# Patient Record
Sex: MALE | Race: WHITE | NOT HISPANIC OR LATINO | Employment: UNEMPLOYED | URBAN - METROPOLITAN AREA
[De-identification: names, ages, dates, MRNs, and addresses within clinical notes are randomized per-mention and may not be internally consistent; named-entity substitution may affect disease eponyms.]

---

## 2018-06-21 ENCOUNTER — HOSPITAL ENCOUNTER (INPATIENT)
Facility: HOSPITAL | Age: 48
LOS: 3 days | Discharge: HOME | End: 2018-06-24
Attending: EMERGENCY MEDICINE | Admitting: INTERNAL MEDICINE

## 2018-06-21 ENCOUNTER — APPOINTMENT (OUTPATIENT)
Dept: ULTRASOUND IMAGING | Facility: HOSPITAL | Age: 48
End: 2018-06-21

## 2018-06-21 ENCOUNTER — APPOINTMENT (OUTPATIENT)
Dept: CT IMAGING | Facility: HOSPITAL | Age: 48
End: 2018-06-21

## 2018-06-21 DIAGNOSIS — R93.2 ABNORMAL GALL BLADDER DIAGNOSTIC IMAGING: ICD-10-CM

## 2018-06-21 DIAGNOSIS — K70.30 ALCOHOLIC CIRRHOSIS OF LIVER WITHOUT ASCITES (HCC): ICD-10-CM

## 2018-06-21 DIAGNOSIS — K92.2 ACUTE UPPER GI BLEED: Primary | ICD-10-CM

## 2018-06-21 PROBLEM — M10.9 GOUT: Status: ACTIVE | Noted: 2018-06-21

## 2018-06-21 PROBLEM — R73.03 PRE-DIABETES: Status: ACTIVE | Noted: 2018-06-21

## 2018-06-21 LAB
ABO GROUP BLD: NORMAL
ALBUMIN SERPL-MCNC: 3.6 G/DL (ref 3.5–5.2)
ALBUMIN/GLOB SERPL: 0.8 G/DL
ALP SERPL-CCNC: 137 U/L (ref 39–117)
ALT SERPL W P-5'-P-CCNC: 29 U/L (ref 1–41)
AMMONIA BLD-SCNC: 69 UMOL/L (ref 16–60)
ANION GAP SERPL CALCULATED.3IONS-SCNC: 12.4 MMOL/L
AST SERPL-CCNC: 90 U/L (ref 1–40)
BASOPHILS # BLD AUTO: 0.02 10*3/MM3 (ref 0–0.2)
BASOPHILS NFR BLD AUTO: 0.3 % (ref 0–1.5)
BILIRUB SERPL-MCNC: 6.9 MG/DL (ref 0.1–1.2)
BILIRUB UR QL STRIP: NEGATIVE
BLD GP AB SCN SERPL QL: NEGATIVE
BUN BLD-MCNC: 3 MG/DL (ref 6–20)
BUN/CREAT SERPL: 5.2 (ref 7–25)
CALCIUM SPEC-SCNC: 9.1 MG/DL (ref 8.6–10.5)
CHLORIDE SERPL-SCNC: 91 MMOL/L (ref 98–107)
CLARITY UR: CLEAR
CO2 SERPL-SCNC: 27.6 MMOL/L (ref 22–29)
COLOR UR: YELLOW
CREAT BLD-MCNC: 0.58 MG/DL (ref 0.76–1.27)
D-LACTATE SERPL-SCNC: 1.5 MMOL/L (ref 0.5–2)
DEPRECATED RDW RBC AUTO: 62.3 FL (ref 37–54)
EOSINOPHIL # BLD AUTO: 0.1 10*3/MM3 (ref 0–0.7)
EOSINOPHIL NFR BLD AUTO: 1.3 % (ref 0.3–6.2)
ERYTHROCYTE [DISTWIDTH] IN BLOOD BY AUTOMATED COUNT: 14.1 % (ref 11.5–14.5)
ETHANOL BLD-MCNC: <10 MG/DL (ref 0–10)
ETHANOL UR QL: <0.01 %
GFR SERPL CREATININE-BSD FRML MDRD: >150 ML/MIN/1.73
GLOBULIN UR ELPH-MCNC: 4.7 GM/DL
GLUCOSE BLD-MCNC: 105 MG/DL (ref 65–99)
GLUCOSE UR STRIP-MCNC: NEGATIVE MG/DL
HAV IGM SERPL QL IA: NORMAL
HBV CORE IGM SERPL QL IA: NORMAL
HBV SURFACE AG SERPL QL IA: NORMAL
HCT VFR BLD AUTO: 32.5 % (ref 40.4–52.2)
HCV AB SER DONR QL: NORMAL
HGB BLD-MCNC: 11 G/DL (ref 13.7–17.6)
HGB UR QL STRIP.AUTO: NEGATIVE
IMM GRANULOCYTES # BLD: 0.03 10*3/MM3 (ref 0–0.03)
IMM GRANULOCYTES NFR BLD: 0.4 % (ref 0–0.5)
INR PPP: 1.89 (ref 0.9–1.1)
IRON 24H UR-MRATE: 49 MCG/DL (ref 59–158)
IRON SATN MFR SERPL: 37 % (ref 20–50)
KETONES UR QL STRIP: NEGATIVE
LEUKOCYTE ESTERASE UR QL STRIP.AUTO: NEGATIVE
LIPASE SERPL-CCNC: 45 U/L (ref 13–60)
LYMPHOCYTES # BLD AUTO: 1.06 10*3/MM3 (ref 0.9–4.8)
LYMPHOCYTES NFR BLD AUTO: 13.5 % (ref 19.6–45.3)
MACROCYTES BLD QL SMEAR: NORMAL
MCH RBC QN AUTO: 41 PG (ref 27–32.7)
MCHC RBC AUTO-ENTMCNC: 33.8 G/DL (ref 32.6–36.4)
MCV RBC AUTO: 121.3 FL (ref 79.8–96.2)
MONOCYTES # BLD AUTO: 0.94 10*3/MM3 (ref 0.2–1.2)
MONOCYTES NFR BLD AUTO: 11.9 % (ref 5–12)
NEUTROPHILS # BLD AUTO: 5.73 10*3/MM3 (ref 1.9–8.1)
NEUTROPHILS NFR BLD AUTO: 72.6 % (ref 42.7–76)
NITRITE UR QL STRIP: NEGATIVE
PH UR STRIP.AUTO: 6.5 [PH] (ref 5–8)
PLAT MORPH BLD: NORMAL
PLATELET # BLD AUTO: 110 10*3/MM3 (ref 140–500)
PMV BLD AUTO: 9.8 FL (ref 6–12)
POTASSIUM BLD-SCNC: 3.5 MMOL/L (ref 3.5–5.2)
PROCALCITONIN SERPL-MCNC: 0.15 NG/ML (ref 0.1–0.25)
PROT SERPL-MCNC: 8.3 G/DL (ref 6–8.5)
PROT UR QL STRIP: NEGATIVE
PROTHROMBIN TIME: 21.4 SECONDS (ref 11.7–14.2)
RBC # BLD AUTO: 2.68 10*6/MM3 (ref 4.6–6)
RH BLD: POSITIVE
SODIUM BLD-SCNC: 131 MMOL/L (ref 136–145)
SP GR UR STRIP: <1.005 (ref 1–1.03)
T&S EXPIRATION DATE: NORMAL
TIBC SERPL-MCNC: 134 MCG/DL (ref 298–536)
TRANSFERRIN SERPL-MCNC: 90 MG/DL (ref 200–360)
UROBILINOGEN UR QL STRIP: ABNORMAL
WBC MORPH BLD: NORMAL
WBC NRBC COR # BLD: 7.88 10*3/MM3 (ref 4.5–10.7)

## 2018-06-21 PROCEDURE — 85007 BL SMEAR W/DIFF WBC COUNT: CPT | Performed by: NURSE PRACTITIONER

## 2018-06-21 PROCEDURE — 81003 URINALYSIS AUTO W/O SCOPE: CPT | Performed by: NURSE PRACTITIONER

## 2018-06-21 PROCEDURE — 80307 DRUG TEST PRSMV CHEM ANLYZR: CPT | Performed by: NURSE PRACTITIONER

## 2018-06-21 PROCEDURE — 85610 PROTHROMBIN TIME: CPT | Performed by: NURSE PRACTITIONER

## 2018-06-21 PROCEDURE — 25010000002 THIAMINE PER 100 MG: Performed by: EMERGENCY MEDICINE

## 2018-06-21 PROCEDURE — 80307 DRUG TEST PRSMV CHEM ANLYZR: CPT | Performed by: INTERNAL MEDICINE

## 2018-06-21 PROCEDURE — 86850 RBC ANTIBODY SCREEN: CPT | Performed by: EMERGENCY MEDICINE

## 2018-06-21 PROCEDURE — 82390 ASSAY OF CERULOPLASMIN: CPT | Performed by: EMERGENCY MEDICINE

## 2018-06-21 PROCEDURE — 85025 COMPLETE CBC W/AUTO DIFF WBC: CPT | Performed by: NURSE PRACTITIONER

## 2018-06-21 PROCEDURE — 99284 EMERGENCY DEPT VISIT MOD MDM: CPT

## 2018-06-21 PROCEDURE — 82140 ASSAY OF AMMONIA: CPT | Performed by: EMERGENCY MEDICINE

## 2018-06-21 PROCEDURE — 76705 ECHO EXAM OF ABDOMEN: CPT

## 2018-06-21 PROCEDURE — 80053 COMPREHEN METABOLIC PANEL: CPT | Performed by: NURSE PRACTITIONER

## 2018-06-21 PROCEDURE — 80074 ACUTE HEPATITIS PANEL: CPT | Performed by: EMERGENCY MEDICINE

## 2018-06-21 PROCEDURE — 84145 PROCALCITONIN (PCT): CPT | Performed by: EMERGENCY MEDICINE

## 2018-06-21 PROCEDURE — 25010000002 IOPAMIDOL 61 % SOLUTION: Performed by: EMERGENCY MEDICINE

## 2018-06-21 PROCEDURE — 85018 HEMOGLOBIN: CPT | Performed by: HOSPITALIST

## 2018-06-21 PROCEDURE — 25010000002 PYRIDOXINE PER 100 MG: Performed by: EMERGENCY MEDICINE

## 2018-06-21 PROCEDURE — 25010000002 ONDANSETRON PER 1 MG: Performed by: NURSE PRACTITIONER

## 2018-06-21 PROCEDURE — 25010000002 VITAMIN K1 PER 1 MG: Performed by: EMERGENCY MEDICINE

## 2018-06-21 PROCEDURE — 83605 ASSAY OF LACTIC ACID: CPT | Performed by: EMERGENCY MEDICINE

## 2018-06-21 PROCEDURE — 83690 ASSAY OF LIPASE: CPT | Performed by: NURSE PRACTITIONER

## 2018-06-21 PROCEDURE — 83540 ASSAY OF IRON: CPT | Performed by: EMERGENCY MEDICINE

## 2018-06-21 PROCEDURE — 36415 COLL VENOUS BLD VENIPUNCTURE: CPT | Performed by: HOSPITALIST

## 2018-06-21 PROCEDURE — 74177 CT ABD & PELVIS W/CONTRAST: CPT

## 2018-06-21 PROCEDURE — 85014 HEMATOCRIT: CPT | Performed by: HOSPITALIST

## 2018-06-21 PROCEDURE — 84466 ASSAY OF TRANSFERRIN: CPT | Performed by: EMERGENCY MEDICINE

## 2018-06-21 PROCEDURE — 86901 BLOOD TYPING SEROLOGIC RH(D): CPT | Performed by: EMERGENCY MEDICINE

## 2018-06-21 PROCEDURE — 86900 BLOOD TYPING SEROLOGIC ABO: CPT | Performed by: EMERGENCY MEDICINE

## 2018-06-21 RX ORDER — ONDANSETRON 4 MG/1
4 TABLET, ORALLY DISINTEGRATING ORAL EVERY 6 HOURS PRN
Status: DISCONTINUED | OUTPATIENT
Start: 2018-06-21 | End: 2018-06-24 | Stop reason: HOSPADM

## 2018-06-21 RX ORDER — SODIUM CHLORIDE 9 MG/ML
100 INJECTION, SOLUTION INTRAVENOUS CONTINUOUS
Status: DISCONTINUED | OUTPATIENT
Start: 2018-06-21 | End: 2018-06-23

## 2018-06-21 RX ORDER — NITROGLYCERIN 0.4 MG/1
0.4 TABLET SUBLINGUAL
Status: DISCONTINUED | OUTPATIENT
Start: 2018-06-21 | End: 2018-06-24 | Stop reason: HOSPADM

## 2018-06-21 RX ORDER — ONDANSETRON 4 MG/1
4 TABLET, FILM COATED ORAL EVERY 6 HOURS PRN
Status: DISCONTINUED | OUTPATIENT
Start: 2018-06-21 | End: 2018-06-24 | Stop reason: HOSPADM

## 2018-06-21 RX ORDER — DIPHENOXYLATE HYDROCHLORIDE AND ATROPINE SULFATE 2.5; .025 MG/1; MG/1
1 TABLET ORAL DAILY
Status: DISCONTINUED | OUTPATIENT
Start: 2018-06-22 | End: 2018-06-24 | Stop reason: HOSPADM

## 2018-06-21 RX ORDER — ONDANSETRON 2 MG/ML
4 INJECTION INTRAMUSCULAR; INTRAVENOUS ONCE
Status: COMPLETED | OUTPATIENT
Start: 2018-06-21 | End: 2018-06-21

## 2018-06-21 RX ORDER — ALLOPURINOL 300 MG/1
300 TABLET ORAL DAILY PRN
Status: DISCONTINUED | OUTPATIENT
Start: 2018-06-21 | End: 2018-06-24 | Stop reason: HOSPADM

## 2018-06-21 RX ORDER — SODIUM CHLORIDE 0.9 % (FLUSH) 0.9 %
1-10 SYRINGE (ML) INJECTION AS NEEDED
Status: DISCONTINUED | OUTPATIENT
Start: 2018-06-21 | End: 2018-06-24 | Stop reason: HOSPADM

## 2018-06-21 RX ORDER — LORAZEPAM 1 MG/1
1 TABLET ORAL EVERY 6 HOURS PRN
Status: DISCONTINUED | OUTPATIENT
Start: 2018-06-21 | End: 2018-06-24 | Stop reason: HOSPADM

## 2018-06-21 RX ORDER — SODIUM CHLORIDE 0.9 % (FLUSH) 0.9 %
10 SYRINGE (ML) INJECTION AS NEEDED
Status: DISCONTINUED | OUTPATIENT
Start: 2018-06-21 | End: 2018-06-24 | Stop reason: HOSPADM

## 2018-06-21 RX ORDER — ONDANSETRON 2 MG/ML
4 INJECTION INTRAMUSCULAR; INTRAVENOUS EVERY 6 HOURS PRN
Status: DISCONTINUED | OUTPATIENT
Start: 2018-06-21 | End: 2018-06-24 | Stop reason: HOSPADM

## 2018-06-21 RX ORDER — THIAMINE MONONITRATE (VIT B1) 100 MG
100 TABLET ORAL DAILY
Status: DISCONTINUED | OUTPATIENT
Start: 2018-06-22 | End: 2018-06-24 | Stop reason: HOSPADM

## 2018-06-21 RX ORDER — ACETAMINOPHEN 325 MG/1
650 TABLET ORAL EVERY 4 HOURS PRN
Status: DISCONTINUED | OUTPATIENT
Start: 2018-06-21 | End: 2018-06-24 | Stop reason: HOSPADM

## 2018-06-21 RX ADMIN — SODIUM CHLORIDE 1000 ML: 9 INJECTION, SOLUTION INTRAVENOUS at 19:43

## 2018-06-21 RX ADMIN — FOLIC ACID 125 ML/HR: 5 INJECTION, SOLUTION INTRAMUSCULAR; INTRAVENOUS; SUBCUTANEOUS at 20:59

## 2018-06-21 RX ADMIN — IOPAMIDOL 85 ML: 612 INJECTION, SOLUTION INTRAVENOUS at 20:21

## 2018-06-21 RX ADMIN — PHYTONADIONE 5 MG: 10 INJECTION, EMULSION INTRAMUSCULAR; INTRAVENOUS; SUBCUTANEOUS at 22:11

## 2018-06-21 RX ADMIN — ONDANSETRON 4 MG: 2 INJECTION INTRAMUSCULAR; INTRAVENOUS at 19:44

## 2018-06-21 NOTE — ED PROVIDER NOTES
" EMERGENCY DEPARTMENT ENCOUNTER    CHIEF COMPLAINT  Chief Complaint: Hematemesis  History given by: Pt  History limited by: Nothing  Room Number: 19/19  PMD: No Known Provider      HPI:  Pt is a 48 y.o. male who presents complaining of vomiting blood (x3-4), which is streaked with blood, and began 2 days ago. The pt denies abd pain or black/tarry stool, and confirms mild fever. The pt stated that he vomited 1 week ago but without bleeding. The pt has never had this kind of vomiting before. The pt states that his symptoms are worse at night and in the morning.     Duration:  2 days  Onset: Gradual  Timing: Intermittent  Quality: Streaked with blood  Intensity/Severity: Moderate  Progression: No change  Associated Symptoms: Mild fever  Aggravating Factors: Unknown  Alleviating Factors: Unknown  Previous Episodes: None  Treatment before arrival: Nonw    PAST MEDICAL HISTORY  Active Ambulatory Problems     Diagnosis Date Noted   • No Active Ambulatory Problems     Resolved Ambulatory Problems     Diagnosis Date Noted   • No Resolved Ambulatory Problems     Past Medical History:   Diagnosis Date   • Gout    • Pre-diabetes        PAST SURGICAL HISTORY  Past Surgical History:   Procedure Laterality Date   • FOOT SURGERY         FAMILY HISTORY  History reviewed. No pertinent family history.    SOCIAL HISTORY  Social History     Social History   • Marital status:      Spouse name: N/A   • Number of children: N/A   • Years of education: N/A     Occupational History   • Not on file.     Social History Main Topics   • Smoking status: Never Smoker   • Smokeless tobacco: Not on file   • Alcohol use Yes      Comment: \"I normally drink on the weekends\".    • Drug use: Unknown   • Sexual activity: Not on file     Other Topics Concern   • Not on file     Social History Narrative   • No narrative on file       ALLERGIES  Patient has no known allergies.    REVIEW OF SYSTEMS  Review of Systems   Constitutional: Positive for " fever. Negative for activity change and appetite change.   HENT: Negative for congestion and sore throat.    Eyes: Negative.    Respiratory: Negative for cough and shortness of breath.    Cardiovascular: Negative for chest pain and leg swelling.   Gastrointestinal: Negative for abdominal pain, blood in stool, diarrhea and vomiting.        Hematemesis   Endocrine: Negative.    Genitourinary: Negative for decreased urine volume and dysuria.   Musculoskeletal: Negative for neck pain.   Skin: Negative for rash and wound.   Allergic/Immunologic: Negative.    Neurological: Negative for weakness, numbness and headaches.   Hematological: Negative.    Psychiatric/Behavioral: Negative.    All other systems reviewed and are negative.      PHYSICAL EXAM  ED Triage Vitals   Temp Heart Rate Resp BP SpO2   06/21/18 1850 06/21/18 1850 06/21/18 1850 06/21/18 1854 06/21/18 1850   97.8 °F (36.6 °C) 101 14 145/87 99 %      Temp src Heart Rate Source Patient Position BP Location FiO2 (%)   06/21/18 1850 06/21/18 1850 06/21/18 1854 06/21/18 1854 --   Tympanic Monitor Sitting Right arm        Physical Exam   Constitutional: He is oriented to person, place, and time. No distress.   HENT:   Head: Normocephalic and atraumatic.   Eyes: EOM are normal. Pupils are equal, round, and reactive to light.   Neck: Normal range of motion. Neck supple.   Cardiovascular: Normal rate, regular rhythm and normal heart sounds.    Pulmonary/Chest: Effort normal and breath sounds normal. No respiratory distress.   Abdominal: Soft. There is hepatomegaly. There is no tenderness. There is no rebound and no guarding.   Hepatomegaly on exam, liver is hard and palpable   Musculoskeletal: Normal range of motion. He exhibits no edema.   Neurological: He is alert and oriented to person, place, and time. He has normal sensation and normal strength.   Skin: Skin is warm and dry.   Psychiatric: Mood and affect normal.   Nursing note and vitals reviewed.      LAB  RESULTS  Lab Results (last 24 hours)     Procedure Component Value Units Date/Time    CBC & Differential [[id number]] Collected:  06/21/18 1906    Specimen:  Blood Updated:  06/21/18 1955    Narrative:       The following orders were created for panel order CBC & Differential.  Procedure                               Abnormality         Status                     ---------                               -----------         ------                     Scan Slide[[id number]]                                       Final result               CBC Auto Differential[[id number]]        Abnormal            Final result                 Please view results for these tests on the individual orders.    Comprehensive Metabolic Panel [[id number]]  (Abnormal) Collected:  06/21/18 1906    Specimen:  Blood Updated:  06/21/18 1944     Glucose 105 (H) mg/dL      BUN 3 (L) mg/dL      Creatinine 0.58 (L) mg/dL      Sodium 131 (L) mmol/L      Potassium 3.5 mmol/L      Chloride 91 (L) mmol/L      CO2 27.6 mmol/L      Calcium 9.1 mg/dL      Total Protein 8.3 g/dL      Albumin 3.60 g/dL      ALT (SGPT) 29 U/L      AST (SGOT) 90 (H) U/L      Alkaline Phosphatase 137 (H) U/L      Total Bilirubin 6.9 (H) mg/dL      eGFR Non African Amer >150 mL/min/1.73      Globulin 4.7 gm/dL      A/G Ratio 0.8 g/dL      BUN/Creatinine Ratio 5.2 (L)     Anion Gap 12.4 mmol/L     Lipase [[id number]]  (Normal) Collected:  06/21/18 1906    Specimen:  Blood Updated:  06/21/18 1938     Lipase 45 U/L     Protime-INR [[id number]]  (Abnormal) Collected:  06/21/18 1906    Specimen:  Blood Updated:  06/21/18 1937     Protime 21.4 (H) Seconds      INR 1.89 (H)    CBC Auto Differential [[id number]]  (Abnormal) Collected:  06/21/18 1906    Specimen:  Blood Updated:  06/21/18 1955     WBC 7.88 10*3/mm3      RBC 2.68 (L) 10*6/mm3      Hemoglobin 11.0 (L) g/dL      Hematocrit 32.5 (L) %      .3 (H) fL      MCH 41.0 (H) pg      MCHC 33.8 g/dL      RDW 14.1 %      RDW-SD 62.3 (H)  "fl      MPV 9.8 fL      Platelets 110 (L) 10*3/mm3      Neutrophil % 72.6 %      Lymphocyte % 13.5 (L) %      Monocyte % 11.9 %      Eosinophil % 1.3 %      Basophil % 0.3 %      Immature Grans % 0.4 %      Neutrophils, Absolute 5.73 10*3/mm3      Lymphocytes, Absolute 1.06 10*3/mm3      Monocytes, Absolute 0.94 10*3/mm3      Eosinophils, Absolute 0.10 10*3/mm3      Basophils, Absolute 0.02 10*3/mm3      Immature Grans, Absolute 0.03 10*3/mm3     Ethanol [[id number]] Collected:  06/21/18 1906    Specimen:  Blood Updated:  06/21/18 1938     Ethanol <10 mg/dL      Ethanol % <0.010 %     Scan Slide [[id number]] Collected:  06/21/18 1906    Specimen:  Blood Updated:  06/21/18 1955     Macrocytes Large/3+     WBC Morphology Normal     Platelet Morphology Normal    Procalcitonin [[id number]]  (Normal) Collected:  06/21/18 1906    Specimen:  Blood Updated:  06/21/18 2040     Procalcitonin 0.15 ng/mL     Narrative:       As a Marker for Sepsis (Non-Neonates):   1. <0.5 ng/mL represents a low risk of severe sepsis and/or septic shock.  1. >2 ng/mL represents a high risk of severe sepsis and/or septic shock.    As a Marker for Lower Respiratory Tract Infections that require antibiotic therapy:  PCT on Admission     Antibiotic Therapy             6-12 Hrs later  > 0.5                Strongly Recommended            >0.25 - <0.5         Recommended  0.1 - 0.25           Discouraged                   Remeasure/reassess PCT  <0.1                 Strongly Discouraged          Remeasure/reassess PCT      As 28 day mortality risk marker: \"Change in Procalcitonin Result\" (> 80 % or <=80 %) if Day 0 (or Day 1) and Day 4 values are available. Refer to http://www.[facility]-pct-calculator.com/   Change in PCT <=80 %   A decrease of PCT levels below or equal to 80 % defines a positive change in PCT test result representing a higher risk for 28-day all-cause mortality of patients diagnosed with severe sepsis or septic shock.  Change in PCT > 80 % "   A decrease of PCT levels of more than 80 % defines a negative change in PCT result representing a lower risk for 28-day all-cause mortality of patients diagnosed with severe sepsis or septic shock.                Iron Profile [[id number]] Collected:  06/21/18 1906    Specimen:  Blood Updated:  06/21/18 2029    Urinalysis With / Microscopic If Indicated (No Culture) - Urine, Clean Catch [[id number]]  (Abnormal) Collected:  06/21/18 1933    Specimen:  Urine from Urine, Clean Catch Updated:  06/21/18 2001     Color, UA Yellow     Appearance, UA Clear     pH, UA 6.5     Specific Gravity, UA <1.005 (L)     Glucose, UA Negative     Ketones, UA Negative     Bilirubin, UA Negative     Blood, UA Negative     Protein, UA Negative     Leuk Esterase, UA Negative     Nitrite, UA Negative     Urobilinogen, UA 1.0 E.U./dL    Narrative:       Urine microscopic not indicated.    Lactic Acid, Plasma [[id number]]  (Normal) Collected:  06/21/18 2014    Specimen:  Blood Updated:  06/21/18 2047     Lactate 1.5 mmol/L     Ceruloplasmin [[id number]] Collected:  06/21/18 2045    Specimen:  Blood Updated:  06/21/18 2049    Hepatitis Panel, Acute [[id number]] Collected:  06/21/18 2045    Specimen:  Blood Updated:  06/21/18 2049          I ordered the above labs and reviewed the results    RADIOLOGY  CT Abdomen Pelvis With Contrast   Final Result       Hepatosplenomegaly. Hepatic steatosis. Portacaval adenopathy,   nonspecific, further evaluation can be obtained.       Sludge or stones in the gallbladder, ultrasound could be considered for   further evaluation.       Minimal pleural effusions. Possible nodule in the lingula, follow-up   recommended.       Discussed by telephone with Dr. Worley at time of interpretation, 2035,   6/21/2018.       This report was finalized on 6/21/2018 8:45 PM by Dr. Rojas Dubon M.D.          US Gallbladder    (Results Pending)        I ordered the above noted radiological studies. Interpreted by  radiologist. Discussed with radiologist (Dr. Griffiths). Reviewed by me in PACS.       PROCEDURES  Procedures      PROGRESS AND CONSULTS  ED Course as of Jun 21 2109   Thu Jun 21, 2018   1905 C/o vomiting blood for the past 3 days.  Pt denies fever, chills, diarrhea, black stools or abdominal pain.  Pt states that he has cut back ETOH to the weekends, previously 4-5 nights a week.  Exam: Sclera icterus, lungs clear, abd soft, non tender, + BS  [MS]   2011 CT Angiogram Chest With Contrast []   2012 CT Angiogram Chest With Contrast []      ED Course User Index  [MS] Lizeth Dupont, APRN  [SM] Jaiden Worley MD   2001 Ordered multivitamin for the pt. Ordered CT abd/pelvis for further evaluation.    2022 Ordered ceruloplasmin, hepatitis panel, and iron profile for further evaluation.    2048 Placed call to [facility] for admission.     2049 Ordered US gallbladder for further evaluation.     2052 Rechecked the patient and he is resting comfortably. Discussed pertinent lab and imaging results, including CT abd pelvis and labs. Discussed that the pt has early cirrhosis, and will need to completely stop drinking alcohol. Discussed the plan to admit the pt to the hospital for GI evaluation. Patient agrees with plan and all questions were addressed.     2100 Discussed the pt with Dr. Miles ([facility]) who agrees to admit the pt    MEDICAL DECISION MAKING  Results were reviewed/discussed with the patient and they were also made aware of online access. Pt also made aware that some labs, such as cultures, will not be resulted during ER visit and follow up with PMD is necessary.     MDM  Number of Diagnoses or Management Options  Abnormal gall bladder diagnostic imaging:   Acute upper GI bleed:   Alcoholic cirrhosis of liver without ascites:      Amount and/or Complexity of Data Reviewed  Clinical lab tests: reviewed and ordered (BUN: 3, Sodium: 131, AST: 90, alkaline phosphatase: 137, total bilirubin: 6.9)  Tests in the radiology  section of CPT®: ordered and reviewed (CT abd/pelvis: hepatosplenomegaly, hepatic steatosis, sludge in gallbladder, possible nodule in lingula)  Discussion of test results with the performing providers: yes (Dr. Dubon (radiology))  Decide to obtain previous medical records or to obtain history from someone other than the patient: yes  Discuss the patient with other providers: yes (Dr. Miles ([facility]))    Patient Progress  Patient progress: stable         DIAGNOSIS  Final diagnoses:   Acute upper GI bleed   Alcoholic cirrhosis of liver without ascites   Abnormal gall bladder diagnostic imaging       DISPOSITION  ADMISSION    Discussed treatment plan and reason for admission with pt/family and admitting physician.  Pt/family voiced understanding of the plan for admission for further testing/treatment as needed.       Latest Documented Vital Signs:  As of 9:09 PM  BP- 132/80 HR- 98 Temp- 97.8 °F (36.6 °C) (Tympanic) O2 sat- 92%    --  Documentation assistance provided by eun Blake for Dr. Worley.  Information recorded by the scribe was done at my direction and has been verified and validated by me.     Josi Blake  06/21/18 2033       Josi Blake  06/21/18 2110       Jaiden Worley MD  06/22/18 0031

## 2018-06-21 NOTE — ED TRIAGE NOTES
"Pt c/o vomiting bright red bloody streaks that started last night and generalized weakness. Pt denies chest pain, shortness of breath, or dizziness. He also denies any dark colored stool but states \"My urine has been really dark\".   "

## 2018-06-22 ENCOUNTER — ANESTHESIA EVENT (OUTPATIENT)
Dept: GASTROENTEROLOGY | Facility: HOSPITAL | Age: 48
End: 2018-06-22

## 2018-06-22 ENCOUNTER — ANESTHESIA (OUTPATIENT)
Dept: GASTROENTEROLOGY | Facility: HOSPITAL | Age: 48
End: 2018-06-22

## 2018-06-22 PROBLEM — K80.20 GALLSTONES: Status: ACTIVE | Noted: 2018-06-22

## 2018-06-22 PROBLEM — R79.89 ELEVATED LIVER FUNCTION TESTS: Status: ACTIVE | Noted: 2018-06-22

## 2018-06-22 PROBLEM — F10.10 ALCOHOL ABUSE: Status: ACTIVE | Noted: 2018-06-22

## 2018-06-22 PROBLEM — K70.10 ALCOHOLIC HEPATITIS WITHOUT ASCITES: Status: ACTIVE | Noted: 2018-06-22

## 2018-06-22 PROBLEM — D69.6 THROMBOCYTOPENIA (HCC): Status: ACTIVE | Noted: 2018-06-22

## 2018-06-22 LAB
ALBUMIN SERPL-MCNC: 3.2 G/DL (ref 3.5–5.2)
ALBUMIN/GLOB SERPL: 0.8 G/DL
ALP SERPL-CCNC: 112 U/L (ref 39–117)
ALT SERPL W P-5'-P-CCNC: 22 U/L (ref 1–41)
AMPHET+METHAMPHET UR QL: NEGATIVE
ANION GAP SERPL CALCULATED.3IONS-SCNC: 12.2 MMOL/L
AST SERPL-CCNC: 70 U/L (ref 1–40)
BARBITURATES UR QL SCN: NEGATIVE
BASOPHILS # BLD AUTO: 0.01 10*3/MM3 (ref 0–0.2)
BASOPHILS NFR BLD AUTO: 0.2 % (ref 0–1.5)
BENZODIAZ UR QL SCN: NEGATIVE
BILIRUB SERPL-MCNC: 6.2 MG/DL (ref 0.1–1.2)
BUN BLD-MCNC: 2 MG/DL (ref 6–20)
BUN/CREAT SERPL: 3.7 (ref 7–25)
CALCIUM SPEC-SCNC: 8.5 MG/DL (ref 8.6–10.5)
CANNABINOIDS SERPL QL: NEGATIVE
CHLORIDE SERPL-SCNC: 94 MMOL/L (ref 98–107)
CO2 SERPL-SCNC: 25.8 MMOL/L (ref 22–29)
COCAINE UR QL: NEGATIVE
CREAT BLD-MCNC: 0.54 MG/DL (ref 0.76–1.27)
DEPRECATED RDW RBC AUTO: 62.3 FL (ref 37–54)
EOSINOPHIL # BLD AUTO: 0.08 10*3/MM3 (ref 0–0.7)
EOSINOPHIL NFR BLD AUTO: 1.5 % (ref 0.3–6.2)
ERYTHROCYTE [DISTWIDTH] IN BLOOD BY AUTOMATED COUNT: 14.1 % (ref 11.5–14.5)
FERRITIN SERPL-MCNC: 875 NG/ML (ref 30–400)
GFR SERPL CREATININE-BSD FRML MDRD: >150 ML/MIN/1.73
GLOBULIN UR ELPH-MCNC: 4 GM/DL
GLUCOSE BLD-MCNC: 102 MG/DL (ref 65–99)
HCT VFR BLD AUTO: 29.8 % (ref 40.4–52.2)
HCT VFR BLD AUTO: 30.1 % (ref 40.4–52.2)
HCT VFR BLD AUTO: 32.5 % (ref 40.4–52.2)
HGB BLD-MCNC: 10 G/DL (ref 13.7–17.6)
HGB BLD-MCNC: 10.8 G/DL (ref 13.7–17.6)
HGB BLD-MCNC: 9.9 G/DL (ref 13.7–17.6)
IMM GRANULOCYTES # BLD: 0 10*3/MM3 (ref 0–0.03)
IMM GRANULOCYTES NFR BLD: 0 % (ref 0–0.5)
INR PPP: 1.92 (ref 0.9–1.1)
LYMPHOCYTES # BLD AUTO: 0.76 10*3/MM3 (ref 0.9–4.8)
LYMPHOCYTES NFR BLD AUTO: 14.4 % (ref 19.6–45.3)
MCH RBC QN AUTO: 40.1 PG (ref 27–32.7)
MCHC RBC AUTO-ENTMCNC: 32.9 G/DL (ref 32.6–36.4)
MCV RBC AUTO: 121.9 FL (ref 79.8–96.2)
METHADONE UR QL SCN: NEGATIVE
MONOCYTES # BLD AUTO: 0.78 10*3/MM3 (ref 0.2–1.2)
MONOCYTES NFR BLD AUTO: 14.7 % (ref 5–12)
NEUTROPHILS # BLD AUTO: 3.66 10*3/MM3 (ref 1.9–8.1)
NEUTROPHILS NFR BLD AUTO: 69.2 % (ref 42.7–76)
OPIATES UR QL: NEGATIVE
OXYCODONE UR QL SCN: NEGATIVE
PLATELET # BLD AUTO: 92 10*3/MM3 (ref 140–500)
PMV BLD AUTO: 9.9 FL (ref 6–12)
POTASSIUM BLD-SCNC: 3.4 MMOL/L (ref 3.5–5.2)
PROT SERPL-MCNC: 7.2 G/DL (ref 6–8.5)
PROTHROMBIN TIME: 21.6 SECONDS (ref 11.7–14.2)
RBC # BLD AUTO: 2.47 10*6/MM3 (ref 4.6–6)
SODIUM BLD-SCNC: 132 MMOL/L (ref 136–145)
WBC NRBC COR # BLD: 5.29 10*3/MM3 (ref 4.5–10.7)

## 2018-06-22 PROCEDURE — 25010000002 PROPOFOL 10 MG/ML EMULSION: Performed by: NURSE ANESTHETIST, CERTIFIED REGISTERED

## 2018-06-22 PROCEDURE — 99254 IP/OBS CNSLTJ NEW/EST MOD 60: CPT | Performed by: INTERNAL MEDICINE

## 2018-06-22 PROCEDURE — 85025 COMPLETE CBC W/AUTO DIFF WBC: CPT | Performed by: HOSPITALIST

## 2018-06-22 PROCEDURE — 85014 HEMATOCRIT: CPT | Performed by: HOSPITALIST

## 2018-06-22 PROCEDURE — 85018 HEMOGLOBIN: CPT | Performed by: HOSPITALIST

## 2018-06-22 PROCEDURE — 88305 TISSUE EXAM BY PATHOLOGIST: CPT | Performed by: INTERNAL MEDICINE

## 2018-06-22 PROCEDURE — 87081 CULTURE SCREEN ONLY: CPT | Performed by: INTERNAL MEDICINE

## 2018-06-22 PROCEDURE — 82728 ASSAY OF FERRITIN: CPT | Performed by: INTERNAL MEDICINE

## 2018-06-22 PROCEDURE — 43239 EGD BIOPSY SINGLE/MULTIPLE: CPT | Performed by: INTERNAL MEDICINE

## 2018-06-22 PROCEDURE — 80053 COMPREHEN METABOLIC PANEL: CPT | Performed by: HOSPITALIST

## 2018-06-22 PROCEDURE — 88312 SPECIAL STAINS GROUP 1: CPT | Performed by: INTERNAL MEDICINE

## 2018-06-22 PROCEDURE — 85610 PROTHROMBIN TIME: CPT | Performed by: HOSPITALIST

## 2018-06-22 PROCEDURE — 0DB68ZX EXCISION OF STOMACH, VIA NATURAL OR ARTIFICIAL OPENING ENDOSCOPIC, DIAGNOSTIC: ICD-10-PCS | Performed by: INTERNAL MEDICINE

## 2018-06-22 RX ORDER — POTASSIUM CHLORIDE 750 MG/1
40 CAPSULE, EXTENDED RELEASE ORAL ONCE
Status: COMPLETED | OUTPATIENT
Start: 2018-06-22 | End: 2018-06-22

## 2018-06-22 RX ORDER — PROMETHAZINE HYDROCHLORIDE 25 MG/1
25 TABLET ORAL ONCE AS NEEDED
Status: DISCONTINUED | OUTPATIENT
Start: 2018-06-22 | End: 2018-06-22 | Stop reason: HOSPADM

## 2018-06-22 RX ORDER — ZINC SULFATE 50(220)MG
220 CAPSULE ORAL DAILY
Status: DISCONTINUED | OUTPATIENT
Start: 2018-06-22 | End: 2018-06-24 | Stop reason: HOSPADM

## 2018-06-22 RX ORDER — PROMETHAZINE HYDROCHLORIDE 25 MG/ML
12.5 INJECTION, SOLUTION INTRAMUSCULAR; INTRAVENOUS ONCE AS NEEDED
Status: DISCONTINUED | OUTPATIENT
Start: 2018-06-22 | End: 2018-06-22 | Stop reason: HOSPADM

## 2018-06-22 RX ORDER — PROPOFOL 10 MG/ML
VIAL (ML) INTRAVENOUS CONTINUOUS PRN
Status: DISCONTINUED | OUTPATIENT
Start: 2018-06-22 | End: 2018-06-22 | Stop reason: SURG

## 2018-06-22 RX ORDER — SODIUM CHLORIDE, SODIUM LACTATE, POTASSIUM CHLORIDE, CALCIUM CHLORIDE 600; 310; 30; 20 MG/100ML; MG/100ML; MG/100ML; MG/100ML
30 INJECTION, SOLUTION INTRAVENOUS CONTINUOUS
Status: CANCELLED | OUTPATIENT
Start: 2018-06-22

## 2018-06-22 RX ORDER — PROMETHAZINE HYDROCHLORIDE 25 MG/1
25 SUPPOSITORY RECTAL ONCE AS NEEDED
Status: DISCONTINUED | OUTPATIENT
Start: 2018-06-22 | End: 2018-06-22 | Stop reason: HOSPADM

## 2018-06-22 RX ORDER — SODIUM CHLORIDE, SODIUM LACTATE, POTASSIUM CHLORIDE, CALCIUM CHLORIDE 600; 310; 30; 20 MG/100ML; MG/100ML; MG/100ML; MG/100ML
1000 INJECTION, SOLUTION INTRAVENOUS CONTINUOUS
Status: ACTIVE | OUTPATIENT
Start: 2018-06-22 | End: 2018-06-24

## 2018-06-22 RX ORDER — PENTOXIFYLLINE 400 MG/1
400 TABLET, EXTENDED RELEASE ORAL
Status: DISCONTINUED | OUTPATIENT
Start: 2018-06-22 | End: 2018-06-24 | Stop reason: HOSPADM

## 2018-06-22 RX ORDER — TEMAZEPAM 15 MG/1
15 CAPSULE ORAL NIGHTLY PRN
Status: DISCONTINUED | OUTPATIENT
Start: 2018-06-22 | End: 2018-06-24 | Stop reason: HOSPADM

## 2018-06-22 RX ORDER — PROPOFOL 10 MG/ML
VIAL (ML) INTRAVENOUS AS NEEDED
Status: DISCONTINUED | OUTPATIENT
Start: 2018-06-22 | End: 2018-06-22 | Stop reason: SURG

## 2018-06-22 RX ORDER — SODIUM CHLORIDE 0.9 % (FLUSH) 0.9 %
3 SYRINGE (ML) INJECTION AS NEEDED
Status: DISCONTINUED | OUTPATIENT
Start: 2018-06-22 | End: 2018-06-23

## 2018-06-22 RX ORDER — POTASSIUM CHLORIDE 750 MG/1
40 CAPSULE, EXTENDED RELEASE ORAL ONCE
Status: DISCONTINUED | OUTPATIENT
Start: 2018-06-22 | End: 2018-06-22

## 2018-06-22 RX ORDER — PANTOPRAZOLE SODIUM 40 MG/10ML
80 INJECTION, POWDER, LYOPHILIZED, FOR SOLUTION INTRAVENOUS ONCE
Status: COMPLETED | OUTPATIENT
Start: 2018-06-22 | End: 2018-06-22

## 2018-06-22 RX ADMIN — Medication 100 MG: at 07:53

## 2018-06-22 RX ADMIN — PROPOFOL 200 MG: 10 INJECTION, EMULSION INTRAVENOUS at 14:34

## 2018-06-22 RX ADMIN — LORAZEPAM 1 MG: 1 TABLET ORAL at 00:45

## 2018-06-22 RX ADMIN — TEMAZEPAM 15 MG: 15 CAPSULE ORAL at 23:53

## 2018-06-22 RX ADMIN — POTASSIUM CHLORIDE 40 MEQ: 750 CAPSULE, EXTENDED RELEASE ORAL at 17:02

## 2018-06-22 RX ADMIN — SODIUM CHLORIDE 8 MG/HR: 900 INJECTION INTRAVENOUS at 05:50

## 2018-06-22 RX ADMIN — SODIUM CHLORIDE, POTASSIUM CHLORIDE, SODIUM LACTATE AND CALCIUM CHLORIDE 1000 ML: 600; 310; 30; 20 INJECTION, SOLUTION INTRAVENOUS at 13:44

## 2018-06-22 RX ADMIN — SODIUM CHLORIDE, POTASSIUM CHLORIDE, SODIUM LACTATE AND CALCIUM CHLORIDE: 600; 310; 30; 20 INJECTION, SOLUTION INTRAVENOUS at 14:26

## 2018-06-22 RX ADMIN — Medication 1 TABLET: at 07:53

## 2018-06-22 RX ADMIN — SODIUM CHLORIDE 8 MG/HR: 900 INJECTION INTRAVENOUS at 00:45

## 2018-06-22 RX ADMIN — SODIUM CHLORIDE 8 MG/HR: 900 INJECTION INTRAVENOUS at 21:28

## 2018-06-22 RX ADMIN — PENTOXIFYLLINE 400 MG: 400 TABLET, EXTENDED RELEASE ORAL at 16:16

## 2018-06-22 RX ADMIN — PROPOFOL 300 MCG/KG/MIN: 10 INJECTION, EMULSION INTRAVENOUS at 14:34

## 2018-06-22 RX ADMIN — PANTOPRAZOLE SODIUM 80 MG: 40 INJECTION, POWDER, FOR SOLUTION INTRAVENOUS at 00:46

## 2018-06-22 RX ADMIN — SODIUM CHLORIDE 100 ML/HR: 9 INJECTION, SOLUTION INTRAVENOUS at 20:11

## 2018-06-22 RX ADMIN — SODIUM CHLORIDE 100 ML/HR: 9 INJECTION, SOLUTION INTRAVENOUS at 05:01

## 2018-06-22 RX ADMIN — Medication 220 MG: at 16:16

## 2018-06-22 NOTE — PROGRESS NOTES
Attempted to see patient. Chart was read. He is in Endo at this time. [facility] will attempt to consult patient after returning from Endo.

## 2018-06-22 NOTE — PROGRESS NOTES
Discharge Planning Assessment  [facility]     Patient Name: Enrique Ramirez  MRN: [id number]  Today's Date: 6/22/2018    Admit Date: 6/21/2018          Discharge Needs Assessment     Row Name 06/22/18 1012       Living Environment    Lives With spouse    Name(s) of Who Lives With Patient Yessenia Ramirez  (Wife)    Current Living Arrangements home/apartment/condo    Primary Care Provided by self    Provides Primary Care For no one    Family Caregiver if Needed spouse    Family Caregiver Names Yessenia Ramirez    Able to Return to Prior Arrangements yes    Living Arrangement Comments Pt lives in a two story house with walk out basement with wife  (Yessenia Ramirez).       Resource/Environmental Concerns    Resource/Environmental Concerns none    Transportation Concerns car, none       Transition Planning    Patient/Family Anticipates Transition to home with family    Patient/Family Anticipated Services at Transition none    Transportation Anticipated car, drives self;family or friend will provide       Discharge Needs Assessment    Concerns to be Addressed denies needs/concerns at this time    Equipment Currently Used at Home none    Anticipated Changes Related to Illness none    Offered/Gave Vendor List no            Discharge Plan     Row Name 06/22/18 1014       Plan    Plan Plan home with wife.  TIA Crowder    Patient/Family in Agreement with Plan yes    Plan Comments FACE SHEET VERIFIED.  Spoke to pt at bedside.  Pt does not have a PCP.  Pt provided [facility] - pt appointment liaison number and list of [facility]MA MDs. Pt lives in a two story house with wife (Yessenia Ramirez  [phone]).  Pt is independent with ADL's.  Pt does not use any DME's.  Pt gets prescriptions at [facility]  ([address]  Pt denies any issues affording medications.   Pt is not current with HH.  Pt has not been in SNF.  Pt denies any discharge needs.  Plan home with wife.  TIA Crowder        Destination     No service coordination in this encounter.       Durable Medical Equipment     No service coordination in this encounter.      Dialysis/Infusion     No service coordination in this encounter.      Home Medical Care     No service coordination in this encounter.      Social Care     No service coordination in this encounter.                Demographic Summary     Row Name 06/22/18 1012       General Information    Admission Type inpatient    Arrived From emergency department    Referral Source admission list    Reason for Consult discharge planning    Preferred Language English     Used During This Interaction no            Functional Status     Row Name 06/22/18 1012       Functional Status    Usual Activity Tolerance good    Current Activity Tolerance good       Functional Status, IADL    Medications independent    Meal Preparation independent    Housekeeping independent    Laundry independent    Shopping independent       Mental Status    General Appearance WDL WDL       Mental Status Summary    Recent Changes in Mental Status/Cognitive Functioning no changes            Psychosocial    No documentation.           Abuse/Neglect    No documentation.           Legal    No documentation.           Substance Abuse    No documentation.           Patient Forms    No documentation.         Sudha Quiles RN

## 2018-06-22 NOTE — CONSULTS
[facility]  Initial Inpatient Consult Note    Referring Provider: Dr Miles [facility]    Reason for Consultation: GI bleed    Subjective     History of present illness:  48-year-old man with a past medical history of gout admitted with nausea, jaundice, and reports of hematemesis.  He reports that symptoms started about 6 days ago.  He had some nausea and bilious emesis.  It sounds like this occurred after some binge drinking.  He had some associated anorexia.  The nausea and poor appetite persisted.  For the past 4 days prior to admission, he has had some more emesis.  It has been blood-streaked and also frankly bloody.  He describes the blood as bright red.  He denies any coffee-ground emesis.  He denies any associated abdominal pain.  He denies any associated melenic stools, diarrhea, or hematochezia.  He denies any associated heartburn or reflux symptoms.  He was taking some ibuprofen a few days ago but only 2 tablets here there and denies any excessive use.  He does have a fairly significant alcohol use history with drinking at least 6 alcoholic drinks 3-4 times per week.  He denies that he drinks more though his lab work suggests otherwise.  He reports that he has had dark urine for at least the past 6 weeks.  He reports that his wife told him that his eyes looked yellow recently.    Currently, he reports feeling well.  He denies any emesis since admission.    Labs show that his hemoglobin back in 2015 was 15.7.  His hemoglobin this morning is down to 9.9 with macrocytic indices.  He has a thrombocytopenia with platelets of 92.  INR elevated at 1.92  CMP shows a total bilirubin of 6.9 at admission with an AST of 90 and an ALT of 29.  Imaging has shown hepatic steatosis.  Hepatitis panel is negative.    He reports a colonoscopy at age 42 due to father's history of colon polyps.  He has never had an EGD.    Past Medical History:  Past Medical History:   Diagnosis Date   • Gout    •  "Pre-diabetes        Past Surgical History:  Past Surgical History:   Procedure Laterality Date   • FOOT SURGERY          Social History:   Social History   Substance Use Topics   • Smoking status: Never Smoker   • Smokeless tobacco: Not on file   • Alcohol use Yes      Comment: \"I normally drink on the weekends\".         Family History:  Family History   Problem Relation Age of Onset   • Lung cancer Mother    • Heart attack Father        Home Meds:  Prescriptions Prior to Admission   Medication Sig Dispense Refill Last Dose   • ALLOPURINOL PO Take 300 mg by mouth Daily As Needed (\"gout flare-up\").          Current Meds:     multivitamin 1 tablet Oral Daily   potassium chloride 40 mEq Oral Once   thiamine 100 mg Oral Daily       Allergies:  No Known Allergies    Review of Systems  All systems were reviewed and negative except for:  Gastrointestinal: postitive for  hematemesis, jaundice, nausea and vomiting     Objective     Vital Signs  Temp:  [97.8 °F (36.6 °C)-99.4 °F (37.4 °C)] 98.2 °F (36.8 °C)  Heart Rate:  [] 85  Resp:  [14-18] 18  BP: (113-145)/(61-87) 124/65    Physical Exam:  Constitutional:    Alert, cooperative, in no acute distress, appears stated age   Eyes:            Lids and lashes normal, conjunctivae and sclerae normal, +    icterus   Ears, nose, mouth and throat:   Normal appearance of external ears and nose, no oral lesions, no thrush, oral mucosa moist   Respiratory:     Clear to auscultation, respirations regular, even and                   unlabored    Cardiovascular:    Regular rhythm and normal rate, normal S1 and S2, no            murmur, no gallop, palpable distal pulses, no lower extremity edema   Gastrointestinal:    Soft and obese, non-distended, non-tender to palpation, no guarding, no rebound tenderness, normal bowel sounds, no palpable masses or organomegaly  Rectal exam: deferred   Musculoskeletal:   Normal station, no atrophy, no tenderness to palpation, normal digits and " nails   Skin:   Mild jaundice, no bleeding, bruising, rashes or lesions   Lymphatics:   No palpable cervical or supraclavicular adenopathy   Psychiatric:  Judgement and insight: normal   Orientation to person, place and time: normal   Mood and affect: normal       Results Review:   I reviewed the patient's new clinical results.      Results from last 7 days  Lab Units 06/22/18  0726 06/21/18  2346 06/21/18  1906   WBC 10*3/mm3 5.29  --  7.88   HEMOGLOBIN g/dL 9.9* 10.0* 11.0*   HEMATOCRIT % 30.1* 29.8* 32.5*   PLATELETS 10*3/mm3 92*  --  110*         Results from last 7 days  Lab Units 06/22/18  0726 06/21/18  1906   SODIUM mmol/L 132* 131*   POTASSIUM mmol/L 3.4* 3.5   CHLORIDE mmol/L 94* 91*   CO2 mmol/L 25.8 27.6   BUN mg/dL 2* 3*   CREATININE mg/dL 0.54* 0.58*   CALCIUM mg/dL 8.5* 9.1   BILIRUBIN mg/dL 6.2* 6.9*   ALK PHOS U/L 112 137*   ALT (SGPT) U/L 22 29   AST (SGOT) U/L 70* 90*   GLUCOSE mg/dL 102* 105*         Results from last 7 days  Lab Units 06/22/18  0726 06/21/18  1906   INR  1.92* 1.89*         Lab Results  Lab Value Date/Time   LIPASE 45 06/21/2018 1906       Radiology:  Imaging Results (last 72 hours)     Procedure Component Value Units Date/Time    US Gallbladder [[id number]] Collected:  06/21/18 2148     Updated:  06/21/18 2210    Narrative:       ULTRASOUND GALLBLADDER.     TECHNIQUE: Multiple grayscale color and Doppler images were obtained.     HISTORY: Elevated bilirubin.     COMPARISON: No prior studies for comparison.     FINDINGS:  Liver does not demonstrate a mass or intrahepatic biliary ductal  dilatation. Fatty infiltration of the liver, hepatomegaly measures 22  cm.     Gallbladder is collapsed and demonstrates sludge with echogenic punctate  foci which may be tiny gallstones measuring up to 0.3 cm. Sonographic  Morrissey's sign is negative. Gallbladder wall thickness measures 0.5 cm,  the wall appears edematous versus small amount of pericholecystic fluid.  Common bile duct measures 0.5  cm.     Limited evaluation of pancreas is unremarkable, no mass.     Right kidney measures 12.8 x 9.7 x 5.8 cm, no mass, hydronephrosis or  calculus.       Impression:       Gallbladder demonstrates sludge with possible tiny calculi measuring up  to 0.3 cm. Gallbladder wall edema versus small amount of pericholecystic  fluid, Morrissey's sign is negative, the findings may be related to chronic  cholecystitis. Acute cholecystitis is possible but appears less likely.  If there is high concern for acute cholecystitis HIDA scan may be  performed.     Findings called to Jaiden Worley, 9:44 PM.       CT Abdomen Pelvis With Contrast [[id number]] Collected:  06/21/18 2033     Updated:  06/21/18 2049    Narrative:       CT ABDOMEN PELVIS W CONTRAST-     INDICATIONS: Elevated bilirubin     TECHNIQUE: Radiation dose reduction techniques were utilized, including  automated exposure control and exposure modulation based on body size.   Enhanced ABDOMEN AND PELVIS CT     COMPARISON: None available     FINDINGS:      Diffuse low-density of the liver compatible with steatosis. The liver  and spleen are enlarged. The spleen is measured at 16.6 cm AP, 20.3 cm  CC.     Density within the gallbladder lumen may be sludge or layering stones,  ultrasound could be considered for further evaluation.           Otherwise unremarkable appearance of the liver, spleen, adrenal glands,  pancreas, kidneys, bladder.     No bowel obstruction or abnormal bowel thickening is identified.     No free intraperitoneal gas or free fluid.     Enlarged portal caval lymph nodes are seen, nonspecific. For example,  precaval lymph node on image 65 measures 3.1 x 2.2 cm, could be evidence  of neoplasm or may be reactive. Further evaluation with positron  emission tomography could be obtained. Interval follow-up can  characterize change. Scattered small mesenteric and para-aortic lymph  nodes are seen that are not significant by size criteria.     Abdominal aorta is  not aneurysmal. Aortic and other arterial  calcifications are present.     The lung bases show minimal pleural effusions and linear densities  suggesting atelectasis and scarring. Somewhat nodular density in the  lingula, 8 mm on image 10 of series 1, may be nodular atelectasis,  follow-up CT of the chest in 3 months advised to exclude the possibility  of a nodule/characterize change.     Degenerative changes are seen in the spine. No acute fracture is  identified.             Impression:          Hepatosplenomegaly. Hepatic steatosis. Portacaval adenopathy,  nonspecific, further evaluation can be obtained.     Sludge or stones in the gallbladder, ultrasound could be considered for  further evaluation.     Minimal pleural effusions. Possible nodule in the lingula, follow-up  recommended.     Discussed by telephone with Dr. Worley at time of interpretation, 2035,  6/21/2018.     This report was finalized on 6/21/2018 8:45 PM by Dr. Rojas Dubon M.D.             Assessment/Plan     Principal Problem:    Acute upper GI bleed  Active Problems:    Gout    Pre-diabetes    Gallstones    Elevated liver function tests    Alcohol abuse    Alcoholic hepatitis without ascites      Impression  1. Hematemesis: leigh blood.  Possibly alcoholic gastritis versus portal hypertensive gastropathy versus esophageal varices    2. Macrocytic anemia: Acute decline over the past 3 years    3. Alcoholic hepatitis: Discriminate function of 50, MELD 21    4. Thrombocytopenia: Associated with #1    5. Cholelithiasis: no evidence of obstruction on imaging, denies any abdominal pain    6. Alcohol abuse: suspect drinking more than he is admitting to    Plan  EGD today for further evaluation of hematemesis  Start trental, zinc  3 meals with snacks daily  Needs absolute alcohol cessation      I discussed the patients findings and my recommendations with patient, nursing staff and consulting provider    Emerald White MD  [facility]      Dictated utilizing Dragon dictation

## 2018-06-22 NOTE — PLAN OF CARE
Problem: Patient Care Overview  Goal: Plan of Care Review  Outcome: Ongoing (interventions implemented as appropriate)   06/22/18 4181   Coping/Psychosocial   Plan of Care Reviewed With patient   Plan of Care Review   Progress improving   OTHER   Outcome Summary EGD done, test showed esophageal varices, CIWA at 1 most of shift, leigh discussion about ETOH cessation, no other complications       Problem: Gastrointestinal Bleeding (Adult)  Goal: Signs and Symptoms of Listed Potential Problems Will be Absent, Minimized or Managed (Gastrointestinal Bleeding)  Outcome: Ongoing (interventions implemented as appropriate)      Problem: Fall Risk (Adult)  Goal: Absence of Fall  Outcome: Ongoing (interventions implemented as appropriate)

## 2018-06-22 NOTE — PLAN OF CARE
Problem: Patient Care Overview  Goal: Plan of Care Review  Outcome: Ongoing (interventions implemented as appropriate)   06/22/18 0342   Coping/Psychosocial   Plan of Care Reviewed With patient   Plan of Care Review   Progress no change   OTHER   Outcome Summary Pt has no c/o pain, PRN ativan given for anxiety, GI consulted, iv fluids continue. No s/s of distress noted will continue to monitor. at 0344 6/22/18       Problem: Gastrointestinal Bleeding (Adult)  Goal: Signs and Symptoms of Listed Potential Problems Will be Absent, Minimized or Managed (Gastrointestinal Bleeding)  Outcome: Ongoing (interventions implemented as appropriate)      Problem: Fall Risk (Adult)  Goal: Identify Related Risk Factors and Signs and Symptoms  Outcome: Ongoing (interventions implemented as appropriate)    Goal: Absence of Fall  Outcome: Ongoing (interventions implemented as appropriate)

## 2018-06-22 NOTE — PROGRESS NOTES
Continued Stay Note  [facility]     Patient Name: Enrique Ramirez  MRN: [id number]  Today's Date: 6/22/2018    Admit Date: 6/21/2018          Discharge Plan     Row Name 06/22/18 1014       Plan    Plan Plan home with wife.  TIA Crowder    Patient/Family in Agreement with Plan yes    Plan Comments FACE SHEET VERIFIED.  Spoke to pt at bedside.  Pt does not have a PCP.  Pt provided [facility] - pt appointment liaison number and list of [facility] MDs. Pt lives in a two story house with wife (Yessenia Ramirez  [phone]).  Pt is independent with ADL's.  Pt does not use any DME's.  Pt gets prescriptions at [facility]  (Gene Mcnamara).  Pt denies any issues affording medications.   Pt is not current with HH.  Pt has not been in SNF.  Pt denies any discharge needs.  Plan home with wife.  TIA Crowder              Discharge Codes    No documentation.           Sudha Quiles RN

## 2018-06-22 NOTE — ANESTHESIA PREPROCEDURE EVALUATION
Anesthesia Evaluation     Patient summary reviewed   NPO Solid Status: > 8 hours  NPO Liquid Status: > 6 hours           Airway   Mallampati: II  TM distance: >3 FB  Dental      Pulmonary    Cardiovascular     Rhythm: regular  Rate: normal        Neuro/Psych  GI/Hepatic/Renal/Endo    (+) obesity,  GI bleeding, hepatitis, liver disease,     Musculoskeletal     Abdominal    Substance History   (+) alcohol use,      OB/GYN          Other                        Anesthesia Plan    ASA 3     MAC   total IV anesthesia  Anesthetic plan and risks discussed with patient.

## 2018-06-22 NOTE — H&P
"HISTORY AND PHYSICAL   [facility]        Patient Identification:  Name: Enrique Ramirez  Age: 48 y.o.  Sex: male  :  1970  MRN: [id number]                     Primary Care Physician: No Known Provider    Chief Complaint:  Vomiting blood    History of Present Illness:        The patient is a 48-year-old white male with history of gout and prediabetes who is admitted with history of having nausea and vomiting with some blood in the emesis.  He is felt sick for about 3 or 4 days.    Past Medical History:  Past Medical History:   Diagnosis Date   • Gout    • Pre-diabetes      Past Surgical History:  Past Surgical History:   Procedure Laterality Date   • FOOT SURGERY        Home Meds:  Prescriptions Prior to Admission   Medication Sig Dispense Refill Last Dose   • ALLOPURINOL PO Take 300 mg by mouth Daily As Needed (\"gout flare-up\").        Current meds    Current Facility-Administered Medications:   •  acetaminophen (TYLENOL) tablet 650 mg, 650 mg, Oral, Q4H PRN, Anthony Miles MD  •  allopurinol (ZYLOPRIM) tablet 300 mg, 300 mg, Oral, Daily PRN, Anthony Miles MD  •  LORazepam (ATIVAN) tablet 1 mg, 1 mg, Oral, Q6H PRN, Anthony Miles MD, 1 mg at 18  •  multivitamin (THERAGRAN) tablet 1 tablet, 1 tablet, Oral, Daily, Anthony Miles MD  •  nitroglycerin (NITROSTAT) SL tablet 0.4 mg, 0.4 mg, Sublingual, Q5 Min PRN, Anthony Miles MD  •  ondansetron (ZOFRAN) tablet 4 mg, 4 mg, Oral, Q6H PRN **OR** ondansetron ODT (ZOFRAN-ODT) disintegrating tablet 4 mg, 4 mg, Oral, Q6H PRN **OR** ondansetron (ZOFRAN) injection 4 mg, 4 mg, Intravenous, Q6H PRN, Anthony Miles MD  •  pantoprazole (PROTONIX) 40 mg/100 mL (0.4 mg/mL) in 0.9% NS IVPB, 8 mg/hr, Intravenous, Continuous, Anthony Miles MD, Last Rate: 20 mL/hr at 18, 8 mg/hr at 18  •  sodium chloride 0.9 % flush 1-10 mL, 1-10 mL, Intravenous, PRN, Anthony Miles MD  •  Insert peripheral IV, , , Once **AND** sodium chloride 0.9 % flush 10 " "mL, 10 mL, Intravenous, PRN, Lizeth Dupont, APRN  •  sodium chloride 0.9 % infusion, 100 mL/hr, Intravenous, Continuous, Anthony Miles MD  •  thiamine (VITAMIN B-1) tablet 100 mg, 100 mg, Oral, Daily, Anthony Miles MD  Allergies:  No Known Allergies  Immunizations:    There is no immunization history on file for this patient.  Social History:   Social History     Social History Narrative   • No narrative on file     Social History     Social History   • Marital status:      Spouse name: N/A   • Number of children: N/A   • Years of education: N/A     Occupational History   • Not on file.     Social History Main Topics   • Smoking status: Never Smoker   • Smokeless tobacco: Not on file   • Alcohol use Yes      Comment: \"I normally drink on the weekends\".    • Drug use: No   • Sexual activity: Defer     Other Topics Concern   • Not on file     Social History Narrative   • No narrative on file       Family History:  Family History   Problem Relation Age of Onset   • Lung cancer Mother    • Heart attack Father         Review of Systems  See history of present illness and past medical history.  Patient denies headache, dizziness, syncope, falls, trauma, change in vision, change in hearing, change in taste, changes in weight, changes in appetite, focal weakness, numbness, or paresthesia.  Patient denies chest pain, palpitations, dyspnea, orthopnea, PND, cough, sinus pressure, rhinorrhea, epistaxis, hemoptysis,  diarrhea, constipation or hematchezia.  Denies cold or heat intolerance, polydipsia, polyuria, polyphagia. Denies hematuria, pyuria, dysuria, hesitancy, frequency or urgency.  Denies fever, chills, sweats, night sweats.   Remainder of ROS is negative.    Objective:  tMax 24 hrs: Temp (24hrs), Av.6 °F (37 °C), Min:97.8 °F (36.6 °C), Max:99.4 °F (37.4 °C)    Vitals Ranges:   Temp:  [97.8 °F (36.6 °C)-99.4 °F (37.4 °C)] 99.4 °F (37.4 °C)  Heart Rate:  [] 98  Resp:  [14-18] 18  BP: " "(132-145)/(76-87) 132/82      Exam:  /82 (BP Location: Right arm, Patient Position: Lying)   Pulse 98   Temp 99.4 °F (37.4 °C) (Oral)   Resp 18   Ht 180.3 cm (71\")   Wt 113 kg (250 lb)   SpO2 92%   BMI 34.87 kg/m²     General Appearance:    Alert, cooperative, no distress, appears stated age   Head:    Normocephalic, without obvious abnormality, atraumatic   Eyes:    PERRL, conjunctiva/corneas clear, EOM's intact, both eyes   Ears:    Normal external ear canals, both ears   Nose:   Nares normal, septum midline, mucosa normal, no drainage    or sinus tenderness   Throat:   Lips, mucosa, and tongue normal   Neck:   Supple, symmetrical, trachea midline, no adenopathy;     thyroid:  no enlargement/tenderness/nodules; no carotid    bruit or JVD   Back:     Symmetric, no curvature, ROM normal, no CVA tenderness   Lungs:     Clear to auscultation bilaterally, respirations unlabored   Chest Wall:    No tenderness or deformity    Heart:    Regular rate and rhythm, S1 and S2 normal, no murmur, rub   or gallop   Abdomen:     Soft, non-tender, bowel sounds active all four quadrants,     no masses, Some hepatomegaly and splenomegaly   Extremities:   Extremities normal, atraumatic, no cyanosis or edema   Pulses:   2+ and symmetric all extremities   Skin:   Skin color, texture, turgor normal, no rashes or lesions   Lymph nodes:   Cervical, supraclavicular, and axillary nodes normal   Neurologic:   CNII-XII intact, normal strength, sensation intact throughout      .    Data Review:  Lab Results (last 72 hours)     Procedure Component Value Units Date/Time    Ammonia [[id number]] Collected:  06/21/18 2204    Specimen:  Blood Updated:  06/21/18 2211    Iron Profile [[id number]]  (Abnormal) Collected:  06/21/18 1906    Specimen:  Blood Updated:  06/21/18 2143     Iron 49 (L) mcg/dL      Iron Saturation 37 %      Transferrin 90 (L) mg/dL      TIBC 134 mcg/dL     Hepatitis Panel, Acute [[id number]]  (Normal) Collected:  " "06/21/18 2045    Specimen:  Blood Updated:  06/21/18 2140     Hepatitis B Surface Ag Non-Reactive     Hep A IgM Non-Reactive     Hep B C IgM Non-Reactive     Hepatitis C Ab Non-Reactive    Ceruloplasmin [[id number]] Collected:  06/21/18 2045    Specimen:  Blood Updated:  06/21/18 2049    Lactic Acid, Plasma [[id number]]  (Normal) Collected:  06/21/18 2014    Specimen:  Blood Updated:  06/21/18 2047     Lactate 1.5 mmol/L     Procalcitonin [[id number]]  (Normal) Collected:  06/21/18 1906    Specimen:  Blood Updated:  06/21/18 2040     Procalcitonin 0.15 ng/mL     Narrative:       As a Marker for Sepsis (Non-Neonates):   1. <0.5 ng/mL represents a low risk of severe sepsis and/or septic shock.  1. >2 ng/mL represents a high risk of severe sepsis and/or septic shock.    As a Marker for Lower Respiratory Tract Infections that require antibiotic therapy:  PCT on Admission     Antibiotic Therapy             6-12 Hrs later  > 0.5                Strongly Recommended            >0.25 - <0.5         Recommended  0.1 - 0.25           Discouraged                   Remeasure/reassess PCT  <0.1                 Strongly Discouraged          Remeasure/reassess PCT      As 28 day mortality risk marker: \"Change in Procalcitonin Result\" (> 80 % or <=80 %) if Day 0 (or Day 1) and Day 4 values are available. Refer to http://[email or web address]-pct-calculator.com/   Change in PCT <=80 %   A decrease of PCT levels below or equal to 80 % defines a positive change in PCT test result representing a higher risk for 28-day all-cause mortality of patients diagnosed with severe sepsis or septic shock.  Change in PCT > 80 %   A decrease of PCT levels of more than 80 % defines a negative change in PCT result representing a lower risk for 28-day all-cause mortality of patients diagnosed with severe sepsis or septic shock.                Urinalysis With / Microscopic If Indicated (No Culture) - Urine, Clean Catch [[id number]]  (Abnormal) Collected:  06/21/18 " 1933    Specimen:  Urine from Urine, Clean Catch Updated:  06/21/18 2001     Color, UA Yellow     Appearance, UA Clear     pH, UA 6.5     Specific Gravity, UA <1.005 (L)     Glucose, UA Negative     Ketones, UA Negative     Bilirubin, UA Negative     Blood, UA Negative     Protein, UA Negative     Leuk Esterase, UA Negative     Nitrite, UA Negative     Urobilinogen, UA 1.0 E.U./dL    Narrative:       Urine microscopic not indicated.    CBC & Differential [[id number]] Collected:  06/21/18 1906    Specimen:  Blood Updated:  06/21/18 1955    Narrative:       The following orders were created for panel order CBC & Differential.  Procedure                               Abnormality         Status                     ---------                               -----------         ------                     Scan Slide[[id number]]                                       Final result               CBC Auto Differential[[id number]]        Abnormal            Final result                 Please view results for these tests on the individual orders.    CBC Auto Differential [[id number]]  (Abnormal) Collected:  06/21/18 1906    Specimen:  Blood Updated:  06/21/18 1955     WBC 7.88 10*3/mm3      RBC 2.68 (L) 10*6/mm3      Hemoglobin 11.0 (L) g/dL      Hematocrit 32.5 (L) %      .3 (H) fL      MCH 41.0 (H) pg      MCHC 33.8 g/dL      RDW 14.1 %      RDW-SD 62.3 (H) fl      MPV 9.8 fL      Platelets 110 (L) 10*3/mm3      Neutrophil % 72.6 %      Lymphocyte % 13.5 (L) %      Monocyte % 11.9 %      Eosinophil % 1.3 %      Basophil % 0.3 %      Immature Grans % 0.4 %      Neutrophils, Absolute 5.73 10*3/mm3      Lymphocytes, Absolute 1.06 10*3/mm3      Monocytes, Absolute 0.94 10*3/mm3      Eosinophils, Absolute 0.10 10*3/mm3      Basophils, Absolute 0.02 10*3/mm3      Immature Grans, Absolute 0.03 10*3/mm3     Scan Slide [[id number]] Collected:  06/21/18 1906    Specimen:  Blood Updated:  06/21/18 1955     Macrocytes Large/3+     WBC  Morphology Normal     Platelet Morphology Normal    Comprehensive Metabolic Panel [[id number]]  (Abnormal) Collected:  06/21/18 1906    Specimen:  Blood Updated:  06/21/18 1944     Glucose 105 (H) mg/dL      BUN 3 (L) mg/dL      Creatinine 0.58 (L) mg/dL      Sodium 131 (L) mmol/L      Potassium 3.5 mmol/L      Chloride 91 (L) mmol/L      CO2 27.6 mmol/L      Calcium 9.1 mg/dL      Total Protein 8.3 g/dL      Albumin 3.60 g/dL      ALT (SGPT) 29 U/L      AST (SGOT) 90 (H) U/L      Alkaline Phosphatase 137 (H) U/L      Total Bilirubin 6.9 (H) mg/dL      eGFR Non African Amer >150 mL/min/1.73      Globulin 4.7 gm/dL      A/G Ratio 0.8 g/dL      BUN/Creatinine Ratio 5.2 (L)     Anion Gap 12.4 mmol/L     Lipase [[id number]]  (Normal) Collected:  06/21/18 1906    Specimen:  Blood Updated:  06/21/18 1938     Lipase 45 U/L     Ethanol [[id number]] Collected:  06/21/18 1906    Specimen:  Blood Updated:  06/21/18 1938     Ethanol <10 mg/dL      Ethanol % <0.010 %     Protime-INR [[id number]]  (Abnormal) Collected:  06/21/18 1906    Specimen:  Blood Updated:  06/21/18 1937     Protime 21.4 (H) Seconds      INR 1.89 (H)                   Imaging Results (all)     Procedure Component Value Units Date/Time    US Gallbladder [[id number]] Collected:  06/21/18 2148     Updated:  06/21/18 2210    Narrative:       ULTRASOUND GALLBLADDER.     TECHNIQUE: Multiple grayscale color and Doppler images were obtained.     HISTORY: Elevated bilirubin.     COMPARISON: No prior studies for comparison.     FINDINGS:  Liver does not demonstrate a mass or intrahepatic biliary ductal  dilatation. Fatty infiltration of the liver, hepatomegaly measures 22  cm.     Gallbladder is collapsed and demonstrates sludge with echogenic punctate  foci which may be tiny gallstones measuring up to 0.3 cm. Sonographic  Morrissey's sign is negative. Gallbladder wall thickness measures 0.5 cm,  the wall appears edematous versus small amount of pericholecystic  fluid.  Common bile duct measures 0.5 cm.     Limited evaluation of pancreas is unremarkable, no mass.     Right kidney measures 12.8 x 9.7 x 5.8 cm, no mass, hydronephrosis or  calculus.       Impression:       Gallbladder demonstrates sludge with possible tiny calculi measuring up  to 0.3 cm. Gallbladder wall edema versus small amount of pericholecystic  fluid, Morrissey's sign is negative, the findings may be related to chronic  cholecystitis. Acute cholecystitis is possible but appears less likely.  If there is high concern for acute cholecystitis HIDA scan may be  performed.     Findings called to Jaiden Worley, 9:44 PM.       CT Abdomen Pelvis With Contrast [[id number]] Collected:  06/21/18 2033     Updated:  06/21/18 2049    Narrative:       CT ABDOMEN PELVIS W CONTRAST-     INDICATIONS: Elevated bilirubin     TECHNIQUE: Radiation dose reduction techniques were utilized, including  automated exposure control and exposure modulation based on body size.   Enhanced ABDOMEN AND PELVIS CT     COMPARISON: None available     FINDINGS:      Diffuse low-density of the liver compatible with steatosis. The liver  and spleen are enlarged. The spleen is measured at 16.6 cm AP, 20.3 cm  CC.     Density within the gallbladder lumen may be sludge or layering stones,  ultrasound could be considered for further evaluation.           Otherwise unremarkable appearance of the liver, spleen, adrenal glands,  pancreas, kidneys, bladder.     No bowel obstruction or abnormal bowel thickening is identified.     No free intraperitoneal gas or free fluid.     Enlarged portal caval lymph nodes are seen, nonspecific. For example,  precaval lymph node on image 65 measures 3.1 x 2.2 cm, could be evidence  of neoplasm or may be reactive. Further evaluation with positron  emission tomography could be obtained. Interval follow-up can  characterize change. Scattered small mesenteric and para-aortic lymph  nodes are seen that are not significant by  size criteria.     Abdominal aorta is not aneurysmal. Aortic and other arterial  calcifications are present.     The lung bases show minimal pleural effusions and linear densities  suggesting atelectasis and scarring. Somewhat nodular density in the  lingula, 8 mm on image 10 of series 1, may be nodular atelectasis,  follow-up CT of the chest in 3 months advised to exclude the possibility  of a nodule/characterize change.     Degenerative changes are seen in the spine. No acute fracture is  identified.             Impression:          Hepatosplenomegaly. Hepatic steatosis. Portacaval adenopathy,  nonspecific, further evaluation can be obtained.     Sludge or stones in the gallbladder, ultrasound could be considered for  further evaluation.     Minimal pleural effusions. Possible nodule in the lingula, follow-up  recommended.     Discussed by telephone with Dr. Worley at time of interpretation, 2035,  6/21/2018.     This report was finalized on 6/21/2018 8:45 PM by Dr. Rojas Dubon M.D.           Patient Active Problem List   Diagnosis Code   • Acute upper GI bleed K92.2   • Gout M10.9   • Pre-diabetes R73.03   • Gallstones K80.20   • Elevated liver function tests R79.89   • Alcohol abuse F10.10       Assessment:  Active Hospital Problems (** Indicates Principal Problem)    Diagnosis Date Noted   • **Acute upper GI bleed [K92.2] 06/21/2018   • Gallstones [K80.20] 06/22/2018   • Elevated liver function tests [R79.89] 06/22/2018   • Alcohol abuse [F10.10] 06/22/2018   • Gout [M10.9] 06/21/2018   • Pre-diabetes [R73.03] 06/21/2018      Resolved Hospital Problems    Diagnosis Date Noted Date Resolved   No resolved problems to display.       Plan:  The patient's admitted to hospital and will start him on Protonix drip.  We'll consult GI medicine for further evaluation.  I've advised the patient that he needs to stop drinking.    Anthony Miles MD  6/22/2018  1:34 AM

## 2018-06-22 NOTE — PROGRESS NOTES
Name: Enrique Ramirez ADMIT: 2018   : 1970  PCP: No Known Provider    MRN: [id number] LOS: 1 days   AGE/SEX: 48 y.o. male  ROOM: [facility]   Subjective     Feels fine, no more nausea or vomiting  No chest pain or soa  Normal BMs, brown without melena or blood  Drinks 3-6 vodkas and sprites 3 times a week. Not sure how much EtOH in each drink since he gets them at a bar    Objective   Vital Signs  Temp:  [97.8 °F (36.6 °C)-99.4 °F (37.4 °C)] 98.2 °F (36.8 °C)  Heart Rate:  [] 85  Resp:  [14-18] 18  BP: (113-145)/(61-87) 124/65  SpO2:  [92 %-99 %] 93 %  on   ;   Device (Oxygen Therapy): nasal cannula  Body mass index is 34.35 kg/m².    Physical Exam   Constitutional: He is oriented to person, place, and time. No distress.   HENT:   Head: Normocephalic and atraumatic.   Eyes: EOM are normal. Pupils are equal, round, and reactive to light.   Cardiovascular: Normal rate and regular rhythm.  Exam reveals no friction rub.    No murmur heard.  Pulmonary/Chest: Effort normal and breath sounds normal. No respiratory distress. He has no wheezes.   Abdominal: Soft. Bowel sounds are normal. He exhibits no distension. There is no tenderness.   Musculoskeletal: He exhibits no edema or tenderness.   Neurological: He is alert and oriented to person, place, and time.   No signs of withdrawal     Skin: He is not diaphoretic.   Psychiatric: He has a normal mood and affect. His behavior is normal.   Vitals reviewed.      Results Review:       I reviewed the patient's new clinical results.    Results from last 7 days  Lab Units 18  0726 18  2346 18  1906   WBC 10*3/mm3 5.29  --  7.88   HEMOGLOBIN g/dL 9.9* 10.0* 11.0*   PLATELETS 10*3/mm3 92*  --  110*     Results from last 7 days  Lab Units 18  0726 18  1906   SODIUM mmol/L 132* 131*   POTASSIUM mmol/L 3.4* 3.5   CHLORIDE mmol/L 94* 91*   CO2 mmol/L 25.8 27.6   BUN mg/dL 2* 3*   CREATININE mg/dL 0.54* 0.58*   GLUCOSE mg/dL 102* 105*    Estimated Creatinine Clearance: 217.6 mL/min (A) (by C-G formula based on SCr of 0.54 mg/dL (L)).  Results from last 7 days  Lab Units 06/22/18  0726 06/21/18  1906   CALCIUM mg/dL 8.5* 9.1   ALBUMIN g/dL 3.20* 3.60         multivitamin 1 tablet Oral Daily   pentoxifylline 400 mg Oral TID With Meals   thiamine 100 mg Oral Daily   zinc sulfate 220 mg Oral Daily       pantoprazole 8 mg/hr Last Rate: 8 mg/hr (06/22/18 0550)   sodium chloride 100 mL/hr Last Rate: 100 mL/hr (06/22/18 0501)   NPO Diet      Assessment/Plan      Active Hospital Problems (** Indicates Principal Problem)    Diagnosis Date Noted   • **Acute upper GI bleed [K92.2] 06/21/2018   • Cholelithiasis [K80.20] 06/22/2018   • Elevated liver function tests [R79.89] 06/22/2018   • Alcohol abuse [F10.10] 06/22/2018   • Alcoholic hepatitis without ascites [K70.10] 06/22/2018   • Thrombocytopenia [D69.6] 06/22/2018   • Gout [M10.9] 06/21/2018   • Pre-diabetes [R73.03] 06/21/2018      Resolved Hospital Problems    Diagnosis Date Noted Date Resolved   No resolved problems to display.       · Upper GI bleed: EGD today.  DDx includes gastritis, varices, portal hypertensive gastropathy. Cont PPI  · Alcohol hepatitis: follow labs closely, high DF and MELD, discussed cessation of alcohol.  Acute hep panel negative. On MVI, trental and zinc  · Alcohol abuse: cessation discussed, consult access center. Prn CIWAs, no h/o withdrawal  · Anemia: from above but with drinking will check labs  · Hyponatremia: from alcohol use and dehydration, cont IV fluids  · Thrombocytopenia: likely from ETOH, observe  · Cholelithiasis: no obstruction on imaging, watch  · Pre DM: will check lipid panel and watch  · Replace K after EGD so we can give oral  · Needs PCP    DISPO: TBD    D/W Dr. White and RN    Jens Granados MD  [facility]  06/22/18  11:05 AM

## 2018-06-22 NOTE — ANESTHESIA POSTPROCEDURE EVALUATION
Patient: Enrique Ramirez    Procedure Summary     Date:  06/22/18 Room / Location:   [facility]    Anesthesia Start:  1426 Anesthesia Stop:  1449    Procedure:  ESOPHAGOGASTRODUODENOSCOPY with biopsies (N/A Esophagus) Diagnosis:       Acute upper GI bleed      (Acute upper GI bleed [K92.2])    Surgeon:  Byron Hassan MD Provider:  Flora Corbin MD    Anesthesia Type:  MAC ASA Status:  3          Anesthesia Type: MAC  Last vitals  BP   102/54 (06/22/18 1510)   Temp   36.7 °C (98 °F) (06/22/18 1450)   Pulse   78 (06/22/18 1510)   Resp   18 (06/22/18 1510)     SpO2   94 % (06/22/18 1510)     Post Anesthesia Care and Evaluation      Comments: Pt. Discharged prior to being evaluated by anesthesia.  Chart is reviewed and no complications are noted.  THIS CASE IS NOT MEDICALLY DIRECTED

## 2018-06-23 ENCOUNTER — INPATIENT HOSPITAL (OUTPATIENT)
Dept: URBAN - METROPOLITAN AREA HOSPITAL 113 | Facility: HOSPITAL | Age: 48
End: 2018-06-23

## 2018-06-23 DIAGNOSIS — K70.10 ALCOHOLIC HEPATITIS WITHOUT ASCITES: ICD-10-CM

## 2018-06-23 DIAGNOSIS — I86.4 GASTRIC VARICES: ICD-10-CM

## 2018-06-23 DIAGNOSIS — K92.2 GASTROINTESTINAL HEMORRHAGE, UNSPECIFIED: ICD-10-CM

## 2018-06-23 PROBLEM — E53.8 FOLATE DEFICIENCY: Status: ACTIVE | Noted: 2018-06-23

## 2018-06-23 PROBLEM — K20.90 ESOPHAGITIS: Status: ACTIVE | Noted: 2018-06-23

## 2018-06-23 LAB
ALBUMIN SERPL-MCNC: 3.1 G/DL (ref 3.5–5.2)
ALBUMIN/GLOB SERPL: 0.7 G/DL
ALP SERPL-CCNC: 107 U/L (ref 39–117)
ALT SERPL W P-5'-P-CCNC: 22 U/L (ref 1–41)
ANION GAP SERPL CALCULATED.3IONS-SCNC: 13.7 MMOL/L
AST SERPL-CCNC: 65 U/L (ref 1–40)
BILIRUB SERPL-MCNC: 6.7 MG/DL (ref 0.1–1.2)
BUN BLD-MCNC: 3 MG/DL (ref 6–20)
BUN/CREAT SERPL: 4.6 (ref 7–25)
CALCIUM SPEC-SCNC: 8.7 MG/DL (ref 8.6–10.5)
CERULOPLASMIN SERPL-MCNC: 20.4 MG/DL (ref 16–31)
CHLORIDE SERPL-SCNC: 97 MMOL/L (ref 98–107)
CHOLEST SERPL-MCNC: 115 MG/DL (ref 0–200)
CO2 SERPL-SCNC: 25.3 MMOL/L (ref 22–29)
CREAT BLD-MCNC: 0.65 MG/DL (ref 0.76–1.27)
CYTO UR: NORMAL
FOLATE SERPL-MCNC: <2 NG/ML (ref 4.78–24.2)
GFR SERPL CREATININE-BSD FRML MDRD: 132 ML/MIN/1.73
GLOBULIN UR ELPH-MCNC: 4.2 GM/DL
GLUCOSE BLD-MCNC: 96 MG/DL (ref 65–99)
HCT VFR BLD AUTO: 28.6 % (ref 40.4–52.2)
HCT VFR BLD AUTO: 29.1 % (ref 40.4–52.2)
HDLC SERPL-MCNC: 18 MG/DL (ref 40–60)
HGB BLD-MCNC: 9.5 G/DL (ref 13.7–17.6)
HGB BLD-MCNC: 9.5 G/DL (ref 13.7–17.6)
INR PPP: 1.8 (ref 0.9–1.1)
LAB AP CASE REPORT: NORMAL
LDLC SERPL CALC-MCNC: 75 MG/DL (ref 0–100)
LDLC/HDLC SERPL: 4.17 {RATIO}
PATH REPORT.FINAL DX SPEC: NORMAL
PATH REPORT.GROSS SPEC: NORMAL
POTASSIUM BLD-SCNC: 3.5 MMOL/L (ref 3.5–5.2)
PROT SERPL-MCNC: 7.3 G/DL (ref 6–8.5)
PROTHROMBIN TIME: 20.6 SECONDS (ref 11.7–14.2)
SODIUM BLD-SCNC: 136 MMOL/L (ref 136–145)
TRIGL SERPL-MCNC: 110 MG/DL (ref 0–150)
UREASE TISS QL: NEGATIVE
VIT B12 BLD-MCNC: 582 PG/ML (ref 211–946)
VLDLC SERPL-MCNC: 22 MG/DL (ref 5–40)

## 2018-06-23 PROCEDURE — 85018 HEMOGLOBIN: CPT | Performed by: HOSPITALIST

## 2018-06-23 PROCEDURE — 36415 COLL VENOUS BLD VENIPUNCTURE: CPT | Performed by: HOSPITALIST

## 2018-06-23 PROCEDURE — 82746 ASSAY OF FOLIC ACID SERUM: CPT | Performed by: INTERNAL MEDICINE

## 2018-06-23 PROCEDURE — 94799 UNLISTED PULMONARY SVC/PX: CPT

## 2018-06-23 PROCEDURE — 80061 LIPID PANEL: CPT | Performed by: INTERNAL MEDICINE

## 2018-06-23 PROCEDURE — 99231 SBSQ HOSP IP/OBS SF/LOW 25: CPT | Performed by: INTERNAL MEDICINE

## 2018-06-23 PROCEDURE — 85014 HEMATOCRIT: CPT | Performed by: HOSPITALIST

## 2018-06-23 PROCEDURE — 82607 VITAMIN B-12: CPT | Performed by: INTERNAL MEDICINE

## 2018-06-23 PROCEDURE — 90791 PSYCH DIAGNOSTIC EVALUATION: CPT | Performed by: SOCIAL WORKER

## 2018-06-23 PROCEDURE — 80053 COMPREHEN METABOLIC PANEL: CPT | Performed by: HOSPITALIST

## 2018-06-23 PROCEDURE — 85610 PROTHROMBIN TIME: CPT | Performed by: INTERNAL MEDICINE

## 2018-06-23 RX ORDER — PANTOPRAZOLE SODIUM 40 MG/1
40 TABLET, DELAYED RELEASE ORAL
Status: DISCONTINUED | OUTPATIENT
Start: 2018-06-23 | End: 2018-06-24 | Stop reason: HOSPADM

## 2018-06-23 RX ORDER — FOLIC ACID 1 MG/1
1 TABLET ORAL DAILY
Status: DISCONTINUED | OUTPATIENT
Start: 2018-06-23 | End: 2018-06-24 | Stop reason: HOSPADM

## 2018-06-23 RX ADMIN — Medication 100 MG: at 08:44

## 2018-06-23 RX ADMIN — PENTOXIFYLLINE 400 MG: 400 TABLET, EXTENDED RELEASE ORAL at 08:43

## 2018-06-23 RX ADMIN — PENTOXIFYLLINE 400 MG: 400 TABLET, EXTENDED RELEASE ORAL at 18:01

## 2018-06-23 RX ADMIN — SODIUM CHLORIDE 8 MG/HR: 900 INJECTION INTRAVENOUS at 09:19

## 2018-06-23 RX ADMIN — TEMAZEPAM 15 MG: 15 CAPSULE ORAL at 21:58

## 2018-06-23 RX ADMIN — PANTOPRAZOLE SODIUM 40 MG: 40 TABLET, DELAYED RELEASE ORAL at 18:01

## 2018-06-23 RX ADMIN — Medication 1 TABLET: at 08:44

## 2018-06-23 RX ADMIN — FOLIC ACID 1 MG: 1 TABLET ORAL at 11:25

## 2018-06-23 RX ADMIN — Medication 220 MG: at 08:43

## 2018-06-23 RX ADMIN — PENTOXIFYLLINE 400 MG: 400 TABLET, EXTENDED RELEASE ORAL at 11:25

## 2018-06-23 NOTE — PLAN OF CARE
Problem: Patient Care Overview  Goal: Plan of Care Review  Outcome: Ongoing (interventions implemented as appropriate)   06/23/18 0343   Coping/Psychosocial   Plan of Care Reviewed With patient   Plan of Care Review   Progress improving   OTHER   Outcome Summary Pt has no c/o pain, CIWA at 1, IV fluids continue along with protonix gtt. No s/s of distress noted will continue to monitor. at 0344 6/23/18       Problem: Fall Risk (Adult)  Goal: Absence of Fall  Outcome: Ongoing (interventions implemented as appropriate)

## 2018-06-23 NOTE — CONSULTS
"[facility] evaluated pt. Pt talked about his drinking habits and states he drinks approx 3x a week and has 2-5 drinks each time. He denies daily drinking. Pt does not feel he is having any withdrawal symptoms. Pt states he stopped drinking alcohol 1 1/2 yrs ago for 7 mos because he was getting severe gout. He states he stopped \"cold turkey\" and did not have any withdrawal symptoms. He states he used to drink beer but started drinking vodka and Sprite when he started drinking again.. Pt states he realizes he can die if he continued to drink. He feels this is enough to make him continue to not drink upon d/c. Pt states his Protestant, [facility]'s [employer] has AA and other support groups he will attend if he feels he needs the help. Pt realizes it might be more of a challenge to stay sober once he has felt better for a while. Pt denies any family hx of addictions except his mother's smoking which resulted in her death from lung cancer. Pt denies any tobacco or drug use. Pt denies any SI past or present. He rates both his depressed mood and anxiety as a \"1.\" He denies any psych or CD tx in the past. Pt states he has never had a black out or a DUI. Pt states his sleep and appetite were good until about 2 wks ago when he started to feel sick. Pt denies any abuse in his past.   Pt states he was in the [employer] for 6 yrs. He states he  for the first time 5 yrs ago. He and his wife have no children. Pt states they moved from [place], his hometown, to [place] 6 yrs ago. Pt states they live in a neighborhood and have many neighbors they are friends with. He states they all walk their dogs together. Pt states he has plans to start swimming again for exercise. 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 Access will follow.  "

## 2018-06-23 NOTE — PROGRESS NOTES
Name: Enrique Ramirez ADMIT: 2018   : 1970  PCP: No Known Provider    MRN: [id number] LOS: 2 days   AGE/SEX: 48 y.o. male  ROOM: [facility]   Subjective   EGD yesterday  Feels fine, no more nausea or vomiting  No chest pain or soa  Normal BMs, brown without melena or blood  Drinks 3-6 vodkas and sprites 3 times a week. Not sure how much EtOH in each drink since he gets them at a bar    Objective   Vital Signs  Temp:  [98 °F (36.7 °C)-98.9 °F (37.2 °C)] 98.9 °F (37.2 °C)  Heart Rate:  [] 90  Resp:  [14-18] 18  BP: ()/(47-79) 107/47  SpO2:  [92 %-99 %] 94 %  on  Flow (L/min):  [3] 3;   Device (Oxygen Therapy): room air  Body mass index is 34.35 kg/m².    Physical Exam   Constitutional: He is oriented to person, place, and time. No distress.   HENT:   Head: Normocephalic and atraumatic.   Eyes: EOM are normal. Pupils are equal, round, and reactive to light. Scleral icterus is present.   Cardiovascular: Normal rate and regular rhythm.  Exam reveals no friction rub.    No murmur heard.  Pulmonary/Chest: Effort normal and breath sounds normal. No respiratory distress. He has no wheezes.   Abdominal: Soft. Bowel sounds are normal. He exhibits no distension. There is no tenderness.   Musculoskeletal: He exhibits no edema or tenderness.   Neurological: He is alert and oriented to person, place, and time.   No signs of withdrawal     Skin: He is not diaphoretic.   Jaundiced     Psychiatric: He has a normal mood and affect. His behavior is normal.   Vitals reviewed.      Results Review:       I reviewed the patient's new clinical results.    Results from last 7 days  Lab Units 18  0544 18  0002 18  1844 18  0726  18  1906   WBC 10*3/mm3  --   --   --  5.29  --  7.88   HEMOGLOBIN g/dL 9.5* 9.5* 10.8* 9.9*  < > 11.0*   PLATELETS 10*3/mm3  --   --   --  92*  --  110*   < > = values in this interval not displayed.    Results from last 7 days  Lab Units 18  0532  06/22/18  0726 06/21/18  1906   SODIUM mmol/L 136 132* 131*   POTASSIUM mmol/L 3.5 3.4* 3.5   CHLORIDE mmol/L 97* 94* 91*   CO2 mmol/L 25.3 25.8 27.6   BUN mg/dL 3* 2* 3*   CREATININE mg/dL 0.65* 0.54* 0.58*   GLUCOSE mg/dL 96 102* 105*   Estimated Creatinine Clearance: 180.8 mL/min (A) (by C-G formula based on SCr of 0.65 mg/dL (L)).    Results from last 7 days  Lab Units 06/23/18  0544 06/22/18  0726 06/21/18  1906   CALCIUM mg/dL 8.7 8.5* 9.1   ALBUMIN g/dL 3.10* 3.20* 3.60         multivitamin 1 tablet Oral Daily   pantoprazole 40 mg Oral BID AC   pentoxifylline 400 mg Oral TID With Meals   thiamine 100 mg Oral Daily   zinc sulfate 220 mg Oral Daily       lactated ringers 1,000 mL Last Rate: Stopped (06/22/18 1453)   Diet Regular; Consistent Carbohydrate      Assessment/Plan      Active Hospital Problems (** Indicates Principal Problem)    Diagnosis Date Noted   • **Esophagitis [K20.9] 06/23/2018   • Cholelithiasis [K80.20] 06/22/2018   • Elevated liver function tests [R79.89] 06/22/2018   • Alcohol abuse [F10.10] 06/22/2018   • Alcoholic hepatitis without ascites [K70.10] 06/22/2018   • Thrombocytopenia [D69.6] 06/22/2018   • Acute upper GI bleed [K92.2] 06/21/2018   • Gout [M10.9] 06/21/2018   • Pre-diabetes [R73.03] 06/21/2018      Resolved Hospital Problems    Diagnosis Date Noted Date Resolved   No resolved problems to display.     · Upper GI bleed: EGD shows Grade B esophagitis, Grade 1 esophageal varices and mild portal hypertensive gastropathy.   · Cont PPI but change to PO  · Eating well so d/c IVF  · Alcohol hepatitis: follow labs closely, high DF and MELD, discussed cessation of alcohol.  Acute hep panel negative. On MVI, trental and zinc  · Alcohol abuse: cessation discussed, consult access center. Prn CIWAs, no h/o withdrawal  · Anemia: from above, folate is low--replace  · Hyponatremia: from alcohol use and dehydration, resolved, stop IV fluids  · Thrombocytopenia: likely from ETOH,  observe  · Cholelithiasis: no obstruction on imaging, watch  · Pre DM: will check lipid panel and watch  · Replaced K    · Needs PCP    DISPO: home tomorrow if ok with GI    Jens Granados MD  [facility]  06/23/18  10:18 AM

## 2018-06-24 VITALS
TEMPERATURE: 98.3 F | HEIGHT: 71 IN | SYSTOLIC BLOOD PRESSURE: 112 MMHG | BODY MASS INDEX: 34.48 KG/M2 | OXYGEN SATURATION: 97 % | HEART RATE: 82 BPM | WEIGHT: 246.3 LBS | RESPIRATION RATE: 18 BRPM | DIASTOLIC BLOOD PRESSURE: 57 MMHG

## 2018-06-24 LAB
ALBUMIN SERPL-MCNC: 3.3 G/DL (ref 3.5–5.2)
ALBUMIN/GLOB SERPL: 0.8 G/DL
ALP SERPL-CCNC: 111 U/L (ref 39–117)
ALT SERPL W P-5'-P-CCNC: 23 U/L (ref 1–41)
ANION GAP SERPL CALCULATED.3IONS-SCNC: 13.9 MMOL/L
AST SERPL-CCNC: 73 U/L (ref 1–40)
BASOPHILS # BLD AUTO: 0.02 10*3/MM3 (ref 0–0.2)
BASOPHILS NFR BLD AUTO: 0.3 % (ref 0–1.5)
BILIRUB SERPL-MCNC: 7.7 MG/DL (ref 0.1–1.2)
BUN BLD-MCNC: 3 MG/DL (ref 6–20)
BUN/CREAT SERPL: 4.5 (ref 7–25)
CALCIUM SPEC-SCNC: 9.1 MG/DL (ref 8.6–10.5)
CHLORIDE SERPL-SCNC: 97 MMOL/L (ref 98–107)
CO2 SERPL-SCNC: 26.1 MMOL/L (ref 22–29)
CREAT BLD-MCNC: 0.67 MG/DL (ref 0.76–1.27)
DEPRECATED RDW RBC AUTO: 64.9 FL (ref 37–54)
EOSINOPHIL # BLD AUTO: 0.1 10*3/MM3 (ref 0–0.7)
EOSINOPHIL NFR BLD AUTO: 1.6 % (ref 0.3–6.2)
ERYTHROCYTE [DISTWIDTH] IN BLOOD BY AUTOMATED COUNT: 14.4 % (ref 11.5–14.5)
GFR SERPL CREATININE-BSD FRML MDRD: 128 ML/MIN/1.73
GLOBULIN UR ELPH-MCNC: 4.4 GM/DL
GLUCOSE BLD-MCNC: 96 MG/DL (ref 65–99)
HCT VFR BLD AUTO: 30.1 % (ref 40.4–52.2)
HGB BLD-MCNC: 10.1 G/DL (ref 13.7–17.6)
IMM GRANULOCYTES # BLD: 0.01 10*3/MM3 (ref 0–0.03)
IMM GRANULOCYTES NFR BLD: 0.2 % (ref 0–0.5)
INR PPP: 1.71 (ref 0.9–1.1)
LYMPHOCYTES # BLD AUTO: 0.92 10*3/MM3 (ref 0.9–4.8)
LYMPHOCYTES NFR BLD AUTO: 15.1 % (ref 19.6–45.3)
MCH RBC QN AUTO: 41.4 PG (ref 27–32.7)
MCHC RBC AUTO-ENTMCNC: 33.6 G/DL (ref 32.6–36.4)
MCV RBC AUTO: 123.4 FL (ref 79.8–96.2)
MONOCYTES # BLD AUTO: 0.79 10*3/MM3 (ref 0.2–1.2)
MONOCYTES NFR BLD AUTO: 13 % (ref 5–12)
NEUTROPHILS # BLD AUTO: 4.26 10*3/MM3 (ref 1.9–8.1)
NEUTROPHILS NFR BLD AUTO: 70 % (ref 42.7–76)
PLATELET # BLD AUTO: 117 10*3/MM3 (ref 140–500)
PMV BLD AUTO: 10.2 FL (ref 6–12)
POTASSIUM BLD-SCNC: 3.6 MMOL/L (ref 3.5–5.2)
PROT SERPL-MCNC: 7.7 G/DL (ref 6–8.5)
PROTHROMBIN TIME: 19.8 SECONDS (ref 11.7–14.2)
RBC # BLD AUTO: 2.44 10*6/MM3 (ref 4.6–6)
SODIUM BLD-SCNC: 137 MMOL/L (ref 136–145)
WBC NRBC COR # BLD: 6.09 10*3/MM3 (ref 4.5–10.7)

## 2018-06-24 PROCEDURE — 94799 UNLISTED PULMONARY SVC/PX: CPT

## 2018-06-24 PROCEDURE — 80053 COMPREHEN METABOLIC PANEL: CPT | Performed by: HOSPITALIST

## 2018-06-24 PROCEDURE — 85025 COMPLETE CBC W/AUTO DIFF WBC: CPT | Performed by: INTERNAL MEDICINE

## 2018-06-24 PROCEDURE — 85610 PROTHROMBIN TIME: CPT | Performed by: INTERNAL MEDICINE

## 2018-06-24 RX ORDER — PENTOXIFYLLINE 400 MG/1
400 TABLET, EXTENDED RELEASE ORAL
Qty: 90 TABLET | Refills: 0 | Status: SHIPPED | OUTPATIENT
Start: 2018-06-24 | End: 2018-07-24

## 2018-06-24 RX ORDER — ZINC SULFATE 50(220)MG
220 CAPSULE ORAL DAILY
Qty: 30 CAPSULE | Refills: 0 | Status: SHIPPED | OUTPATIENT
Start: 2018-06-25 | End: 2018-07-25

## 2018-06-24 RX ORDER — PANTOPRAZOLE SODIUM 40 MG/1
40 TABLET, DELAYED RELEASE ORAL
Qty: 60 TABLET | Refills: 0 | Status: SHIPPED | OUTPATIENT
Start: 2018-06-24 | End: 2018-07-24

## 2018-06-24 RX ADMIN — Medication 220 MG: at 08:30

## 2018-06-24 RX ADMIN — Medication 100 MG: at 08:30

## 2018-06-24 RX ADMIN — Medication 1 TABLET: at 08:30

## 2018-06-24 RX ADMIN — PANTOPRAZOLE SODIUM 40 MG: 40 TABLET, DELAYED RELEASE ORAL at 08:30

## 2018-06-24 RX ADMIN — PENTOXIFYLLINE 400 MG: 400 TABLET, EXTENDED RELEASE ORAL at 08:29

## 2018-06-24 RX ADMIN — FOLIC ACID 1 MG: 1 TABLET ORAL at 08:29

## 2018-06-24 NOTE — PROGRESS NOTES
"   LOS: 2 days   Patient Care Team:  No Known Provider as PCP - General  No Known Provider as PCP - Family Medicine    Chief Complaint: vomiting blood    Subjective     History of Present Illness  Patient feels better and wants to go home , no black stools or vomiting of blood  Subjective:  Symptoms:  Improved.    Diet:  Adequate intake.    Activity level: Normal.        History taken from: patient chart family    Objective     Vital Signs  Temp:  [98 °F (36.7 °C)-98.9 °F (37.2 °C)] 98 °F (36.7 °C)  Heart Rate:  [78-90] 78  Resp:  [18] 18  BP: (107-110)/(47-69) 110/69    Objective:  General Appearance:  Ill-appearing.    Vital signs: (most recent): Blood pressure 110/69, pulse 78, temperature 98 °F (36.7 °C), temperature source Oral, resp. rate 18, height 180.3 cm (71\"), weight 112 kg (246 lb 4.8 oz), SpO2 98 %.  Vital signs are normal.    Output: Producing urine and producing stool.    HEENT: (Icteric)    Lungs:  Normal effort.    Heart: Normal rate.    Abdomen: Abdomen is soft.              Results Review:     I reviewed the patient's new clinical results.    Medication Review: done    Assessment/Plan     Principal Problem:    Esophagitis  Active Problems:    Acute upper GI bleed    Gout    Pre-diabetes    Cholelithiasis    Elevated liver function tests    Alcohol abuse    Alcoholic hepatitis without ascites    Thrombocytopenia    Folate deficiency      Assessment:  (Ugi bleed secondary to esophagitis  Non bleedig varices  etoh hepatitis).     Plan:   (Continue trental and zinc  Ok to discharge tomorrow on a PPI  Patient needs short interval followup with [facility]).       Willie Gutierrez MD  06/23/18  8:07 PM    Time: Critical care 11 min      "

## 2018-06-24 NOTE — DISCHARGE SUMMARY
Date of Admission: 6/21/2018  Date of Discharge:  6/24/2018  Primary Care Physician: No Known Provider     Discharge Diagnosis:  Principal Problem:    Esophagitis  Active Problems:    Acute upper GI bleed    Gout    Pre-diabetes    Cholelithiasis    Elevated liver function tests    Alcohol abuse    Alcoholic hepatitis without ascites    Thrombocytopenia    Folate deficiency      DETAILS OF HOSPITAL STAY     Pertinent Test Results and Procedures Performed    CT scan of the abdomen and pelvis:  Hepatosplenomegaly. Hepatic steatosis. Portacaval adenopathy,  nonspecific, further evaluation can be obtained.     Sludge or stones in the gallbladder, ultrasound could be considered for  further evaluation.     Minimal pleural effusions. Possible nodule in the lingula, follow-up  recommended.    Right upper quadrant ultrasound:  Gallbladder demonstrates sludge with possible tiny calculi measuring up  to 0.3 cm. Gallbladder wall edema versus small amount of pericholecystic  fluid, Morrissey's sign is negative, the findings may be related to chronic  cholecystitis. Acute cholecystitis is possible but appears less likely.  If there is high concern for acute cholecystitis HIDA scan may be  performed.    EGD performed on 6/22/18:  - LA Grade B reflux esophagitis.  - Grade I esophageal varices.  - Erythematous mucosa in the stomach. Biopsied.  - Portal hypertensive gastropathy.  - No gross lesions in the entire examined duodenum.    Hospital Course    This is a 8-year-old male who suffers from alcoholism and who presented with upper GI bleeding.  Please see H&P for full details of admission.  He was evaluated by gastroenterology upon admission and had a EGD which showed grade B esophagitis, esophageal varices and mild portal hypertensive gastropathy.  He has had no additional signs of bleeding and will remain on twice daily Protonix for now.  He also was found to have alcoholic hepatitis with elevated discriminant function and MELD.   This is improving with cessation from alcohol and we have counseled the patient on continuing abstinence upon discharge.  Acute hepatitis panel is negative.  No signs of alcohol withdrawal were present at any point throughout his hospitalization.  At this point he is medically stable and has been cleared for discharge by gastroenterology.  He will remain on zinc and Trental.  He will need close follow-up with [facility] for ongoing management.  He will be discharged home today.  I discussed this with him and he is in agreement with plans for discharge.    Physical Exam at Discharge:  General: No acute distress, AAOx3  HEENT: EOMI, PERRL  Cardiovascular: +s1 and s2, RRR  Lungs: No rhonchi or wheezing  Abdomen: soft, nontender    Consults:   Consults     Date and Time Order Name Status Description    6/21/2018 2241 Inpatient Gastroenterology Consult      6/21/2018 2048 [facility] (on-call MD unless specified) Completed             Condition on Discharge: Stable    Discharge Disposition  Home or Self Care    Discharge Medications     Discharge Medications      New Medications      Instructions Start Date   pantoprazole 40 MG EC tablet  Commonly known as:  PROTONIX   40 mg, Oral, 2 Times Daily Before Meals      pentoxifylline 400 MG CR tablet  Commonly known as:  TRENtal   400 mg, Oral, 3 Times Daily With Meals      zinc sulfate 220 (50 Zn) MG capsule  Commonly known as:  ZINCATE   220 mg, Oral, Daily   Start Date:  6/25/2018        Continue These Medications      Instructions Start Date   ALLOPURINOL PO   300 mg, Oral, Daily PRN             Discharge Diet:   Diet Instructions     Diet: Regular, Consistent Carbohydrate; Thin       Discharge Diet:   Regular  Consistent Carbohydrate       Fluid Consistency:  Thin          Activity at Discharge:   Activity Instructions     Activity as Tolerated             Follow-up Appointments  No future appointments.  Additional Instructions for the Follow-ups that  You Need to Schedule     Discharge Follow-up with PCP    As directed      Currently Documented PCP:  No Known Provider  PCP Phone Number:  None    Follow Up Details:  1 week         Discharge Follow-up with Specified Provider: Dr. White of gastroenterology in 2-3 weeks    As directed      To:  Dr. White of gastroenterology in 2-3 weeks                 I have examined and discussed discharge planning with the patient today.     Michele Dixon MD  06/24/18  10:53 AM    Time: Discharge less than 30 min

## 2018-06-24 NOTE — PLAN OF CARE
Problem: Patient Care Overview  Goal: Plan of Care Review  Outcome: Ongoing (interventions implemented as appropriate)   06/24/18 0428   Coping/Psychosocial   Plan of Care Reviewed With patient   Plan of Care Review   Progress improving   OTHER   Outcome Summary CIWA scores 0, no complaints of pain, rested comfortably through the night, VSS, possible dc today, will continue to monitor.     Goal: Individualization and Mutuality  Outcome: Ongoing (interventions implemented as appropriate)    Goal: Discharge Needs Assessment  Outcome: Ongoing (interventions implemented as appropriate)      Problem: Gastrointestinal Bleeding (Adult)  Goal: Signs and Symptoms of Listed Potential Problems Will be Absent, Minimized or Managed (Gastrointestinal Bleeding)  Outcome: Ongoing (interventions implemented as appropriate)      Problem: Fall Risk (Adult)  Goal: Identify Related Risk Factors and Signs and Symptoms  Outcome: Outcome(s) achieved Date Met: 06/24/18    Goal: Absence of Fall  Outcome: Ongoing (interventions implemented as appropriate)

## 2018-06-24 NOTE — PLAN OF CARE
Problem: Patient Care Overview  Goal: Plan of Care Review  Outcome: Outcome(s) achieved Date Met: 06/24/18    Goal: Individualization and Mutuality  Outcome: Outcome(s) achieved Date Met: 06/24/18    Goal: Discharge Needs Assessment  Outcome: Outcome(s) achieved Date Met: 06/24/18    Goal: Interprofessional Rounds/Family Conf  Outcome: Outcome(s) achieved Date Met: 06/24/18      Problem: Gastrointestinal Bleeding (Adult)  Goal: Signs and Symptoms of Listed Potential Problems Will be Absent, Minimized or Managed (Gastrointestinal Bleeding)  Outcome: Outcome(s) achieved Date Met: 06/24/18      Problem: Fall Risk (Adult)  Goal: Absence of Fall  Outcome: Outcome(s) achieved Date Met: 06/24/18

## 2018-06-25 NOTE — PAYOR COMM NOTE
"Enrique Ramirez (48 y.o. Male)     Date of Birth Social Security Number Address Home Phone MRN    1970  [id number] [id number] [phone] [id number]    [employer] Marital Status          Presybeterian        Admission Date Admission Type Admitting Provider Attending Provider Department, Room/Bed    6/21/18 Emergency Anthony Miles MD  [address], 664/1    Discharge Date Discharge Disposition Discharge Destination        6/24/2018 Home or Self Care              Attending Provider:  (none)   Allergies:  No Known Allergies    Isolation:  None   Infection:  None   Code Status:  Prior    Ht:  180.3 cm (71\")   Wt:  112 kg (246 lb 4.8 oz)    Admission Cmt:  None   Principal Problem:  Esophagitis [K20.9]                 Active Insurance as of 6/21/2018     Primary Coverage     Payor Plan Insurance Group Employer/Plan Group    [employer] BLUE [employer] BLUE CROSS BLUE [employer]IELD PPO [id number]     Payor Plan Address Payor Plan Phone Number Effective From Effective To    [address] [phone] 4/16/2017     [facility] [id number]       Subscriber Name Subscriber Birth Date Member ID       YESSENIA WALKER 12/17/1977 [id number]                 Emergency Contacts      (Rel.) Home Phone Work Phone Mobile Phone    Yessenia Ramirez (Spouse) [phone] -- --          "

## 2018-06-25 NOTE — PROGRESS NOTES
Case Management Discharge Note    Final Note: Discharged home on 6/24.  TIA Crowder    Destination     No service coordination in this encounter.      Durable Medical Equipment     No service coordination in this encounter.      Dialysis/Infusion     No service coordination in this encounter.      Home Medical Care     No service coordination in this encounter.      Social Care     No service coordination in this encounter.        Other: Other (Private Auto)    Final Discharge Disposition Code: 01 - home or self-care

## 2018-10-24 ENCOUNTER — APPOINTMENT (OUTPATIENT)
Dept: CARDIOLOGY | Facility: HOSPITAL | Age: 48
End: 2018-10-24
Attending: EMERGENCY MEDICINE

## 2018-10-24 ENCOUNTER — HOSPITAL ENCOUNTER (INPATIENT)
Facility: HOSPITAL | Age: 48
LOS: 5 days | Discharge: HOME | End: 2018-10-29
Attending: EMERGENCY MEDICINE | Admitting: INTERNAL MEDICINE

## 2018-10-24 ENCOUNTER — APPOINTMENT (OUTPATIENT)
Dept: GENERAL RADIOLOGY | Facility: HOSPITAL | Age: 48
End: 2018-10-24

## 2018-10-24 DIAGNOSIS — K70.31 ALCOHOLIC CIRRHOSIS OF LIVER WITH ASCITES (HCC): ICD-10-CM

## 2018-10-24 DIAGNOSIS — J81.0 ACUTE PULMONARY EDEMA (HCC): ICD-10-CM

## 2018-10-24 DIAGNOSIS — D64.9 SYMPTOMATIC ANEMIA: Primary | ICD-10-CM

## 2018-10-24 DIAGNOSIS — R19.5 OCCULT GI BLEEDING: ICD-10-CM

## 2018-10-24 DIAGNOSIS — E80.6 HYPERBILIRUBINEMIA: ICD-10-CM

## 2018-10-24 PROBLEM — K70.10 HEPATITIS, ALCOHOLIC: Status: ACTIVE | Noted: 2018-10-24

## 2018-10-24 PROBLEM — I85.00 ESOPHAGEAL VARICES (HCC): Status: ACTIVE | Noted: 2018-10-24

## 2018-10-24 PROBLEM — R18.8 ASCITES: Status: ACTIVE | Noted: 2018-10-24

## 2018-10-24 PROBLEM — K70.30 ALCOHOLIC CIRRHOSIS (HCC): Status: ACTIVE | Noted: 2018-10-24

## 2018-10-24 LAB
ABO GROUP BLD: NORMAL
ACANTHOCYTES BLD QL SMEAR: NORMAL
ALBUMIN SERPL-MCNC: 3.3 G/DL (ref 3.5–5.2)
ALBUMIN/GLOB SERPL: 0.8 G/DL
ALP SERPL-CCNC: 109 U/L (ref 39–117)
ALT SERPL W P-5'-P-CCNC: 27 U/L (ref 1–41)
AMMONIA BLD-SCNC: 62 UMOL/L (ref 16–60)
ANION GAP SERPL CALCULATED.3IONS-SCNC: 12.3 MMOL/L
APTT PPP: 44.1 SECONDS (ref 22.7–35.4)
AST SERPL-CCNC: 60 U/L (ref 1–40)
BASOPHILS # BLD AUTO: 0.04 10*3/MM3 (ref 0–0.2)
BASOPHILS NFR BLD AUTO: 0.5 % (ref 0–1.5)
BH CV LOW VAS LEFT SAPHENOFEMORAL JUNCTION SPONT: 1
BH CV LOW VAS RIGHT SAPHENOFEMORAL JUNCTION SPONT: 1
BH CV LOWER VASCULAR LEFT COMMON FEMORAL AUGMENT: NORMAL
BH CV LOWER VASCULAR LEFT COMMON FEMORAL COMPETENT: NORMAL
BH CV LOWER VASCULAR LEFT COMMON FEMORAL COMPRESS: NORMAL
BH CV LOWER VASCULAR LEFT COMMON FEMORAL PHASIC: NORMAL
BH CV LOWER VASCULAR LEFT COMMON FEMORAL SPONT: NORMAL
BH CV LOWER VASCULAR LEFT DISTAL FEMORAL COMPRESS: NORMAL
BH CV LOWER VASCULAR LEFT GASTRONEMIUS COMPRESS: NORMAL
BH CV LOWER VASCULAR LEFT GREATER SAPH AK COMPRESS: NORMAL
BH CV LOWER VASCULAR LEFT GREATER SAPH BK COMPRESS: NORMAL
BH CV LOWER VASCULAR LEFT MID FEMORAL AUGMENT: NORMAL
BH CV LOWER VASCULAR LEFT MID FEMORAL COMPETENT: NORMAL
BH CV LOWER VASCULAR LEFT MID FEMORAL COMPRESS: NORMAL
BH CV LOWER VASCULAR LEFT MID FEMORAL PHASIC: NORMAL
BH CV LOWER VASCULAR LEFT MID FEMORAL SPONT: NORMAL
BH CV LOWER VASCULAR LEFT PERONEAL COMPRESS: NORMAL
BH CV LOWER VASCULAR LEFT POPLITEAL AUGMENT: NORMAL
BH CV LOWER VASCULAR LEFT POPLITEAL COMPETENT: NORMAL
BH CV LOWER VASCULAR LEFT POPLITEAL COMPRESS: NORMAL
BH CV LOWER VASCULAR LEFT POPLITEAL PHASIC: NORMAL
BH CV LOWER VASCULAR LEFT POPLITEAL SPONT: NORMAL
BH CV LOWER VASCULAR LEFT POSTERIOR TIBIAL COMPRESS: NORMAL
BH CV LOWER VASCULAR LEFT PROXIMAL FEMORAL COMPRESS: NORMAL
BH CV LOWER VASCULAR LEFT SAPHENOFEMORAL JUNCTION AUGMENT: NORMAL
BH CV LOWER VASCULAR LEFT SAPHENOFEMORAL JUNCTION COMPETENT: NORMAL
BH CV LOWER VASCULAR LEFT SAPHENOFEMORAL JUNCTION COMPRESS: NORMAL
BH CV LOWER VASCULAR LEFT SAPHENOFEMORAL JUNCTION PHASIC: NORMAL
BH CV LOWER VASCULAR LEFT SAPHENOFEMORAL JUNCTION SPONT: NORMAL
BH CV LOWER VASCULAR RIGHT COMMON FEMORAL AUGMENT: NORMAL
BH CV LOWER VASCULAR RIGHT COMMON FEMORAL COMPETENT: NORMAL
BH CV LOWER VASCULAR RIGHT COMMON FEMORAL COMPRESS: NORMAL
BH CV LOWER VASCULAR RIGHT COMMON FEMORAL PHASIC: NORMAL
BH CV LOWER VASCULAR RIGHT COMMON FEMORAL SPONT: NORMAL
BH CV LOWER VASCULAR RIGHT DISTAL FEMORAL COMPRESS: NORMAL
BH CV LOWER VASCULAR RIGHT GASTRONEMIUS COMPRESS: NORMAL
BH CV LOWER VASCULAR RIGHT GREATER SAPH AK COMPRESS: NORMAL
BH CV LOWER VASCULAR RIGHT GREATER SAPH BK COMPRESS: NORMAL
BH CV LOWER VASCULAR RIGHT MID FEMORAL AUGMENT: NORMAL
BH CV LOWER VASCULAR RIGHT MID FEMORAL COMPETENT: NORMAL
BH CV LOWER VASCULAR RIGHT MID FEMORAL COMPRESS: NORMAL
BH CV LOWER VASCULAR RIGHT MID FEMORAL PHASIC: NORMAL
BH CV LOWER VASCULAR RIGHT MID FEMORAL SPONT: NORMAL
BH CV LOWER VASCULAR RIGHT PERONEAL COMPRESS: NORMAL
BH CV LOWER VASCULAR RIGHT POPLITEAL AUGMENT: NORMAL
BH CV LOWER VASCULAR RIGHT POPLITEAL COMPETENT: NORMAL
BH CV LOWER VASCULAR RIGHT POPLITEAL COMPRESS: NORMAL
BH CV LOWER VASCULAR RIGHT POPLITEAL PHASIC: NORMAL
BH CV LOWER VASCULAR RIGHT POPLITEAL SPONT: NORMAL
BH CV LOWER VASCULAR RIGHT POSTERIOR TIBIAL COMPRESS: NORMAL
BH CV LOWER VASCULAR RIGHT PROXIMAL FEMORAL COMPRESS: NORMAL
BH CV LOWER VASCULAR RIGHT SAPHENOFEMORAL JUNCTION AUGMENT: NORMAL
BH CV LOWER VASCULAR RIGHT SAPHENOFEMORAL JUNCTION COMPETENT: NORMAL
BH CV LOWER VASCULAR RIGHT SAPHENOFEMORAL JUNCTION COMPRESS: NORMAL
BH CV LOWER VASCULAR RIGHT SAPHENOFEMORAL JUNCTION PHASIC: NORMAL
BH CV LOWER VASCULAR RIGHT SAPHENOFEMORAL JUNCTION SPONT: NORMAL
BILIRUB SERPL-MCNC: 9.1 MG/DL (ref 0.1–1.2)
BILIRUB UR QL STRIP: NEGATIVE
BLD GP AB SCN SERPL QL: NEGATIVE
BUN BLD-MCNC: 6 MG/DL (ref 6–20)
BUN/CREAT SERPL: 6.4 (ref 7–25)
BURR CELLS BLD QL SMEAR: NORMAL
CALCIUM SPEC-SCNC: 9 MG/DL (ref 8.6–10.5)
CHLORIDE SERPL-SCNC: 84 MMOL/L (ref 98–107)
CLARITY UR: CLEAR
CO2 SERPL-SCNC: 26.7 MMOL/L (ref 22–29)
COLOR UR: ABNORMAL
CREAT BLD-MCNC: 0.94 MG/DL (ref 0.76–1.27)
DEPRECATED RDW RBC AUTO: 54 FL (ref 37–54)
EOSINOPHIL # BLD AUTO: 0.05 10*3/MM3 (ref 0–0.7)
EOSINOPHIL NFR BLD AUTO: 0.6 % (ref 0.3–6.2)
ERYTHROCYTE [DISTWIDTH] IN BLOOD BY AUTOMATED COUNT: 13.6 % (ref 11.5–14.5)
ETHANOL BLD-MCNC: <10 MG/DL (ref 0–10)
ETHANOL UR QL: <0.01 %
FERRITIN SERPL-MCNC: 934.5 NG/ML (ref 30–400)
GFR SERPL CREATININE-BSD FRML MDRD: 86 ML/MIN/1.73
GLOBULIN UR ELPH-MCNC: 4.3 GM/DL
GLUCOSE BLD-MCNC: 131 MG/DL (ref 65–99)
GLUCOSE UR STRIP-MCNC: NEGATIVE MG/DL
HCT VFR BLD AUTO: 19.8 % (ref 40.4–52.2)
HGB BLD-MCNC: 6.9 G/DL (ref 13.7–17.6)
HGB UR QL STRIP.AUTO: NEGATIVE
HOLD SPECIMEN: NORMAL
HOLD SPECIMEN: NORMAL
IMM GRANULOCYTES # BLD: 0.04 10*3/MM3 (ref 0–0.03)
IMM GRANULOCYTES NFR BLD: 0.5 % (ref 0–0.5)
INR PPP: 2.12 (ref 0.9–1.1)
IRON 24H UR-MRATE: 105 MCG/DL (ref 59–158)
IRON SATN MFR SERPL: 84 % (ref 20–50)
KETONES UR QL STRIP: NEGATIVE
LEUKOCYTE ESTERASE UR QL STRIP.AUTO: NEGATIVE
LIPASE SERPL-CCNC: 38 U/L (ref 13–60)
LYMPHOCYTES # BLD AUTO: 0.8 10*3/MM3 (ref 0.9–4.8)
LYMPHOCYTES NFR BLD AUTO: 9 % (ref 19.6–45.3)
MACROCYTES BLD QL SMEAR: NORMAL
MAGNESIUM SERPL-MCNC: 1.7 MG/DL (ref 1.6–2.6)
MCH RBC QN AUTO: 38.5 PG (ref 27–32.7)
MCHC RBC AUTO-ENTMCNC: 34.8 G/DL (ref 32.6–36.4)
MCV RBC AUTO: 110.6 FL (ref 79.8–96.2)
MONOCYTES # BLD AUTO: 0.77 10*3/MM3 (ref 0.2–1.2)
MONOCYTES NFR BLD AUTO: 8.7 % (ref 5–12)
NEUTROPHILS # BLD AUTO: 7.19 10*3/MM3 (ref 1.9–8.1)
NEUTROPHILS NFR BLD AUTO: 81.2 % (ref 42.7–76)
NITRITE UR QL STRIP: NEGATIVE
NRBC BLD MANUAL-RTO: 0 /100 WBC (ref 0–0)
NT-PROBNP SERPL-MCNC: 1320 PG/ML (ref 5–450)
PH UR STRIP.AUTO: 5.5 [PH] (ref 5–8)
PLAT MORPH BLD: NORMAL
PLATELET # BLD AUTO: 133 10*3/MM3 (ref 140–500)
PMV BLD AUTO: 9.3 FL (ref 6–12)
POTASSIUM BLD-SCNC: 3.2 MMOL/L (ref 3.5–5.2)
PROT SERPL-MCNC: 7.6 G/DL (ref 6–8.5)
PROT UR QL STRIP: NEGATIVE
PROTHROMBIN TIME: 23.4 SECONDS (ref 11.7–14.2)
RBC # BLD AUTO: 1.79 10*6/MM3 (ref 4.6–6)
RETICS/RBC NFR AUTO: 6.15 % (ref 0.5–1.5)
RH BLD: POSITIVE
SODIUM BLD-SCNC: 123 MMOL/L (ref 136–145)
SP GR UR STRIP: 1.01 (ref 1–1.03)
T&S EXPIRATION DATE: NORMAL
TIBC SERPL-MCNC: 125 MCG/DL (ref 298–536)
TRANSFERRIN SERPL-MCNC: 84 MG/DL (ref 200–360)
TROPONIN T SERPL-MCNC: <0.01 NG/ML (ref 0–0.03)
UROBILINOGEN UR QL STRIP: ABNORMAL
WBC MORPH BLD: NORMAL
WBC NRBC COR # BLD: 8.85 10*3/MM3 (ref 4.5–10.7)
WHOLE BLOOD HOLD SPECIMEN: NORMAL
WHOLE BLOOD HOLD SPECIMEN: NORMAL

## 2018-10-24 PROCEDURE — 84466 ASSAY OF TRANSFERRIN: CPT | Performed by: INTERNAL MEDICINE

## 2018-10-24 PROCEDURE — 84484 ASSAY OF TROPONIN QUANT: CPT | Performed by: EMERGENCY MEDICINE

## 2018-10-24 PROCEDURE — 86901 BLOOD TYPING SEROLOGIC RH(D): CPT | Performed by: EMERGENCY MEDICINE

## 2018-10-24 PROCEDURE — 93970 EXTREMITY STUDY: CPT

## 2018-10-24 PROCEDURE — 80307 DRUG TEST PRSMV CHEM ANLYZR: CPT | Performed by: EMERGENCY MEDICINE

## 2018-10-24 PROCEDURE — 85025 COMPLETE CBC W/AUTO DIFF WBC: CPT | Performed by: EMERGENCY MEDICINE

## 2018-10-24 PROCEDURE — 83690 ASSAY OF LIPASE: CPT | Performed by: EMERGENCY MEDICINE

## 2018-10-24 PROCEDURE — 83880 ASSAY OF NATRIURETIC PEPTIDE: CPT | Performed by: EMERGENCY MEDICINE

## 2018-10-24 PROCEDURE — 36430 TRANSFUSION BLD/BLD COMPNT: CPT

## 2018-10-24 PROCEDURE — 71046 X-RAY EXAM CHEST 2 VIEWS: CPT

## 2018-10-24 PROCEDURE — 85007 BL SMEAR W/DIFF WBC COUNT: CPT | Performed by: EMERGENCY MEDICINE

## 2018-10-24 PROCEDURE — 86923 COMPATIBILITY TEST ELECTRIC: CPT

## 2018-10-24 PROCEDURE — 85610 PROTHROMBIN TIME: CPT | Performed by: EMERGENCY MEDICINE

## 2018-10-24 PROCEDURE — 82728 ASSAY OF FERRITIN: CPT | Performed by: INTERNAL MEDICINE

## 2018-10-24 PROCEDURE — P9016 RBC LEUKOCYTES REDUCED: HCPCS

## 2018-10-24 PROCEDURE — 85045 AUTOMATED RETICULOCYTE COUNT: CPT | Performed by: INTERNAL MEDICINE

## 2018-10-24 PROCEDURE — 83540 ASSAY OF IRON: CPT | Performed by: INTERNAL MEDICINE

## 2018-10-24 PROCEDURE — 83735 ASSAY OF MAGNESIUM: CPT | Performed by: EMERGENCY MEDICINE

## 2018-10-24 PROCEDURE — 86900 BLOOD TYPING SEROLOGIC ABO: CPT

## 2018-10-24 PROCEDURE — 85730 THROMBOPLASTIN TIME PARTIAL: CPT | Performed by: EMERGENCY MEDICINE

## 2018-10-24 PROCEDURE — 80053 COMPREHEN METABOLIC PANEL: CPT | Performed by: EMERGENCY MEDICINE

## 2018-10-24 PROCEDURE — 99284 EMERGENCY DEPT VISIT MOD MDM: CPT

## 2018-10-24 PROCEDURE — 81003 URINALYSIS AUTO W/O SCOPE: CPT | Performed by: EMERGENCY MEDICINE

## 2018-10-24 PROCEDURE — 86900 BLOOD TYPING SEROLOGIC ABO: CPT | Performed by: EMERGENCY MEDICINE

## 2018-10-24 PROCEDURE — 25010000002 FUROSEMIDE PER 20 MG: Performed by: EMERGENCY MEDICINE

## 2018-10-24 PROCEDURE — 82140 ASSAY OF AMMONIA: CPT | Performed by: INTERNAL MEDICINE

## 2018-10-24 PROCEDURE — 93005 ELECTROCARDIOGRAM TRACING: CPT | Performed by: EMERGENCY MEDICINE

## 2018-10-24 PROCEDURE — 86850 RBC ANTIBODY SCREEN: CPT | Performed by: EMERGENCY MEDICINE

## 2018-10-24 PROCEDURE — 93010 ELECTROCARDIOGRAM REPORT: CPT | Performed by: INTERNAL MEDICINE

## 2018-10-24 RX ORDER — MULTIPLE VITAMINS W/ MINERALS TAB 9MG-400MCG
1 TAB ORAL DAILY
COMMUNITY
End: 2018-12-18 | Stop reason: HOSPADM

## 2018-10-24 RX ORDER — SPIRONOLACTONE 25 MG/1
25 TABLET ORAL
Status: DISCONTINUED | OUTPATIENT
Start: 2018-10-24 | End: 2018-10-25

## 2018-10-24 RX ORDER — MULTIPLE VITAMINS W/ MINERALS TAB 9MG-400MCG
1 TAB ORAL DAILY
Status: DISCONTINUED | OUTPATIENT
Start: 2018-10-25 | End: 2018-10-26 | Stop reason: SDUPTHER

## 2018-10-24 RX ORDER — ONDANSETRON 2 MG/ML
4 INJECTION INTRAMUSCULAR; INTRAVENOUS EVERY 6 HOURS PRN
Status: DISCONTINUED | OUTPATIENT
Start: 2018-10-24 | End: 2018-10-29 | Stop reason: HOSPADM

## 2018-10-24 RX ORDER — PANTOPRAZOLE SODIUM 40 MG/10ML
40 INJECTION, POWDER, LYOPHILIZED, FOR SOLUTION INTRAVENOUS
Status: DISCONTINUED | OUTPATIENT
Start: 2018-10-25 | End: 2018-10-26 | Stop reason: ALTCHOICE

## 2018-10-24 RX ORDER — SODIUM CHLORIDE 0.9 % (FLUSH) 0.9 %
10 SYRINGE (ML) INJECTION AS NEEDED
Status: DISCONTINUED | OUTPATIENT
Start: 2018-10-24 | End: 2018-10-29 | Stop reason: HOSPADM

## 2018-10-24 RX ORDER — ALLOPURINOL 300 MG/1
300 TABLET ORAL DAILY PRN
COMMUNITY
End: 2018-12-18 | Stop reason: HOSPADM

## 2018-10-24 RX ORDER — NADOLOL 20 MG/1
20 TABLET ORAL
Status: DISCONTINUED | OUTPATIENT
Start: 2018-10-24 | End: 2018-10-29 | Stop reason: HOSPADM

## 2018-10-24 RX ORDER — SODIUM CHLORIDE 0.9 % (FLUSH) 0.9 %
3 SYRINGE (ML) INJECTION EVERY 12 HOURS SCHEDULED
Status: DISCONTINUED | OUTPATIENT
Start: 2018-10-24 | End: 2018-10-29 | Stop reason: HOSPADM

## 2018-10-24 RX ORDER — FUROSEMIDE 40 MG/1
40 TABLET ORAL DAILY
Status: DISCONTINUED | OUTPATIENT
Start: 2018-10-24 | End: 2018-10-29 | Stop reason: HOSPADM

## 2018-10-24 RX ORDER — ALLOPURINOL 300 MG/1
300 TABLET ORAL DAILY PRN
Status: DISCONTINUED | OUTPATIENT
Start: 2018-10-25 | End: 2018-10-29 | Stop reason: HOSPADM

## 2018-10-24 RX ORDER — FUROSEMIDE 10 MG/ML
20 INJECTION INTRAMUSCULAR; INTRAVENOUS ONCE
Status: COMPLETED | OUTPATIENT
Start: 2018-10-24 | End: 2018-10-24

## 2018-10-24 RX ORDER — SODIUM CHLORIDE 0.9 % (FLUSH) 0.9 %
3-10 SYRINGE (ML) INJECTION AS NEEDED
Status: DISCONTINUED | OUTPATIENT
Start: 2018-10-24 | End: 2018-10-25

## 2018-10-24 RX ADMIN — NADOLOL 20 MG: 20 TABLET ORAL at 22:07

## 2018-10-24 RX ADMIN — Medication 3 ML: at 22:08

## 2018-10-24 RX ADMIN — FUROSEMIDE 40 MG: 40 TABLET ORAL at 22:07

## 2018-10-24 RX ADMIN — SPIRONOLACTONE 25 MG: 25 TABLET ORAL at 22:07

## 2018-10-24 RX ADMIN — FUROSEMIDE 20 MG: 10 INJECTION, SOLUTION INTRAMUSCULAR; INTRAVENOUS at 17:15

## 2018-10-25 ENCOUNTER — APPOINTMENT (OUTPATIENT)
Dept: CARDIOLOGY | Facility: HOSPITAL | Age: 48
End: 2018-10-25
Attending: INTERNAL MEDICINE

## 2018-10-25 PROBLEM — K76.6 PORTAL HYPERTENSION (HCC): Status: ACTIVE | Noted: 2018-10-25

## 2018-10-25 PROBLEM — E87.1 HYPONATREMIA: Status: ACTIVE | Noted: 2018-10-25

## 2018-10-25 LAB
ABO + RH BLD: NORMAL
ALBUMIN SERPL-MCNC: 2.7 G/DL (ref 3.5–5.2)
ALBUMIN/GLOB SERPL: 0.7 G/DL
ALP SERPL-CCNC: 81 U/L (ref 39–117)
ALT SERPL W P-5'-P-CCNC: 20 U/L (ref 1–41)
ANION GAP SERPL CALCULATED.3IONS-SCNC: 12.3 MMOL/L
AST SERPL-CCNC: 55 U/L (ref 1–40)
BASOPHILS # BLD AUTO: 0.03 10*3/MM3 (ref 0–0.2)
BASOPHILS NFR BLD AUTO: 0.4 % (ref 0–1.5)
BH BB BLOOD EXPIRATION DATE: NORMAL
BH BB BLOOD TYPE BARCODE: 6200
BH BB DISPENSE STATUS: NORMAL
BH BB PRODUCT CODE: NORMAL
BH BB UNIT NUMBER: NORMAL
BH CV VAS HEPATIC PORTAL VEIN DIAMETER: 1.6 CM
BH CV VAS SMA AORTA PSV: 123 CM/S
BH CV VAS SMA HEPATIC PSV: 221 CM/S
BH CV VAS SMA SPLENIC PSV: 144 CM/S
BILIRUB SERPL-MCNC: 8.8 MG/DL (ref 0.1–1.2)
BUN BLD-MCNC: 6 MG/DL (ref 6–20)
BUN/CREAT SERPL: 6.6 (ref 7–25)
CALCIUM SPEC-SCNC: 8.7 MG/DL (ref 8.6–10.5)
CHLORIDE SERPL-SCNC: 84 MMOL/L (ref 98–107)
CHLORIDE UR-SCNC: <20 MMOL/L
CO2 SERPL-SCNC: 27.7 MMOL/L (ref 22–29)
CREAT BLD-MCNC: 0.91 MG/DL (ref 0.76–1.27)
CREAT UR-MCNC: 144 MG/DL
DEPRECATED RDW RBC AUTO: 66.2 FL (ref 37–54)
EOSINOPHIL # BLD AUTO: 0.13 10*3/MM3 (ref 0–0.7)
EOSINOPHIL NFR BLD AUTO: 1.8 % (ref 0.3–6.2)
ERYTHROCYTE [DISTWIDTH] IN BLOOD BY AUTOMATED COUNT: 16.5 % (ref 11.5–14.5)
GFR SERPL CREATININE-BSD FRML MDRD: 90 ML/MIN/1.73
GLOBULIN UR ELPH-MCNC: 3.9 GM/DL
GLUCOSE BLD-MCNC: 86 MG/DL (ref 65–99)
HCT VFR BLD AUTO: 19.9 % (ref 40.4–52.2)
HCT VFR BLD AUTO: 20.1 % (ref 40.4–52.2)
HCT VFR BLD AUTO: 24.9 % (ref 40.4–52.2)
HGB BLD-MCNC: 6.7 G/DL (ref 13.7–17.6)
HGB BLD-MCNC: 6.8 G/DL (ref 13.7–17.6)
HGB BLD-MCNC: 8.2 G/DL (ref 13.7–17.6)
IMM GRANULOCYTES # BLD: 0 10*3/MM3 (ref 0–0.03)
IMM GRANULOCYTES NFR BLD: 0 % (ref 0–0.5)
INR PPP: 2.29 (ref 0.9–1.1)
LYMPHOCYTES # BLD AUTO: 0.95 10*3/MM3 (ref 0.9–4.8)
LYMPHOCYTES NFR BLD AUTO: 13.2 % (ref 19.6–45.3)
MAGNESIUM SERPL-MCNC: 1.6 MG/DL (ref 1.6–2.6)
MCH RBC QN AUTO: 36.4 PG (ref 27–32.7)
MCHC RBC AUTO-ENTMCNC: 33.3 G/DL (ref 32.6–36.4)
MCV RBC AUTO: 109.2 FL (ref 79.8–96.2)
MONOCYTES # BLD AUTO: 0.71 10*3/MM3 (ref 0.2–1.2)
MONOCYTES NFR BLD AUTO: 9.8 % (ref 5–12)
NEUTROPHILS # BLD AUTO: 5.4 10*3/MM3 (ref 1.9–8.1)
NEUTROPHILS NFR BLD AUTO: 74.8 % (ref 42.7–76)
OSMOLALITY UR: 288 MOSM/KG
PHOSPHATE SERPL-MCNC: 3.6 MG/DL (ref 2.5–4.5)
PLATELET # BLD AUTO: 112 10*3/MM3 (ref 140–500)
PMV BLD AUTO: 9.8 FL (ref 6–12)
POTASSIUM BLD-SCNC: 3.5 MMOL/L (ref 3.5–5.2)
PROT SERPL-MCNC: 6.6 G/DL (ref 6–8.5)
PROTHROMBIN TIME: 24.9 SECONDS (ref 11.7–14.2)
RBC # BLD AUTO: 1.84 10*6/MM3 (ref 4.6–6)
SODIUM BLD-SCNC: 124 MMOL/L (ref 136–145)
SODIUM UR-SCNC: <20 MMOL/L
UNIT  ABO: NORMAL
UNIT  RH: NORMAL
WBC NRBC COR # BLD: 7.22 10*3/MM3 (ref 4.5–10.7)

## 2018-10-25 PROCEDURE — 80053 COMPREHEN METABOLIC PANEL: CPT | Performed by: INTERNAL MEDICINE

## 2018-10-25 PROCEDURE — 85025 COMPLETE CBC W/AUTO DIFF WBC: CPT | Performed by: INTERNAL MEDICINE

## 2018-10-25 PROCEDURE — 83935 ASSAY OF URINE OSMOLALITY: CPT | Performed by: HOSPITALIST

## 2018-10-25 PROCEDURE — P9016 RBC LEUKOCYTES REDUCED: HCPCS

## 2018-10-25 PROCEDURE — 84100 ASSAY OF PHOSPHORUS: CPT | Performed by: INTERNAL MEDICINE

## 2018-10-25 PROCEDURE — 93975 VASCULAR STUDY: CPT

## 2018-10-25 PROCEDURE — 86900 BLOOD TYPING SEROLOGIC ABO: CPT

## 2018-10-25 PROCEDURE — 25010000002 THIAMINE PER 100 MG: Performed by: HOSPITALIST

## 2018-10-25 PROCEDURE — 85014 HEMATOCRIT: CPT | Performed by: INTERNAL MEDICINE

## 2018-10-25 PROCEDURE — 82570 ASSAY OF URINE CREATININE: CPT | Performed by: HOSPITALIST

## 2018-10-25 PROCEDURE — 83735 ASSAY OF MAGNESIUM: CPT | Performed by: INTERNAL MEDICINE

## 2018-10-25 PROCEDURE — 99254 IP/OBS CNSLTJ NEW/EST MOD 60: CPT | Performed by: INTERNAL MEDICINE

## 2018-10-25 PROCEDURE — 82436 ASSAY OF URINE CHLORIDE: CPT | Performed by: HOSPITALIST

## 2018-10-25 PROCEDURE — 25010000002 VITAMIN K1 1 MG/1 ML SOLUTION: Performed by: INTERNAL MEDICINE

## 2018-10-25 PROCEDURE — 90791 PSYCH DIAGNOSTIC EVALUATION: CPT | Performed by: SOCIAL WORKER

## 2018-10-25 PROCEDURE — 84300 ASSAY OF URINE SODIUM: CPT | Performed by: HOSPITALIST

## 2018-10-25 PROCEDURE — 85018 HEMOGLOBIN: CPT | Performed by: INTERNAL MEDICINE

## 2018-10-25 PROCEDURE — 36430 TRANSFUSION BLD/BLD COMPNT: CPT

## 2018-10-25 PROCEDURE — 85610 PROTHROMBIN TIME: CPT | Performed by: INTERNAL MEDICINE

## 2018-10-25 PROCEDURE — 87040 BLOOD CULTURE FOR BACTERIA: CPT | Performed by: INTERNAL MEDICINE

## 2018-10-25 RX ORDER — FOLIC ACID 1 MG/1
1 TABLET ORAL DAILY
Status: COMPLETED | OUTPATIENT
Start: 2018-10-26 | End: 2018-10-28

## 2018-10-25 RX ORDER — PHYTONADIONE 2 MG/ML
5 INJECTION, EMULSION INTRAMUSCULAR; INTRAVENOUS; SUBCUTANEOUS DAILY
Status: COMPLETED | OUTPATIENT
Start: 2018-10-25 | End: 2018-10-27

## 2018-10-25 RX ORDER — DIPHENOXYLATE HYDROCHLORIDE AND ATROPINE SULFATE 2.5; .025 MG/1; MG/1
1 TABLET ORAL DAILY
Status: COMPLETED | OUTPATIENT
Start: 2018-10-26 | End: 2018-10-28

## 2018-10-25 RX ORDER — ZINC SULFATE 50(220)MG
220 CAPSULE ORAL DAILY
Status: DISCONTINUED | OUTPATIENT
Start: 2018-10-25 | End: 2018-10-29 | Stop reason: HOSPADM

## 2018-10-25 RX ORDER — SPIRONOLACTONE 100 MG/1
100 TABLET, FILM COATED ORAL DAILY
Status: DISCONTINUED | OUTPATIENT
Start: 2018-10-26 | End: 2018-10-29 | Stop reason: HOSPADM

## 2018-10-25 RX ORDER — THIAMINE MONONITRATE (VIT B1) 100 MG
100 TABLET ORAL ONCE
Status: COMPLETED | OUTPATIENT
Start: 2018-10-25 | End: 2018-10-25

## 2018-10-25 RX ORDER — THIAMINE MONONITRATE (VIT B1) 100 MG
100 TABLET ORAL DAILY
Status: COMPLETED | OUTPATIENT
Start: 2018-10-26 | End: 2018-10-28

## 2018-10-25 RX ADMIN — PHYTONADIONE 5 MG: 1 INJECTION, EMULSION INTRAMUSCULAR; INTRAVENOUS; SUBCUTANEOUS at 12:41

## 2018-10-25 RX ADMIN — Medication 3 ML: at 20:00

## 2018-10-25 RX ADMIN — Medication 3 ML: at 14:30

## 2018-10-25 RX ADMIN — THIAMINE HYDROCHLORIDE 100 MG: 100 INJECTION, SOLUTION INTRAMUSCULAR; INTRAVENOUS at 19:48

## 2018-10-25 RX ADMIN — PANTOPRAZOLE SODIUM 40 MG: 40 INJECTION, POWDER, FOR SOLUTION INTRAVENOUS at 05:01

## 2018-10-25 RX ADMIN — SPIRONOLACTONE 25 MG: 25 TABLET ORAL at 10:52

## 2018-10-25 RX ADMIN — MULTIPLE VITAMINS W/ MINERALS TAB 1 TABLET: TAB at 10:52

## 2018-10-26 ENCOUNTER — APPOINTMENT (OUTPATIENT)
Dept: CARDIOLOGY | Facility: HOSPITAL | Age: 48
End: 2018-10-26
Attending: HOSPITALIST

## 2018-10-26 LAB
ABO + RH BLD: NORMAL
ALBUMIN SERPL-MCNC: 2.9 G/DL (ref 3.5–5.2)
ALBUMIN/GLOB SERPL: 0.8 G/DL
ALP SERPL-CCNC: 85 U/L (ref 39–117)
ALT SERPL W P-5'-P-CCNC: 24 U/L (ref 1–41)
AMMONIA BLD-SCNC: 74 UMOL/L (ref 16–60)
ANION GAP SERPL CALCULATED.3IONS-SCNC: 11.6 MMOL/L
AST SERPL-CCNC: 56 U/L (ref 1–40)
BH BB BLOOD EXPIRATION DATE: NORMAL
BH BB BLOOD TYPE BARCODE: 6200
BH BB DISPENSE STATUS: NORMAL
BH BB PRODUCT CODE: NORMAL
BH BB UNIT NUMBER: NORMAL
BH CV ECHO MEAS - ACS: 2.3 CM
BH CV ECHO MEAS - AO MAX PG (FULL): 9.6 MMHG
BH CV ECHO MEAS - AO MAX PG: 19.7 MMHG
BH CV ECHO MEAS - AO MEAN PG (FULL): 4 MMHG
BH CV ECHO MEAS - AO MEAN PG: 9 MMHG
BH CV ECHO MEAS - AO ROOT AREA (BSA CORRECTED): 1
BH CV ECHO MEAS - AO ROOT AREA: 4.9 CM^2
BH CV ECHO MEAS - AO ROOT DIAM: 2.5 CM
BH CV ECHO MEAS - AO V2 MAX: 222 CM/SEC
BH CV ECHO MEAS - AO V2 MEAN: 141 CM/SEC
BH CV ECHO MEAS - AO V2 VTI: 45.3 CM
BH CV ECHO MEAS - AVA(I,A): 2.4 CM^2
BH CV ECHO MEAS - AVA(I,D): 2.4 CM^2
BH CV ECHO MEAS - AVA(V,A): 2.3 CM^2
BH CV ECHO MEAS - AVA(V,D): 2.3 CM^2
BH CV ECHO MEAS - BSA(HAYCOCK): 2.5 M^2
BH CV ECHO MEAS - BSA: 2.4 M^2
BH CV ECHO MEAS - BZI_BMI: 37.7 KILOGRAMS/M^2
BH CV ECHO MEAS - BZI_METRIC_HEIGHT: 180.3 CM
BH CV ECHO MEAS - BZI_METRIC_WEIGHT: 122.5 KG
BH CV ECHO MEAS - EDV(CUBED): 175.6 ML
BH CV ECHO MEAS - EDV(MOD-SP2): 205 ML
BH CV ECHO MEAS - EDV(MOD-SP4): 163 ML
BH CV ECHO MEAS - EDV(TEICH): 153.7 ML
BH CV ECHO MEAS - EF(CUBED): 73.4 %
BH CV ECHO MEAS - EF(MOD-BP): 70 %
BH CV ECHO MEAS - EF(MOD-SP2): 65.4 %
BH CV ECHO MEAS - EF(MOD-SP4): 75.5 %
BH CV ECHO MEAS - EF(TEICH): 64.6 %
BH CV ECHO MEAS - ESV(CUBED): 46.7 ML
BH CV ECHO MEAS - ESV(MOD-SP2): 71 ML
BH CV ECHO MEAS - ESV(MOD-SP4): 40 ML
BH CV ECHO MEAS - ESV(TEICH): 54.4 ML
BH CV ECHO MEAS - FS: 35.7 %
BH CV ECHO MEAS - IVS/LVPW: 0.91
BH CV ECHO MEAS - IVSD: 1 CM
BH CV ECHO MEAS - LAT PEAK E' VEL: 10 CM/SEC
BH CV ECHO MEAS - LV DIASTOLIC VOL/BSA (35-75): 68 ML/M^2
BH CV ECHO MEAS - LV MASS(C)D: 234.3 GRAMS
BH CV ECHO MEAS - LV MASS(C)DI: 97.8 GRAMS/M^2
BH CV ECHO MEAS - LV MAX PG: 10.1 MMHG
BH CV ECHO MEAS - LV MEAN PG: 5 MMHG
BH CV ECHO MEAS - LV SYSTOLIC VOL/BSA (12-30): 16.7 ML/M^2
BH CV ECHO MEAS - LV V1 MAX: 159 CM/SEC
BH CV ECHO MEAS - LV V1 MEAN: 96.8 CM/SEC
BH CV ECHO MEAS - LV V1 VTI: 34.3 CM
BH CV ECHO MEAS - LVIDD: 5.6 CM
BH CV ECHO MEAS - LVIDS: 3.6 CM
BH CV ECHO MEAS - LVLD AP2: 8.2 CM
BH CV ECHO MEAS - LVLD AP4: 8.1 CM
BH CV ECHO MEAS - LVLS AP2: 6.7 CM
BH CV ECHO MEAS - LVLS AP4: 7.1 CM
BH CV ECHO MEAS - LVOT AREA (M): 3.1 CM^2
BH CV ECHO MEAS - LVOT AREA: 3.1 CM^2
BH CV ECHO MEAS - LVOT DIAM: 2 CM
BH CV ECHO MEAS - LVPWD: 1.1 CM
BH CV ECHO MEAS - MED PEAK E' VEL: 8 CM/SEC
BH CV ECHO MEAS - MV A DUR: 0.13 SEC
BH CV ECHO MEAS - MV A MAX VEL: 59.4 CM/SEC
BH CV ECHO MEAS - MV DEC SLOPE: 540 CM/SEC^2
BH CV ECHO MEAS - MV DEC TIME: 0.29 SEC
BH CV ECHO MEAS - MV E MAX VEL: 128 CM/SEC
BH CV ECHO MEAS - MV E/A: 2.2
BH CV ECHO MEAS - MV MAX PG: 9.5 MMHG
BH CV ECHO MEAS - MV MEAN PG: 3 MMHG
BH CV ECHO MEAS - MV P1/2T MAX VEL: 156 CM/SEC
BH CV ECHO MEAS - MV P1/2T: 84.6 MSEC
BH CV ECHO MEAS - MV V2 MAX: 154 CM/SEC
BH CV ECHO MEAS - MV V2 MEAN: 78.7 CM/SEC
BH CV ECHO MEAS - MV V2 VTI: 42.8 CM
BH CV ECHO MEAS - MVA P1/2T LCG: 1.4 CM^2
BH CV ECHO MEAS - MVA(P1/2T): 2.6 CM^2
BH CV ECHO MEAS - MVA(VTI): 2.5 CM^2
BH CV ECHO MEAS - PA ACC TIME: 0.15 SEC
BH CV ECHO MEAS - PA MAX PG (FULL): 1 MMHG
BH CV ECHO MEAS - PA MAX PG: 5.2 MMHG
BH CV ECHO MEAS - PA PR(ACCEL): 11.1 MMHG
BH CV ECHO MEAS - PA V2 MAX: 114 CM/SEC
BH CV ECHO MEAS - PULM A REVS DUR: 0.13 SEC
BH CV ECHO MEAS - PULM A REVS VEL: 32.7 CM/SEC
BH CV ECHO MEAS - PULM DIAS VEL: 104 CM/SEC
BH CV ECHO MEAS - PULM S/D: 0.54
BH CV ECHO MEAS - PULM SYS VEL: 56.2 CM/SEC
BH CV ECHO MEAS - PVA(V,A): 3.1 CM^2
BH CV ECHO MEAS - PVA(V,D): 3.1 CM^2
BH CV ECHO MEAS - QP/QS: 0.64
BH CV ECHO MEAS - RAP SYSTOLE: 15 MMHG
BH CV ECHO MEAS - RV MAX PG: 4.2 MMHG
BH CV ECHO MEAS - RV MEAN PG: 2 MMHG
BH CV ECHO MEAS - RV V1 MAX: 102 CM/SEC
BH CV ECHO MEAS - RV V1 MEAN: 59.6 CM/SEC
BH CV ECHO MEAS - RV V1 VTI: 19.9 CM
BH CV ECHO MEAS - RVOT AREA: 3.5 CM^2
BH CV ECHO MEAS - RVOT DIAM: 2.1 CM
BH CV ECHO MEAS - RVSP: 38 MMHG
BH CV ECHO MEAS - SI(AO): 92.8 ML/M^2
BH CV ECHO MEAS - SI(CUBED): 53.8 ML/M^2
BH CV ECHO MEAS - SI(LVOT): 45 ML/M^2
BH CV ECHO MEAS - SI(MOD-SP2): 55.9 ML/M^2
BH CV ECHO MEAS - SI(MOD-SP4): 51.3 ML/M^2
BH CV ECHO MEAS - SI(TEICH): 41.4 ML/M^2
BH CV ECHO MEAS - SUP REN AO DIAM: 1.6 CM
BH CV ECHO MEAS - SV(AO): 222.4 ML
BH CV ECHO MEAS - SV(CUBED): 129 ML
BH CV ECHO MEAS - SV(LVOT): 107.8 ML
BH CV ECHO MEAS - SV(MOD-SP2): 134 ML
BH CV ECHO MEAS - SV(MOD-SP4): 123 ML
BH CV ECHO MEAS - SV(RVOT): 68.9 ML
BH CV ECHO MEAS - SV(TEICH): 99.2 ML
BH CV ECHO MEAS - TAPSE (>1.6): 2 CM2
BH CV ECHO MEAS - TR MAX VEL: 240 CM/SEC
BH CV ECHO MEASUREMENTS AVERAGE E/E' RATIO: 14.22
BH CV VAS BP RIGHT ARM: NORMAL MMHG
BH CV XLRA - RV BASE: 4.3 CM
BH CV XLRA - TDI S': 15 CM/SEC
BILIRUB SERPL-MCNC: 10.2 MG/DL (ref 0.1–1.2)
BUN BLD-MCNC: 8 MG/DL (ref 6–20)
BUN/CREAT SERPL: 8.2 (ref 7–25)
CALCIUM SPEC-SCNC: 8.6 MG/DL (ref 8.6–10.5)
CHLORIDE SERPL-SCNC: 85 MMOL/L (ref 98–107)
CO2 SERPL-SCNC: 28.4 MMOL/L (ref 22–29)
CREAT BLD-MCNC: 0.97 MG/DL (ref 0.76–1.27)
DEPRECATED RDW RBC AUTO: 71 FL (ref 37–54)
ERYTHROCYTE [DISTWIDTH] IN BLOOD BY AUTOMATED COUNT: 18.9 % (ref 11.5–14.5)
GFR SERPL CREATININE-BSD FRML MDRD: 83 ML/MIN/1.73
GLOBULIN UR ELPH-MCNC: 3.8 GM/DL
GLUCOSE BLD-MCNC: 96 MG/DL (ref 65–99)
HCT VFR BLD AUTO: 21.4 % (ref 40.4–52.2)
HCT VFR BLD AUTO: 25.1 % (ref 40.4–52.2)
HGB BLD-MCNC: 7.5 G/DL (ref 13.7–17.6)
HGB BLD-MCNC: 8.2 G/DL (ref 13.7–17.6)
INR PPP: 2.06 (ref 0.9–1.1)
LEFT ATRIUM VOLUME INDEX: 28 ML/M2
LV EF 2D ECHO EST: 70 %
MAXIMAL PREDICTED HEART RATE: 174 BPM
MCH RBC QN AUTO: 36.6 PG (ref 27–32.7)
MCHC RBC AUTO-ENTMCNC: 35 G/DL (ref 32.6–36.4)
MCV RBC AUTO: 104.4 FL (ref 79.8–96.2)
PLATELET # BLD AUTO: 122 10*3/MM3 (ref 140–500)
PMV BLD AUTO: 9.4 FL (ref 6–12)
POTASSIUM BLD-SCNC: 3.5 MMOL/L (ref 3.5–5.2)
PROT SERPL-MCNC: 6.7 G/DL (ref 6–8.5)
PROTHROMBIN TIME: 22.9 SECONDS (ref 11.7–14.2)
RBC # BLD AUTO: 2.05 10*6/MM3 (ref 4.6–6)
SODIUM BLD-SCNC: 125 MMOL/L (ref 136–145)
STRESS TARGET HR: 148 BPM
UNIT  ABO: NORMAL
UNIT  RH: NORMAL
WBC NRBC COR # BLD: 7.82 10*3/MM3 (ref 4.5–10.7)

## 2018-10-26 PROCEDURE — 25010000002 PERFLUTREN (DEFINITY) 8.476 MG IN SODIUM CHLORIDE 0.9 % 10 ML INJECTION: Performed by: INTERNAL MEDICINE

## 2018-10-26 PROCEDURE — 82140 ASSAY OF AMMONIA: CPT | Performed by: INTERNAL MEDICINE

## 2018-10-26 PROCEDURE — 85027 COMPLETE CBC AUTOMATED: CPT | Performed by: HOSPITALIST

## 2018-10-26 PROCEDURE — 93306 TTE W/DOPPLER COMPLETE: CPT | Performed by: INTERNAL MEDICINE

## 2018-10-26 PROCEDURE — 25010000002 VITAMIN K1 1 MG/1 ML SOLUTION: Performed by: INTERNAL MEDICINE

## 2018-10-26 PROCEDURE — 85018 HEMOGLOBIN: CPT | Performed by: INTERNAL MEDICINE

## 2018-10-26 PROCEDURE — 85610 PROTHROMBIN TIME: CPT | Performed by: HOSPITALIST

## 2018-10-26 PROCEDURE — 99232 SBSQ HOSP IP/OBS MODERATE 35: CPT | Performed by: INTERNAL MEDICINE

## 2018-10-26 PROCEDURE — 85014 HEMATOCRIT: CPT | Performed by: INTERNAL MEDICINE

## 2018-10-26 PROCEDURE — 93306 TTE W/DOPPLER COMPLETE: CPT

## 2018-10-26 PROCEDURE — 80053 COMPREHEN METABOLIC PANEL: CPT | Performed by: HOSPITALIST

## 2018-10-26 RX ORDER — LACTULOSE 10 G/15ML
20 SOLUTION ORAL 2 TIMES DAILY
Status: DISCONTINUED | OUTPATIENT
Start: 2018-10-26 | End: 2018-10-29 | Stop reason: HOSPADM

## 2018-10-26 RX ORDER — PANTOPRAZOLE SODIUM 40 MG/1
40 TABLET, DELAYED RELEASE ORAL
Status: DISCONTINUED | OUTPATIENT
Start: 2018-10-27 | End: 2018-10-29 | Stop reason: HOSPADM

## 2018-10-26 RX ORDER — PREDNISOLONE SODIUM PHOSPHATE 10 MG/5ML
40 SOLUTION ORAL
Status: DISCONTINUED | OUTPATIENT
Start: 2018-10-26 | End: 2018-10-29 | Stop reason: HOSPADM

## 2018-10-26 RX ADMIN — PREDNISOLONE SODIUM PHOSPHATE 40 MG: 10 SOLUTION ORAL at 13:36

## 2018-10-26 RX ADMIN — Medication 1 TABLET: at 08:54

## 2018-10-26 RX ADMIN — PHYTONADIONE 5 MG: 1 INJECTION, EMULSION INTRAMUSCULAR; INTRAVENOUS; SUBCUTANEOUS at 08:54

## 2018-10-26 RX ADMIN — PANTOPRAZOLE SODIUM 40 MG: 40 INJECTION, POWDER, FOR SOLUTION INTRAVENOUS at 08:53

## 2018-10-26 RX ADMIN — FUROSEMIDE 40 MG: 40 TABLET ORAL at 08:54

## 2018-10-26 RX ADMIN — Medication 3 ML: at 21:00

## 2018-10-26 RX ADMIN — Medication 3 ML: at 13:38

## 2018-10-26 RX ADMIN — FOLIC ACID 1 MG: 1 TABLET ORAL at 08:54

## 2018-10-26 RX ADMIN — Medication 100 MG: at 08:54

## 2018-10-26 RX ADMIN — LACTULOSE 20 G: 20 SOLUTION ORAL at 20:15

## 2018-10-26 RX ADMIN — SPIRONOLACTONE 100 MG: 100 TABLET ORAL at 08:54

## 2018-10-26 RX ADMIN — NADOLOL 20 MG: 20 TABLET ORAL at 08:55

## 2018-10-26 RX ADMIN — MULTIPLE VITAMINS W/ MINERALS TAB 1 TABLET: TAB at 08:54

## 2018-10-26 RX ADMIN — PERFLUTREN 2 ML: 6.52 INJECTION, SUSPENSION INTRAVENOUS at 08:50

## 2018-10-27 ENCOUNTER — APPOINTMENT (OUTPATIENT)
Dept: GENERAL RADIOLOGY | Facility: HOSPITAL | Age: 48
End: 2018-10-27

## 2018-10-27 LAB
ALBUMIN SERPL-MCNC: 3.2 G/DL (ref 3.5–5.2)
ALBUMIN/GLOB SERPL: 0.8 G/DL
ALP SERPL-CCNC: 90 U/L (ref 39–117)
ALT SERPL W P-5'-P-CCNC: 25 U/L (ref 1–41)
AMMONIA BLD-SCNC: 69 UMOL/L (ref 16–60)
ANION GAP SERPL CALCULATED.3IONS-SCNC: 11.6 MMOL/L
AST SERPL-CCNC: 60 U/L (ref 1–40)
BILIRUB SERPL-MCNC: 10.1 MG/DL (ref 0.1–1.2)
BUN BLD-MCNC: 11 MG/DL (ref 6–20)
BUN/CREAT SERPL: 10.7 (ref 7–25)
CALCIUM SPEC-SCNC: 8.8 MG/DL (ref 8.6–10.5)
CHLORIDE SERPL-SCNC: 84 MMOL/L (ref 98–107)
CO2 SERPL-SCNC: 26.4 MMOL/L (ref 22–29)
CREAT BLD-MCNC: 1.03 MG/DL (ref 0.76–1.27)
DEPRECATED RDW RBC AUTO: 69.4 FL (ref 37–54)
ERYTHROCYTE [DISTWIDTH] IN BLOOD BY AUTOMATED COUNT: 18.1 % (ref 11.5–14.5)
GFR SERPL CREATININE-BSD FRML MDRD: 78 ML/MIN/1.73
GLOBULIN UR ELPH-MCNC: 3.9 GM/DL
GLUCOSE BLD-MCNC: 141 MG/DL (ref 65–99)
HCT VFR BLD AUTO: 22.8 % (ref 40.4–52.2)
HGB BLD-MCNC: 7.6 G/DL (ref 13.7–17.6)
INR PPP: 1.9 (ref 0.9–1.1)
MAGNESIUM SERPL-MCNC: 1.7 MG/DL (ref 1.6–2.6)
MCH RBC QN AUTO: 35.3 PG (ref 27–32.7)
MCHC RBC AUTO-ENTMCNC: 33.3 G/DL (ref 32.6–36.4)
MCV RBC AUTO: 106 FL (ref 79.8–96.2)
PLATELET # BLD AUTO: 120 10*3/MM3 (ref 140–500)
PMV BLD AUTO: 9.4 FL (ref 6–12)
POTASSIUM BLD-SCNC: 3.8 MMOL/L (ref 3.5–5.2)
PROT SERPL-MCNC: 7.1 G/DL (ref 6–8.5)
PROTHROMBIN TIME: 21.5 SECONDS (ref 11.7–14.2)
RBC # BLD AUTO: 2.15 10*6/MM3 (ref 4.6–6)
SODIUM BLD-SCNC: 122 MMOL/L (ref 136–145)
WBC NRBC COR # BLD: 6.39 10*3/MM3 (ref 4.5–10.7)

## 2018-10-27 PROCEDURE — 99232 SBSQ HOSP IP/OBS MODERATE 35: CPT | Performed by: INTERNAL MEDICINE

## 2018-10-27 PROCEDURE — 85610 PROTHROMBIN TIME: CPT | Performed by: HOSPITALIST

## 2018-10-27 PROCEDURE — 25010000002 VITAMIN K1 1 MG/1 ML SOLUTION: Performed by: INTERNAL MEDICINE

## 2018-10-27 PROCEDURE — 83735 ASSAY OF MAGNESIUM: CPT | Performed by: INTERNAL MEDICINE

## 2018-10-27 PROCEDURE — 82140 ASSAY OF AMMONIA: CPT | Performed by: INTERNAL MEDICINE

## 2018-10-27 PROCEDURE — 85027 COMPLETE CBC AUTOMATED: CPT | Performed by: HOSPITALIST

## 2018-10-27 PROCEDURE — 80053 COMPREHEN METABOLIC PANEL: CPT | Performed by: HOSPITALIST

## 2018-10-27 PROCEDURE — 71046 X-RAY EXAM CHEST 2 VIEWS: CPT

## 2018-10-27 RX ORDER — GUAIFENESIN 600 MG/1
600 TABLET, EXTENDED RELEASE ORAL EVERY 12 HOURS SCHEDULED
Status: DISCONTINUED | OUTPATIENT
Start: 2018-10-27 | End: 2018-10-29 | Stop reason: HOSPADM

## 2018-10-27 RX ADMIN — GUAIFENESIN 600 MG: 600 TABLET, EXTENDED RELEASE ORAL at 20:54

## 2018-10-27 RX ADMIN — SPIRONOLACTONE 100 MG: 100 TABLET ORAL at 08:40

## 2018-10-27 RX ADMIN — FUROSEMIDE 40 MG: 40 TABLET ORAL at 08:40

## 2018-10-27 RX ADMIN — LACTULOSE 20 G: 20 SOLUTION ORAL at 08:40

## 2018-10-27 RX ADMIN — Medication 3 ML: at 20:54

## 2018-10-27 RX ADMIN — PANTOPRAZOLE SODIUM 40 MG: 40 TABLET, DELAYED RELEASE ORAL at 08:40

## 2018-10-27 RX ADMIN — PREDNISOLONE SODIUM PHOSPHATE 40 MG: 10 SOLUTION ORAL at 08:40

## 2018-10-27 RX ADMIN — Medication 1 TABLET: at 08:40

## 2018-10-27 RX ADMIN — GUAIFENESIN 600 MG: 600 TABLET, EXTENDED RELEASE ORAL at 13:00

## 2018-10-27 RX ADMIN — FOLIC ACID 1 MG: 1 TABLET ORAL at 08:40

## 2018-10-27 RX ADMIN — Medication 3 ML: at 08:42

## 2018-10-27 RX ADMIN — Medication 100 MG: at 08:40

## 2018-10-27 RX ADMIN — PHYTONADIONE 5 MG: 1 INJECTION, EMULSION INTRAMUSCULAR; INTRAVENOUS; SUBCUTANEOUS at 08:41

## 2018-10-27 RX ADMIN — Medication 220 MG: at 08:40

## 2018-10-27 RX ADMIN — LACTULOSE 20 G: 20 SOLUTION ORAL at 20:54

## 2018-10-28 LAB
ALBUMIN SERPL-MCNC: 3.5 G/DL (ref 3.5–5.2)
ALBUMIN/GLOB SERPL: 0.9 G/DL
ALP SERPL-CCNC: 97 U/L (ref 39–117)
ALT SERPL W P-5'-P-CCNC: 33 U/L (ref 1–41)
ANION GAP SERPL CALCULATED.3IONS-SCNC: 13 MMOL/L
AST SERPL-CCNC: 76 U/L (ref 1–40)
BILIRUB SERPL-MCNC: 9.2 MG/DL (ref 0.1–1.2)
BUN BLD-MCNC: 14 MG/DL (ref 6–20)
BUN/CREAT SERPL: 11.7 (ref 7–25)
CALCIUM SPEC-SCNC: 9.3 MG/DL (ref 8.6–10.5)
CHLORIDE SERPL-SCNC: 85 MMOL/L (ref 98–107)
CO2 SERPL-SCNC: 27 MMOL/L (ref 22–29)
CREAT BLD-MCNC: 1.2 MG/DL (ref 0.76–1.27)
DEPRECATED RDW RBC AUTO: 67.8 FL (ref 37–54)
ERYTHROCYTE [DISTWIDTH] IN BLOOD BY AUTOMATED COUNT: 17.5 % (ref 11.5–14.5)
GFR SERPL CREATININE-BSD FRML MDRD: 65 ML/MIN/1.73
GLOBULIN UR ELPH-MCNC: 4 GM/DL
GLUCOSE BLD-MCNC: 119 MG/DL (ref 65–99)
HCT VFR BLD AUTO: 22.6 % (ref 40.4–52.2)
HGB BLD-MCNC: 7.5 G/DL (ref 13.7–17.6)
INR PPP: 1.91 (ref 0.9–1.1)
MCH RBC QN AUTO: 35.5 PG (ref 27–32.7)
MCHC RBC AUTO-ENTMCNC: 33.2 G/DL (ref 32.6–36.4)
MCV RBC AUTO: 107.1 FL (ref 79.8–96.2)
PLATELET # BLD AUTO: 115 10*3/MM3 (ref 140–500)
PMV BLD AUTO: 9.4 FL (ref 6–12)
POTASSIUM BLD-SCNC: 3.7 MMOL/L (ref 3.5–5.2)
PROT SERPL-MCNC: 7.5 G/DL (ref 6–8.5)
PROTHROMBIN TIME: 21.5 SECONDS (ref 11.7–14.2)
RBC # BLD AUTO: 2.11 10*6/MM3 (ref 4.6–6)
SODIUM BLD-SCNC: 125 MMOL/L (ref 136–145)
WBC NRBC COR # BLD: 9.5 10*3/MM3 (ref 4.5–10.7)

## 2018-10-28 PROCEDURE — 85610 PROTHROMBIN TIME: CPT | Performed by: HOSPITALIST

## 2018-10-28 PROCEDURE — 80053 COMPREHEN METABOLIC PANEL: CPT | Performed by: HOSPITALIST

## 2018-10-28 PROCEDURE — 99232 SBSQ HOSP IP/OBS MODERATE 35: CPT | Performed by: INTERNAL MEDICINE

## 2018-10-28 PROCEDURE — 85027 COMPLETE CBC AUTOMATED: CPT | Performed by: HOSPITALIST

## 2018-10-28 RX ADMIN — LACTULOSE 20 G: 20 SOLUTION ORAL at 20:58

## 2018-10-28 RX ADMIN — Medication 100 MG: at 09:11

## 2018-10-28 RX ADMIN — Medication 3 ML: at 09:00

## 2018-10-28 RX ADMIN — Medication 3 ML: at 20:58

## 2018-10-28 RX ADMIN — GUAIFENESIN 600 MG: 600 TABLET, EXTENDED RELEASE ORAL at 20:58

## 2018-10-28 RX ADMIN — FUROSEMIDE 40 MG: 40 TABLET ORAL at 09:11

## 2018-10-28 RX ADMIN — PREDNISOLONE SODIUM PHOSPHATE 40 MG: 10 SOLUTION ORAL at 09:11

## 2018-10-28 RX ADMIN — SPIRONOLACTONE 100 MG: 100 TABLET ORAL at 09:11

## 2018-10-28 RX ADMIN — FOLIC ACID 1 MG: 1 TABLET ORAL at 09:11

## 2018-10-28 RX ADMIN — LACTULOSE 20 G: 20 SOLUTION ORAL at 09:11

## 2018-10-28 RX ADMIN — PANTOPRAZOLE SODIUM 40 MG: 40 TABLET, DELAYED RELEASE ORAL at 06:33

## 2018-10-28 RX ADMIN — Medication 1 TABLET: at 09:11

## 2018-10-28 RX ADMIN — NADOLOL 20 MG: 20 TABLET ORAL at 09:11

## 2018-10-28 RX ADMIN — GUAIFENESIN 600 MG: 600 TABLET, EXTENDED RELEASE ORAL at 09:11

## 2018-10-29 VITALS
SYSTOLIC BLOOD PRESSURE: 110 MMHG | WEIGHT: 269 LBS | RESPIRATION RATE: 18 BRPM | TEMPERATURE: 97.8 F | HEIGHT: 71 IN | OXYGEN SATURATION: 94 % | BODY MASS INDEX: 37.66 KG/M2 | HEART RATE: 77 BPM | DIASTOLIC BLOOD PRESSURE: 65 MMHG

## 2018-10-29 LAB
ALBUMIN SERPL-MCNC: 3.3 G/DL (ref 3.5–5.2)
ALBUMIN/GLOB SERPL: 0.8 G/DL
ALP SERPL-CCNC: 92 U/L (ref 39–117)
ALT SERPL W P-5'-P-CCNC: 51 U/L (ref 1–41)
ANION GAP SERPL CALCULATED.3IONS-SCNC: 15.5 MMOL/L
AST SERPL-CCNC: 104 U/L (ref 1–40)
BILIRUB SERPL-MCNC: 8.4 MG/DL (ref 0.1–1.2)
BUN BLD-MCNC: 13 MG/DL (ref 6–20)
BUN/CREAT SERPL: 11.6 (ref 7–25)
CALCIUM SPEC-SCNC: 9.5 MG/DL (ref 8.6–10.5)
CHLORIDE SERPL-SCNC: 86 MMOL/L (ref 98–107)
CO2 SERPL-SCNC: 26.5 MMOL/L (ref 22–29)
CREAT BLD-MCNC: 1.12 MG/DL (ref 0.76–1.27)
DEPRECATED RDW RBC AUTO: 68.6 FL (ref 37–54)
ERYTHROCYTE [DISTWIDTH] IN BLOOD BY AUTOMATED COUNT: 17.5 % (ref 11.5–14.5)
GFR SERPL CREATININE-BSD FRML MDRD: 71 ML/MIN/1.73
GLOBULIN UR ELPH-MCNC: 4.1 GM/DL
GLUCOSE BLD-MCNC: 111 MG/DL (ref 65–99)
HCT VFR BLD AUTO: 22.8 % (ref 40.4–52.2)
HGB BLD-MCNC: 7.4 G/DL (ref 13.7–17.6)
INR PPP: 1.94 (ref 0.9–1.1)
MCH RBC QN AUTO: 35.2 PG (ref 27–32.7)
MCHC RBC AUTO-ENTMCNC: 32.5 G/DL (ref 32.6–36.4)
MCV RBC AUTO: 108.6 FL (ref 79.8–96.2)
PLATELET # BLD AUTO: 108 10*3/MM3 (ref 140–500)
PMV BLD AUTO: 9.3 FL (ref 6–12)
POTASSIUM BLD-SCNC: 3.8 MMOL/L (ref 3.5–5.2)
PROT SERPL-MCNC: 7.4 G/DL (ref 6–8.5)
PROTHROMBIN TIME: 21.8 SECONDS (ref 11.7–14.2)
RBC # BLD AUTO: 2.1 10*6/MM3 (ref 4.6–6)
SODIUM BLD-SCNC: 128 MMOL/L (ref 136–145)
WBC NRBC COR # BLD: 8.87 10*3/MM3 (ref 4.5–10.7)

## 2018-10-29 PROCEDURE — 80053 COMPREHEN METABOLIC PANEL: CPT | Performed by: HOSPITALIST

## 2018-10-29 PROCEDURE — 85610 PROTHROMBIN TIME: CPT | Performed by: HOSPITALIST

## 2018-10-29 PROCEDURE — 99232 SBSQ HOSP IP/OBS MODERATE 35: CPT | Performed by: INTERNAL MEDICINE

## 2018-10-29 PROCEDURE — 85027 COMPLETE CBC AUTOMATED: CPT | Performed by: HOSPITALIST

## 2018-10-29 RX ORDER — FUROSEMIDE 40 MG/1
40 TABLET ORAL DAILY
Qty: 30 TABLET | Refills: 0 | Status: SHIPPED | OUTPATIENT
Start: 2018-10-30 | End: 2018-11-29 | Stop reason: SDUPTHER

## 2018-10-29 RX ORDER — NADOLOL 20 MG/1
20 TABLET ORAL
Qty: 30 TABLET | Refills: 0 | Status: SHIPPED | OUTPATIENT
Start: 2018-10-29 | End: 2018-11-29 | Stop reason: SDUPTHER

## 2018-10-29 RX ORDER — ZINC SULFATE 50(220)MG
220 CAPSULE ORAL DAILY
Qty: 30 CAPSULE | Refills: 0 | Status: SHIPPED | OUTPATIENT
Start: 2018-10-30 | End: 2018-11-29 | Stop reason: SDUPTHER

## 2018-10-29 RX ORDER — PREDNISOLONE SODIUM PHOSPHATE 10 MG/5ML
40 SOLUTION ORAL
Qty: 280 ML | Refills: 0 | Status: SHIPPED | OUTPATIENT
Start: 2018-10-29 | End: 2018-11-12 | Stop reason: SDUPTHER

## 2018-10-29 RX ORDER — PANTOPRAZOLE SODIUM 40 MG/1
40 TABLET, DELAYED RELEASE ORAL
Qty: 30 TABLET | Refills: 0 | Status: SHIPPED | OUTPATIENT
Start: 2018-10-30 | End: 2018-11-29 | Stop reason: SDUPTHER

## 2018-10-29 RX ORDER — LACTULOSE 10 G/15ML
20 SOLUTION ORAL 2 TIMES DAILY
Qty: 1800 ML | Refills: 0 | Status: SHIPPED | OUTPATIENT
Start: 2018-10-29 | End: 2018-11-29 | Stop reason: SDUPTHER

## 2018-10-29 RX ORDER — SPIRONOLACTONE 100 MG/1
100 TABLET, FILM COATED ORAL DAILY
Qty: 30 TABLET | Refills: 0 | Status: SHIPPED | OUTPATIENT
Start: 2018-10-30 | End: 2018-11-29 | Stop reason: SDUPTHER

## 2018-10-29 RX ADMIN — SPIRONOLACTONE 100 MG: 100 TABLET ORAL at 09:45

## 2018-10-29 RX ADMIN — LACTULOSE 20 G: 20 SOLUTION ORAL at 09:45

## 2018-10-29 RX ADMIN — PREDNISOLONE SODIUM PHOSPHATE 40 MG: 10 SOLUTION ORAL at 11:16

## 2018-10-29 RX ADMIN — NADOLOL 20 MG: 20 TABLET ORAL at 11:16

## 2018-10-29 RX ADMIN — PANTOPRAZOLE SODIUM 40 MG: 40 TABLET, DELAYED RELEASE ORAL at 06:39

## 2018-10-29 RX ADMIN — GUAIFENESIN 600 MG: 600 TABLET, EXTENDED RELEASE ORAL at 09:45

## 2018-10-29 RX ADMIN — Medication 3 ML: at 09:49

## 2018-10-29 RX ADMIN — FUROSEMIDE 40 MG: 40 TABLET ORAL at 09:45

## 2018-10-30 ENCOUNTER — READMISSION MANAGEMENT (OUTPATIENT)
Dept: CALL CENTER | Facility: HOSPITAL | Age: 48
End: 2018-10-30

## 2018-10-30 LAB — BACTERIA SPEC AEROBE CULT: NORMAL

## 2018-10-30 NOTE — OUTREACH NOTE
Prep Survey      Responses   Facility patient discharged from?  [place]   Is patient eligible?  Yes   Discharge diagnosis  Hepatitis, alcoholic, portal HTN, hyponatremia, acute anemia, esophageal varices, ascites, cholelithiasis, thrombocytopenia   Does the patient have one of the following disease processes/diagnoses(primary or secondary)?  Other   Does the patient have Home health ordered?  No   Is there a DME ordered?  No   Comments regarding appointments  See AVS   Prep survey completed?  Yes          Flora Medley RN

## 2018-10-31 ENCOUNTER — READMISSION MANAGEMENT (OUTPATIENT)
Dept: CALL CENTER | Facility: HOSPITAL | Age: 48
End: 2018-10-31

## 2018-10-31 NOTE — OUTREACH NOTE
Medical Week 1 Survey      Responses   Facility patient discharged from?  [place]   Does the patient have one of the following disease processes/diagnoses(primary or secondary)?  Other   Is there a successful TCM telephone encounter documented?  No   Week 1 attempt successful?  No   Unsuccessful attempts  Attempt 1          Flora Ceballos RN

## 2018-11-02 ENCOUNTER — READMISSION MANAGEMENT (OUTPATIENT)
Dept: CALL CENTER | Facility: HOSPITAL | Age: 48
End: 2018-11-02

## 2018-11-02 NOTE — OUTREACH NOTE
Medical Week 1 Survey      Responses   Facility patient discharged from?  [place]   Does the patient have one of the following disease processes/diagnoses(primary or secondary)?  Other   Is there a successful TCM telephone encounter documented?  No   Week 1 attempt successful?  No   Unsuccessful attempts  Attempt 2          Mira Kevin RN

## 2018-11-06 ENCOUNTER — READMISSION MANAGEMENT (OUTPATIENT)
Dept: CALL CENTER | Facility: HOSPITAL | Age: 48
End: 2018-11-06

## 2018-11-06 NOTE — OUTREACH NOTE
Medical Week 1 Survey      Responses   Facility patient discharged from?  [place]   Does the patient have one of the following disease processes/diagnoses(primary or secondary)?  Other   Is there a successful TCM telephone encounter documented?  No   Week 1 attempt successful?  No   Unsuccessful attempts  Attempt 3          Jens Pollard RN

## 2018-11-09 ENCOUNTER — OFFICE VISIT (OUTPATIENT)
Dept: GASTROENTEROLOGY | Facility: CLINIC | Age: 48
End: 2018-11-09

## 2018-11-09 ENCOUNTER — READMISSION MANAGEMENT (OUTPATIENT)
Dept: CALL CENTER | Facility: HOSPITAL | Age: 48
End: 2018-11-09

## 2018-11-09 VITALS
SYSTOLIC BLOOD PRESSURE: 114 MMHG | WEIGHT: 260.8 LBS | DIASTOLIC BLOOD PRESSURE: 60 MMHG | TEMPERATURE: 97.6 F | HEIGHT: 72 IN | BODY MASS INDEX: 35.33 KG/M2

## 2018-11-09 DIAGNOSIS — K70.10 ALCOHOLIC HEPATITIS WITHOUT ASCITES: Primary | ICD-10-CM

## 2018-11-09 DIAGNOSIS — I85.10 SECONDARY ESOPHAGEAL VARICES WITHOUT BLEEDING (HCC): ICD-10-CM

## 2018-11-09 DIAGNOSIS — K92.2 ACUTE UPPER GI BLEED: ICD-10-CM

## 2018-11-09 DIAGNOSIS — E87.1 HYPONATREMIA: ICD-10-CM

## 2018-11-09 DIAGNOSIS — L98.9 BENIGN SKIN LESION OF THIGH: ICD-10-CM

## 2018-11-09 PROCEDURE — 99214 OFFICE O/P EST MOD 30 MIN: CPT | Performed by: INTERNAL MEDICINE

## 2018-11-09 RX ORDER — BENZETHONIUM CHLORIDE 0.2 %
FILM-FORMING LIQUID WITH APPLICATOR TOPICAL AS NEEDED
Qty: 30 ML | Refills: 2 | Status: SHIPPED | OUTPATIENT
Start: 2018-11-09 | End: 2018-12-18 | Stop reason: HOSPADM

## 2018-11-09 NOTE — OUTREACH NOTE
Medical Week 2 Survey      Responses   Facility patient discharged from?  [place]   Does the patient have one of the following disease processes/diagnoses(primary or secondary)?  Other   Week 2 attempt successful?  No   Unsuccessful attempts  Attempt 1          Jens Pollard RN

## 2018-11-09 NOTE — PROGRESS NOTES
Chief Complaint   Patient presents with   • Liver Follow-up     hospital follow up      Subjective     HPI  Enrique Ramirez is a 48 y.o. male who presents for follow up of alcoholic hepatitis.  He has been hospitalized two times, 6/21-6/24 and 10/24-10/29 for alcoholic hepatitis episodes.    He had diffuse anasarca during his October hospitalization.  Portal Doppler and  lower extremity Dopplers were negative for any evidence of clot.  He was started on diuretics.  He has been on zinc.  He is on prednisolone for alcoholic hepatitis.  By the time of his discharge on the 29th, his lab work had improved.    He is doing well since discharge.  He denies eating well.  He is following a low-sodium diet.  He is still on 2 L of fluid restriction based on his hyponatremia.  He has had no further alcohol.  He does complain of a ingrown hair that has been persistently using serous fluid.  He does have to change gauze on it about every hour.    He reports that his urine has improved in color.  He does feel a bit better.    He did undergo EGD with Dr. Hassan on June 22 notable for small varices, esophagitis, changes of portal hypertensive gastropathy.  Imaging in June shows diffuse hepatic steatosis.       Past Medical History:   Diagnosis Date   • Esophageal varices (CMS/HCC)    • Gallstones    • Gout    • Hepatitis, alcoholic    • Pre-diabetes        Social History     Social History   • Marital status:      Social History Main Topics   • Smoking status: Never Smoker   • Smokeless tobacco: Never Used   • Alcohol use No      Comment: 3 weeks ago   • Drug use: No   • Sexual activity: Defer     Other Topics Concern   • Not on file         Current Outpatient Prescriptions:   •  Fexofenadine HCl (MUCINEX ALLERGY PO), Take  by mouth Every Night., Disp: , Rfl:   •  furosemide (LASIX) 40 MG tablet, Take 1 tablet by mouth Daily for 30 days., Disp: 30 tablet, Rfl: 0  •  lactulose (CHRONULAC) 10 GM/15ML solution, Take 30 mL by mouth 2  (Two) Times a Day for 30 days., Disp: 1800 mL, Rfl: 0  •  Multiple Vitamins-Minerals (MULTIVITAMIN WITH MINERALS) tablet tablet, Take 1 tablet by mouth Daily., Disp: , Rfl:   •  nadolol (CORGARD) 20 MG tablet, Take 1 tablet by mouth Daily., Disp: 30 tablet, Rfl: 0  •  pantoprazole (PROTONIX) 40 MG EC tablet, Take 1 tablet by mouth Every Morning Before Breakfast for 30 days., Disp: 30 tablet, Rfl: 0  •  prednisoLONE sodium phosphate (MILLIPRED) 10 MG/5ML solution oral solution, Take 20 mL by mouth Daily With Breakfast for 14 days., Disp: 280 mL, Rfl: 0  •  spironolactone (ALDACTONE) 100 MG tablet, Take 1 tablet by mouth Daily for 30 days., Disp: 30 tablet, Rfl: 0  •  Vitamin A 26054 units tablet, Take 5,000 Units by mouth Daily., Disp: , Rfl:   •  allopurinol (ZYLOPRIM) 300 MG tablet, Take 300 mg by mouth Daily As Needed (FOR GOUT FLARE-UPS)., Disp: , Rfl:   •  zinc sulfate (ZINCATE) 220 (50 Zn) MG capsule, Take 1 capsule by mouth Daily for 30 days., Disp: 30 capsule, Rfl: 0    Review of Systems   Constitutional: Negative for activity change, appetite change, chills and fever.   HENT: Negative for trouble swallowing.    Respiratory: Negative.    Cardiovascular: Positive for leg swelling. Negative for chest pain.   Gastrointestinal: Positive for abdominal distention. Negative for abdominal pain, anal bleeding, constipation, diarrhea, nausea and vomiting.   Genitourinary: Negative for dysuria, frequency and hematuria.   Skin:        Draining lesion of R thigh       Objective   Vitals:    11/09/18 1240   BP: 114/60   Temp: 97.6 °F (36.4 °C)     1    11/09/18  1240   Weight: 118 kg (260 lb 12.8 oz)     Body mass index is 35.37 kg/m².      Physical Exam   Constitutional: He is oriented to person, place, and time. He appears well-developed and well-nourished. No distress.   HENT:   Head: Normocephalic and atraumatic.   Right Ear: External ear normal.   Left Ear: External ear normal.   Nose: Nose normal.   Mouth/Throat:  Oropharynx is clear and moist.   Eyes: Conjunctivae and EOM are normal. Right eye exhibits no discharge. Left eye exhibits no discharge. Scleral icterus is present.   Neck: Normal range of motion. Neck supple. No thyromegaly present.   No supraclavicular adenopathy   Cardiovascular: Normal rate, regular rhythm, normal heart sounds and intact distal pulses.  Exam reveals no gallop.    No murmur heard.  No lower extremity edema   Pulmonary/Chest: Effort normal and breath sounds normal. No respiratory distress. He has no wheezes.   Abdominal: Soft. Normal appearance and bowel sounds are normal. He exhibits no distension and no mass. There is no hepatosplenomegaly. There is no tenderness. There is no rigidity, no rebound and no guarding. No hernia.   Genitourinary:   Genitourinary Comments: Rectal exam deferred   Musculoskeletal: Normal range of motion. He exhibits edema. He exhibits no tenderness.   No atrophy of upper or lower extremities.  Normal digits and nails of both hands.   Lymphadenopathy:     He has no cervical adenopathy.   Neurological: He is alert and oriented to person, place, and time. He displays no atrophy. Coordination normal.   Skin: Skin is warm and dry. No rash noted. He is not diaphoretic. No erythema.   + jaundice, about a 1.5 mm (ingrown hair type lesion on his inner right thigh that is draining a copious amount of serous fluid.   Psychiatric: He has a normal mood and affect. His behavior is normal. Judgment and thought content normal.   Vitals reviewed.      WBC   Date Value Ref Range Status   10/29/2018 8.87 4.50 - 10.70 10*3/mm3 Final     RBC   Date Value Ref Range Status   10/29/2018 2.10 (L) 4.60 - 6.00 10*6/mm3 Final     Hemoglobin   Date Value Ref Range Status   10/29/2018 7.4 (L) 13.7 - 17.6 g/dL Final     Hematocrit   Date Value Ref Range Status   10/29/2018 22.8 (L) 40.4 - 52.2 % Final     MCV   Date Value Ref Range Status   10/29/2018 108.6 (H) 79.8 - 96.2 fL Final     MCH   Date  Value Ref Range Status   10/29/2018 35.2 (H) 27.0 - 32.7 pg Final     MCHC   Date Value Ref Range Status   10/29/2018 32.5 (L) 32.6 - 36.4 g/dL Final     RDW   Date Value Ref Range Status   10/29/2018 17.5 (H) 11.5 - 14.5 % Final     RDW-SD   Date Value Ref Range Status   10/29/2018 68.6 (H) 37.0 - 54.0 fl Final     MPV   Date Value Ref Range Status   10/29/2018 9.3 6.0 - 12.0 fL Final     Platelets   Date Value Ref Range Status   10/29/2018 108 (L) 140 - 500 10*3/mm3 Final     Neutrophil %   Date Value Ref Range Status   10/25/2018 74.8 42.7 - 76.0 % Final     Lymphocyte %   Date Value Ref Range Status   10/25/2018 13.2 (L) 19.6 - 45.3 % Final     Monocyte %   Date Value Ref Range Status   10/25/2018 9.8 5.0 - 12.0 % Final     Eosinophil %   Date Value Ref Range Status   10/25/2018 1.8 0.3 - 6.2 % Final     Basophil %   Date Value Ref Range Status   10/25/2018 0.4 0.0 - 1.5 % Final     Immature Grans %   Date Value Ref Range Status   10/25/2018 0.0 0.0 - 0.5 % Final     Neutrophils, Absolute   Date Value Ref Range Status   10/25/2018 5.40 1.90 - 8.10 10*3/mm3 Final     Lymphocytes, Absolute   Date Value Ref Range Status   10/25/2018 0.95 0.90 - 4.80 10*3/mm3 Final     Monocytes, Absolute   Date Value Ref Range Status   10/25/2018 0.71 0.20 - 1.20 10*3/mm3 Final     Eosinophils, Absolute   Date Value Ref Range Status   10/25/2018 0.13 0.00 - 0.70 10*3/mm3 Final     Basophils, Absolute   Date Value Ref Range Status   10/25/2018 0.03 0.00 - 0.20 10*3/mm3 Final     Immature Grans, Absolute   Date Value Ref Range Status   10/25/2018 0.00 0.00 - 0.03 10*3/mm3 Final     nRBC   Date Value Ref Range Status   10/24/2018 0.0 0.0 - 0.0 /100 WBC Final       Lab Results   Component Value Date    GLUCOSE 111 (H) 10/29/2018    BUN 13 10/29/2018    CREATININE 1.12 10/29/2018    EGFRIFNONA 71 10/29/2018    BCR 11.6 10/29/2018    CO2 26.5 10/29/2018    CALCIUM 9.5 10/29/2018    ALBUMIN 3.30 (L) 10/29/2018     (H) 10/29/2018     ALT 51 (H) 10/29/2018         Imaging Results (last 7 days)     ** No results found for the last 168 hours. **            Assessment/Plan    1.  Alcoholic hepatitis: He had shown improvement on prednisolone.  No further drinking.  Eating well.    2. Hyponatremia: He is on 2 L fluid restriction    3.  Anasarca: Still with serous is oozing from his thigh.  He is on 100 of Aldactone and 40 of Lasix.    4.  Esophageal varices: As per June EGD    5. Thigh lesion: draining serous fluid    6. Thrombocytopenia, coagulopathy: with #1    Plan  Continue diuretics, prednisolone  Continue low-sodium diet, frequent small meals  Liver ultrasound in December  CMP, CBC, INR today  New skin to apply to the lesion on his thigh    Enrique was seen today for liver follow-up.    Diagnoses and all orders for this visit:    Alcoholic hepatitis without ascites  -     Comprehensive Metabolic Panel  -     CBC & Differential  -     Protime-INR  -     US Liver; Future    Acute upper GI bleed    Secondary esophageal varices without bleeding (CMS/HCC)    Hyponatremia  -     Comprehensive Metabolic Panel  -     CBC & Differential  -     Protime-INR    Benign skin lesion of thigh        Dictated utilizing Dragon dictation

## 2018-11-09 NOTE — PATIENT INSTRUCTIONS
Labs today    No medication changes until you hear from me    Liver ultrasound in December    Continue low sodium diet, frequent meals    For any additional questions, concerns or changes to your condition after today's office visit please contact the office at [phone].

## 2018-11-10 LAB
ALBUMIN SERPL-MCNC: 3.9 G/DL (ref 3.5–5.2)
ALBUMIN/GLOB SERPL: 1 G/DL
ALP SERPL-CCNC: 139 U/L (ref 39–117)
ALT SERPL-CCNC: 140 U/L (ref 1–41)
AST SERPL-CCNC: 99 U/L (ref 1–40)
BASOPHILS # BLD AUTO: 0.02 10*3/MM3 (ref 0–0.2)
BASOPHILS NFR BLD AUTO: 0.1 % (ref 0–1.5)
BILIRUB SERPL-MCNC: 8 MG/DL (ref 0.1–1.2)
BUN SERPL-MCNC: 21 MG/DL (ref 6–20)
BUN/CREAT SERPL: 20.8 (ref 7–25)
CALCIUM SERPL-MCNC: 9.9 MG/DL (ref 8.6–10.5)
CHLORIDE SERPL-SCNC: 89 MMOL/L (ref 98–107)
CO2 SERPL-SCNC: 30.4 MMOL/L (ref 22–29)
CREAT SERPL-MCNC: 1.01 MG/DL (ref 0.76–1.27)
DIFFERENTIAL COMMENT: NORMAL
EOSINOPHIL # BLD AUTO: 0.1 10*3/MM3 (ref 0–0.7)
EOSINOPHIL NFR BLD AUTO: 0.6 % (ref 0.3–6.2)
ERYTHROCYTE [DISTWIDTH] IN BLOOD BY AUTOMATED COUNT: 16 % (ref 11.5–14.5)
GLOBULIN SER CALC-MCNC: 3.9 GM/DL
GLUCOSE SERPL-MCNC: 159 MG/DL (ref 65–99)
HCT VFR BLD AUTO: 27.2 % (ref 40.4–52.2)
HGB BLD-MCNC: 8.4 G/DL (ref 13.7–17.6)
IMM GRANULOCYTES # BLD: 0.14 10*3/MM3 (ref 0–0.03)
IMM GRANULOCYTES NFR BLD: 0.9 % (ref 0–0.5)
INR PPP: 1.84 (ref 0.9–1.1)
LYMPHOCYTES # BLD AUTO: 1.59 10*3/MM3 (ref 0.9–4.8)
LYMPHOCYTES NFR BLD AUTO: 9.9 % (ref 19.6–45.3)
MCH RBC QN AUTO: 35.1 PG (ref 27–32.7)
MCHC RBC AUTO-ENTMCNC: 30.9 G/DL (ref 32.6–36.4)
MCV RBC AUTO: 113.8 FL (ref 79.8–96.2)
MONOCYTES # BLD AUTO: 0.98 10*3/MM3 (ref 0.2–1.2)
MONOCYTES NFR BLD AUTO: 6.1 % (ref 5–12)
NEUTROPHILS # BLD AUTO: 13.19 10*3/MM3 (ref 1.9–8.1)
NEUTROPHILS NFR BLD AUTO: 82.4 % (ref 42.7–76)
NRBC BLD AUTO-RTO: 0 /100 WBC (ref 0–0)
PLATELET # BLD AUTO: 106 10*3/MM3 (ref 140–500)
PLATELET BLD QL SMEAR: NORMAL
POTASSIUM SERPL-SCNC: 4.3 MMOL/L (ref 3.5–5.2)
PROT SERPL-MCNC: 7.8 G/DL (ref 6–8.5)
PROTHROMBIN TIME: 20.9 SECONDS (ref 11.7–14.2)
RBC # BLD AUTO: 2.39 10*6/MM3 (ref 4.6–6)
RBC MORPH BLD: NORMAL
SODIUM SERPL-SCNC: 132 MMOL/L (ref 136–145)
WBC # BLD AUTO: 16.02 10*3/MM3 (ref 4.5–10.7)

## 2018-11-11 ENCOUNTER — READMISSION MANAGEMENT (OUTPATIENT)
Dept: CALL CENTER | Facility: HOSPITAL | Age: 48
End: 2018-11-11

## 2018-11-11 NOTE — OUTREACH NOTE
Medical Week 2 Survey      Responses   Facility patient discharged from?  [facility]   Does the patient have one of the following disease processes/diagnoses(primary or secondary)?  Other   Week 2 attempt successful?  No   Unsuccessful attempts  Attempt 2          Yessenia Aleln RN

## 2018-11-12 ENCOUNTER — TELEPHONE (OUTPATIENT)
Dept: GASTROENTEROLOGY | Facility: CLINIC | Age: 48
End: 2018-11-12

## 2018-11-12 DIAGNOSIS — K70.10 ALCOHOLIC HEPATITIS, UNSPECIFIED WHETHER ASCITES PRESENT: Primary | ICD-10-CM

## 2018-11-12 RX ORDER — PREDNISOLONE SODIUM PHOSPHATE 10 MG/5ML
SOLUTION ORAL
Qty: 280 ML | Refills: 1 | Status: SHIPPED | OUTPATIENT
Start: 2018-11-12 | End: 2018-12-05

## 2018-11-12 NOTE — TELEPHONE ENCOUNTER
----- Message from Emerald White MD sent at 11/12/2018  7:50 AM EST -----  He has had an overall improvement in his labwork (though still not normal)    Continue the steroid at the current dose until November 23rd, and then taper according to instructions on the new prescription I am sending him.      Will repeat labs in 2 weeks (I have placed the order)

## 2018-11-12 NOTE — PROGRESS NOTES
He has had an overall improvement in his labwork (though still not normal)    Continue the steroid at the current dose until November 23rd, and then taper according to instructions on the new prescription I am sending him.      Will repeat labs in 2 weeks (I have placed the order)

## 2018-11-13 ENCOUNTER — TELEPHONE (OUTPATIENT)
Dept: GASTROENTEROLOGY | Facility: CLINIC | Age: 48
End: 2018-11-13

## 2018-11-13 ENCOUNTER — OFFICE VISIT (OUTPATIENT)
Dept: FAMILY MEDICINE CLINIC | Facility: CLINIC | Age: 48
End: 2018-11-13

## 2018-11-13 VITALS
OXYGEN SATURATION: 98 % | HEIGHT: 72 IN | HEART RATE: 75 BPM | BODY MASS INDEX: 35.21 KG/M2 | WEIGHT: 260 LBS | RESPIRATION RATE: 16 BRPM | TEMPERATURE: 98.6 F | SYSTOLIC BLOOD PRESSURE: 110 MMHG | DIASTOLIC BLOOD PRESSURE: 50 MMHG

## 2018-11-13 DIAGNOSIS — E66.01 CLASS 2 SEVERE OBESITY WITH SERIOUS COMORBIDITY AND BODY MASS INDEX (BMI) OF 35.0 TO 35.9 IN ADULT, UNSPECIFIED OBESITY TYPE (HCC): ICD-10-CM

## 2018-11-13 DIAGNOSIS — K70.10 ALCOHOLIC HEPATITIS, UNSPECIFIED WHETHER ASCITES PRESENT: Primary | ICD-10-CM

## 2018-11-13 DIAGNOSIS — M1A.9XX0 CHRONIC GOUT WITHOUT TOPHUS, UNSPECIFIED CAUSE, UNSPECIFIED SITE: ICD-10-CM

## 2018-11-13 DIAGNOSIS — Z13.220 SCREENING FOR HYPERLIPIDEMIA: ICD-10-CM

## 2018-11-13 DIAGNOSIS — Z13.1 SCREENING FOR DIABETES MELLITUS: ICD-10-CM

## 2018-11-13 PROCEDURE — 99204 OFFICE O/P NEW MOD 45 MIN: CPT | Performed by: FAMILY MEDICINE

## 2018-11-13 NOTE — PROGRESS NOTES
Subjective   Enrique Ramirez is a 48 y.o. male.     48 year old male here to [facility]/hospital follow up. Recently admitted and treated for acute alcoholic hepatitis. GI was consulted while in patient and patient has also followed up with GI 4 days ago as an outpatient. Repeat labs were done at that time which per recs have shown some improvement. Per GI plan is to cont steroids through Nov 23 with plan to taper afterwards; repeat labs ordered as well to be repeated in 2 weeks.     Patient reports since discharge he continues to abstain from alcohol use. His weight is same from follow up visit 4 days ago. BP stable at 110/50. He is continuing his discharge medications including Lasix, Lactulose, nadolol, Nitrocellulse, Protonix, Spironolactone and Prednisolone.     Thigh lesion: Dr. oscar treating wound; patient to  Rx today for patch for healing. No induration/warmth.    Flu vaccine: refuses today.   C-scope 3 years ago done through [facility]; next one due in 2 years. NO family history of colon/rectal cancer however father had polyps.        PHQ-2/PHQ-9 Depression Screening 11/13/2018   Little interest or pleasure in doing things 0   Feeling down, depressed, or hopeless 0   Total Score 0     Current outpatient and discharge medications have been reconciled for the patient.  Reviewed by: Cydney Chase MD    The following portions of the patient's history were reviewed and updated as appropriate: allergies, current medications, past family history, past medical history, past social history, past surgical history and problem list.    Review of Systems   Constitutional: Negative for activity change, appetite change, chills and fever.   HENT: Negative for congestion, ear pain, sinus pressure and sore throat.    Respiratory: Negative for cough and shortness of breath.    Cardiovascular: Negative for chest pain.   Gastrointestinal: Negative for abdominal pain, blood in stool, constipation and diarrhea.    Musculoskeletal: Negative for back pain.   Psychiatric/Behavioral: Negative for stress. The patient is not nervous/anxious.        Objective   Physical Exam   Constitutional: He is oriented to person, place, and time. He appears well-developed and well-nourished.   HENT:   Head: Normocephalic and atraumatic.   Right Ear: Hearing, tympanic membrane, external ear and ear canal normal.   Left Ear: Hearing, tympanic membrane, external ear and ear canal normal.   Nose: Nose normal.   Mouth/Throat: Uvula is midline and mucous membranes are normal. No oropharyngeal exudate, posterior oropharyngeal edema, posterior oropharyngeal erythema or tonsillar abscesses.   Eyes: Conjunctivae and EOM are normal. Pupils are equal, round, and reactive to light.   Mild scleral icterus   Cardiovascular: Normal rate and regular rhythm.   No LE edema   Pulmonary/Chest: Effort normal and breath sounds normal. No respiratory distress. He has no decreased breath sounds.   Musculoskeletal: Normal range of motion.   Lymphadenopathy:        Right cervical: No superficial cervical adenopathy present.       Left cervical: No superficial cervical adenopathy present.   Neurological: He is alert and oriented to person, place, and time.   Psychiatric: He has a normal mood and affect. His speech is normal.     Assessment/Plan     Enrique was seen today for hospital follow up.    Diagnoses and all orders for this visit:    Alcoholic hepatitis, unspecified whether ascites present        - Cont plan of care per GI     Class 2 severe obesity with serious comorbidity and body mass index (BMI) of 35.0 to 35.9 in adult, unspecified obesity type (CMS/[facility])        - Info on obesity given    Chronic Gout        - Last flare 3 years ago        - Doing well with Allopurinol     Screening for diabetes mellitus  -     Hemoglobin A1c    Screening for hyperlipidemia  -     Lipid Panel    RTC PRN

## 2018-11-13 NOTE — PATIENT INSTRUCTIONS

## 2018-11-13 NOTE — TELEPHONE ENCOUNTER
Received call from [facility] pharmcist [facility] who advised that they are unable to get prednisolone 10mg/5ml but can get prednisolone 15mg/5ml.  He states that this is what is usually ordered and is very comparable.  Advised it is ok to substitute.  Advised would update Dr White.

## 2018-11-14 NOTE — TELEPHONE ENCOUNTER
Pt called and advised per Dr White that he has overall improvement in his labwork(though still not normal).  Continue the steroid at the current dose until Nov 23rd and then taper according to instructions on the new prescription she is sending him.  She wants him to repeat labs in 2 wks. Pt verb understanding and made lab appt for 11/28 at 11am.

## 2018-11-15 LAB
CHOLEST SERPL-MCNC: 292 MG/DL (ref 0–200)
HBA1C MFR BLD: <4.3 % (ref 4.8–5.6)
HDLC SERPL-MCNC: 83 MG/DL (ref 40–60)
LDLC SERPL CALC-MCNC: 184 MG/DL (ref 0–100)
TRIGL SERPL-MCNC: 127 MG/DL (ref 0–150)
VLDLC SERPL CALC-MCNC: 25.4 MG/DL (ref 5–40)

## 2018-11-16 NOTE — PROGRESS NOTES
Please inform patient his A1C is not concerning for DM. His lipid panel shows some mild elevations in total cholesterol and LDL. Advise to cont to work on diet/exercise and we can recheck this in 6 months.    Thanks.

## 2018-11-23 ENCOUNTER — RESULTS ENCOUNTER (OUTPATIENT)
Dept: GASTROENTEROLOGY | Facility: CLINIC | Age: 48
End: 2018-11-23

## 2018-11-23 DIAGNOSIS — K70.10 ALCOHOLIC HEPATITIS, UNSPECIFIED WHETHER ASCITES PRESENT: ICD-10-CM

## 2018-11-26 ENCOUNTER — TELEPHONE (OUTPATIENT)
Dept: GASTROENTEROLOGY | Facility: CLINIC | Age: 48
End: 2018-11-26

## 2018-11-26 NOTE — TELEPHONE ENCOUNTER
Called pt's wife and she reports that on Saturday night he developed a coughing spells.  She states it is a wet cough.  She states he unable to sleep due this cough.  The cough is productive a clear thick saliva.  She states they did travel out of town and he was in the car for 6 hrs.  She denies a fever and chills.  She denies any congestion.  She reports he does have some edema in ankles but it is no worse.  She states he does seem slow to react.  Pt is taking his lactulose.  She reports she also called pt's pcp , but has not heard back from them.   Advised pt's wife we will send message to Dr White and in the meantime if symptoms worsen to go to ER.  Pt's wife verb understanding.

## 2018-11-26 NOTE — TELEPHONE ENCOUNTER
----- Message from Lizeth Argueta sent at 11/26/2018  2:35 PM EST -----  Regarding: coughing spell  Contact: [phone]  Pt wife Yessenia is calling regarding her  who is having bad coughing spells when he lays down. He is fine when sitting up. Pt is retaining a lot of water and maybe this can be in his lungs? No fever or runny noise. Sounds like a wet cough, and clear fluid is coming up. Please call her regarding this.

## 2018-11-27 NOTE — TELEPHONE ENCOUNTER
Called pt's wife and advised per Dr White that the pt should go to urgent care vs er-most likely viral but should get checked out.  Pt s wife verb understanding and reports they are going to see his pcp .

## 2018-11-28 LAB
ALBUMIN SERPL-MCNC: 3.5 G/DL (ref 3.5–5.2)
ALBUMIN/GLOB SERPL: 0.8 G/DL
ALP SERPL-CCNC: 130 U/L (ref 39–117)
ALT SERPL-CCNC: 77 U/L (ref 1–41)
AST SERPL-CCNC: 79 U/L (ref 1–40)
BILIRUB SERPL-MCNC: 11 MG/DL (ref 0.1–1.2)
BUN SERPL-MCNC: 22 MG/DL (ref 6–20)
BUN/CREAT SERPL: 17.7 (ref 7–25)
CALCIUM SERPL-MCNC: 10 MG/DL (ref 8.6–10.5)
CHLORIDE SERPL-SCNC: 91 MMOL/L (ref 98–107)
CO2 SERPL-SCNC: 25.6 MMOL/L (ref 22–29)
CREAT SERPL-MCNC: 1.24 MG/DL (ref 0.76–1.27)
ERYTHROCYTE [DISTWIDTH] IN BLOOD BY AUTOMATED COUNT: 16.7 % (ref 11.5–14.5)
GLOBULIN SER CALC-MCNC: 4.4 GM/DL
GLUCOSE SERPL-MCNC: 85 MG/DL (ref 65–99)
HCT VFR BLD AUTO: 22.1 % (ref 40.4–52.2)
HGB BLD-MCNC: 7.3 G/DL (ref 13.7–17.6)
LIPASE SERPL-CCNC: 53 U/L (ref 13–60)
MCH RBC QN AUTO: 36.3 PG (ref 27–32.7)
MCHC RBC AUTO-ENTMCNC: 33 G/DL (ref 32.6–36.4)
MCV RBC AUTO: 110 FL (ref 79.8–96.2)
PLATELET # BLD AUTO: 134 10*3/MM3 (ref 140–500)
POTASSIUM SERPL-SCNC: 4.8 MMOL/L (ref 3.5–5.2)
PROT SERPL-MCNC: 7.9 G/DL (ref 6–8.5)
RBC # BLD AUTO: 2.01 10*6/MM3 (ref 4.6–6)
SODIUM SERPL-SCNC: 131 MMOL/L (ref 136–145)
WBC # BLD AUTO: 11.92 10*3/MM3 (ref 4.5–10.7)

## 2018-11-29 ENCOUNTER — TELEPHONE (OUTPATIENT)
Dept: GASTROENTEROLOGY | Facility: CLINIC | Age: 48
End: 2018-11-29

## 2018-11-29 RX ORDER — NADOLOL 20 MG/1
20 TABLET ORAL
Qty: 30 TABLET | Refills: 3 | Status: SHIPPED | OUTPATIENT
Start: 2018-11-29 | End: 2018-12-18 | Stop reason: HOSPADM

## 2018-11-29 RX ORDER — SPIRONOLACTONE 100 MG/1
100 TABLET, FILM COATED ORAL DAILY
Qty: 30 TABLET | Refills: 3 | Status: SHIPPED | OUTPATIENT
Start: 2018-11-29 | End: 2018-12-18 | Stop reason: HOSPADM

## 2018-11-29 RX ORDER — PANTOPRAZOLE SODIUM 40 MG/1
40 TABLET, DELAYED RELEASE ORAL
Qty: 30 TABLET | Refills: 3 | Status: SHIPPED | OUTPATIENT
Start: 2018-11-29 | End: 2018-12-29

## 2018-11-29 RX ORDER — LACTULOSE 10 G/15ML
20 SOLUTION ORAL 2 TIMES DAILY
Qty: 1800 ML | Refills: 3 | Status: SHIPPED | OUTPATIENT
Start: 2018-11-29 | End: 2018-12-29

## 2018-11-29 RX ORDER — ZINC SULFATE 50(220)MG
220 CAPSULE ORAL DAILY
Qty: 30 CAPSULE | Refills: 3 | Status: ON HOLD | OUTPATIENT
Start: 2018-11-29 | End: 2018-12-05

## 2018-11-29 RX ORDER — FUROSEMIDE 40 MG/1
40 TABLET ORAL DAILY
Qty: 30 TABLET | Refills: 3 | Status: SHIPPED | OUTPATIENT
Start: 2018-11-29 | End: 2018-12-18 | Stop reason: HOSPADM

## 2018-11-29 NOTE — TELEPHONE ENCOUNTER
Called pt and advised per Dr White that his lab work shows slight decline in his hgb but stable to about a month ago.  His liver numbers are improving.  We will need to monitor his kidney function.  He needs to get the liver us.      Also advised pt we need to change his appt with [place] to Dr White.   Pt verb understanding and appt was made with Dr White for 12/10 at 830am.

## 2018-11-29 NOTE — TELEPHONE ENCOUNTER
----- Message from Emerald White MD sent at 11/29/2018 11:56 AM EST -----  His lab work shows slight decline in his hemoglobin but stable to about a month ago.    His liver numbers are improving    We will need to continue to monitor his kidney function    He needs to get the liver ultrasound    Please change his upcoming appointment that is scheduled with Alecia to be with me, okay to overbook ×1, thank you

## 2018-11-29 NOTE — TELEPHONE ENCOUNTER
Call to spouse, Yessenia (see [employer] auth) to advise that all meds have been filled.  Verb understanding.

## 2018-11-29 NOTE — TELEPHONE ENCOUNTER
----- Message from Zeina Le sent at 11/29/2018 11:16 AM EST -----  Regarding: SCRIPTS  Contact: [phone]  PT WIFE RUSSELL SAID HE'S ALMOST OUT OF MEDS. THERE WERE NO REFILLS ON ANY OF HIS SCRIPTS. LASIX, NADOLOL, PROTONIX & SPIRONOLACTONE. DOES HE NEED TO CONTINUE WITH THIS MEDICINE?

## 2018-11-29 NOTE — PROGRESS NOTES
His lab work shows slight decline in his hemoglobin but stable to about a month ago.    His liver numbers are improving    We will need to continue to monitor his kidney function    He needs to get the liver ultrasound    Please change his upcoming appointment that is scheduled with Alecia to be with me, okay to overbook ×1, thank you

## 2018-12-03 ENCOUNTER — HOSPITAL ENCOUNTER (OUTPATIENT)
Dept: ULTRASOUND IMAGING | Facility: HOSPITAL | Age: 48
Discharge: HOME | End: 2018-12-03
Attending: INTERNAL MEDICINE | Admitting: INTERNAL MEDICINE

## 2018-12-03 DIAGNOSIS — K70.10 ALCOHOLIC HEPATITIS WITHOUT ASCITES: ICD-10-CM

## 2018-12-03 PROCEDURE — 76705 ECHO EXAM OF ABDOMEN: CPT

## 2018-12-05 ENCOUNTER — TELEPHONE (OUTPATIENT)
Dept: GASTROENTEROLOGY | Facility: CLINIC | Age: 48
End: 2018-12-05

## 2018-12-05 ENCOUNTER — APPOINTMENT (OUTPATIENT)
Dept: CT IMAGING | Facility: HOSPITAL | Age: 48
End: 2018-12-05

## 2018-12-05 ENCOUNTER — HOSPITAL ENCOUNTER (INPATIENT)
Facility: HOSPITAL | Age: 48
LOS: 13 days | Discharge: ANOTHER HOSPITAL | End: 2018-12-18
Attending: EMERGENCY MEDICINE | Admitting: INTERNAL MEDICINE

## 2018-12-05 ENCOUNTER — APPOINTMENT (OUTPATIENT)
Dept: GENERAL RADIOLOGY | Facility: HOSPITAL | Age: 48
End: 2018-12-05

## 2018-12-05 DIAGNOSIS — K75.9 HEPATITIS: ICD-10-CM

## 2018-12-05 DIAGNOSIS — I85.01 BLEEDING ESOPHAGEAL VARICES, UNSPECIFIED ESOPHAGEAL VARICES TYPE (HCC): ICD-10-CM

## 2018-12-05 DIAGNOSIS — D62 ACUTE BLOOD LOSS ANEMIA: Primary | ICD-10-CM

## 2018-12-05 PROBLEM — R04.2 HEMOPTYSIS: Status: ACTIVE | Noted: 2018-12-05

## 2018-12-05 PROBLEM — R04.0 EPISTAXIS: Status: ACTIVE | Noted: 2018-12-05

## 2018-12-05 PROBLEM — R05.9 COUGH: Status: ACTIVE | Noted: 2018-12-05

## 2018-12-05 PROBLEM — K70.30 ALCOHOLIC CIRRHOSIS OF LIVER WITHOUT ASCITES (HCC): Status: ACTIVE | Noted: 2018-12-05

## 2018-12-05 PROBLEM — N17.9 AKI (ACUTE KIDNEY INJURY) (HCC): Status: ACTIVE | Noted: 2018-12-05

## 2018-12-05 PROBLEM — R60.1 ANASARCA: Status: ACTIVE | Noted: 2018-12-05

## 2018-12-05 PROBLEM — D64.9 SYMPTOMATIC ANEMIA: Status: ACTIVE | Noted: 2018-12-05

## 2018-12-05 LAB
ABO GROUP BLD: NORMAL
ALBUMIN SERPL-MCNC: 3.6 G/DL (ref 3.5–5.2)
ALBUMIN/GLOB SERPL: 0.9 G/DL
ALP SERPL-CCNC: 102 U/L (ref 39–117)
ALT SERPL W P-5'-P-CCNC: 59 U/L (ref 1–41)
AMMONIA BLD-SCNC: 38 UMOL/L (ref 16–60)
AMYLASE SERPL-CCNC: 27 U/L (ref 28–100)
ANION GAP SERPL CALCULATED.3IONS-SCNC: 14.2 MMOL/L
AST SERPL-CCNC: 76 U/L (ref 1–40)
B PARAPERT DNA SPEC QL NAA+PROBE: NOT DETECTED
B PERT DNA SPEC QL NAA+PROBE: NOT DETECTED
BACTERIA UR QL AUTO: ABNORMAL /HPF
BASOPHILS # BLD AUTO: 0.03 10*3/MM3 (ref 0–0.2)
BASOPHILS NFR BLD AUTO: 0.3 % (ref 0–1.5)
BILIRUB SERPL-MCNC: 12.1 MG/DL (ref 0.1–1.2)
BILIRUB UR QL STRIP: ABNORMAL
BLD GP AB SCN SERPL QL: NEGATIVE
BUN BLD-MCNC: 35 MG/DL (ref 6–20)
BUN/CREAT SERPL: 18.5 (ref 7–25)
C PNEUM DNA NPH QL NAA+NON-PROBE: NOT DETECTED
CALCIUM SPEC-SCNC: 9.2 MG/DL (ref 8.6–10.5)
CHLORIDE SERPL-SCNC: 88 MMOL/L (ref 98–107)
CLARITY UR: CLEAR
CO2 SERPL-SCNC: 22.8 MMOL/L (ref 22–29)
COLOR UR: ABNORMAL
CREAT BLD-MCNC: 1.89 MG/DL (ref 0.76–1.27)
DEPRECATED RDW RBC AUTO: 64.4 FL (ref 37–54)
EOSINOPHIL # BLD AUTO: 0.1 10*3/MM3 (ref 0–0.7)
EOSINOPHIL NFR BLD AUTO: 0.9 % (ref 0.3–6.2)
ERYTHROCYTE [DISTWIDTH] IN BLOOD BY AUTOMATED COUNT: 16.2 % (ref 11.5–14.5)
FLUAV H1 2009 PAND RNA NPH QL NAA+PROBE: NOT DETECTED
FLUAV H1 HA GENE NPH QL NAA+PROBE: NOT DETECTED
FLUAV H3 RNA NPH QL NAA+PROBE: NOT DETECTED
FLUAV SUBTYP SPEC NAA+PROBE: NOT DETECTED
FLUBV RNA ISLT QL NAA+PROBE: NOT DETECTED
GFR SERPL CREATININE-BSD FRML MDRD: 39 ML/MIN/1.73
GLOBULIN UR ELPH-MCNC: 3.8 GM/DL
GLUCOSE BLD-MCNC: 113 MG/DL (ref 65–99)
GLUCOSE UR STRIP-MCNC: NEGATIVE MG/DL
HADV DNA SPEC NAA+PROBE: NOT DETECTED
HCOV 229E RNA SPEC QL NAA+PROBE: NOT DETECTED
HCOV HKU1 RNA SPEC QL NAA+PROBE: NOT DETECTED
HCOV NL63 RNA SPEC QL NAA+PROBE: NOT DETECTED
HCOV OC43 RNA SPEC QL NAA+PROBE: NOT DETECTED
HCT VFR BLD AUTO: 19.8 % (ref 40.4–52.2)
HCT VFR BLD AUTO: 20.1 % (ref 40.4–52.2)
HCT VFR BLD AUTO: 21.9 % (ref 40.4–52.2)
HGB BLD-MCNC: 6.7 G/DL (ref 13.7–17.6)
HGB BLD-MCNC: 6.7 G/DL (ref 13.7–17.6)
HGB BLD-MCNC: 7.2 G/DL (ref 13.7–17.6)
HGB UR QL STRIP.AUTO: NEGATIVE
HMPV RNA NPH QL NAA+NON-PROBE: NOT DETECTED
HPIV1 RNA SPEC QL NAA+PROBE: NOT DETECTED
HPIV2 RNA SPEC QL NAA+PROBE: NOT DETECTED
HPIV3 RNA NPH QL NAA+PROBE: NOT DETECTED
HPIV4 P GENE NPH QL NAA+PROBE: NOT DETECTED
HYALINE CASTS UR QL AUTO: ABNORMAL /LPF
IMM GRANULOCYTES # BLD: 0.06 10*3/MM3 (ref 0–0.03)
IMM GRANULOCYTES NFR BLD: 0.5 % (ref 0–0.5)
INR PPP: 1.83 (ref 0.9–1.1)
KETONES UR QL STRIP: ABNORMAL
LEUKOCYTE ESTERASE UR QL STRIP.AUTO: ABNORMAL
LIPASE SERPL-CCNC: 51 U/L (ref 13–60)
LYMPHOCYTES # BLD AUTO: 1.04 10*3/MM3 (ref 0.9–4.8)
LYMPHOCYTES NFR BLD AUTO: 9.2 % (ref 19.6–45.3)
M PNEUMO IGG SER IA-ACNC: NOT DETECTED
MCH RBC QN AUTO: 37.2 PG (ref 27–32.7)
MCHC RBC AUTO-ENTMCNC: 33.8 G/DL (ref 32.6–36.4)
MCV RBC AUTO: 110 FL (ref 79.8–96.2)
MONOCYTES # BLD AUTO: 1.79 10*3/MM3 (ref 0.2–1.2)
MONOCYTES NFR BLD AUTO: 15.8 % (ref 5–12)
NEUTROPHILS # BLD AUTO: 8.38 10*3/MM3 (ref 1.9–8.1)
NEUTROPHILS NFR BLD AUTO: 73.8 % (ref 42.7–76)
NITRITE UR QL STRIP: POSITIVE
PH UR STRIP.AUTO: <=5 [PH] (ref 5–8)
PLATELET # BLD AUTO: 149 10*3/MM3 (ref 140–500)
PMV BLD AUTO: 10.2 FL (ref 6–12)
POTASSIUM BLD-SCNC: 5.4 MMOL/L (ref 3.5–5.2)
PROT SERPL-MCNC: 7.4 G/DL (ref 6–8.5)
PROT UR QL STRIP: ABNORMAL
PROTHROMBIN TIME: 20.8 SECONDS (ref 11.7–14.2)
RBC # BLD AUTO: 1.8 10*6/MM3 (ref 4.6–6)
RBC # UR: ABNORMAL /HPF
REF LAB TEST METHOD: ABNORMAL
RH BLD: POSITIVE
RHINOVIRUS RNA SPEC NAA+PROBE: DETECTED
RSV RNA NPH QL NAA+NON-PROBE: NOT DETECTED
SODIUM BLD-SCNC: 125 MMOL/L (ref 136–145)
SP GR UR STRIP: 1.02 (ref 1–1.03)
SQUAMOUS #/AREA URNS HPF: ABNORMAL /HPF
T&S EXPIRATION DATE: NORMAL
UROBILINOGEN UR QL STRIP: ABNORMAL
WBC NRBC COR # BLD: 11.34 10*3/MM3 (ref 4.5–10.7)
WBC UR QL AUTO: ABNORMAL /HPF

## 2018-12-05 PROCEDURE — P9016 RBC LEUKOCYTES REDUCED: HCPCS

## 2018-12-05 PROCEDURE — 86850 RBC ANTIBODY SCREEN: CPT | Performed by: EMERGENCY MEDICINE

## 2018-12-05 PROCEDURE — 86923 COMPATIBILITY TEST ELECTRIC: CPT

## 2018-12-05 PROCEDURE — 82150 ASSAY OF AMYLASE: CPT | Performed by: EMERGENCY MEDICINE

## 2018-12-05 PROCEDURE — 86900 BLOOD TYPING SEROLOGIC ABO: CPT

## 2018-12-05 PROCEDURE — 81001 URINALYSIS AUTO W/SCOPE: CPT | Performed by: EMERGENCY MEDICINE

## 2018-12-05 PROCEDURE — 87633 RESP VIRUS 12-25 TARGETS: CPT | Performed by: INTERNAL MEDICINE

## 2018-12-05 PROCEDURE — 85014 HEMATOCRIT: CPT | Performed by: INTERNAL MEDICINE

## 2018-12-05 PROCEDURE — 83690 ASSAY OF LIPASE: CPT | Performed by: EMERGENCY MEDICINE

## 2018-12-05 PROCEDURE — 82140 ASSAY OF AMMONIA: CPT | Performed by: EMERGENCY MEDICINE

## 2018-12-05 PROCEDURE — 99253 IP/OBS CNSLTJ NEW/EST LOW 45: CPT | Performed by: INTERNAL MEDICINE

## 2018-12-05 PROCEDURE — 99285 EMERGENCY DEPT VISIT HI MDM: CPT

## 2018-12-05 PROCEDURE — 85610 PROTHROMBIN TIME: CPT | Performed by: EMERGENCY MEDICINE

## 2018-12-05 PROCEDURE — 86901 BLOOD TYPING SEROLOGIC RH(D): CPT | Performed by: EMERGENCY MEDICINE

## 2018-12-05 PROCEDURE — 71045 X-RAY EXAM CHEST 1 VIEW: CPT

## 2018-12-05 PROCEDURE — 36415 COLL VENOUS BLD VENIPUNCTURE: CPT | Performed by: INTERNAL MEDICINE

## 2018-12-05 PROCEDURE — 80053 COMPREHEN METABOLIC PANEL: CPT | Performed by: EMERGENCY MEDICINE

## 2018-12-05 PROCEDURE — 85018 HEMOGLOBIN: CPT | Performed by: INTERNAL MEDICINE

## 2018-12-05 PROCEDURE — 87486 CHLMYD PNEUM DNA AMP PROBE: CPT | Performed by: INTERNAL MEDICINE

## 2018-12-05 PROCEDURE — 85025 COMPLETE CBC W/AUTO DIFF WBC: CPT | Performed by: EMERGENCY MEDICINE

## 2018-12-05 PROCEDURE — 87798 DETECT AGENT NOS DNA AMP: CPT | Performed by: INTERNAL MEDICINE

## 2018-12-05 PROCEDURE — 36430 TRANSFUSION BLD/BLD COMPNT: CPT

## 2018-12-05 PROCEDURE — 87581 M.PNEUMON DNA AMP PROBE: CPT | Performed by: INTERNAL MEDICINE

## 2018-12-05 PROCEDURE — 71250 CT THORAX DX C-: CPT

## 2018-12-05 PROCEDURE — 86900 BLOOD TYPING SEROLOGIC ABO: CPT | Performed by: EMERGENCY MEDICINE

## 2018-12-05 RX ORDER — OXYCODONE HYDROCHLORIDE 5 MG/1
5 TABLET ORAL EVERY 4 HOURS PRN
Status: DISPENSED | OUTPATIENT
Start: 2018-12-05 | End: 2018-12-15

## 2018-12-05 RX ORDER — OXYMETAZOLINE HYDROCHLORIDE 0.05 G/100ML
2 SPRAY NASAL 2 TIMES DAILY PRN
Status: DISCONTINUED | OUTPATIENT
Start: 2018-12-05 | End: 2018-12-05

## 2018-12-05 RX ORDER — SODIUM CHLORIDE 9 MG/ML
125 INJECTION, SOLUTION INTRAVENOUS CONTINUOUS
Status: DISCONTINUED | OUTPATIENT
Start: 2018-12-05 | End: 2018-12-05

## 2018-12-05 RX ORDER — SODIUM CHLORIDE 0.9 % (FLUSH) 0.9 %
3 SYRINGE (ML) INJECTION EVERY 12 HOURS SCHEDULED
Status: DISCONTINUED | OUTPATIENT
Start: 2018-12-05 | End: 2018-12-18 | Stop reason: HOSPADM

## 2018-12-05 RX ORDER — SODIUM CHLORIDE 0.9 % (FLUSH) 0.9 %
10 SYRINGE (ML) INJECTION AS NEEDED
Status: DISCONTINUED | OUTPATIENT
Start: 2018-12-05 | End: 2018-12-18 | Stop reason: HOSPADM

## 2018-12-05 RX ORDER — OXYMETAZOLINE HYDROCHLORIDE 0.05 G/100ML
2 SPRAY NASAL 2 TIMES DAILY
Status: DISCONTINUED | OUTPATIENT
Start: 2018-12-05 | End: 2018-12-12

## 2018-12-05 RX ORDER — IPRATROPIUM BROMIDE AND ALBUTEROL SULFATE 2.5; .5 MG/3ML; MG/3ML
3 SOLUTION RESPIRATORY (INHALATION)
Status: DISCONTINUED | OUTPATIENT
Start: 2018-12-05 | End: 2018-12-13

## 2018-12-05 RX ORDER — GUAIFENESIN 600 MG/1
600 TABLET, EXTENDED RELEASE ORAL EVERY 12 HOURS SCHEDULED
Status: DISCONTINUED | OUTPATIENT
Start: 2018-12-05 | End: 2018-12-18 | Stop reason: HOSPADM

## 2018-12-05 RX ORDER — LACTULOSE 10 G/15ML
20 SOLUTION ORAL 2 TIMES DAILY
Status: DISCONTINUED | OUTPATIENT
Start: 2018-12-05 | End: 2018-12-18 | Stop reason: HOSPADM

## 2018-12-05 RX ORDER — CALCIUM CARBONATE 200(500)MG
2 TABLET,CHEWABLE ORAL 2 TIMES DAILY PRN
Status: DISCONTINUED | OUTPATIENT
Start: 2018-12-05 | End: 2018-12-18

## 2018-12-05 RX ORDER — NADOLOL 20 MG/1
20 TABLET ORAL
Status: DISCONTINUED | OUTPATIENT
Start: 2018-12-05 | End: 2018-12-06

## 2018-12-05 RX ORDER — SODIUM CHLORIDE 9 MG/ML
100 INJECTION, SOLUTION INTRAVENOUS CONTINUOUS
Status: ACTIVE | OUTPATIENT
Start: 2018-12-05 | End: 2018-12-06

## 2018-12-05 RX ORDER — BISACODYL 5 MG/1
5 TABLET, DELAYED RELEASE ORAL DAILY PRN
Status: DISCONTINUED | OUTPATIENT
Start: 2018-12-05 | End: 2018-12-18 | Stop reason: HOSPADM

## 2018-12-05 RX ORDER — ZOLPIDEM TARTRATE 5 MG/1
5 TABLET ORAL NIGHTLY PRN
Status: DISPENSED | OUTPATIENT
Start: 2018-12-05 | End: 2018-12-15

## 2018-12-05 RX ORDER — PANTOPRAZOLE SODIUM 40 MG/1
40 TABLET, DELAYED RELEASE ORAL
Status: DISCONTINUED | OUTPATIENT
Start: 2018-12-06 | End: 2018-12-18 | Stop reason: HOSPADM

## 2018-12-05 RX ORDER — NITROGLYCERIN 0.4 MG/1
0.4 TABLET SUBLINGUAL
Status: DISCONTINUED | OUTPATIENT
Start: 2018-12-05 | End: 2018-12-18 | Stop reason: HOSPADM

## 2018-12-05 RX ORDER — ONDANSETRON 4 MG/1
4 TABLET, FILM COATED ORAL EVERY 6 HOURS PRN
Status: DISCONTINUED | OUTPATIENT
Start: 2018-12-05 | End: 2018-12-18 | Stop reason: HOSPADM

## 2018-12-05 RX ORDER — GUAIFENESIN 1200 MG/1
1 TABLET, EXTENDED RELEASE ORAL NIGHTLY
COMMUNITY
End: 2018-12-18 | Stop reason: HOSPADM

## 2018-12-05 RX ORDER — SODIUM CHLORIDE 0.9 % (FLUSH) 0.9 %
3-10 SYRINGE (ML) INJECTION AS NEEDED
Status: DISCONTINUED | OUTPATIENT
Start: 2018-12-05 | End: 2018-12-18 | Stop reason: HOSPADM

## 2018-12-05 RX ORDER — ONDANSETRON 2 MG/ML
4 INJECTION INTRAMUSCULAR; INTRAVENOUS EVERY 6 HOURS PRN
Status: DISCONTINUED | OUTPATIENT
Start: 2018-12-05 | End: 2018-12-18 | Stop reason: HOSPADM

## 2018-12-05 RX ORDER — PANTOPRAZOLE SODIUM 40 MG/10ML
80 INJECTION, POWDER, LYOPHILIZED, FOR SOLUTION INTRAVENOUS ONCE
Status: COMPLETED | OUTPATIENT
Start: 2018-12-05 | End: 2018-12-05

## 2018-12-05 RX ORDER — ONDANSETRON 4 MG/1
4 TABLET, ORALLY DISINTEGRATING ORAL EVERY 6 HOURS PRN
Status: DISCONTINUED | OUTPATIENT
Start: 2018-12-05 | End: 2018-12-18 | Stop reason: HOSPADM

## 2018-12-05 RX ADMIN — GUAIFENESIN 600 MG: 600 TABLET, EXTENDED RELEASE ORAL at 20:58

## 2018-12-05 RX ADMIN — SODIUM CHLORIDE 100 ML/HR: 9 INJECTION, SOLUTION INTRAVENOUS at 19:30

## 2018-12-05 RX ADMIN — PANTOPRAZOLE SODIUM 80 MG: 40 INJECTION, POWDER, FOR SOLUTION INTRAVENOUS at 14:36

## 2018-12-05 RX ADMIN — LACTULOSE 20 G: 10 SOLUTION ORAL at 20:58

## 2018-12-05 RX ADMIN — SODIUM CHLORIDE 8 MG/HR: 900 INJECTION INTRAVENOUS at 16:28

## 2018-12-05 RX ADMIN — SODIUM CHLORIDE 8 MG/HR: 900 INJECTION INTRAVENOUS at 22:08

## 2018-12-05 RX ADMIN — ZOLPIDEM TARTRATE 5 MG: 5 TABLET ORAL at 21:03

## 2018-12-05 NOTE — H&P
Patient Name:  Enrique Ramirez  YOB: 1970  MRN:  [id number]  Admit Date:  12/5/2018  Patient Care Team:  Cydney Chase MD as PCP - General (Family Medicine)      Chief Complaint   Patient presents with   • Coughing Up Blood   • Fatigue   • Edema     Subjective   Mr. Ramirez is a 48 y.o. non-smoker with a history of Alcoholic cirrhosis with esophageal varices that presents to [facility] complaining of increased leg swelling and weight gain of almost 10lbs in the past week.  Additionally, for the past 4 days or so he has had a cough and has been occasionally coughing up varying amounts of blood, which seems to be worse when supine.  He notes that he has also been having epistaxis during this time.  He denies missing medications and changes in diet.  There has been no melena or hematochezia.  He notes that his wife also had a URI at about the same time.     History of Present Illness    Past Medical History:   Diagnosis Date   • Esophageal varices (CMS/HCC)    • Gallstones    • Gout    • Hepatitis, alcoholic    • Pre-diabetes      Past Surgical History:   Procedure Laterality Date   • COLONOSCOPY     • FOOT SURGERY       Family History   Problem Relation Age of Onset   • Lung cancer Mother    • Heart attack Father      Social History     Tobacco Use   • Smoking status: Never Smoker   • Smokeless tobacco: Never Used   Substance Use Topics   • Alcohol use: No     Comment: 3 weeks ago   • Drug use: No       (Not in a hospital admission)  Allergies:    Allergies   Allergen Reactions   • Zinc Nausea And Vomiting       Review of Systems   Constitutional: Negative for chills, fatigue and fever.   HENT: Negative for congestion, nosebleeds, sore throat and trouble swallowing.    Eyes: Negative for redness and visual disturbance.   Respiratory: Positive for cough and shortness of breath. Negative for choking and wheezing.    Cardiovascular: Positive for leg swelling. Negative for chest pain and  palpitations.   Gastrointestinal: Negative for abdominal pain, blood in stool, constipation, diarrhea, nausea and vomiting.   Endocrine: Negative for cold intolerance and heat intolerance.   Genitourinary: Negative for decreased urine volume, difficulty urinating, dysuria and hematuria.   Musculoskeletal: Negative for arthralgias and myalgias.   Skin: Negative for pallor and rash.   Neurological: Positive for weakness (generalized) and light-headedness. Negative for dizziness, syncope and headaches.   Hematological: Negative for adenopathy. Bruises/bleeds easily.   Psychiatric/Behavioral: Negative for confusion and decreased concentration.        Objective    Vital Signs  Temp:  [97.8 °F (36.6 °C)] 97.8 °F (36.6 °C)  Heart Rate:  [65-73] 68  Resp:  [17-18] 17  BP: ()/(51-60) 106/59  SpO2:  [92 %-97 %] 94 %  on   ;   Device (Oxygen Therapy): room air  Body mass index is 35.59 kg/m².    Physical Exam   Constitutional: He is oriented to person, place, and time. No distress.   HENT:   Head: Normocephalic and atraumatic.   Nose: Epistaxis is observed.   Mouth/Throat: Oropharynx is clear and moist.   Eyes: Conjunctivae and EOM are normal. Pupils are equal, round, and reactive to light. Scleral icterus is present.   Neck: Normal range of motion. Neck supple.   Cardiovascular: Normal rate, regular rhythm and intact distal pulses.   Pulmonary/Chest: Effort normal. He has wheezes (scattered). He has no rales.   Abdominal: Soft. Bowel sounds are normal.   Musculoskeletal: He exhibits edema (3+ pitting edema to thigh with body wall edema). He exhibits no tenderness.   Neurological: He is alert and oriented to person, place, and time.   Skin: Skin is warm and dry. He is not diaphoretic.   Psychiatric: He has a normal mood and affect. His behavior is normal.   Nursing note and vitals reviewed.      Results Review:  I reviewed the patient's new clinical results.  I reviewed the patient's new imaging results and agree with  the interpretation.  I reviewed the patient's other test results and agree with the interpretation  I personally viewed and interpreted the patient's Telemetry data  Discussed with ED provider.      Lab Results (last 24 hours)     Procedure Component Value Units Date/Time    CBC & Differential [[id number]] Collected:  12/05/18 1306    Specimen:  Blood Updated:  12/05/18 1338    Narrative:       The following orders were created for panel order CBC & Differential.  Procedure                               Abnormality         Status                     ---------                               -----------         ------                     Scan Slide[[id number]]                                                                  CBC Auto Differential[[id number]]        Abnormal            Final result                 Please view results for these tests on the individual orders.    Comprehensive Metabolic Panel [[id number]]  (Abnormal) Collected:  12/05/18 1306    Specimen:  Blood Updated:  12/05/18 1344     Glucose 113 mg/dL      BUN 35 mg/dL      Creatinine 1.89 mg/dL      Sodium 125 mmol/L      Potassium 5.4 mmol/L      Chloride 88 mmol/L      CO2 22.8 mmol/L      Calcium 9.2 mg/dL      Total Protein 7.4 g/dL      Albumin 3.60 g/dL      ALT (SGPT) 59 U/L      AST (SGOT) 76 U/L      Comment: Specimen hemolyzed.  Results may be affected.        Alkaline Phosphatase 102 U/L      Total Bilirubin 12.1 mg/dL      eGFR Non African Amer 39 mL/min/1.73      Globulin 3.8 gm/dL      A/G Ratio 0.9 g/dL      BUN/Creatinine Ratio 18.5     Anion Gap 14.2 mmol/L     Protime-INR [[id number]]  (Abnormal) Collected:  12/05/18 1306    Specimen:  Blood Updated:  12/05/18 1339     Protime 20.8 Seconds      INR 1.83    Lipase [[id number]]  (Normal) Collected:  12/05/18 1306    Specimen:  Blood Updated:  12/05/18 1343     Lipase 51 U/L     Amylase [[id number]]  (Abnormal) Collected:  12/05/18 1306    Specimen:  Blood Updated:  12/05/18 1343     Amylase  27 U/L     Ammonia [[id number]]  (Normal) Collected:  12/05/18 1306    Specimen:  Blood Updated:  12/05/18 1355     Ammonia 38 umol/L     CBC Auto Differential [[id number]]  (Abnormal) Collected:  12/05/18 1306    Specimen:  Blood Updated:  12/05/18 1338     WBC 11.34 10*3/mm3      RBC 1.80 10*6/mm3      Hemoglobin 6.7 g/dL      Hematocrit 19.8 %      .0 fL      MCH 37.2 pg      MCHC 33.8 g/dL      RDW 16.2 %      RDW-SD 64.4 fl      MPV 10.2 fL      Platelets 149 10*3/mm3      Neutrophil % 73.8 %      Lymphocyte % 9.2 %      Monocyte % 15.8 %      Eosinophil % 0.9 %      Basophil % 0.3 %      Immature Grans % 0.5 %      Neutrophils, Absolute 8.38 10*3/mm3      Lymphocytes, Absolute 1.04 10*3/mm3      Monocytes, Absolute 1.79 10*3/mm3      Eosinophils, Absolute 0.10 10*3/mm3      Basophils, Absolute 0.03 10*3/mm3      Immature Grans, Absolute 0.06 10*3/mm3     Urinalysis With Microscopic If Indicated (No Culture) - Urine, Clean Catch [[id number]]  (Abnormal) Collected:  12/05/18 1330    Specimen:  Urine, Clean Catch Updated:  12/05/18 1355     Color, UA Dark Yellow     Appearance, UA Clear     pH, UA <=5.0     Specific Gravity, UA 1.016     Glucose, UA Negative     Ketones, UA Trace     Bilirubin, UA Moderate (2+)     Blood, UA Negative     Protein, UA Trace     Leuk Esterase, UA Trace     Nitrite, UA Positive     Urobilinogen, UA 1.0 E.U./dL    Urinalysis, Microscopic Only - Urine, Clean Catch [[id number]]  (Abnormal) Collected:  12/05/18 1330    Specimen:  Urine, Clean Catch Updated:  12/05/18 1355     RBC, UA 3-5 /HPF      WBC, UA 0-2 /HPF      Bacteria, UA None Seen /HPF      Squamous Epithelial Cells, UA 0-2 /HPF      Hyaline Casts, UA 3-6 /LPF      Methodology Automated Microscopy          Imaging Results (last 24 hours)     Procedure Component Value Units Date/Time    XR Chest 1 View [[id number]] Collected:  12/05/18 1347     Updated:  12/05/18 1352    Narrative:       XR CHEST 1 VW-     HISTORY: Male who  is 48 years-old,  cough     TECHNIQUE:         COMPARISON: 10/27/2018     FINDINGS: Heart is enlarged. Mild pulmonary vascular congestion. Patchy  densities in the mid to lower lungs appear similar to prior exam, could  reflect residual or recurrent infiltrate and/or chronic changes of the  lungs, follow-up recommended to characterize resolution/stability, CT  could be considered for further evaluation if indicated. Mild left  pleural effusion appears increased, minimal right pleural effusion is  apparent. No pneumothorax. No acute osseous process.          Impression:       Persistent patchy densities in the mid to lower lungs. Increased left  more than right pleural effusions. Cardiomegaly with persistent mild  pulmonary vascular congestion. Continued follow-up/further evaluation  recommended as indicated.              This report was finalized on 12/5/2018 1:49 PM by Dr. Rojas Dubon M.D.             No orders to display     Assessment/Plan   Active Hospital Problems    Diagnosis Date Noted   • **Symptomatic anemia [D64.9] 12/05/2018   • Alcoholic cirrhosis of liver without ascites (CMS/HCC) [K70.30] 12/05/2018   • LÓPEZ (acute kidney injury) (CMS/HCC) [N17.9] 12/05/2018   • Anasarca [R60.1] 12/05/2018   • Cough [R05] 12/05/2018   • Epistaxis [R04.0] 12/05/2018   • Hemoptysis [R04.2] 12/05/2018   • Hyponatremia [E87.1] 10/25/2018   • Esophageal varices (CMS/HCC) [I85.00] 10/24/2018   • Elevated liver function tests [R94.5] 06/22/2018      Resolved Hospital Problems   No resolved problems to display.   Symptomatic Anemia  - hgb 6.7 in ER, will repeat; was 7.3 11/28/18 and previous iron studies suggested inflammatory anemia  - will transfuse 1 unit of PRBCs and follow serial Hgb  - cannot rule out blood loss as a secondary cause at this point but it looks more like he is having epistaxis than a GI source  - given esophageal varices a protonix gtt has been started  - GI following, appreciate recs-planning to  re-assess the need for endoscopy tomorrow morning    Epistaxis/hemoptysis  - possibly from irritation from URI, could have a component of bronchitis given his CXR appearance but doubt bacterial PNA  - will check non-contrasted chest CT  - start on oxymetazoline to control epistaxis  - check RVP    LÓPEZ  - looks pre-renal due to intravascular volume depletion, which is complicated by severe extravascular fluid overload which we will need to deal with when renal function stabilizes  - will check PVR  - give IV fluid challenge and follow Cr in AM-will use crystalloid as his albumin is 3.60 (which may just be concentrated)    Cirrhosis  - continue on home meds  - RUQ u/s revealing GB mass-will try to get contrasted abdominal CT when renal function stabilizes  - GI following, appreciate recs    DVT Prophylaxis  - SCDs    I discussed the patients findings and my recommendations with patient, family and nursing staff.      Valentino Valdez MD  [facility]  12/05/18  4:46 PM

## 2018-12-05 NOTE — PROGRESS NOTES
Clinical Pharmacy Services: Medication History    Enrique Ramirez is a 48 y.o. male presenting to [facility] for   Chief Complaint   Patient presents with   • Coughing Up Blood   • Fatigue   • Edema       He  has a past medical history of Esophageal varices (CMS/HCC), Gallstones, Gout, Hepatitis, alcoholic, and Pre-diabetes.    Allergies as of 12/05/2018 - Reviewed 12/05/2018   Allergen Reaction Noted   • Zinc Nausea And Vomiting 10/24/2018       Medication information was obtained from: Family member  Pharmacy and Phone Number: [facility] [phone]    Prior to Admission Medications     Prescriptions Last Dose Informant Patient Reported? Taking?    allopurinol (ZYLOPRIM) 300 MG tablet  Family Member Yes No    Take 300 mg by mouth Daily As Needed (FOR GOUT FLARE-UPS).    furosemide (LASIX) 40 MG tablet 12/5/2018 Family Member No Yes    Take 1 tablet by mouth Daily for 30 days.    GuaiFENesin ER (MUCINEX MAXIMUM STRENGTH) 1200 MG tablet sustained-release 12 hour  Family Member Yes Yes    Take 1 tablet by mouth Every Night.    lactulose (CHRONULAC) 10 GM/15ML solution 12/5/2018 Family Member No Yes    Take 30 mL by mouth 2 (Two) Times a Day for 30 days.    Multiple Vitamins-Minerals (MULTIVITAMIN WITH MINERALS) tablet tablet 12/4/2018 Family Member Yes Yes    Take 1 tablet by mouth Daily.    nadolol (CORGARD) 20 MG tablet 12/5/2018 Family Member No Yes    Take 1 tablet by mouth Daily.    nitrocellulose (NEW SKIN) external liquid  Family Member No No    Apply  topically to the appropriate area as directed As Needed for Wound Care.    pantoprazole (PROTONIX) 40 MG EC tablet 12/5/2018 Family Member No Yes    Take 1 tablet by mouth Every Morning Before Breakfast for 30 days.    spironolactone (ALDACTONE) 100 MG tablet 12/5/2018 Family Member No Yes    Take 1 tablet by mouth Daily for 30 days.    Vitamin A 81879 units tablet 12/4/2018 Family Member Yes Yes    Take 5,000 Units by mouth Daily.    zinc sulfate  (ZINCATE) 220 (50 Zn) MG capsule  Family Member No No    Take 1 capsule by mouth Daily for 30 days.            Medication notes: Prednisolone removed, therapy complete. Zinc removed due to allergic reaction per family.    This medication list is complete to the best of my knowledge as of 12/5/2018    Please call if questions.    Kelly Hermosillo, Medication History Technician  12/5/2018 3:03 PM

## 2018-12-05 NOTE — TELEPHONE ENCOUNTER
----- Message from Heena Argueta sent at 12/5/2018  8:14 AM EST -----  Regarding: not feeling well   Contact: [phone]  Pt wife stated has gain 10 pounds and coughing up bleed..

## 2018-12-05 NOTE — TELEPHONE ENCOUNTER
"Call to spouse, Yessenia (see [employer] auth).  Reports pt seems more lethargic \"lately\".  Wt last pm 271 lbs (260 lbs with o/v of 11/9/18).  Easily SOA.  Coughing and spitting up blood.  Told Yessenia 2-3 days ago that cannot lay flat because feels like blood pools in mouth.  Had US completed 12/3 - reports good day yesterday, but more uncomfortable today.    States following all the rules for diet etc - asking at what point can pt be tapped.  States resting comfortably now.    Advise Yessenia will send message to DR White, but in the meantime - if symptoms worsen ie: increased SOA, bleeding - go to ER.  Verb understanding.    Call to [facility] to check status of US - they will try to push thru.    Message to DR White.  "

## 2018-12-05 NOTE — ED PROVIDER NOTES
EMERGENCY DEPARTMENT ENCOUNTER    CHIEF COMPLAINT  Chief Complaint: Cough  History given by: Pt  History limited by: None  Room Number: 24/24  PMD: Cydney Chase MD      HPI:  Pt is a 48 y.o. male who presents complaining of constant dry cough for the past few days. Pt also reports that he has had some hemoptysis, which he thinks is more from variced/gastric source as opposed to coughing up from his lungs.  He has edema of his lower extremities (chronic, worsening), but denies urinary issues. Pt family reports the jaundice has gotten better for the pt recently. Reports pt is on lasix for edema but it is not helping.    Duration:  Few days  Onset: gradual  Timing: constant  Quality: dry cough  Intensity/Severity: moderate  Progression: unchanged  Associated Symptoms:  trouble sleeping, edema (chronic), blood from varices rather than hemotypsis when he lays down  Aggravating Factors: none  Alleviating Factors: none  Previous Episodes: none  Treatment before arrival: none    PAST MEDICAL HISTORY  Active Ambulatory Problems     Diagnosis Date Noted   • Acute upper GI bleed 06/21/2018   • Gout 06/21/2018   • Pre-diabetes 06/21/2018   • Cholelithiasis 06/22/2018   • Elevated liver function tests 06/22/2018   • Alcohol abuse 06/22/2018   • Alcoholic hepatitis without ascites 06/22/2018   • Thrombocytopenia (CMS/HCC) 06/22/2018   • Esophagitis 06/23/2018   • Folate deficiency 06/23/2018   • Acute anemia 10/24/2018   • Esophageal varices (CMS/HCC) 10/24/2018   • Hepatitis, alcoholic 10/24/2018   • Ascites 10/24/2018   • Portal hypertension (CMS/HCC) 10/25/2018   • Hyponatremia 10/25/2018     Resolved Ambulatory Problems     Diagnosis Date Noted   • No Resolved Ambulatory Problems     Past Medical History:   Diagnosis Date   • Esophageal varices (CMS/HCC)    • Gallstones    • Gout    • Hepatitis, alcoholic    • Pre-diabetes        PAST SURGICAL HISTORY  Past Surgical History:   Procedure Laterality Date   • COLONOSCOPY      • FOOT SURGERY         FAMILY HISTORY  Family History   Problem Relation Age of Onset   • Lung cancer Mother    • Heart attack Father        SOCIAL HISTORY  Social History     Socioeconomic History   • Marital status:      Spouse name: Not on file   • Number of children: Not on file   • Years of education: Not on file   • Highest education level: Not on file   Social Needs   • Financial resource strain: Not on file   • Food insecurity - worry: Not on file   • Food insecurity - inability: Not on file   • Transportation needs - medical: Not on file   • Transportation needs - non-medical: Not on file   Occupational History   • Not on file   Tobacco Use   • Smoking status: Never Smoker   • Smokeless tobacco: Never Used   Substance and Sexual Activity   • Alcohol use: No     Comment: 3 weeks ago   • Drug use: No   • Sexual activity: Defer   Other Topics Concern   • Not on file   Social History Narrative   • Not on file       ALLERGIES  Zinc    REVIEW OF SYSTEMS  Review of Systems   Constitutional: Negative for activity change, appetite change and fever.   HENT: Negative for congestion and sore throat.    Eyes: Negative.    Respiratory: Positive for cough (dry). Negative for shortness of breath.    Cardiovascular: Positive for leg swelling (chronic). Negative for chest pain.   Gastrointestinal: Negative for abdominal pain, blood in stool, diarrhea and vomiting.        Blood from varices rather than hemotypsis when he lays down   Endocrine: Negative.    Genitourinary: Negative for decreased urine volume and dysuria.   Musculoskeletal: Negative for neck pain.   Skin: Negative for rash and wound.   Allergic/Immunologic: Negative.    Neurological: Negative for weakness, numbness and headaches.   Hematological: Negative.    Psychiatric/Behavioral: Positive for sleep disturbance.   All other systems reviewed and are negative.      PHYSICAL EXAM  ED Triage Vitals   Temp Heart Rate Resp BP SpO2   12/05/18 1238 12/05/18  1236 12/05/18 1236 -- 12/05/18 1236   97.8 °F (36.6 °C) 73 18  97 %      Temp src Heart Rate Source Patient Position BP Location FiO2 (%)   -- -- -- -- --              Physical Exam   Constitutional: He is oriented to person, place, and time. No distress.   Chronically ill appearing   HENT:   Head: Normocephalic and atraumatic.   Yellow tint to scalp   Eyes: EOM are normal. Pupils are equal, round, and reactive to light. Scleral icterus is present.   Neck: Normal range of motion. Neck supple.   No meningismus   Cardiovascular: Normal rate, regular rhythm and normal heart sounds.   Pulmonary/Chest: Effort normal and breath sounds normal. No respiratory distress.   Abdominal: Soft. He exhibits distension (not taut). There is no tenderness. There is no rebound and no guarding.   Musculoskeletal: Normal range of motion. He exhibits edema (severe in LE to mid thighs bilaterally).   Neurological: He is alert and oriented to person, place, and time. He has normal sensation and normal strength.   No focal deficits   Skin: Skin is warm and dry.   Spider angiometa on upper chest   Psychiatric: Mood and affect normal.   Nursing note and vitals reviewed.      LAB RESULTS  Lab Results (last 24 hours)     Procedure Component Value Units Date/Time    CBC & Differential [[id number]] Collected:  12/05/18 1306    Specimen:  Blood Updated:  12/05/18 6797    Narrative:       The following orders were created for panel order CBC & Differential.  Procedure                               Abnormality         Status                     ---------                               -----------         ------                     Scan Slide[[id number]]                                                                  CBC Auto Differential[[id number]]        Abnormal            Final result                 Please view results for these tests on the individual orders.    Comprehensive Metabolic Panel [[id number]]  (Abnormal) Collected:  12/05/18 1306     Specimen:  Blood Updated:  12/05/18 1344     Glucose 113 mg/dL      BUN 35 mg/dL      Creatinine 1.89 mg/dL      Sodium 125 mmol/L      Potassium 5.4 mmol/L      Chloride 88 mmol/L      CO2 22.8 mmol/L      Calcium 9.2 mg/dL      Total Protein 7.4 g/dL      Albumin 3.60 g/dL      ALT (SGPT) 59 U/L      AST (SGOT) 76 U/L      Comment: Specimen hemolyzed.  Results may be affected.        Alkaline Phosphatase 102 U/L      Total Bilirubin 12.1 mg/dL      eGFR Non African Amer 39 mL/min/1.73      Globulin 3.8 gm/dL      A/G Ratio 0.9 g/dL      BUN/Creatinine Ratio 18.5     Anion Gap 14.2 mmol/L     Protime-INR [[id number]]  (Abnormal) Collected:  12/05/18 1306    Specimen:  Blood Updated:  12/05/18 1339     Protime 20.8 Seconds      INR 1.83    Lipase [[id number]]  (Normal) Collected:  12/05/18 1306    Specimen:  Blood Updated:  12/05/18 1343     Lipase 51 U/L     Amylase [[id number]]  (Abnormal) Collected:  12/05/18 1306    Specimen:  Blood Updated:  12/05/18 1343     Amylase 27 U/L     Ammonia [[id number]]  (Normal) Collected:  12/05/18 1306    Specimen:  Blood Updated:  12/05/18 1355     Ammonia 38 umol/L     CBC Auto Differential [[id number]]  (Abnormal) Collected:  12/05/18 1306    Specimen:  Blood Updated:  12/05/18 1338     WBC 11.34 10*3/mm3      RBC 1.80 10*6/mm3      Hemoglobin 6.7 g/dL      Hematocrit 19.8 %      .0 fL      MCH 37.2 pg      MCHC 33.8 g/dL      RDW 16.2 %      RDW-SD 64.4 fl      MPV 10.2 fL      Platelets 149 10*3/mm3      Neutrophil % 73.8 %      Lymphocyte % 9.2 %      Monocyte % 15.8 %      Eosinophil % 0.9 %      Basophil % 0.3 %      Immature Grans % 0.5 %      Neutrophils, Absolute 8.38 10*3/mm3      Lymphocytes, Absolute 1.04 10*3/mm3      Monocytes, Absolute 1.79 10*3/mm3      Eosinophils, Absolute 0.10 10*3/mm3      Basophils, Absolute 0.03 10*3/mm3      Immature Grans, Absolute 0.06 10*3/mm3     Urinalysis With Microscopic If Indicated (No Culture) - Urine, Clean Catch [[id number]]   (Abnormal) Collected:  12/05/18 1330    Specimen:  Urine, Clean Catch Updated:  12/05/18 1355     Color, UA Dark Yellow     Appearance, UA Clear     pH, UA <=5.0     Specific Gravity, UA 1.016     Glucose, UA Negative     Ketones, UA Trace     Bilirubin, UA Moderate (2+)     Blood, UA Negative     Protein, UA Trace     Leuk Esterase, UA Trace     Nitrite, UA Positive     Urobilinogen, UA 1.0 E.U./dL    Urinalysis, Microscopic Only - Urine, Clean Catch [[id number]]  (Abnormal) Collected:  12/05/18 1330    Specimen:  Urine, Clean Catch Updated:  12/05/18 1355     RBC, UA 3-5 /HPF      WBC, UA 0-2 /HPF      Bacteria, UA None Seen /HPF      Squamous Epithelial Cells, UA 0-2 /HPF      Hyaline Casts, UA 3-6 /LPF      Methodology Automated Microscopy          I ordered the above labs and reviewed the results    RADIOLOGY  XR Chest 1 View   Final Result   Persistent patchy densities in the mid to lower lungs. Increased left   more than right pleural effusions. Cardiomegaly with persistent mild   pulmonary vascular congestion. Continued follow-up/further evaluation   recommended as indicated.                   This report was finalized on 12/5/2018 1:49 PM by Dr. Rojas Dubon M.D.               I ordered the above noted radiological studies. Interpreted by radiologist. Reviewed by me in PACS.       PROCEDURES  Critical Care  Performed by: Gurinder Burnett MD  Authorized by: Gurinder Burnett MD     Critical care provider statement:     Critical care time (minutes):  35    Critical care time was exclusive of:  Separately billable procedures and treating other patients    Critical care was necessary to treat or prevent imminent or life-threatening deterioration of the following conditions:  Hepatic failure    Critical care was time spent personally by me on the following activities:  Development of treatment plan with patient or surrogate, discussions with consultants, evaluation of patient's response to treatment,  examination of patient, obtaining history from patient or surrogate, ordering and performing treatments and interventions, ordering and review of laboratory studies, ordering and review of radiographic studies, pulse oximetry, re-evaluation of patient's condition and review of old charts            PROGRESS AND CONSULTS  ED Course as of Dec 05 1438   Wed Dec 05, 2018   1422 2:22 PM  D/W Dr. Damon () who agrees to see the patient in consultation.  [WC]      ED Course User Index  [WC] Gurinder Burnett MD       1259  Ordered labs and chest XR for further viewing.    1346  Ordered protonix for edema. Ordered microscopic UA for further viewing. Consult to [facility] and GI.    1422  Discussed case with Dr Sexton, GI  Reviewed history, exam, results and treatments.  Discussed concerns and plan of care. Dr Sexton will consult.      1439  Discussed case with Dr Jacques, [facility]  Reviewed history, exam, results and treatments.  Discussed concerns and plan of care. Dr Jacques accepts pt to be admitted to telemetry.    1520  Rechecked patient who is resting. Discussed all lab and test results. Discussed plan to admit the pt. Pt understands and agrees with the plan. All questions have been answered.      MEDICAL DECISION MAKING  Results were reviewed/discussed with the patient and they were also made aware of online access. Pt also made aware that some labs, such as cultures, will not be resulted during ER visit and follow up with PMD is necessary.     MDM  Number of Diagnoses or Management Options  Acute blood loss anemia:   Bleeding esophageal varices, unspecified esophageal varices type (CMS/HCC):   Hepatitis:      Amount and/or Complexity of Data Reviewed  Clinical lab tests: reviewed and ordered (HGB 6.7, Hematocrit 19.8, Amylase 27, BUN 35, Creatinine 1.89, Sodium 125, Potassium 5.4, Bilirubin 12.1, ALT 59, AST 76  UA: Bilirubin Moderate (2+), Nitrate positive, RBC 3-5)  Tests in the radiology section of CPT®: reviewed  and ordered (Chest XR - Persistent patchy densities in the mid to lower lungs. Increased left more than right pleural effusions.)  Decide to obtain previous medical records or to obtain history from someone other than the patient: yes (EPIC)  Review and summarize past medical records: yes (Admitted 10/24- 10/29/18 for alcoholic hepatitis, portal hypotension, ascites, and thrombcytopenia.)  Discuss the patient with other providers: yes (Dr Carlie Valdez)           DIAGNOSIS  Final diagnoses:   Hepatitis   Bleeding esophageal varices, unspecified esophageal varices type (CMS/HCC)   Acute blood loss anemia       DISPOSITION  ADMISSION    Discussed treatment plan and reason for admission with pt/family and admitting physician.  Pt/family voiced understanding of the plan for admission for further testing/treatment as needed.         Latest Documented Vital Signs:  As of 2:08 PM  BP- 104/60 HR- 73 Temp- 97.8 °F (36.6 °C) O2 sat- 97%    --  Documentation assistance provided by eun Torres for Dr uBrnett.  Information recorded by the scribe was done at my direction and has been verified and validated by me.               Carrol Torres  12/05/18 1520       Gurinder Burnett MD  12/05/18 4156

## 2018-12-05 NOTE — TELEPHONE ENCOUNTER
Call to Yessenia.  Advise per Dr White to go to ER today.  Yessenia verb understanding -states will do so.

## 2018-12-05 NOTE — CONSULTS
[facility]  Initial Inpatient Consult Note    Referring Provider: Dr Cydney Chase    Reason for Consultation: Hemoptysis    Subjective     History of present illness:    48 y.o. male patient of Dr. Emerald White who is followed for alcoholic hepatitis with history of cirrhosis, esophageal varices, and ascites.  He was last hospitalized in October for anasarca and jaundice.  It was noted at that time that he has episodes of hemoptysis with cough.  No reported hematemesis noted.  He had undergone upper endoscopic evaluation in June that revealed grade 1 esophageal varices as well as esophagitis and gastritis and portal hypertensive gastropathy.  He presents at this time with abdominal distention and reported cough with blood especially when lying supine.  His cough has been an issue for the past 4-5 days.  He denies any chest or abdominal pain.  No reported melena or bright red blood per rectum.    Past Medical History:  Past Medical History:   Diagnosis Date   • Esophageal varices (CMS/HCC)    • Gallstones    • Gout    • Hepatitis, alcoholic    • Pre-diabetes      Past Surgical History:  Past Surgical History:   Procedure Laterality Date   • COLONOSCOPY     • FOOT SURGERY        Social History:   Social History     Tobacco Use   • Smoking status: Never Smoker   • Smokeless tobacco: Never Used   Substance Use Topics   • Alcohol use: No     Comment: 3 weeks ago      Family History:  Family History   Problem Relation Age of Onset   • Lung cancer Mother    • Heart attack Father        Home Meds:    (Not in a hospital admission)  Current Meds:      Allergies:  Allergies   Allergen Reactions   • Zinc Nausea And Vomiting     Review of Systems  Pertinent items are noted in HPI, all other systems reviewed and negative     Objective     Vital Signs  Temp:  [97.8 °F (36.6 °C)] 97.8 °F (36.6 °C)  Heart Rate:  [65-73] 65  Resp:  [18] 18  BP: ()/(51-60) 94/51  Physical Exam:  General Appearance:     Alert, cooperative, in no acute distress   Head:    Normocephalic, without obvious abnormality, atraumatic   Eyes:            Lids and lashes normal, conjunctivae and sclerae normal, no   icterus   Throat:   No oral lesions, no thrush, oral mucosa moist   Neck:   No adenopathy, supple, trachea midline, no thyromegaly, no   carotid bruit, no JVD   Lungs:     Clear to auscultation,respirations regular, even and                   unlabored    Heart:    Regular rhythm and normal rate, normal S1 and S2, no            murmur, no gallop, no rub, no click   Chest Wall:    No abnormalities observed   Abdomen:     Normal bowel sounds, no masses, no organomegaly, soft        non-tender, non-distended, no guarding, no rebound                 tenderness   Rectal:     Deferred   Extremities:   no edema, no cyanosis, no redness   Skin:   No bleeding, bruising or rash   Lymph nodes:   No palpable adenopathy   Psychiatric:  Judgement and insight: normal   Orientation to person place and time: normal   Mood and affect: normal   Results Review:   I reviewed the patient's new clinical results.    Results from last 7 days   Lab Units  12/05/18   1306   WBC 10*3/mm3  11.34*   HEMOGLOBIN g/dL  6.7*   HEMATOCRIT %  19.8*   PLATELETS 10*3/mm3  149     Results from last 7 days   Lab Units  12/05/18   1306   SODIUM mmol/L  125*   POTASSIUM mmol/L  5.4*   CHLORIDE mmol/L  88*   CO2 mmol/L  22.8   BUN mg/dL  35*   CREATININE mg/dL  1.89*   CALCIUM mg/dL  9.2   BILIRUBIN mg/dL  12.1*   ALK PHOS U/L  102   ALT (SGPT) U/L  59*   AST (SGOT) U/L  76*   GLUCOSE mg/dL  113*     Results from last 7 days   Lab Units  12/05/18   1306   INR   1.83*     Lab Results   Lab Value Date/Time    LIPASE 51 12/05/2018 1306    LIPASE 53 11/28/2018 1101    LIPASE 38 10/24/2018 1439    LIPASE 45 06/21/2018 1906       Radiology:  XR Chest 1 View   Final Result   Persistent patchy densities in the mid to lower lungs. Increased left   more than right pleural effusions.  Cardiomegaly with persistent mild   pulmonary vascular congestion. Continued follow-up/further evaluation   recommended as indicated.                   This report was finalized on 12/5/2018 1:49 PM by Dr. Rojas Dubon M.D.              Assessment/Plan   Patient Active Problem List   Diagnosis   • Acute upper GI bleed   • Gout   • Pre-diabetes   • Cholelithiasis   • Elevated liver function tests   • Alcohol abuse   • Alcoholic hepatitis without ascites   • Thrombocytopenia (CMS/HCC)   • Esophagitis   • Folate deficiency   • Acute anemia   • Esophageal varices (CMS/HCC)   • Hepatitis, alcoholic   • Ascites   • Portal hypertension (CMS/HCC)   • Hyponatremia   • Acute blood loss anemia     Impression  #1 hemoptysis: Symptoms not consistent with hematemesis associated with a variceal bleeding source.  #2 alcoholic liver disease  #3 history of ascites  #4 history of varices  #5 anemia      Recommendation  To current medical regimen, maintain pantoprazole as well as lactulose.  Hold on diuretics until intervascular volume improves.  We will reassess in a.m. to determine if endoscopic evaluation is warranted.  Clear liquid diet for the present.  Monitor H&H, defer transfusion to the admitting physician    I discussed the patients findings and my recommendations with patient.    Lawrence Sexton MD

## 2018-12-06 ENCOUNTER — APPOINTMENT (OUTPATIENT)
Dept: ULTRASOUND IMAGING | Facility: HOSPITAL | Age: 48
End: 2018-12-06

## 2018-12-06 ENCOUNTER — APPOINTMENT (OUTPATIENT)
Dept: CARDIOLOGY | Facility: HOSPITAL | Age: 48
End: 2018-12-06

## 2018-12-06 PROBLEM — I31.39 PERICARDIAL EFFUSION: Status: ACTIVE | Noted: 2018-12-06

## 2018-12-06 PROBLEM — B34.8 RHINOVIRUS INFECTION: Status: ACTIVE | Noted: 2018-12-06

## 2018-12-06 LAB
ABO + RH BLD: NORMAL
ALBUMIN SERPL-MCNC: 3.6 G/DL (ref 3.5–5.2)
ALBUMIN/GLOB SERPL: 1.1 G/DL
ALP SERPL-CCNC: 84 U/L (ref 39–117)
ALT SERPL W P-5'-P-CCNC: 49 U/L (ref 1–41)
ANION GAP SERPL CALCULATED.3IONS-SCNC: 15 MMOL/L
AST SERPL-CCNC: 51 U/L (ref 1–40)
BASOPHILS # BLD AUTO: 0.04 10*3/MM3 (ref 0–0.2)
BASOPHILS NFR BLD AUTO: 0.3 % (ref 0–1.5)
BH BB BLOOD EXPIRATION DATE: NORMAL
BH BB BLOOD TYPE BARCODE: 6200
BH BB DISPENSE STATUS: NORMAL
BH BB PRODUCT CODE: NORMAL
BH BB UNIT NUMBER: NORMAL
BH CV ECHO MEAS - ACS: 2.4 CM
BH CV ECHO MEAS - AO MAX PG (FULL): 4.5 MMHG
BH CV ECHO MEAS - AO MAX PG: 16.3 MMHG
BH CV ECHO MEAS - AO MEAN PG (FULL): 2 MMHG
BH CV ECHO MEAS - AO MEAN PG: 8 MMHG
BH CV ECHO MEAS - AO ROOT AREA (BSA CORRECTED): 1.2
BH CV ECHO MEAS - AO ROOT AREA: 7.1 CM^2
BH CV ECHO MEAS - AO ROOT DIAM: 3 CM
BH CV ECHO MEAS - AO V2 MAX: 202 CM/SEC
BH CV ECHO MEAS - AO V2 MEAN: 133 CM/SEC
BH CV ECHO MEAS - AO V2 VTI: 44.4 CM
BH CV ECHO MEAS - AVA(I,A): 3.9 CM^2
BH CV ECHO MEAS - AVA(I,D): 3.9 CM^2
BH CV ECHO MEAS - AVA(V,A): 3.9 CM^2
BH CV ECHO MEAS - AVA(V,D): 3.9 CM^2
BH CV ECHO MEAS - BSA(HAYCOCK): 2.5 M^2
BH CV ECHO MEAS - BSA: 2.4 M^2
BH CV ECHO MEAS - BZI_BMI: 36.2 KILOGRAMS/M^2
BH CV ECHO MEAS - BZI_METRIC_HEIGHT: 182.9 CM
BH CV ECHO MEAS - BZI_METRIC_WEIGHT: 121.1 KG
BH CV ECHO MEAS - EDV(CUBED): 166.4 ML
BH CV ECHO MEAS - EDV(MOD-SP2): 141 ML
BH CV ECHO MEAS - EDV(MOD-SP4): 143 ML
BH CV ECHO MEAS - EDV(TEICH): 147.4 ML
BH CV ECHO MEAS - EF(CUBED): 74.2 %
BH CV ECHO MEAS - EF(MOD-BP): 66 %
BH CV ECHO MEAS - EF(MOD-SP2): 65.2 %
BH CV ECHO MEAS - EF(MOD-SP4): 67.8 %
BH CV ECHO MEAS - EF(TEICH): 65.5 %
BH CV ECHO MEAS - ESV(CUBED): 42.9 ML
BH CV ECHO MEAS - ESV(MOD-SP2): 49 ML
BH CV ECHO MEAS - ESV(MOD-SP4): 46 ML
BH CV ECHO MEAS - ESV(TEICH): 50.9 ML
BH CV ECHO MEAS - FS: 36.4 %
BH CV ECHO MEAS - IVS/LVPW: 1.2
BH CV ECHO MEAS - IVSD: 1.4 CM
BH CV ECHO MEAS - LAT PEAK E' VEL: 13.5 CM/SEC
BH CV ECHO MEAS - LV DIASTOLIC VOL/BSA (35-75): 59.4 ML/M^2
BH CV ECHO MEAS - LV MASS(C)D: 304.3 GRAMS
BH CV ECHO MEAS - LV MASS(C)DI: 126.3 GRAMS/M^2
BH CV ECHO MEAS - LV MAX PG: 11.8 MMHG
BH CV ECHO MEAS - LV MEAN PG: 6 MMHG
BH CV ECHO MEAS - LV SYSTOLIC VOL/BSA (12-30): 19.1 ML/M^2
BH CV ECHO MEAS - LV V1 MAX: 172 CM/SEC
BH CV ECHO MEAS - LV V1 MEAN: 117 CM/SEC
BH CV ECHO MEAS - LV V1 VTI: 37.8 CM
BH CV ECHO MEAS - LVIDD: 5.5 CM
BH CV ECHO MEAS - LVIDS: 3.5 CM
BH CV ECHO MEAS - LVLD AP2: 7.9 CM
BH CV ECHO MEAS - LVLD AP4: 7.6 CM
BH CV ECHO MEAS - LVLS AP2: 6.9 CM
BH CV ECHO MEAS - LVLS AP4: 6.3 CM
BH CV ECHO MEAS - LVOT AREA (M): 4.5 CM^2
BH CV ECHO MEAS - LVOT AREA: 4.5 CM^2
BH CV ECHO MEAS - LVOT DIAM: 2.4 CM
BH CV ECHO MEAS - LVPWD: 1.2 CM
BH CV ECHO MEAS - MED PEAK E' VEL: 8.3 CM/SEC
BH CV ECHO MEAS - MV A DUR: 0.22 SEC
BH CV ECHO MEAS - MV DEC SLOPE: 477 CM/SEC^2
BH CV ECHO MEAS - MV DEC TIME: 0.31 SEC
BH CV ECHO MEAS - MV E MAX VEL: 110 CM/SEC
BH CV ECHO MEAS - MV MAX PG: 11.4 MMHG
BH CV ECHO MEAS - MV MEAN PG: 3 MMHG
BH CV ECHO MEAS - MV P1/2T MAX VEL: 162 CM/SEC
BH CV ECHO MEAS - MV P1/2T: 99.5 MSEC
BH CV ECHO MEAS - MV V2 MAX: 169 CM/SEC
BH CV ECHO MEAS - MV V2 MEAN: 78.4 CM/SEC
BH CV ECHO MEAS - MV V2 VTI: 46 CM
BH CV ECHO MEAS - MVA P1/2T LCG: 1.4 CM^2
BH CV ECHO MEAS - MVA(P1/2T): 2.2 CM^2
BH CV ECHO MEAS - MVA(VTI): 3.7 CM^2
BH CV ECHO MEAS - PA ACC TIME: 0.16 SEC
BH CV ECHO MEAS - PA MAX PG (FULL): 2.1 MMHG
BH CV ECHO MEAS - PA MAX PG: 3.4 MMHG
BH CV ECHO MEAS - PA PR(ACCEL): 9.3 MMHG
BH CV ECHO MEAS - PA V2 MAX: 92.3 CM/SEC
BH CV ECHO MEAS - PULM A REVS DUR: 0.16 SEC
BH CV ECHO MEAS - PULM A REVS VEL: 19.4 CM/SEC
BH CV ECHO MEAS - PULM DIAS VEL: 86.8 CM/SEC
BH CV ECHO MEAS - PULM S/D: 0.5
BH CV ECHO MEAS - PULM SYS VEL: 43.4 CM/SEC
BH CV ECHO MEAS - RAP SYSTOLE: 15 MMHG
BH CV ECHO MEAS - RV MAX PG: 1.3 MMHG
BH CV ECHO MEAS - RV MEAN PG: 1 MMHG
BH CV ECHO MEAS - RV V1 MAX: 56.2 CM/SEC
BH CV ECHO MEAS - RV V1 MEAN: 37.5 CM/SEC
BH CV ECHO MEAS - RV V1 VTI: 12.8 CM
BH CV ECHO MEAS - RVSP: 23.6 MMHG
BH CV ECHO MEAS - SI(AO): 130.3 ML/M^2
BH CV ECHO MEAS - SI(CUBED): 51.3 ML/M^2
BH CV ECHO MEAS - SI(LVOT): 71 ML/M^2
BH CV ECHO MEAS - SI(MOD-SP2): 38.2 ML/M^2
BH CV ECHO MEAS - SI(MOD-SP4): 40.3 ML/M^2
BH CV ECHO MEAS - SI(TEICH): 40.1 ML/M^2
BH CV ECHO MEAS - SV(AO): 313.8 ML
BH CV ECHO MEAS - SV(CUBED): 123.5 ML
BH CV ECHO MEAS - SV(LVOT): 171 ML
BH CV ECHO MEAS - SV(MOD-SP2): 92 ML
BH CV ECHO MEAS - SV(MOD-SP4): 97 ML
BH CV ECHO MEAS - SV(TEICH): 96.6 ML
BH CV ECHO MEAS - TAPSE (>1.6): 2 CM2
BH CV ECHO MEAS - TR MAX VEL: 147 CM/SEC
BH CV ECHO MEASUREMENTS AVERAGE E/E' RATIO: 10.09
BH CV XLRA - RV BASE: 3.5 CM
BH CV XLRA - RV LENGTH: 6.7 CM
BH CV XLRA - RV MID: 3 CM
BH CV XLRA - TDI S': 11.4 CM/SEC
BILIRUB SERPL-MCNC: 12.9 MG/DL (ref 0.1–1.2)
BUN BLD-MCNC: 35 MG/DL (ref 6–20)
BUN/CREAT SERPL: 19.4 (ref 7–25)
CALCIUM SPEC-SCNC: 9.3 MG/DL (ref 8.6–10.5)
CHLORIDE SERPL-SCNC: 90 MMOL/L (ref 98–107)
CO2 SERPL-SCNC: 21 MMOL/L (ref 22–29)
CREAT BLD-MCNC: 1.8 MG/DL (ref 0.76–1.27)
CREAT UR-MCNC: 267.7 MG/DL
DEPRECATED RDW RBC AUTO: 79.7 FL (ref 37–54)
EOSINOPHIL # BLD AUTO: 0.08 10*3/MM3 (ref 0–0.7)
EOSINOPHIL NFR BLD AUTO: 0.7 % (ref 0.3–6.2)
ERYTHROCYTE [DISTWIDTH] IN BLOOD BY AUTOMATED COUNT: 19.2 % (ref 11.5–14.5)
GFR SERPL CREATININE-BSD FRML MDRD: 41 ML/MIN/1.73
GLOBULIN UR ELPH-MCNC: 3.3 GM/DL
GLUCOSE BLD-MCNC: 90 MG/DL (ref 65–99)
HCT VFR BLD AUTO: 21.4 % (ref 40.4–52.2)
HCT VFR BLD AUTO: 22.4 % (ref 40.4–52.2)
HGB BLD-MCNC: 7 G/DL (ref 13.7–17.6)
HGB BLD-MCNC: 7.2 G/DL (ref 13.7–17.6)
IMM GRANULOCYTES # BLD: 0.05 10*3/MM3 (ref 0–0.03)
IMM GRANULOCYTES NFR BLD: 0.4 % (ref 0–0.5)
INR PPP: 1.86 (ref 0.9–1.1)
LEFT ATRIUM VOLUME INDEX: 39 ML/M2
LYMPHOCYTES # BLD AUTO: 1.16 10*3/MM3 (ref 0.9–4.8)
LYMPHOCYTES NFR BLD AUTO: 9.8 % (ref 19.6–45.3)
MAXIMAL PREDICTED HEART RATE: 174 BPM
MCH RBC QN AUTO: 36.8 PG (ref 27–32.7)
MCHC RBC AUTO-ENTMCNC: 32.7 G/DL (ref 32.6–36.4)
MCV RBC AUTO: 112.6 FL (ref 79.8–96.2)
MONOCYTES # BLD AUTO: 2 10*3/MM3 (ref 0.2–1.2)
MONOCYTES NFR BLD AUTO: 16.9 % (ref 5–12)
NEUTROPHILS # BLD AUTO: 8.51 10*3/MM3 (ref 1.9–8.1)
NEUTROPHILS NFR BLD AUTO: 71.9 % (ref 42.7–76)
PLATELET # BLD AUTO: 109 10*3/MM3 (ref 140–500)
PMV BLD AUTO: 9 FL (ref 6–12)
POTASSIUM BLD-SCNC: 5 MMOL/L (ref 3.5–5.2)
PROCALCITONIN SERPL-MCNC: 0.27 NG/ML (ref 0.1–0.25)
PROT SERPL-MCNC: 6.9 G/DL (ref 6–8.5)
PROTHROMBIN TIME: 21.1 SECONDS (ref 11.7–14.2)
RBC # BLD AUTO: 1.9 10*6/MM3 (ref 4.6–6)
SODIUM BLD-SCNC: 126 MMOL/L (ref 136–145)
SODIUM UR-SCNC: <20 MMOL/L
STRESS TARGET HR: 148 BPM
UNIT  ABO: NORMAL
UNIT  RH: NORMAL
WBC NRBC COR # BLD: 11.84 10*3/MM3 (ref 4.5–10.7)

## 2018-12-06 PROCEDURE — G0008 ADMIN INFLUENZA VIRUS VAC: HCPCS | Performed by: INTERNAL MEDICINE

## 2018-12-06 PROCEDURE — 85025 COMPLETE CBC W/AUTO DIFF WBC: CPT | Performed by: INTERNAL MEDICINE

## 2018-12-06 PROCEDURE — 94799 UNLISTED PULMONARY SVC/PX: CPT

## 2018-12-06 PROCEDURE — 25010000002 ALBUMIN HUMAN 25% PER 50 ML: Performed by: INTERNAL MEDICINE

## 2018-12-06 PROCEDURE — 84145 PROCALCITONIN (PCT): CPT | Performed by: INTERNAL MEDICINE

## 2018-12-06 PROCEDURE — 93010 ELECTROCARDIOGRAM REPORT: CPT | Performed by: INTERNAL MEDICINE

## 2018-12-06 PROCEDURE — P9047 ALBUMIN (HUMAN), 25%, 50ML: HCPCS | Performed by: INTERNAL MEDICINE

## 2018-12-06 PROCEDURE — 85610 PROTHROMBIN TIME: CPT | Performed by: INTERNAL MEDICINE

## 2018-12-06 PROCEDURE — 76775 US EXAM ABDO BACK WALL LIM: CPT

## 2018-12-06 PROCEDURE — 85014 HEMATOCRIT: CPT | Performed by: INTERNAL MEDICINE

## 2018-12-06 PROCEDURE — 36430 TRANSFUSION BLD/BLD COMPNT: CPT

## 2018-12-06 PROCEDURE — 80053 COMPREHEN METABOLIC PANEL: CPT | Performed by: INTERNAL MEDICINE

## 2018-12-06 PROCEDURE — 93306 TTE W/DOPPLER COMPLETE: CPT

## 2018-12-06 PROCEDURE — 94640 AIRWAY INHALATION TREATMENT: CPT

## 2018-12-06 PROCEDURE — 93005 ELECTROCARDIOGRAM TRACING: CPT | Performed by: NURSE PRACTITIONER

## 2018-12-06 PROCEDURE — 25010000002 INFLUENZA VAC SUBUNIT QUAD 0.5 ML SUSPENSION PREFILLED SYRINGE: Performed by: INTERNAL MEDICINE

## 2018-12-06 PROCEDURE — P9016 RBC LEUKOCYTES REDUCED: HCPCS

## 2018-12-06 PROCEDURE — 99252 IP/OBS CONSLTJ NEW/EST SF 35: CPT | Performed by: NURSE PRACTITIONER

## 2018-12-06 PROCEDURE — 82570 ASSAY OF URINE CREATININE: CPT | Performed by: INTERNAL MEDICINE

## 2018-12-06 PROCEDURE — 84300 ASSAY OF URINE SODIUM: CPT | Performed by: INTERNAL MEDICINE

## 2018-12-06 PROCEDURE — 99233 SBSQ HOSP IP/OBS HIGH 50: CPT | Performed by: INTERNAL MEDICINE

## 2018-12-06 PROCEDURE — 85018 HEMOGLOBIN: CPT | Performed by: INTERNAL MEDICINE

## 2018-12-06 PROCEDURE — 86900 BLOOD TYPING SEROLOGIC ABO: CPT

## 2018-12-06 PROCEDURE — 93306 TTE W/DOPPLER COMPLETE: CPT | Performed by: INTERNAL MEDICINE

## 2018-12-06 PROCEDURE — 90661 CCIIV3 VAC ABX FR 0.5 ML IM: CPT | Performed by: INTERNAL MEDICINE

## 2018-12-06 RX ORDER — SODIUM CHLORIDE 9 MG/ML
100 INJECTION, SOLUTION INTRAVENOUS CONTINUOUS
Status: ACTIVE | OUTPATIENT
Start: 2018-12-06 | End: 2018-12-06

## 2018-12-06 RX ORDER — ALBUMIN (HUMAN) 12.5 G/50ML
50 SOLUTION INTRAVENOUS ONCE
Status: COMPLETED | OUTPATIENT
Start: 2018-12-06 | End: 2018-12-06

## 2018-12-06 RX ORDER — PHYTONADIONE 2 MG/ML
10 INJECTION, EMULSION INTRAMUSCULAR; INTRAVENOUS; SUBCUTANEOUS ONCE
Status: DISCONTINUED | OUTPATIENT
Start: 2018-12-06 | End: 2018-12-06

## 2018-12-06 RX ADMIN — IPRATROPIUM BROMIDE AND ALBUTEROL SULFATE 3 ML: 2.5; .5 SOLUTION RESPIRATORY (INHALATION) at 11:45

## 2018-12-06 RX ADMIN — SODIUM CHLORIDE, PRESERVATIVE FREE 3 ML: 5 INJECTION INTRAVENOUS at 09:42

## 2018-12-06 RX ADMIN — NADOLOL 20 MG: 20 TABLET ORAL at 09:41

## 2018-12-06 RX ADMIN — IPRATROPIUM BROMIDE AND ALBUTEROL SULFATE 3 ML: 2.5; .5 SOLUTION RESPIRATORY (INHALATION) at 08:08

## 2018-12-06 RX ADMIN — IPRATROPIUM BROMIDE AND ALBUTEROL SULFATE 3 ML: 2.5; .5 SOLUTION RESPIRATORY (INHALATION) at 20:02

## 2018-12-06 RX ADMIN — INFLUENZA A VIRUS A/SINGAPORE/GP1908/2015 IVR-180 (H1N1) ANTIGEN (MDCK CELL DERIVED, PROPIOLACTONE INACTIVATED), INFLUENZA A VIRUS A/NORTH CAROLINA/04/2016 (H3N2) HEMAGGLUTININ ANTIGEN (MDCK CELL DERIVED, PROPIOLACTONE INACTIVATED), INFLUENZA B VIRUS B/IOWA/06/2017 HEMAGGLUTININ ANTIGEN (MDCK CELL DERIVED, PROPIOLACTONE INACTIVATED), INFLUENZA B VIRUS B/SINGAPORE/INFTT-16-0610/2016 HEMAGGLUTININ ANTIGEN (MDCK CELL DERIVED, PROPIOLACTONE INACTIVATED) 0.5 ML: 15; 15; 15; 15 INJECTION, SUSPENSION INTRAMUSCULAR at 12:04

## 2018-12-06 RX ADMIN — LACTULOSE 20 G: 10 SOLUTION ORAL at 21:16

## 2018-12-06 RX ADMIN — PANTOPRAZOLE SODIUM 40 MG: 40 TABLET, DELAYED RELEASE ORAL at 09:41

## 2018-12-06 RX ADMIN — LACTULOSE 20 G: 10 SOLUTION ORAL at 09:41

## 2018-12-06 RX ADMIN — SODIUM CHLORIDE 100 ML/HR: 9 INJECTION, SOLUTION INTRAVENOUS at 13:19

## 2018-12-06 RX ADMIN — SODIUM CHLORIDE 8 MG/HR: 900 INJECTION INTRAVENOUS at 03:20

## 2018-12-06 RX ADMIN — SODIUM CHLORIDE, PRESERVATIVE FREE 3 ML: 5 INJECTION INTRAVENOUS at 22:54

## 2018-12-06 RX ADMIN — Medication 2 SPRAY: at 09:42

## 2018-12-06 RX ADMIN — GUAIFENESIN 600 MG: 600 TABLET, EXTENDED RELEASE ORAL at 21:16

## 2018-12-06 RX ADMIN — Medication 2 SPRAY: at 22:07

## 2018-12-06 RX ADMIN — SODIUM CHLORIDE 8 MG/HR: 900 INJECTION INTRAVENOUS at 09:50

## 2018-12-06 RX ADMIN — ALBUMIN HUMAN 50 G: 0.25 SOLUTION INTRAVENOUS at 11:58

## 2018-12-06 RX ADMIN — GUAIFENESIN 600 MG: 600 TABLET, EXTENDED RELEASE ORAL at 09:41

## 2018-12-06 NOTE — PLAN OF CARE
Problem: Patient Care Overview  Goal: Plan of Care Review  Outcome: Ongoing (interventions implemented as appropriate)   12/06/18 0516   Coping/Psychosocial   Plan of Care Reviewed With patient   Plan of Care Review   Progress no change   OTHER   Outcome Summary no complaints, up with standby assist, has been NPO, Hgb about the same after x1 unit of PRBC's, will continue to monitor     Goal: Individualization and Mutuality  Outcome: Ongoing (interventions implemented as appropriate)    Goal: Discharge Needs Assessment  Outcome: Ongoing (interventions implemented as appropriate)      Problem: Anemia (Adult)  Goal: Identify Related Risk Factors and Signs and Symptoms  Outcome: Outcome(s) achieved Date Met: 12/06/18    Goal: Symptom Improvement  Outcome: Ongoing (interventions implemented as appropriate)      Problem: Fall Risk (Adult)  Goal: Identify Related Risk Factors and Signs and Symptoms  Outcome: Outcome(s) achieved Date Met: 12/06/18    Goal: Absence of Fall  Outcome: Ongoing (interventions implemented as appropriate)

## 2018-12-06 NOTE — PROGRESS NOTES
Discharge Planning Assessment  [facility]     Patient Name: Enrique Ramirez  MRN: [id number]  Today's Date: 12/6/2018    Admit Date: 12/5/2018    Discharge Needs Assessment     Row Name 12/06/18 1128       Living Environment    Lives With  spouse    Current Living Arrangements  home/apartment/condo    Potentially Unsafe Housing Conditions  other (see comments) no concerns     Primary Care Provided by  self    Provides Primary Care For  no one    Family Caregiver if Needed  spouse    Quality of Family Relationships  helpful;involved;supportive    Able to Return to Prior Arrangements  yes       Resource/Environmental Concerns    Resource/Environmental Concerns  none    Transportation Concerns  car, none       Transition Planning    Patient/Family Anticipates Transition to  home    Patient/Family Anticipated Services at Transition  none    Transportation Anticipated  family or friend will provide;car, drives self       Discharge Needs Assessment    Readmission Within the Last 30 Days  no previous admission in last 30 days    Concerns to be Addressed  no discharge needs identified;denies needs/concerns at this time    Equipment Currently Used at Home  none    Anticipated Changes Related to Illness  none    Equipment Needed After Discharge  none        Discharge Plan     Row Name 12/06/18 1129       Plan    Plan  Home     Patient/Family in Agreement with Plan  yes    Plan Comments  CCP met with patient at bedside. CCP role explained and discharge planning discussed. Face sheet verified. Patient's PCP is Dr. Chase. Patient lives with his spouse, Yessenia with two steps to the entrance. Patient has 17 interior steps with handrails. Patient's bedroom and bathroom are on the main level. Patient does not use DME and is independent with his ADLS. Patient has not used home health or been to SNF. Patient's pharmacy is [facility] in [place]; patient denies having trouble affording his medications. Patient denies any discharge  needs. CCP will follow any needs to arise. Blanka WYLIE            Destination      No service coordination in this encounter.      Durable Medical Equipment      No service coordination in this encounter.      Dialysis/Infusion      No service coordination in this encounter.      Home Medical Care      No service coordination in this encounter.      Community Resources      No service coordination in this encounter.          Demographic Summary     Row Name 12/06/18 1128       General Information    Admission Type  inpatient    Arrived From  emergency department    Referral Source  admission list    Reason for Consult  discharge planning    Preferred Language  English     Used During This Interaction  no        Functional Status     Row Name 12/06/18 1128       Functional Status    Usual Activity Tolerance  good    Current Activity Tolerance  good       Functional Status, IADL    Medications  independent    Meal Preparation  independent    Housekeeping  independent    Laundry  independent    Shopping  independent       Mental Status    General Appearance WDL  WDL       Mental Status Summary    Recent Changes in Mental Status/Cognitive Functioning  no changes        Psychosocial    No documentation.       Abuse/Neglect    No documentation.       Legal    No documentation.       Substance Abuse    No documentation.       Patient Forms    No documentation.           INESSA Florentino

## 2018-12-06 NOTE — PROGRESS NOTES
[facility]  Inpatient Progress Note    Reason for Follow Up:  Alcoholic liver disease, edema, LÓPEZ, varices, ascites    Subjective     Interval History:   Feeling better today.  Tested positive for rhinovirus.  Hungry and wants to eat.    Current Facility-Administered Medications:   •  bisacodyl (DULCOLAX) EC tablet 5 mg, 5 mg, Oral, Daily PRN, Valentino Valdez MD  •  calcium carbonate (TUMS) chewable tablet 500 mg (200 mg elemental), 2 tablet, Oral, BID PRN, Valentino Valdez MD  •  guaiFENesin (MUCINEX) 12 hr tablet 600 mg, 600 mg, Oral, Q12H, Valentino Valdez MD, 600 mg at 12/05/18 2058  •  Influenza Vac Subunit Quad (FLUCELVAX) injection 0.5 mL, 0.5 mL, Intramuscular, Once, Valentino Valdez MD  •  ipratropium-albuterol (DUO-NEB) nebulizer solution 3 mL, 3 mL, Nebulization, 4x Daily - RT, Valentino Valdez MD, 3 mL at 12/06/18 0808  •  lactulose (CHRONULAC) 10 GM/15ML solution 20 g, 20 g, Oral, BID, Valentino Valdze MD, 20 g at 12/05/18 2058  •  nadolol (CORGARD) tablet 20 mg, 20 mg, Oral, Q24H, Valentino Valdez MD  •  nitroglycerin (NITROSTAT) SL tablet 0.4 mg, 0.4 mg, Sublingual, Q5 Min PRN, Valentino Valdez MD  •  ondansetron (ZOFRAN) tablet 4 mg, 4 mg, Oral, Q6H PRN **OR** ondansetron ODT (ZOFRAN-ODT) disintegrating tablet 4 mg, 4 mg, Oral, Q6H PRN **OR** ondansetron (ZOFRAN) injection 4 mg, 4 mg, Intravenous, Q6H PRN, Valentino Valdez MD  •  oxyCODONE (ROXICODONE) immediate release tablet 5 mg, 5 mg, Oral, Q4H PRN, Valentino Valdez MD  •  oxymetazoline (AFRIN) nasal spray 2 spray, 2 spray, Each Nare, BID, Valentino Valdez MD  •  [COMPLETED] pantoprazole (PROTONIX) injection 80 mg, 80 mg, Intravenous, Once, 80 mg at 12/05/18 1436 **AND** pantoprazole (PROTONIX) 40 mg/100 mL (0.4 mg/mL) in 0.9% NS IVPB, 8 mg/hr, Intravenous, Continuous, Gurinder Burnett MD, Last Rate: 20 mL/hr at 12/06/18 0320, 8 mg/hr at 12/06/18 0320  •  pantoprazole (PROTONIX) EC tablet 40 mg, 40  mg, Oral, QAM AC, Valentino Valdez MD  •  [COMPLETED] Insert peripheral IV, , , Once **AND** sodium chloride 0.9 % flush 10 mL, 10 mL, Intravenous, PRN, Gurinder Burnett MD  •  sodium chloride 0.9 % flush 3 mL, 3 mL, Intravenous, Q12H, Valentino Valdez MD  •  sodium chloride 0.9 % flush 3-10 mL, 3-10 mL, Intravenous, PRN, Valentino Valdez MD  •  sodium chloride 0.9 % infusion, 100 mL/hr, Intravenous, Continuous, Valentino Valdez MD  •  zolpidem (AMBIEN) tablet 5 mg, 5 mg, Oral, Nightly PRN, Valentino Valdez MD, 5 mg at 12/05/18 2103  Review of Systems:   All systems reviewed and negative except for: [place]: LE edema      Objective     Vital Signs  Temp:  [97.8 °F (36.6 °C)-98.8 °F (37.1 °C)] 98.3 °F (36.8 °C)  Heart Rate:  [63-76] 74  Resp:  [16-20] 18  BP: ()/(44-60) 90/44  Body mass index is 36.28 kg/m².    Intake/Output Summary (Last 24 hours) at 12/6/2018 0925  Last data filed at 12/6/2018 0420  Gross per 24 hour   Intake 1300 ml   Output --   Net 1300 ml     No intake/output data recorded.     Physical Exam:   General: patient awake, alert and cooperative   Eyes: Normal lids and lashes, +scleral icterus   Neck: supple, normal ROM   Skin: warm and dry, not jaundiced   Cardiovascular: regular rhythm and rate, no murmurs auscultated   Pulm: clear to auscultation bilaterally, regular and unlabored   Abdomen: soft and obese, nontender, nondistended; normal bowel sounds   Rectal: deferred   Extremities: no rash, 2+ pitting edema   Psychiatric: Normal mood and behavior; memory intact     Results Review:     I reviewed the patient's new clinical results.    Results from last 7 days   Lab Units  12/06/18   0559  12/05/18   2234  12/05/18   1647  12/05/18   1306   WBC 10*3/mm3  11.84*   --    --   11.34*   HEMOGLOBIN g/dL  7.0*  7.2*  6.7*  6.7*   HEMATOCRIT %  21.4*  21.9*  20.1*  19.8*   PLATELETS 10*3/mm3  109*   --    --   149     Results from last 7 days   Lab Units  12/06/18   0559  12/05/18   1306    SODIUM mmol/L  126*  125*   POTASSIUM mmol/L  5.0  5.4*   CHLORIDE mmol/L  90*  88*   CO2 mmol/L  21.0*  22.8   BUN mg/dL  35*  35*   CREATININE mg/dL  1.80*  1.89*   CALCIUM mg/dL  9.3  9.2   BILIRUBIN mg/dL  12.9*  12.1*   ALK PHOS U/L  84  102   ALT (SGPT) U/L  49*  59*   AST (SGOT) U/L  51*  76*   GLUCOSE mg/dL  90  113*     Results from last 7 days   Lab Units  12/06/18   0559  12/05/18   1306   INR   1.86*  1.83*     Lab Results   Lab Value Date/Time    LIPASE 51 12/05/2018 1306    LIPASE 53 11/28/2018 1101    LIPASE 38 10/24/2018 1439    LIPASE 45 06/21/2018 1906       Radiology:  CT Chest Without Contrast   Preliminary Result   1. Basilar predominant opacities favored to reflect atelectasis although   pneumonia not excluded.   2. Right greater than left pleural effusions, moderate pericardial   effusion.   3. Mild mediastinal adenopathy.   4. Homogeneous splenic enlargement.                           .           XR Chest 1 View   Final Result   Persistent patchy densities in the mid to lower lungs. Increased left   more than right pleural effusions. Cardiomegaly with persistent mild   pulmonary vascular congestion. Continued follow-up/further evaluation   recommended as indicated.                   This report was finalized on 12/5/2018 1:49 PM by Dr. Rojas Dubon M.D.          Liver US  IMPRESSION:  1. Within the gallbladder, there is a rounded area of soft tissue  echogenicity that appears to contain flow and is suspicious for a  gallbladder mass. Tumefactive sludge is also a possibility and there is  sludge within the gallbladder. This finding represents a change compared  to the previous ultrasound of the gallbladder 06/21/2018. Attempt at  further evaluation could be performed with a CT or MRI if  cholecystectomy is not performed. There is no biliary duct dilatation.  2. Mild ascites/perihepatic fluid.  3. Diffuse fat infiltration of the liver.     Discussed with Emerald White MD in the morning  on 12/5/2018.     This report was finalized on 12/5/2018 9:49 AM by Dr. Braydon Simental M.D.    Assessment/Plan     Patient Active Problem List   Diagnosis   • Acute upper GI bleed   • Gout   • Pre-diabetes   • Cholelithiasis   • Elevated liver function tests   • Alcohol abuse   • Alcoholic hepatitis without ascites   • Thrombocytopenia (CMS/HCC)   • Esophagitis   • Folate deficiency   • Acute anemia   • Esophageal varices (CMS/HCC)   • Hepatitis, alcoholic   • Ascites   • Portal hypertension (CMS/HCC)   • Hyponatremia   • Symptomatic anemia   • Alcoholic cirrhosis of liver without ascites (CMS/HCC)   • LÓPEZ (acute kidney injury) (CMS/HCC)   • Anasarca   • Cough   • Epistaxis   • Hemoptysis       Impression  1. Alcoholic liver disease: he completed steroids for alcoholic hepatitis, no ETOH since end of September.  Still appears to be hepatitis/liver disease, probably mix of ALD and NAFLD, does not appear to cirrhotic as yet but ultimately would need biopsy to rule out    2. LÓPEZ: Cr still elevated, 1.8    3. Anasarca: diffuse edema    4. Hyponatremia: persists    5. Anemia: no significant overt bleeding    6. URI: tested positive for rhinovirus    7. Esophageal varices: as per June EGD, small    8. Abnormal liver US: ? Lesion on gallbladder, I had Dr Denise review this and he does not feel it is a mass      Plan  Stop nadolol  Nephrology consult  Low sodium diet with supplements  Fluid restriction  IV albumin  No indication for endoscopy at this time  Will check an MRCP when he has stabilized for further evaluation of the ? Gallbladder mass    I discussed the patients findings and my recommendations with patient and family.    Emerald White MD

## 2018-12-06 NOTE — CONSULTS
Referring Provider: Dr. Jamar Valdez  Reason for Consultation: LÓPEZ    Subjective     Chief complaint   Chief Complaint   Patient presents with   • Coughing Up Blood   • Fatigue   • Edema       History of present illness:  48-year-old white male with alcoholism was admitted to the hospital yesterday for further evaluation of hemoptysis.  We have been consulted because serum creatinine was 1.9 yesterday and is 1.8 today.  Past medical history is outlined below.  There is use of spironolactone and furosemide.  Initial hemoglobin was only 6.7, and he is received 2 units packed red blood cells as far.  He remains hypotensive.  Appetite is fair.  He has no urinary complaints.  Breathing is comfortable at rest but he has dyspnea with exertion.  He believes he has gained easily 10-15 pounds of weight over the last week or 2.  He describes extensive swelling in his legs and abdomen over the last 1 month.    Past Medical History:   Diagnosis Date   • Esophageal varices (CMS/HCC)    • Gallstones    • Gout    • Hepatitis, alcoholic    • History of transfusion    • Pre-diabetes      Past Surgical History:   Procedure Laterality Date   • COLONOSCOPY     • FOOT SURGERY       Family History   Problem Relation Age of Onset   • Lung cancer Mother    • Heart attack Father      Social History     Tobacco Use   • Smoking status: Never Smoker   • Smokeless tobacco: Former User     Types: Chew   Substance Use Topics   • Alcohol use: No     Comment: last drink october 4    • Drug use: No     Medications Prior to Admission   Medication Sig Dispense Refill Last Dose   • furosemide (LASIX) 40 MG tablet Take 1 tablet by mouth Daily for 30 days. 30 tablet 3 12/5/2018 at 1200   • GuaiFENesin ER (MUCINEX MAXIMUM STRENGTH) 1200 MG tablet sustained-release 12 hour Take 1 tablet by mouth Every Night.      • lactulose (CHRONULAC) 10 GM/15ML solution Take 30 mL by mouth 2 (Two) Times a Day for 30 days. 1800 mL 3 12/5/2018 at 1200   • Multiple  "Vitamins-Minerals (MULTIVITAMIN WITH MINERALS) tablet tablet Take 1 tablet by mouth Daily.   12/4/2018 at Unknown time   • nadolol (CORGARD) 20 MG tablet Take 1 tablet by mouth Daily. 30 tablet 3 12/5/2018 at 1200   • pantoprazole (PROTONIX) 40 MG EC tablet Take 1 tablet by mouth Every Morning Before Breakfast for 30 days. 30 tablet 3 12/5/2018 at 1200   • spironolactone (ALDACTONE) 100 MG tablet Take 1 tablet by mouth Daily for 30 days. 30 tablet 3 12/5/2018 at 1200   • Vitamin A 85334 units tablet Take 5,000 Units by mouth Daily.   12/4/2018 at Unknown time   • allopurinol (ZYLOPRIM) 300 MG tablet Take 300 mg by mouth Daily As Needed (FOR GOUT FLARE-UPS).   Taking   • nitrocellulose (NEW SKIN) external liquid Apply  topically to the appropriate area as directed As Needed for Wound Care. 30 mL 2 Taking     Allergies:  Zinc    Review of Systems  14-point ROS performed and all negative except for pertinent +/-'s detailed in HPI.     Objective     Vital Signs  Temp:  [97.8 °F (36.6 °C)-98.8 °F (37.1 °C)] 98.3 °F (36.8 °C)  Heart Rate:  [63-76] 66  Resp:  [16-20] 18  BP: ()/(44-60) 97/51    Flowsheet Rows      First Filed Value   Admission Height  182.9 cm (72\") Documented at 12/05/2018 1236   Admission Weight  119 kg (262 lb 6.4 oz) Documented at 12/05/2018 1238           I/O this shift:  In: 200 [P.O.:200]  Out: -   I/O last 3 completed shifts:  In: 1300 [I.V.:1000; Blood:300]  Out: -     Intake/Output Summary (Last 24 hours) at 12/6/2018 1232  Last data filed at 12/6/2018 0948  Gross per 24 hour   Intake 1500 ml   Output --   Net 1500 ml       Physical Exam:  NAD; pleasant; oriented; looks older than stated age  Overweight; deeply jaundiced  MMM; AT/NC  No eye d/c; no scleral icterus  No JVD; no carotid bruits  Coarse bilat; not labored  RRR, no rub  Soft, NT, +D, BS+  +2 edema  No clubbing  No asterixis  Moves all extremities   Speech fluent  Mood and affect normal    Results Review:  Results from last 7 days "   Lab Units  12/06/18   0559  12/05/18   1306   SODIUM mmol/L  126*  125*   POTASSIUM mmol/L  5.0  5.4*   CHLORIDE mmol/L  90*  88*   CO2 mmol/L  21.0*  22.8   BUN mg/dL  35*  35*   CREATININE mg/dL  1.80*  1.89*   CALCIUM mg/dL  9.3  9.2   BILIRUBIN mg/dL  12.9*  12.1*   ALK PHOS U/L  84  102   ALT (SGPT) U/L  49*  59*   AST (SGOT) U/L  51*  76*   GLUCOSE mg/dL  90  113*       Estimated Creatinine Clearance: 68.9 mL/min (A) (by C-G formula based on SCr of 1.8 mg/dL (H)).          Results from last 7 days   Lab Units  12/06/18   0559  12/05/18   2234  12/05/18   1647  12/05/18   1306   WBC 10*3/mm3  11.84*   --    --   11.34*   HEMOGLOBIN g/dL  7.0*  7.2*  6.7*  6.7*   PLATELETS 10*3/mm3  109*   --    --   149       Results from last 7 days   Lab Units  12/06/18   0559  12/05/18   1306   INR   1.86*  1.83*       Active Medications    guaiFENesin 600 mg Oral Q12H   ipratropium-albuterol 3 mL Nebulization 4x Daily - RT   lactulose 20 g Oral BID   oxymetazoline 2 spray Each Nare BID   pantoprazole 40 mg Oral QAM AC   sodium chloride 3 mL Intravenous Q12H       sodium chloride 100 mL/hr       Assessment/Plan   Assessment  1.  LÓPEZ, with UOP not recorded: prerenal from ABLA, hypotension, and profound liver injury.  HRS a consideration, too, as is poor forward flow in face of pericardial effusion (moderate by CT).  HypoNa with hypovolemia; stable mild hyperK. [place] UA with no blood, few RBC's, and minimal protein; u/s w/o hydro.  Tremendous edema, with surprisingly stable serum alb of 3.6  2.  ABLA  3.  Alcoholic cirrhosis  4.  Hypotension  5.  Hemoptysis  6.  TCP        Symptomatic anemia    Elevated liver function tests    Esophageal varices (CMS/HCC)    Hyponatremia    Alcoholic cirrhosis of liver without ascites (CMS/HCC)    LÓPEZ (acute kidney injury) (CMS/HCC)    Anasarca    Cough    Epistaxis    Hemoptysis    Rhinovirus infection      Plan  1.  Agree with PRBC's and IVF to vol expand further as intravasc vol likely  low  2.  High-risk for evolution to HRS  3.  FENa and serum uric acid  4.  Surveillance labs    I discussed the patient's findings and my recommendations with patient    Rush Alvarez MD  12/06/18  12:32 PM

## 2018-12-06 NOTE — CONSULTS
[facility]  Cardiology Consult Note    Patient Identification:  Name: Enrique Ramirez  Age: 48 y.o.  Sex: male  :  1970  MRN: [id number]             Requesting Physician: Dr. Valdez    Reason for Consultation / Chief Complaint: pericardial effusion    History of Present Illness:     This is a 48-year-old  male patient new to our service who we've been asked to see for a noted moderate pericardial effusion seen on CT of the chest from 2018.  Patient was recently diagnosed with alcoholic induced hepatitis approximately 4-6 months ago he states.  He is been been abstinent from alcohol for 2 months.   He also carries a history of esophageal varices and prediabetes.  He was recently admitted back in 2018 for alcoholic hepatitis, portal hypertension, hyponatremia, and acute anemia.  During that stay it was noted that he did have a murmur on exam and for this reason echo cardiogram was obtained which showed an EF greater than 70%, mild TR with mildly elevated right ventricular systolic pressure, and positive and agitated saline study with noted late bubbles.  This is in the only cardiac workup he has had.    He reports that over the course of the last 3 or 4 days he's had significant worsening in dyspnea on exertion, orthopnea, fatigue, and swelling.  He he has had to sleep sitting up laying across his desk.  He has noticed a 10 pound weight gain overnight just prior to admit.  He has had issues with coughing up blood while he lays down.   Denies any syncope near syncope, palpitations, chest pain or pressure.  Family history his father  at the age of 59 from myocardial infarction.  His mother  in her later years of lung cancer who was a heavy smoker.  He has one sister that's alive and well.      Comorbid cardiac risk factors: Pre-diabetes    Past Medical History:  Past Medical History:   Diagnosis Date   • Esophageal varices (CMS/HCC)    • Gallstones    • Gout    •  Hepatitis, alcoholic    • History of transfusion    • Pre-diabetes      Past Surgical History:  Past Surgical History:   Procedure Laterality Date   • COLONOSCOPY     • FOOT SURGERY        Allergies:  Allergies   Allergen Reactions   • Zinc Nausea And Vomiting     Home Meds:  Medications Prior to Admission   Medication Sig Dispense Refill Last Dose   • furosemide (LASIX) 40 MG tablet Take 1 tablet by mouth Daily for 30 days. 30 tablet 3 12/5/2018 at 1200   • GuaiFENesin ER (MUCINEX MAXIMUM STRENGTH) 1200 MG tablet sustained-release 12 hour Take 1 tablet by mouth Every Night.      • lactulose (CHRONULAC) 10 GM/15ML solution Take 30 mL by mouth 2 (Two) Times a Day for 30 days. 1800 mL 3 12/5/2018 at 1200   • Multiple Vitamins-Minerals (MULTIVITAMIN WITH MINERALS) tablet tablet Take 1 tablet by mouth Daily.   12/4/2018 at Unknown time   • nadolol (CORGARD) 20 MG tablet Take 1 tablet by mouth Daily. 30 tablet 3 12/5/2018 at 1200   • pantoprazole (PROTONIX) 40 MG EC tablet Take 1 tablet by mouth Every Morning Before Breakfast for 30 days. 30 tablet 3 12/5/2018 at 1200   • spironolactone (ALDACTONE) 100 MG tablet Take 1 tablet by mouth Daily for 30 days. 30 tablet 3 12/5/2018 at 1200   • Vitamin A 39375 units tablet Take 5,000 Units by mouth Daily.   12/4/2018 at Unknown time   • allopurinol (ZYLOPRIM) 300 MG tablet Take 300 mg by mouth Daily As Needed (FOR GOUT FLARE-UPS).   Taking   • nitrocellulose (NEW SKIN) external liquid Apply  topically to the appropriate area as directed As Needed for Wound Care. 30 mL 2 Taking     Current Meds:     Current Facility-Administered Medications:   •  bisacodyl (DULCOLAX) EC tablet 5 mg, 5 mg, Oral, Daily PRN, Valentino Valdez MD  •  calcium carbonate (TUMS) chewable tablet 500 mg (200 mg elemental), 2 tablet, Oral, BID PRN, Valentino Valdez MD  •  guaiFENesin (MUCINEX) 12 hr tablet 600 mg, 600 mg, Oral, Q12H, Valentino Valdez MD, 600 mg at 12/06/18 0941  •   ipratropium-albuterol (DUO-NEB) nebulizer solution 3 mL, 3 mL, Nebulization, 4x Daily - RT, Valentino Valdez MD, 3 mL at 12/06/18 1145  •  lactulose (CHRONULAC) 10 GM/15ML solution 20 g, 20 g, Oral, BID, Valentino Valdez MD, 20 g at 12/06/18 0941  •  nitroglycerin (NITROSTAT) SL tablet 0.4 mg, 0.4 mg, Sublingual, Q5 Min PRN, Valentino Valdez MD  •  ondansetron (ZOFRAN) tablet 4 mg, 4 mg, Oral, Q6H PRN **OR** ondansetron ODT (ZOFRAN-ODT) disintegrating tablet 4 mg, 4 mg, Oral, Q6H PRN **OR** ondansetron (ZOFRAN) injection 4 mg, 4 mg, Intravenous, Q6H PRN, Valentino Valdez MD  •  oxyCODONE (ROXICODONE) immediate release tablet 5 mg, 5 mg, Oral, Q4H PRN, Valentino Valdez MD  •  oxymetazoline (AFRIN) nasal spray 2 spray, 2 spray, Each Nare, BID, Valentino Valdez MD, 2 spray at 12/06/18 0942  •  pantoprazole (PROTONIX) EC tablet 40 mg, 40 mg, Oral, QAM AC, Valentino Valdez MD, 40 mg at 12/06/18 0941  •  [COMPLETED] Insert peripheral IV, , , Once **AND** sodium chloride 0.9 % flush 10 mL, 10 mL, Intravenous, PRN, Gurinder Burnett MD  •  sodium chloride 0.9 % flush 3 mL, 3 mL, Intravenous, Q12H, Valentino Valdez MD, 3 mL at 12/06/18 0942  •  sodium chloride 0.9 % flush 3-10 mL, 3-10 mL, Intravenous, PRN, Valentino Valdez MD  •  sodium chloride 0.9 % infusion, 100 mL/hr, Intravenous, Continuous, Valentino Valdez MD, Last Rate: 100 mL/hr at 12/06/18 1319, 100 mL/hr at 12/06/18 1319  •  zolpidem (AMBIEN) tablet 5 mg, 5 mg, Oral, Nightly PRN, Valentino Valdez MD, 5 mg at 12/05/18 2103    Social History:   Social History     Tobacco Use   • Smoking status: Never Smoker   • Smokeless tobacco: Former User     Types: Chew   Substance Use Topics   • Alcohol use: No     Comment: last drink october 4       Family History:  Family History   Problem Relation Age of Onset   • Lung cancer Mother    • Heart attack Father         Review of Systems    Constitutional: + weakness,fatigue; no fever, rigors, chills    Eyes: No vision changes, eye pain   ENT/oropharynx: + epistaxis, non difficulty swallowing, sore throat, changes in hearing   Cardiovascular:  paroxysmal nocturnal dyspnea, orthopnea; No chest pain, chest tightness, palpitations, diaphoresis, dizziness / syncopal episode   Respiratory: + shortness of breath, dyspnea on exertion, cough, hemoptysis; denies wheezing   Gastrointestinal: + abdominal pain, nausea, vomiting, diarrhea, bloody stools   Genitourinary: No hematuria, dysuria   Neurological: No headache, tremors, numbness,  one-sided weakness    Musculoskeletal: No cramps, myalgias,  joint pain, joint swelling   Integument: + edema, jaundiced; No rash           Constitutional:  Temp:  [97.8 °F (36.6 °C)-98.8 °F (37.1 °C)] 98.2 °F (36.8 °C)  Heart Rate:  [63-76] 68  Resp:  [16-20] 18  BP: ()/(41-59) 98/41   SpO2:  [90 %-100 %] 95 %    Physical Exam   General:  Appears in no acute distress, jaundiced, Anasarca  Eyes: AMELIA, icteric  HEENT:  + JVD. Thyroid not visibly enlarged. No mucosal pallor or cyanosis  Respiratory: Respirations regular and labored with long coversation at rest. BBS dim to lower lobes bilat. No crackles, rubs or wheezes auscultated  Cardiovascular: S1S2 Regular rate and rhythm. Distant heart tones. No murmur, rub or gallop auscultated. No carotid bruits. DP/PT pulses 2_   .  3 + pretibial pitting edema to LE, Right > left. anasarca  Gastrointestinal: Abdomen soft, distended,  non tender. Bowel sounds present. Difficult to exam with body habitus and ascites.   Musculoskeletal: HESS x4. No abnormal movements  Extremities: No digital clubbing or cyanosis  Skin: Color pink. Skin warm and dry to touch. No rashes  No xanthoma  Neuro: AAO x3 CN II-XII grossly intact    Cardiographics    Telemetry:           ECG:     10/24/18          Echocardiogram:     Interpretation Summary previous 1/26/18    · Left ventricular systolic function is normal. Estimated EF = 70%.  · Mild tricuspid valve  regurgitation is present.  · Calculated right ventricular systolic pressure from tricuspid regurgitation is 38 mmHg.  · Bubble study was positive but technically difficult. Not clear if this was late bubbles crossing or consistent with a patent foramen ovale.         Imaging  Chest X-ray: 12/5/18    CT of abd pelvis 12/5/18  IMPRESSION:  Persistent patchy densities in the mid to lower lungs. Increased left  more than right pleural effusions. Cardiomegaly with persistent mild  pulmonary vascular congestion. Continued follow-up/further evaluation  recommended as indicated.     IMPRESSION:  1. Basilar predominant opacities favored to reflect atelectasis although  pneumonia not excluded.  2. Right greater than left pleural effusions, moderate pericardial  effusion.  3. Mild mediastinal adenopathy.  4. Homogeneous splenic enlargement.    Lab Review           Results from last 7 days   Lab Units  12/06/18   0559   SODIUM mmol/L  126*   POTASSIUM mmol/L  5.0   BUN mg/dL  35*   CREATININE mg/dL  1.80*   CALCIUM mg/dL  9.3       Results from last 7 days   Lab Units  12/06/18   0559  12/05/18   2234  12/05/18   1647  12/05/18   1306   WBC 10*3/mm3  11.84*   --    --   11.34*   HEMOGLOBIN g/dL  7.0*  7.2*  6.7*  6.7*   HEMATOCRIT %  21.4*  21.9*  20.1*  19.8*   PLATELETS 10*3/mm3  109*   --    --   149     Results from last 7 days   Lab Units  12/06/18   0559  12/05/18   1306   INR   1.86*  1.83*         Assessment:   Pericardial Effusion (moderate) on CT of chest  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 BP's have been 's systolic with diastolic 40-50's.  SR on monitor.   We will order a stat echocardiogram to assess for tamponade. If tamponade present pericardial centesis would be recommended.  However, he is at high risk for complications due to his low platelet count and elevated INR (not on AC)  in the setting of liver disease. I discussed this being a high risk procedure should echo show evidence of tamponade. Patient verbalized understanding.     Because of coagulapathy, we will administer 1 unit FFP as well as 10 mg by mouth vitamin K.  RN has spoken with  and MAREK and they are ok with FFB and Vit K.   He has had blood transfusion this morning and we are pending repeat hgb and hct. He has IV fluids infusing at 100 cc/hr. EKG has not been obtained on this admit we will order to be done while in echo lab.  Twelve-lead EKG on previous admission this past October showed Sinus rhythm, Left atrial enlargement, RBBB and LAFB with non specific inferior lateral T wave changes.    Labs/tests ordered for am: INR, CBC, BMP      Loreta Jasso MD  12/6/2018, 2:02 PM    Pericardial effusion not large enough to drain, patient has no tamponade    Loreta Jasso MD

## 2018-12-06 NOTE — PROGRESS NOTES
Name: Enrique Ramirez ADMIT: 2018   : 1970  PCP: Cydney Chase MD    MRN: [id number] LOS: 1 days   AGE/SEX: 48 y.o. male  ROOM: [facility]   Subjective   CC: peripheral edema  No acute events.  Overall patient feels better. Cough and breathing have improved.  No more bleeding.  No CP/palpitations/f/c/n/v/d.    Objective   Vital Signs  Temp:  [97.8 °F (36.6 °C)-98.8 °F (37.1 °C)] 98.2 °F (36.8 °C)  Heart Rate:  [63-76] 68  Resp:  [16-20] 18  BP: ()/(41-59) 98/41  SpO2:  [90 %-100 %] 95 %  on  Flow (L/min):  [2] 2;   Device (Oxygen Therapy): room air  Body mass index is 36.28 kg/m².    Physical Exam   Constitutional: He is oriented to person, place, and time. No distress.   HENT:   Head: Normocephalic and atraumatic.   Nose: Nose normal.   Mouth/Throat: Oropharynx is clear and moist.   Eyes: Conjunctivae and EOM are normal. Pupils are equal, round, and reactive to light. Scleral icterus is present.   Neck: Normal range of motion. Neck supple. No JVD present.   Cardiovascular: Normal rate, regular rhythm and intact distal pulses.   Pulmonary/Chest: Effort normal. He has decreased breath sounds in the right lower field and the left lower field. He has no wheezes.   Abdominal: Soft. Bowel sounds are normal.   Musculoskeletal: He exhibits edema (3+ BLE edema with body wall edema). He exhibits no tenderness.   Neurological: He is alert and oriented to person, place, and time.   Skin: Skin is warm and dry. He is not diaphoretic.   Psychiatric: He has a normal mood and affect. His behavior is normal.   Nursing note and vitals reviewed.      Results Review:       I reviewed the patient's new clinical results.  Results from last 7 days   Lab Units  18   0559  18   2234  18   1647  18   1306   WBC 10*3/mm3  11.84*   --    --   11.34*   HEMOGLOBIN g/dL  7.0*  7.2*  6.7*  6.7*   PLATELETS 10*3/mm3  109*   --    --   149     Results from last 7 days   Lab Units  18   0559  18    1306   SODIUM mmol/L  126*  125*   POTASSIUM mmol/L  5.0  5.4*   CHLORIDE mmol/L  90*  88*   CO2 mmol/L  21.0*  22.8   BUN mg/dL  35*  35*   CREATININE mg/dL  1.80*  1.89*   GLUCOSE mg/dL  90  113*   Estimated Creatinine Clearance: 68.9 mL/min (A) (by C-G formula based on SCr of 1.8 mg/dL (H)).  Results from last 7 days   Lab Units  12/06/18   0559  12/05/18   1306   ALBUMIN g/dL  3.60  3.60   BILIRUBIN mg/dL  12.9*  12.1*   ALK PHOS U/L  84  102   AST (SGOT) U/L  51*  76*   ALT (SGPT) U/L  49*  59*     Results from last 7 days   Lab Units  12/06/18   0559  12/05/18   1306   CALCIUM mg/dL  9.3  9.2   ALBUMIN g/dL  3.60  3.60     Results from last 7 days   Lab Units  12/06/18   0559   PROCALCITONIN ng/mL  0.27*         guaiFENesin 600 mg Oral Q12H   ipratropium-albuterol 3 mL Nebulization 4x Daily - RT   lactulose 20 g Oral BID   oxymetazoline 2 spray Each Nare BID   pantoprazole 40 mg Oral QAM AC   sodium chloride 3 mL Intravenous Q12H       sodium chloride 100 mL/hr Last Rate: 100 mL/hr (12/06/18 1319)   Diet Regular; Low Sodium, Daily Fluid Restriction; Other; 2,000; 2,000 mg Na; Adaptive Equipment      Assessment/Plan      Active Hospital Problems    Diagnosis Date Noted   • **Symptomatic anemia [D64.9] 12/05/2018   • Rhinovirus infection [B34.8] 12/06/2018   • Pericardial effusion [I31.3] 12/06/2018   • LÓPEZ (acute kidney injury) (CMS/HCC) [N17.9] 12/05/2018   • Anasarca [R60.1] 12/05/2018   • Cough [R05] 12/05/2018   • Epistaxis [R04.0] 12/05/2018   • Hemoptysis [R04.2] 12/05/2018   • Hyponatremia [E87.1] 10/25/2018   • Esophageal varices (CMS/HCC) [I85.00] 10/24/2018   • Elevated liver function tests [R94.5] 06/22/2018   • Alcoholic hepatitis without ascites [K70.10] 06/22/2018      Resolved Hospital Problems   No resolved problems to display.   Symptomatic Anemia  - hgb 6.7 in ER, will repeat; was 7.3 11/28/18 and previous iron studies suggested inflammatory anemia  - s/p 1 unit of PRBCs 12/5/18, will give  another unit  - does not appear to have significant acute blood loss-had epistaxis yesterday which was probably the source of hemoptysis  - monitor Hgb and transfuse as needed     Epistaxis/hemoptysis  - possibly from irritation from URI, could have a component of bronchitis  - continue oxymetazoline to control epistaxis  - RVP + for rhinovirus; procalcitonin is marginally elevated which is likely influenced by renal insufficiency, will follow up in AM; no abx at this time     LÓPEZ  - looks pre-renal due to intravascular volume depletion, which is complicated by severe extravascular fluid overload which we will need to deal with when renal function stabilizes  - no e/o obstruction but will check renal u/s given lack of improvement  - getting albumin per GI  - consult nephrology    Pericardial Effusion  - apparent on CT chest, not present on echocardiogram in October  - will repeat echocardiogram  - consult cardiology     EtOH Hepatitis  - possible secondary cirrhosis  -nadolol, lasix, and spironolactone held  - RUQ u/s revealing GB mass-GI following and considering MRCP when renal function stabilizes  - GI following, appreciate recs     DVT Prophylaxis  - SCDs      Valentino Valdez MD  [facility]  12/06/18  1:55 PM

## 2018-12-06 NOTE — PLAN OF CARE
Problem: Patient Care Overview  Goal: Plan of Care Review  Outcome: Ongoing (interventions implemented as appropriate)   12/06/18 0012   Coping/Psychosocial   Plan of Care Reviewed With patient   Plan of Care Review   Progress improving   OTHER   Outcome Summary patient recieved another unit of RBCs today, echo ordered for pericardial effusion, vitamin k/ ffp ordered to help reverse INR in case intervention is needed, nephrology consulted, albumin given, holding on egd for now, VSS, will continue to monitor closely      Goal: Individualization and Mutuality  Outcome: Ongoing (interventions implemented as appropriate)    Goal: Discharge Needs Assessment  Outcome: Ongoing (interventions implemented as appropriate)    Goal: Interprofessional Rounds/Family Conf  Outcome: Ongoing (interventions implemented as appropriate)      Problem: Anemia (Adult)  Goal: Symptom Improvement  Outcome: Ongoing (interventions implemented as appropriate)      Problem: Fall Risk (Adult)  Goal: Absence of Fall  Outcome: Ongoing (interventions implemented as appropriate)

## 2018-12-07 ENCOUNTER — APPOINTMENT (OUTPATIENT)
Dept: GENERAL RADIOLOGY | Facility: HOSPITAL | Age: 48
End: 2018-12-07
Attending: INTERNAL MEDICINE

## 2018-12-07 LAB
ABO + RH BLD: NORMAL
ABO + RH BLD: NORMAL
ACANTHOCYTES BLD QL SMEAR: ABNORMAL
ALBUMIN SERPL-MCNC: 4 G/DL (ref 3.5–5.2)
ALBUMIN/GLOB SERPL: 1.2 G/DL
ALP SERPL-CCNC: 83 U/L (ref 39–117)
ALT SERPL W P-5'-P-CCNC: 44 U/L (ref 1–41)
ANION GAP SERPL CALCULATED.3IONS-SCNC: 15.7 MMOL/L
ANISOCYTOSIS BLD QL: ABNORMAL
AST SERPL-CCNC: 46 U/L (ref 1–40)
BASOPHILS # BLD MANUAL: 0.12 10*3/MM3 (ref 0–0.2)
BASOPHILS NFR BLD AUTO: 1 % (ref 0–1.5)
BH BB BLOOD EXPIRATION DATE: NORMAL
BH BB BLOOD EXPIRATION DATE: NORMAL
BH BB BLOOD TYPE BARCODE: 600
BH BB BLOOD TYPE BARCODE: 6200
BH BB DISPENSE STATUS: NORMAL
BH BB DISPENSE STATUS: NORMAL
BH BB PRODUCT CODE: NORMAL
BH BB PRODUCT CODE: NORMAL
BH BB UNIT NUMBER: NORMAL
BH BB UNIT NUMBER: NORMAL
BILIRUB SERPL-MCNC: 12.5 MG/DL (ref 0.1–1.2)
BUN BLD-MCNC: 39 MG/DL (ref 6–20)
BUN/CREAT SERPL: 15.7 (ref 7–25)
CALCIUM SPEC-SCNC: 9.3 MG/DL (ref 8.6–10.5)
CHLORIDE SERPL-SCNC: 91 MMOL/L (ref 98–107)
CO2 SERPL-SCNC: 20.3 MMOL/L (ref 22–29)
CREAT BLD-MCNC: 2.48 MG/DL (ref 0.76–1.27)
DAT POLY-SP REAG RBC QL: NEGATIVE
DEPRECATED RDW RBC AUTO: 79.3 FL (ref 37–54)
EOSINOPHIL # BLD MANUAL: 0.25 10*3/MM3 (ref 0–0.7)
EOSINOPHIL NFR BLD MANUAL: 2 % (ref 0.3–6.2)
ERYTHROCYTE [DISTWIDTH] IN BLOOD BY AUTOMATED COUNT: 21.1 % (ref 11.5–14.5)
GFR SERPL CREATININE-BSD FRML MDRD: 28 ML/MIN/1.73
GLOBULIN UR ELPH-MCNC: 3.4 GM/DL
GLUCOSE BLD-MCNC: 128 MG/DL (ref 65–99)
HAPTOGLOB SERPL-MCNC: 15 MG/DL (ref 30–200)
HCT VFR BLD AUTO: 21.3 % (ref 40.4–52.2)
HCT VFR BLD AUTO: 21.4 % (ref 40.4–52.2)
HCT VFR BLD AUTO: 21.7 % (ref 40.4–52.2)
HCT VFR BLD AUTO: 22.6 % (ref 40.4–52.2)
HGB BLD-MCNC: 6.8 G/DL (ref 13.7–17.6)
HGB BLD-MCNC: 7.2 G/DL (ref 13.7–17.6)
HGB BLD-MCNC: 7.3 G/DL (ref 13.7–17.6)
HGB BLD-MCNC: 7.3 G/DL (ref 13.7–17.6)
HYPOCHROMIA BLD QL: ABNORMAL
INR PPP: 1.91 (ref 0.9–1.1)
LDH SERPL-CCNC: 236 U/L (ref 135–225)
LYMPHOCYTES # BLD MANUAL: 1.11 10*3/MM3 (ref 0.9–4.8)
LYMPHOCYTES NFR BLD MANUAL: 18 % (ref 5–12)
LYMPHOCYTES NFR BLD MANUAL: 9 % (ref 19.6–45.3)
MACROCYTES BLD QL SMEAR: ABNORMAL
MCH RBC QN AUTO: 36 PG (ref 27–32.7)
MCHC RBC AUTO-ENTMCNC: 34.3 G/DL (ref 32.6–36.4)
MCV RBC AUTO: 104.9 FL (ref 79.8–96.2)
MONOCYTES # BLD AUTO: 2.22 10*3/MM3 (ref 0.2–1.2)
NEUTROPHILS # BLD AUTO: 8.62 10*3/MM3 (ref 1.9–8.1)
NEUTROPHILS NFR BLD MANUAL: 70 % (ref 42.7–76)
PLAT MORPH BLD: NORMAL
PLATELET # BLD AUTO: 131 10*3/MM3 (ref 140–500)
PMV BLD AUTO: 9.2 FL (ref 6–12)
POTASSIUM BLD-SCNC: 5 MMOL/L (ref 3.5–5.2)
PROCALCITONIN SERPL-MCNC: 0.43 NG/ML (ref 0.1–0.25)
PROT SERPL-MCNC: 7.4 G/DL (ref 6–8.5)
PROTHROMBIN TIME: 21.5 SECONDS (ref 11.7–14.2)
RBC # BLD AUTO: 2.03 10*6/MM3 (ref 4.6–6)
SCAN SLIDE: NORMAL
SODIUM BLD-SCNC: 127 MMOL/L (ref 136–145)
UNIT  ABO: NORMAL
UNIT  ABO: NORMAL
UNIT  RH: NORMAL
UNIT  RH: NORMAL
URATE SERPL-MCNC: 11.3 MG/DL (ref 3.4–7)
WBC MORPH BLD: NORMAL
WBC NRBC COR # BLD: 12.31 10*3/MM3 (ref 4.5–10.7)

## 2018-12-07 PROCEDURE — 99233 SBSQ HOSP IP/OBS HIGH 50: CPT | Performed by: INTERNAL MEDICINE

## 2018-12-07 PROCEDURE — 87205 SMEAR GRAM STAIN: CPT | Performed by: INTERNAL MEDICINE

## 2018-12-07 PROCEDURE — 85014 HEMATOCRIT: CPT | Performed by: INTERNAL MEDICINE

## 2018-12-07 PROCEDURE — 85018 HEMOGLOBIN: CPT | Performed by: INTERNAL MEDICINE

## 2018-12-07 PROCEDURE — 99232 SBSQ HOSP IP/OBS MODERATE 35: CPT | Performed by: NURSE PRACTITIONER

## 2018-12-07 PROCEDURE — 71045 X-RAY EXAM CHEST 1 VIEW: CPT

## 2018-12-07 PROCEDURE — 25010000002 CEFEPIME PER 500 MG: Performed by: INTERNAL MEDICINE

## 2018-12-07 PROCEDURE — 87070 CULTURE OTHR SPECIMN AEROBIC: CPT | Performed by: INTERNAL MEDICINE

## 2018-12-07 PROCEDURE — 36415 COLL VENOUS BLD VENIPUNCTURE: CPT | Performed by: INTERNAL MEDICINE

## 2018-12-07 PROCEDURE — 86880 COOMBS TEST DIRECT: CPT | Performed by: INTERNAL MEDICINE

## 2018-12-07 PROCEDURE — 25010000002 ALBUMIN HUMAN 25% PER 50 ML: Performed by: INTERNAL MEDICINE

## 2018-12-07 PROCEDURE — 94799 UNLISTED PULMONARY SVC/PX: CPT

## 2018-12-07 PROCEDURE — P9047 ALBUMIN (HUMAN), 25%, 50ML: HCPCS | Performed by: INTERNAL MEDICINE

## 2018-12-07 PROCEDURE — 85007 BL SMEAR W/DIFF WBC COUNT: CPT | Performed by: INTERNAL MEDICINE

## 2018-12-07 PROCEDURE — 87081 CULTURE SCREEN ONLY: CPT | Performed by: INTERNAL MEDICINE

## 2018-12-07 PROCEDURE — 80053 COMPREHEN METABOLIC PANEL: CPT | Performed by: INTERNAL MEDICINE

## 2018-12-07 PROCEDURE — 83615 LACTATE (LD) (LDH) ENZYME: CPT | Performed by: INTERNAL MEDICINE

## 2018-12-07 PROCEDURE — 85610 PROTHROMBIN TIME: CPT | Performed by: INTERNAL MEDICINE

## 2018-12-07 PROCEDURE — 25010000002 VANCOMYCIN 10 G RECONSTITUTED SOLUTION: Performed by: INTERNAL MEDICINE

## 2018-12-07 PROCEDURE — 83010 ASSAY OF HAPTOGLOBIN QUANT: CPT | Performed by: INTERNAL MEDICINE

## 2018-12-07 PROCEDURE — 84550 ASSAY OF BLOOD/URIC ACID: CPT | Performed by: INTERNAL MEDICINE

## 2018-12-07 PROCEDURE — 85025 COMPLETE CBC W/AUTO DIFF WBC: CPT | Performed by: INTERNAL MEDICINE

## 2018-12-07 PROCEDURE — 84145 PROCALCITONIN (PCT): CPT | Performed by: INTERNAL MEDICINE

## 2018-12-07 RX ORDER — BUMETANIDE 0.25 MG/ML
3 INJECTION INTRAMUSCULAR; INTRAVENOUS ONCE
Status: COMPLETED | OUTPATIENT
Start: 2018-12-07 | End: 2018-12-07

## 2018-12-07 RX ORDER — MIDODRINE HYDROCHLORIDE 5 MG/1
5 TABLET ORAL
Status: DISCONTINUED | OUTPATIENT
Start: 2018-12-07 | End: 2018-12-07

## 2018-12-07 RX ORDER — ECHINACEA PURPUREA EXTRACT 125 MG
2 TABLET ORAL AS NEEDED
Status: DISCONTINUED | OUTPATIENT
Start: 2018-12-07 | End: 2018-12-18 | Stop reason: HOSPADM

## 2018-12-07 RX ORDER — MIDODRINE HYDROCHLORIDE 5 MG/1
10 TABLET ORAL
Status: DISCONTINUED | OUTPATIENT
Start: 2018-12-07 | End: 2018-12-18 | Stop reason: HOSPADM

## 2018-12-07 RX ORDER — HYDROXYZINE 50 MG/1
50 TABLET, FILM COATED ORAL NIGHTLY
Status: DISCONTINUED | OUTPATIENT
Start: 2018-12-07 | End: 2018-12-18 | Stop reason: HOSPADM

## 2018-12-07 RX ORDER — ALBUMIN (HUMAN) 12.5 G/50ML
50 SOLUTION INTRAVENOUS ONCE
Status: COMPLETED | OUTPATIENT
Start: 2018-12-07 | End: 2018-12-07

## 2018-12-07 RX ADMIN — MIDODRINE HYDROCHLORIDE 10 MG: 5 TABLET ORAL at 17:43

## 2018-12-07 RX ADMIN — Medication 2 SPRAY: at 10:48

## 2018-12-07 RX ADMIN — SODIUM CHLORIDE, PRESERVATIVE FREE 3 ML: 5 INJECTION INTRAVENOUS at 21:12

## 2018-12-07 RX ADMIN — CEFEPIME 2 G: 2 INJECTION, POWDER, FOR SOLUTION INTRAVENOUS at 22:13

## 2018-12-07 RX ADMIN — GUAIFENESIN 600 MG: 600 TABLET, EXTENDED RELEASE ORAL at 10:49

## 2018-12-07 RX ADMIN — CEFEPIME HYDROCHLORIDE 2 G: 2 INJECTION, POWDER, FOR SOLUTION INTRAVENOUS at 14:59

## 2018-12-07 RX ADMIN — LACTULOSE 20 G: 10 SOLUTION ORAL at 10:49

## 2018-12-07 RX ADMIN — PANTOPRAZOLE SODIUM 40 MG: 40 TABLET, DELAYED RELEASE ORAL at 06:30

## 2018-12-07 RX ADMIN — VANCOMYCIN HYDROCHLORIDE 2000 MG: 10 INJECTION, POWDER, LYOPHILIZED, FOR SOLUTION INTRAVENOUS at 18:49

## 2018-12-07 RX ADMIN — HYDROXYZINE HYDROCHLORIDE 50 MG: 50 TABLET, FILM COATED ORAL at 22:13

## 2018-12-07 RX ADMIN — IPRATROPIUM BROMIDE AND ALBUTEROL SULFATE 3 ML: 2.5; .5 SOLUTION RESPIRATORY (INHALATION) at 16:34

## 2018-12-07 RX ADMIN — MIDODRINE HYDROCHLORIDE 10 MG: 5 TABLET ORAL at 12:00

## 2018-12-07 RX ADMIN — IPRATROPIUM BROMIDE AND ALBUTEROL SULFATE 3 ML: 2.5; .5 SOLUTION RESPIRATORY (INHALATION) at 09:10

## 2018-12-07 RX ADMIN — IPRATROPIUM BROMIDE AND ALBUTEROL SULFATE 3 ML: 2.5; .5 SOLUTION RESPIRATORY (INHALATION) at 19:48

## 2018-12-07 RX ADMIN — LACTULOSE 20 G: 10 SOLUTION ORAL at 21:12

## 2018-12-07 RX ADMIN — SODIUM CHLORIDE, PRESERVATIVE FREE 3 ML: 5 INJECTION INTRAVENOUS at 10:50

## 2018-12-07 RX ADMIN — Medication 2 SPRAY: at 21:12

## 2018-12-07 RX ADMIN — ALBUMIN HUMAN 50 G: 0.25 SOLUTION INTRAVENOUS at 14:58

## 2018-12-07 RX ADMIN — BUMETANIDE 3 MG: 0.25 INJECTION INTRAMUSCULAR; INTRAVENOUS at 18:49

## 2018-12-07 RX ADMIN — GUAIFENESIN 600 MG: 600 TABLET, EXTENDED RELEASE ORAL at 21:12

## 2018-12-07 NOTE — CONSULTS
[facility]    Reason for Consult: hemoptysis    HPI: Mr. Ramirez is a 47yo WM with alcholic hepatitis and cirrhosis.  He was admitted here on the 5th with increasing le edema and 10lbs weight gain.  He has had increased cough for 4 days of gradual onset, was worsening, no specific alleviating factors, worse when supine.  He was having intermittent epistaxis prior to admission.  His wife had a recent URI.  He has had some continued hemoptysis while here.mild.     He had a CT chest which I reviewed myself, it shows a right middle lobe infiltrate and some bilateral lower lobe infitlrates and bilateral pleural effusions. And some adenopathy    Past Medical History:   Diagnosis Date   • Esophageal varices (CMS/HCC)    • Gallstones    • Gout    • Hepatitis, alcoholic    • History of transfusion    • Pre-diabetes      Social History     Socioeconomic History   • Marital status:      Spouse name: Not on file   • Number of children: Not on file   • Years of education: Not on file   • Highest education level: Not on file   Tobacco Use   • Smoking status: Never Smoker   • Smokeless tobacco: Former User     Types: Chew   Substance and Sexual Activity   • Alcohol use: No     Comment: last drink october 4    • Drug use: No   • Sexual activity: Defer     Family History   Problem Relation Age of Onset   • Lung cancer Mother    • Heart attack Father      MEDS: reviewed  ALL: zinc (sensitivity)  ROS:  Constitutional: Negative for chills, fatigue and fever.   HENT: Negative for congestion, nosebleeds, sore throat and trouble swallowing.    Eyes: Negative for redness and visual disturbance.   Respiratory: Positive for cough and shortness of breath. Negative for choking and wheezing.    Cardiovascular: Positive for leg swelling. Negative for chest pain and palpitations.   Gastrointestinal: Negative for abdominal pain, blood in stool, constipation, diarrhea, nausea and vomiting.   Endocrine: Negative for cold  "intolerance and heat intolerance.   Genitourinary: Negative for decreased urine volume, difficulty urinating, dysuria and hematuria.   Musculoskeletal: Negative for arthralgias and myalgias.   Skin: Negative for pallor and rash.   Neurological: Positive for weakness (generalized) and light-headedness. Negative for dizziness, syncope and headaches.   Hematological: Negative for adenopathy. Bruises/bleeds easily.   Psychiatric/Behavioral: Negative for confusion and decreased concentration.     Vital Sign Min/Max for last 24 hours  Temp  Min: 98 °F (36.7 °C)  Max: 99 °F (37.2 °C)   BP  Min: 81/58  Max: 109/44   Pulse  Min: 65  Max: 75   Resp  Min: 16  Max: 20   SpO2  Min: 88 %  Max: 98 %   No Data Recorded   Weight  Min: 125 kg (276 lb)  Max: 125 kg (276 lb)     GEN:   appears ill, obese, AxOx3  HEENT: PERRL, EOMI, no icterus, mmm, no jvd, trachea midline, neck supple  CHEST: decreased bilat, no wheezes, + crackles, no use of accessory muscles  CV: RRR, no m/g/r  ABD: soft, nt, distended +bs, can't asses hepatosplenomegaly 2/2 obesity  EXT: no c/c/ 1+edema  SKIN: no rashes, no xanthomas, increased turgor  LYMPH: no palpable cervical or supraclavicular lymphadenopathy  NEURO: CN 2-12 intact and symmetric bilaterally  PSYCH: nl affect, nl orientation, nl judgement, nl mood  MSK: no kyphoscoliosis, 5/5 strength ue and le bilaterally    Labs:  hgb 7.3  Cr 2.48  Na 127  inr 1.9  procal 0.43  Wbc 12  plts 131    CT chest; reviewed myself, see HPI  Echo: no pericardial effusion    A/P:  1. Hemoptysis - 2/2 pneumonia/viral infection with underlying coagulatopathy due to his liver disease.  Supportive care only at this point, consider repeat ct scan in 3 months to ensure resolution.  2. Mediastinal adenopathy -- \"   \"  3. Bilateral pleural effusions -- likely 2/2 liver disease, no specific therapy, no need to drain  4. Pneumonia -- agree with coverage for HCAP -- check mrsa nares  5. Cirrhosis/hepatitis  6. Obesity -- iliana?  7. " Rhinovirus uri -- supportive care  8. Anemia  9. LÓPEZ    He has a concerning set of problems for a 49yo    Will follow

## 2018-12-07 NOTE — PROGRESS NOTES
"Pharmacokinetic Evaluation: Vancomycin IV  Patient: Enrique Ramirez  Admission Date: 1205  MRN: [id number]  : 1970    Day of vancomycin therapy:   Consult for Dr. Valdez  Treating: PNA  Goal trough: 15-20 mcg/mL    Other antimicrobials: Cefepime 2 gm IV q12hrs    Relevant clinical data and objective history reviewed:  48 y.o. male 182.9 cm (72\") 125 kg (276 lb)  Body mass index is 37.43 kg/m².  Results from last 7 days   Lab Units  18   0556  18   0559  18   1306   CREATININE mg/dL  2.48*  1.80*  1.89*     Estimated Creatinine Clearance: 50.9 mL/min (A) (by C-G formula based on SCr of 2.48 mg/dL (H)).    Lab Results   Component Value Date    WBC 12.31 (H) 2018    WBC 11.84 (H) 2018    WBC 11.34 (H) 2018     Temp:  [98 °F (36.7 °C)-99 °F (37.2 °C)] 98.8 °F (37.1 °C)  Heart Rate:  [65-75] 75  Resp:  [16-20] 16  BP: ()/(40-58) 109/44    Intake/Output Summary (Last 24 hours) at 2018 1449  Last data filed at 2018 0600  Gross per 24 hour   Intake 2397 ml   Output --   Net 2397 ml     Baseline cultures/labs/radiology:     Procalcitonin: 0.27   Procalcitonin: 0.43    Radiology:    CXR: Persistent patchy densities in the mid to lower lungs. Increased left more than right pleural effusions. Cardiomegaly with persistent mild pulmonary vascular congestion   CT Chest: Basilar predominant opacities favored to reflect atelectasis although  pneumonia not excluded    Cultures:    RVP: Human Rhinovirus/Enterovirus  MRSA Screen & Sputum cx= ordered      Assessment/Plan:   PMH: Esophageal varices, Gallstones, Gout, Hepatitis, alcoholic, History of transfusion, and Pre-diabetes.    1. Start Vancomycin 2000 mg IV x 1 loading dose  2. Continue Vancomycin 1250 mg IV q12 hrs  3. Vancomycin trough due on  at 10:30 am    Vancomycin IV Dosing & Levels History:   2000 mg q8hrs   1250 mg q8hrs    Thank you for your consult,    Jade Maharaj, " RPH

## 2018-12-07 NOTE — PROGRESS NOTES
[facility]  Cardiology Progress Note    Patient Identification:  Name: Enrique Ramirez  Age: 48 y.o.  Sex: male  :  1970  MRN: [id number]                 Follow Up / Chief Complaint: pericardial effusion        Interval History:      He reports that over the course of the last 3 or 4 days he's had significant worsening in dyspnea on exertion, orthopnea, fatigue, and swelling.  He has noticed a 10 pound weight gain overnight just prior to admit.  He has had issues with coughing up blood while he lays down.   Denies any syncope near syncope, palpitations, chest pain or pressure.          Subjective: Shortness of breat and cough, but denies chest pain and pressure      Objective:  Temp:  [98 °F (36.7 °C)-99 °F (37.2 °C)] 98.8 °F (37.1 °C)  Heart Rate:  [65-75] 75  Resp:  [16-20] 16  BP: ()/(40-58) 109/44  SpO2:  [88 %-98 %] 94 %    Past Medical History:  Past Medical History:   Diagnosis Date   • Esophageal varices (CMS/HCC)    • Gallstones    • Gout    • Hepatitis, alcoholic    • History of transfusion    • Pre-diabetes      Past Surgical History:  Past Surgical History:   Procedure Laterality Date   • COLONOSCOPY     • FOOT SURGERY          Social History:   Social History     Tobacco Use   • Smoking status: Never Smoker   • Smokeless tobacco: Former User     Types: Chew   Substance Use Topics   • Alcohol use: No     Comment: last drink        Family History:  Family History   Problem Relation Age of Onset   • Lung cancer Mother    • Heart attack Father           Allergies:  Allergies   Allergen Reactions   • Zinc Nausea And Vomiting     Scheduled Meds:    cefepime 2 g Q12H   guaiFENesin 600 mg Q12H   hydrOXYzine 50 mg Nightly   ipratropium-albuterol 3 mL 4x Daily - RT   lactulose 20 g BID   midodrine 10 mg TID AC   oxymetazoline 2 spray BID   pantoprazole 40 mg QAM AC   sodium chloride 3 mL Q12H   vancomycin 1,250 mg Q12H   vancomycin 15 mg/kg Once           INTAKE AND  OUTPUT:    Intake/Output Summary (Last 24 hours) at 2018 1549  Last data filed at 2018 0600  Gross per 24 hour   Intake 2397 ml   Output --   Net 2397 ml       Review of Systems:   GI: Denies n/v  Cardiac: Denies chest pain/pressure  Pulmonary:  Shortness of breath and cough    Constitutional:  Temp:  [98 °F (36.7 °C)-99 °F (37.2 °C)] 98.8 °F (37.1 °C)  Heart Rate:  [65-75] 75  Resp:  [16-20] 16  BP: ()/(40-58) 109/44   SpO2:  [88 %-98 %] 91 %    Physical Exam:  General:  Appears in no acute distress  Eyes: no conjunctival drainage, or EOM normal  HEENT:  No JVD. Thyroid not visibly enlarged. No mucosal pallor or cyanosis  Respiratory: Respirations regular and unlabored at rest. BBS with good air entry in all fields. No crackles, rubs or wheezes auscultated  Cardiovascular: S1S2 Regular rate and rhythm. No murmur, rub or gallop. No carotid bruits. DP/PT pulses +2     . 2-3+pretibial pitting edema  Gastrointestinal: Abdomen soft, flat, non tender. Bowel sounds present. No hepatosplenomegaly. No ascites  Musculoskeletal: HESS x4. No abnormal movements  Extremities: No digital clubbing or cyanosis  Skin:   Skin warm and dry to touch. No rashes    Neuro: AAO x3 CN II-XII grossly intact  Psych: Mood and affect normal, pleasant and cooperative          Cardiographics  Telemetry:                  EC/26/18      10/24/18             Echocardiogram:      Interpretation Summary previous 18     · Left ventricular systolic function is normal. Estimated EF = 70%.  · Mild tricuspid valve regurgitation is present.  · Calculated right ventricular systolic pressure from tricuspid regurgitation is 38 mmHg.  · Bubble study was positive but technically difficult. Not clear if this was late bubbles crossing or consistent with a patent foramen ovale.            Imaging  Chest X-ray: 18     CT of abd pelvis 18  IMPRESSION:  Persistent patchy densities in the mid to lower lungs. Increased left  more than  right pleural effusions. Cardiomegaly with persistent mild  pulmonary vascular congestion. Continued follow-up/further evaluation  recommended as indicated.     IMPRESSION:  1. Basilar predominant opacities favored to reflect atelectasis although  pneumonia not excluded.  2. Right greater than left pleural effusions, moderate pericardial  effusion.  3. Mild mediastinal adenopathy.  4. Homogeneous splenic enlargement.                Lab Review           Results from last 7 days   Lab Units  12/07/18   0556   SODIUM mmol/L  127*   POTASSIUM mmol/L  5.0   BUN mg/dL  39*   CREATININE mg/dL  2.48*   CALCIUM mg/dL  9.3        Results from last 7 days   Lab Units  12/07/18   1150  12/07/18   0556  12/07/18   0012   12/06/18   0559   12/05/18   1306   WBC 10*3/mm3   --   12.31*   --    --   11.84*   --   11.34*   HEMOGLOBIN g/dL  7.3*  7.3*  7.2*   < >  7.0*   < >  6.7*   HEMATOCRIT %  22.6*  21.3*  21.7*   < >  21.4*   < >  19.8*   PLATELETS 10*3/mm3   --   131*   --    --   109*   --   149    < > = values in this interval not displayed.     Results from last 7 days   Lab Units  12/07/18   0557  12/06/18   0559  12/05/18   1306   INR   1.91*  1.86*  1.83*         Assessment:  1. moderate pericardial effusion  2.  shortness of breath  3. alcoholic hepatitis  4. Anasarca  5. Anemia  6. 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 Rhinovirus infection  8. Thrombocytopenia - 12/6 Plt Cout 109,000 (149,000 on 12/5/18)  9. Auto anticoagulated from liver dz - INR on admit 1.83 is 1.86 on 12/6/1810. LÓPEZ  10. ETOH hepatitis  11. Hemoptysis  12. 6. Cardiomegaly:    Plan  1. moderate pericardial effusion:   ECHO on 12/05/18 showed tricuspid valve regurgitation is present, left atrial cavity size is mildly dilated, right ventricular cavity is borderline dilated, mild mitral valve regurgitation is present, with EF 66%, and no pericardial effusion present. Will redo echo on monday    2.  shortness of breath:  CXR showed presistent patchy densities in  "the mid to lower lungs. Increased left more than right pleural effusions. Cardiomegaly with persistent mild pulmonary vascular congestion.  Defer to pulmonology      3. alcoholic hepatitis: defer to GI    4. Anemia: 12/07/18 hgb 7.3, 2 units of blood given on 12/07/18     5. Cardiomegaly: Cardiomegaly with persistent mild pulmonary vascular congestion      Labs/tests ordered for am: Echo for Monday 12/7/2018  MD ALISSON Lawson/Transcription:   \"Dictated utilizing Dragon dictation\".     "

## 2018-12-07 NOTE — PROGRESS NOTES
Name: Enrique Ramirez ADMIT: 2018   : 1970  PCP: Cydney Chase MD    MRN: [id number] LOS: 2 days   AGE/SEX: 48 y.o. male  ROOM: [facility]   Subjective   CC: peripheral edema  No acute events.  Overall patient feels about the same. Epistaxis has resolved, patient is still having hemoptysis.  No CP/palpitations/f/c/n/v/d.    Objective   Vital Signs  Temp:  [98 °F (36.7 °C)-99 °F (37.2 °C)] 98.8 °F (37.1 °C)  Heart Rate:  [65-75] 75  Resp:  [16-20] 16  BP: ()/(40-58) 109/44  SpO2:  [88 %-98 %] 94 %  on   ;   Device (Oxygen Therapy): room air  Body mass index is 37.43 kg/m².    Physical Exam   Constitutional: He is oriented to person, place, and time. No distress.   HENT:   Head: Normocephalic and atraumatic.   Nose: Nose normal.   Mouth/Throat: Oropharynx is clear and moist.   Eyes: Conjunctivae and EOM are normal. Pupils are equal, round, and reactive to light. Scleral icterus is present.   Neck: Normal range of motion. Neck supple. No JVD present.   Cardiovascular: Normal rate, regular rhythm and intact distal pulses.   Pulmonary/Chest: Effort normal. He has decreased breath sounds in the right lower field and the left lower field. He has no wheezes.   Abdominal: Soft. Bowel sounds are normal.   Musculoskeletal: He exhibits edema (3+ BLE edema with body wall edema). He exhibits no tenderness.   Neurological: He is alert and oriented to person, place, and time.   Skin: Skin is warm and dry. He is not diaphoretic.   Psychiatric: He has a normal mood and affect. His behavior is normal.   Nursing note and vitals reviewed.      Results Review:       I reviewed the patient's new clinical results.  Results from last 7 days   Lab Units  18   1150  18   0556  18   0012  18   1718  18   0559   18   1306   WBC 10*3/mm3   --   12.31*   --    --   11.84*   --   11.34*   HEMOGLOBIN g/dL  7.3*  7.3*  7.2*  7.2*  7.0*   < >  6.7*   PLATELETS 10*3/mm3   --   131*   --    --   109*    --   149    < > = values in this interval not displayed.     Results from last 7 days   Lab Units  12/07/18   0556  12/06/18   0559  12/05/18   1306   SODIUM mmol/L  127*  126*  125*   POTASSIUM mmol/L  5.0  5.0  5.4*   CHLORIDE mmol/L  91*  90*  88*   CO2 mmol/L  20.3*  21.0*  22.8   BUN mg/dL  39*  35*  35*   CREATININE mg/dL  2.48*  1.80*  1.89*   GLUCOSE mg/dL  128*  90  113*   Estimated Creatinine Clearance: 50.9 mL/min (A) (by C-G formula based on SCr of 2.48 mg/dL (H)).  Results from last 7 days   Lab Units  12/07/18   0556  12/06/18   0559  12/05/18   1306   ALBUMIN g/dL  4.00  3.60  3.60   BILIRUBIN mg/dL  12.5*  12.9*  12.1*   ALK PHOS U/L  83  84  102   AST (SGOT) U/L  46*  51*  76*   ALT (SGPT) U/L  44*  49*  59*     Results from last 7 days   Lab Units  12/07/18   0556  12/06/18   0559  12/05/18   1306   CALCIUM mg/dL  9.3  9.3  9.2   ALBUMIN g/dL  4.00  3.60  3.60     Results from last 7 days   Lab Units  12/07/18   0556  12/06/18   0559   PROCALCITONIN ng/mL  0.43*  0.27*         albumin human 50 g Intravenous Once   cefepime 2 g Intravenous Once   cefepime 2 g Intravenous Q12H   guaiFENesin 600 mg Oral Q12H   hydrOXYzine 50 mg Oral Nightly   ipratropium-albuterol 3 mL Nebulization 4x Daily - RT   lactulose 20 g Oral BID   midodrine 10 mg Oral TID AC   oxymetazoline 2 spray Each Nare BID   pantoprazole 40 mg Oral QAM AC   sodium chloride 3 mL Intravenous Q12H   vancomycin 15 mg/kg Intravenous Once       Pharmacy to dose vancomycin    Diet Regular; Low Sodium, Daily Fluid Restriction; Other; 2,000; 2,000 mg Na; Adaptive Equipment      Assessment/Plan      Active Hospital Problems    Diagnosis Date Noted   • **Symptomatic anemia [D64.9] 12/05/2018   • Rhinovirus infection [B34.8] 12/06/2018   • Pericardial effusion [I31.3] 12/06/2018   • LÓPEZ (acute kidney injury) (CMS/HCC) [N17.9] 12/05/2018   • Anasarca [R60.1] 12/05/2018   • Cough [R05] 12/05/2018   • Epistaxis [R04.0] 12/05/2018   • Hemoptysis  [R04.2] 12/05/2018   • Hyponatremia [E87.1] 10/25/2018   • Esophageal varices (CMS/HCC) [I85.00] 10/24/2018   • Elevated liver function tests [R94.5] 06/22/2018   • Alcoholic hepatitis without ascites [K70.10] 06/22/2018      Resolved Hospital Problems   No resolved problems to display.   Symptomatic Anemia  - hgb 6.7 in ER, Hgb 7/3 and is s/p 2 unit of PRBCs 12/5/18; also received crystalloid and albumin  - no large source of blood loss, will check haptoglobin and LDH to help r/o hemolysis, though these will need to be interpreted with caution in the setting of liver disease; plts have been stable and actually better today  - monitor Hgb and transfuse as needed     Hemoptysis  - he clearly had epistaxis on admission but this has since resolved, continues to cough up red-brown sputum  - with procalcitonin and WBC increasing will go ahead and cover for HCAP with IV vancomycin and cefepime  - repeat CXR, check respiratory cx and MRSA screen; has rhinovirus on RVP  - will ask pulm to evaluate     LÓPEZ  - looks pre-renal due to intravascular volume depletion, which is complicated by severe extravascular fluid overload which we will need to deal with when renal function stabilizes  - no e/o obstruction on renal u/s  - worsening today, getting more albumin and midodrine added  - appreciate nephrology recs    Pericardial Effusion  - apparent on CT chest, not present on echocardiogram in October  -  Echocardiogram with tiny effusion not able to be tapped, no tamponade  - appreciate cardiology evaluation    EtOH Hepatitis  - possible secondary cirrhosis  -nadolol, lasix, and spironolactone held  - RUQ u/s revealing GB mass-GI following and considering MRCP when renal function stabilizes  - GI following, appreciate recs     DVT Prophylaxis  - SCDs    Thanks to consultants    Valentino Valdez MD  [facility]  12/07/18  1:51 PM

## 2018-12-07 NOTE — PROGRESS NOTES
[facility]  Inpatient Progress Note    Reason for Follow Up:  Alcoholic liver disease, edema, LÓPEZ, varices, ascites    Subjective     Interval History: Bad night with coughing, poor sleep.  Decreased urine output overnight.      Current Facility-Administered Medications:   •  bisacodyl (DULCOLAX) EC tablet 5 mg, 5 mg, Oral, Daily PRN, Valentino Valdez MD  •  calcium carbonate (TUMS) chewable tablet 500 mg (200 mg elemental), 2 tablet, Oral, BID PRN, Valentino Valdez MD  •  guaiFENesin (MUCINEX) 12 hr tablet 600 mg, 600 mg, Oral, Q12H, Valentino Valdez MD, 600 mg at 12/06/18 2116  •  ipratropium-albuterol (DUO-NEB) nebulizer solution 3 mL, 3 mL, Nebulization, 4x Daily - RT, Valentino Valdez MD, 3 mL at 12/07/18 0910  •  lactulose (CHRONULAC) 10 GM/15ML solution 20 g, 20 g, Oral, BID, Valentino Valdez MD, 20 g at 12/06/18 2116  •  midodrine (PROAMATINE) tablet 5 mg, 5 mg, Oral, TID AC, Emerald White MD  •  nitroglycerin (NITROSTAT) SL tablet 0.4 mg, 0.4 mg, Sublingual, Q5 Min PRN, Valentino Valdez MD  •  ondansetron (ZOFRAN) tablet 4 mg, 4 mg, Oral, Q6H PRN **OR** ondansetron ODT (ZOFRAN-ODT) disintegrating tablet 4 mg, 4 mg, Oral, Q6H PRN **OR** ondansetron (ZOFRAN) injection 4 mg, 4 mg, Intravenous, Q6H PRN, Valentino Valdez MD  •  oxyCODONE (ROXICODONE) immediate release tablet 5 mg, 5 mg, Oral, Q4H PRN, Valentino Valdez MD  •  oxymetazoline (AFRIN) nasal spray 2 spray, 2 spray, Each Nare, BID, Valentino Valdez MD, 2 spray at 12/06/18 2207  •  pantoprazole (PROTONIX) EC tablet 40 mg, 40 mg, Oral, QAM AC, Valentino Valdez MD, 40 mg at 12/07/18 0630  •  [COMPLETED] Insert peripheral IV, , , Once **AND** sodium chloride 0.9 % flush 10 mL, 10 mL, Intravenous, PRN, Gurinder Burnett MD  •  sodium chloride 0.9 % flush 3 mL, 3 mL, Intravenous, Q12H, Valentino Valdez MD, 3 mL at 12/06/18 7680  •  sodium chloride 0.9 % flush 3-10 mL, 3-10 mL, Intravenous, PRN, Courtney,  Valentino RAMON MD  •  zolpidem (AMBIEN) tablet 5 mg, 5 mg, Oral, Nightly PRN, Valentino Valdez MD, 5 mg at 12/05/18 2103  Review of Systems:   All systems reviewed and negative except for: Pulm: cough; Extr: LE edema, : decreased urine output      Objective     Vital Signs  Temp:  [98 °F (36.7 °C)-99 °F (37.2 °C)] 98.4 °F (36.9 °C)  Heart Rate:  [65-75] 73  Resp:  [16-20] 20  BP: (81-98)/(40-58) 96/50  Body mass index is 37.43 kg/m².    Intake/Output Summary (Last 24 hours) at 12/7/2018 0953  Last data filed at 12/7/2018 0600  Gross per 24 hour   Intake 2697 ml   Output 450 ml   Net 2247 ml     No intake/output data recorded.     Physical Exam:   General: patient awake, alert and cooperative, ill appearing   Eyes: Normal lids and lashes, +scleral icterus   Neck: supple, normal ROM   Skin: warm and dry,  jaundiced   Cardiovascular: regular rhythm and rate, no murmurs auscultated   Pulm: clear to auscultation bilaterally, regular and unlabored   Abdomen: soft and obese, nontender, nondistended; normal bowel sounds   Rectal: deferred   Extremities: no rash, 2+ pitting edema   Psychiatric: Normal mood and behavior; memory intact     Results Review:     I reviewed the patient's new clinical results.    Results from last 7 days   Lab Units  12/07/18   0556  12/07/18   0012  12/06/18   1718  12/06/18   0559   12/05/18   1306   WBC 10*3/mm3  12.31*   --    --   11.84*   --   11.34*   HEMOGLOBIN g/dL  7.3*  7.2*  7.2*  7.0*   < >  6.7*   HEMATOCRIT %  21.3*  21.7*  22.4*  21.4*   < >  19.8*   PLATELETS 10*3/mm3  131*   --    --   109*   --   149    < > = values in this interval not displayed.     Results from last 7 days   Lab Units  12/07/18   0556  12/06/18   0559  12/05/18   1306   SODIUM mmol/L  127*  126*  125*   POTASSIUM mmol/L  5.0  5.0  5.4*   CHLORIDE mmol/L  91*  90*  88*   CO2 mmol/L  20.3*  21.0*  22.8   BUN mg/dL  39*  35*  35*   CREATININE mg/dL  2.48*  1.80*  1.89*   CALCIUM mg/dL  9.3  9.3  9.2   BILIRUBIN  mg/dL  12.5*  12.9*  12.1*   ALK PHOS U/L  83  84  102   ALT (SGPT) U/L  44*  49*  59*   AST (SGOT) U/L  46*  51*  76*   GLUCOSE mg/dL  128*  90  113*     Results from last 7 days   Lab Units  12/07/18   0557  12/06/18   0559  12/05/18   1306   INR   1.91*  1.86*  1.83*     Lab Results   Lab Value Date/Time    LIPASE 51 12/05/2018 1306    LIPASE 53 11/28/2018 1101    LIPASE 38 10/24/2018 1439    LIPASE 45 06/21/2018 1906       Radiology:  CT Chest Without Contrast   Final Result   1. Basilar predominant opacities favored to reflect atelectasis although   pneumonia not excluded.   2. Right greater than left pleural effusions, moderate pericardial   effusion.   3. Mild mediastinal adenopathy.   4. Homogeneous splenic enlargement.                           .        This report was finalized on 12/7/2018 5:53 AM by Valentino Nguyen M.D.          US Renal Bilateral   Final Result   Ascites. Otherwise negative renal sonogram. There is no   evidence of obstruction.       This report was finalized on 12/6/2018 8:05 PM by Dr. Brannon Cruz M.D.          XR Chest 1 View   Final Result   Persistent patchy densities in the mid to lower lungs. Increased left   more than right pleural effusions. Cardiomegaly with persistent mild   pulmonary vascular congestion. Continued follow-up/further evaluation   recommended as indicated.                   This report was finalized on 12/5/2018 1:49 PM by Dr. Rojas Dubon M.D.          Liver US  IMPRESSION:  1. Within the gallbladder, there is a rounded area of soft tissue  echogenicity that appears to contain flow and is suspicious for a  gallbladder mass. Tumefactive sludge is also a possibility and there is  sludge within the gallbladder. This finding represents a change compared  to the previous ultrasound of the gallbladder 06/21/2018. Attempt at  further evaluation could be performed with a CT or MRI if  cholecystectomy is not performed. There is no biliary duct  dilatation.  2. Mild ascites/perihepatic fluid.  3. Diffuse fat infiltration of the liver.     Discussed with Emerald White MD in the morning on 12/5/2018.     This report was finalized on 12/5/2018 9:49 AM by Dr. Braydon Simental M.D.    Assessment/Plan     Patient Active Problem List   Diagnosis   • Acute upper GI bleed   • Gout   • Pre-diabetes   • Cholelithiasis   • Elevated liver function tests   • Alcohol abuse   • Alcoholic hepatitis without ascites   • Thrombocytopenia (CMS/HCC)   • Esophagitis   • Folate deficiency   • Acute anemia   • Esophageal varices (CMS/HCC)   • Hepatitis, alcoholic   • Ascites   • Portal hypertension (CMS/HCC)   • Hyponatremia   • Symptomatic anemia   • LÓPEZ (acute kidney injury) (CMS/HCC)   • Anasarca   • Cough   • Epistaxis   • Hemoptysis   • Rhinovirus infection   • Pericardial effusion       Impression  1. Alcoholic liver disease: he completed steroids for alcoholic hepatitis, no ETOH since end of September.  Still appears to be hepatitis/liver disease, probably mix of ALD and NAFLD, does not appear to cirrhotic as yet but ultimately would need biopsy to rule out    2. LÓPEZ: worsening, concerning for HRS    3. Anasarca: diffuse edema    4. Hyponatremia: on fluid restriction    5. Anemia: no significant overt bleeding    6. URI: tested positive for rhinovirus, with cough    7. Esophageal varices: as per June EGD, small    8. Abnormal liver US: ? Lesion on gallbladder, I had Dr Denise review this and he does not feel it is a mass        Plan  Midodrine 10mg TID  Hydroxyzine nightly for sleep  Albumin  3 meals, low sodium, plus snacks  Continue fluid restriction  Will defer to hospitalist re: tessalon or similar for cough  Will check an MRCP when he has stabilized for further evaluation of the ? Gallbladder mass    I discussed the patients findings and my recommendations with patient and family.    Emerald White MD

## 2018-12-07 NOTE — PLAN OF CARE
Problem: Patient Care Overview  Goal: Plan of Care Review  Outcome: Ongoing (interventions implemented as appropriate)   18 0601   Coping/Psychosocial   Plan of Care Reviewed With patient   Plan of Care Review   Progress no change   OTHER   Outcome Summary Pt exhibited low B/P's-contacted physican, ambulated pt and B/P increased. Pt had a persistent, productive, bloody sputum cough throughout the night. Rested intermittently. IVF  @ 2330. Vitals held stable, will continue to monitor.        Problem: Anemia (Adult)  Goal: Symptom Improvement  Outcome: Ongoing (interventions implemented as appropriate)      Problem: Fall Risk (Adult)  Goal: Absence of Fall  Outcome: Ongoing (interventions implemented as appropriate)

## 2018-12-07 NOTE — PROGRESS NOTES
NEPHROLOGY PROGRESS NOTE    PATIENT IDENTIFICATION:   Name:  Enrique Ramirez      MRN:  [id number]     48 y.o.  male             Reason for visit: LÓPEZ    SUBJECTIVE:   Declining UOP today; feels tired; on-going hemoptysis; little appetite; coughing and SOB worse when lying supine    OBJECTIVE:  Vitals:    12/07/18 0910 12/07/18 0916 12/07/18 1201 12/07/18 1315   BP:   95/47 109/44   BP Location:    Left arm   Patient Position:    Lying   Pulse: 75 73  75   Resp: 16 20  16   Temp:    98.8 °F (37.1 °C)   TempSrc:    Oral   SpO2: 91%   94%   Weight:       Height:               Body mass index is 37.43 kg/m².    Intake/Output Summary (Last 24 hours) at 12/7/2018 1628  Last data filed at 12/7/2018 0600  Gross per 24 hour   Intake 2397 ml   Output --   Net 2397 ml     Wt Readings from Last 1 Encounters:   12/07/18 0530 125 kg (276 lb)   12/06/18 1457 121 kg (267 lb)   12/06/18 0600 121 kg (267 lb 8 oz)   12/05/18 1814 124 kg (273 lb)   12/05/18 1238 119 kg (262 lb 6.4 oz)     Wt Readings from Last 3 Encounters:   12/07/18 125 kg (276 lb)   11/13/18 118 kg (260 lb)   11/09/18 118 kg (260 lb 12.8 oz)         Exam:  NAD; pleasant; oriented; looks older than stated age  Overweight; jaundiced  MMM; AT/NC  No eye d/c; no scleral icterus  No JVD; no carotid bruits  Coarse bilat; few rales; diminished in bases; not labored  RRR, no rub  Soft, NT, +D, BS+  +2 edema  No clubbing  No asterixis  Moves all extremities   Speech fluent  Mood depressed; flat affect      Scheduled meds:    cefepime 2 g Intravenous Q12H   guaiFENesin 600 mg Oral Q12H   hydrOXYzine 50 mg Oral Nightly   ipratropium-albuterol 3 mL Nebulization 4x Daily - RT   lactulose 20 g Oral BID   midodrine 10 mg Oral TID AC   oxymetazoline 2 spray Each Nare BID   pantoprazole 40 mg Oral QAM AC   sodium chloride 3 mL Intravenous Q12H   vancomycin 1,250 mg Intravenous Q12H   vancomycin 15 mg/kg Intravenous Once     IV meds:                        Pharmacy to dose  vancomycin        Data Review:    Results from last 7 days   Lab Units  12/07/18   0556  12/06/18   0559  12/05/18   1306   SODIUM mmol/L  127*  126*  125*   POTASSIUM mmol/L  5.0  5.0  5.4*   CHLORIDE mmol/L  91*  90*  88*   CO2 mmol/L  20.3*  21.0*  22.8   BUN mg/dL  39*  35*  35*   CREATININE mg/dL  2.48*  1.80*  1.89*   CALCIUM mg/dL  9.3  9.3  9.2   BILIRUBIN mg/dL  12.5*  12.9*  12.1*   ALK PHOS U/L  83  84  102   ALT (SGPT) U/L  44*  49*  59*   AST (SGOT) U/L  46*  51*  76*   GLUCOSE mg/dL  128*  90  113*     Estimated Creatinine Clearance: 50.9 mL/min (A) (by C-G formula based on SCr of 2.48 mg/dL (H)).  Results from last 7 days   Lab Units  12/07/18   0556  12/06/18   2207   SODIUM UR mmol/L   --   <20   CREATININE UR mg/dL   --   267.7   URIC ACID mg/dL  11.3*   --            Results from last 7 days   Lab Units  12/07/18   1150  12/07/18   0556  12/07/18   0012  12/06/18   1718  12/06/18   0559   12/05/18   1306   WBC 10*3/mm3   --   12.31*   --    --   11.84*   --   11.34*   HEMOGLOBIN g/dL  7.3*  7.3*  7.2*  7.2*  7.0*   < >  6.7*   PLATELETS 10*3/mm3   --   131*   --    --   109*   --   149    < > = values in this interval not displayed.       Results from last 7 days   Lab Units  12/07/18   0557  12/06/18   0559  12/05/18   1306   INR   1.91*  1.86*  1.83*             ASSESSMENT:     Symptomatic anemia    Elevated liver function tests    Alcoholic hepatitis without ascites    Esophageal varices (CMS/HCC)    Hyponatremia    LÓPEZ (acute kidney injury) (CMS/HCC)    Anasarca    Cough    Epistaxis    Hemoptysis    Rhinovirus infection    Pericardial effusion    1.  LÓPEZ, with self-report of decreasing UOP: prerenal from ABLA, hypotension, and profound liver injury, tho growing concern for HRS.  HypoNa with hypovolemia; stable [facility]. [place] UA with Na-avid urine; u/s w/o hydro.  Tremendous edema, with surprisingly stable serum alb of 3.6  2.  ABLA  3.  Alcoholic cirrhosis  4.  Hypotension  5.  Hemoptysis  6.   TCP      PLAN:  1.  Stop IVF  2.  Give one dose of diuretic and gauge response  3.  D/w pt potential for worsening and possibly irreversible renal injury in face of severe liver dz  4.  D/w Dr. White earlier today    Rush Alvarez MD  12/7/2018    4:28 PM

## 2018-12-08 ENCOUNTER — INPATIENT HOSPITAL (OUTPATIENT)
Dept: URBAN - METROPOLITAN AREA HOSPITAL 113 | Facility: HOSPITAL | Age: 48
End: 2018-12-08
Payer: COMMERCIAL

## 2018-12-08 DIAGNOSIS — K70.10 ALCOHOLIC HEPATITIS WITHOUT ASCITES: ICD-10-CM

## 2018-12-08 PROBLEM — J18.9 PNEUMONIA: Status: ACTIVE | Noted: 2018-12-08

## 2018-12-08 LAB
ABO + RH BLD: NORMAL
ALBUMIN SERPL-MCNC: 3.9 G/DL (ref 3.5–5.2)
ALBUMIN/GLOB SERPL: 1.2 G/DL
ALP SERPL-CCNC: 80 U/L (ref 39–117)
ALT SERPL W P-5'-P-CCNC: 36 U/L (ref 1–41)
ANION GAP SERPL CALCULATED.3IONS-SCNC: 16.3 MMOL/L
AST SERPL-CCNC: 41 U/L (ref 1–40)
BASOPHILS # BLD AUTO: 0.04 10*3/MM3 (ref 0–0.2)
BASOPHILS NFR BLD AUTO: 0.4 % (ref 0–1.5)
BH BB BLOOD EXPIRATION DATE: NORMAL
BH BB BLOOD TYPE BARCODE: 6200
BH BB DISPENSE STATUS: NORMAL
BH BB PRODUCT CODE: NORMAL
BH BB UNIT NUMBER: NORMAL
BILIRUB SERPL-MCNC: 10.8 MG/DL (ref 0.1–1.2)
BUN BLD-MCNC: 45 MG/DL (ref 6–20)
BUN/CREAT SERPL: 18 (ref 7–25)
CALCIUM SPEC-SCNC: 9.4 MG/DL (ref 8.6–10.5)
CHLORIDE SERPL-SCNC: 91 MMOL/L (ref 98–107)
CO2 SERPL-SCNC: 19.7 MMOL/L (ref 22–29)
CREAT BLD-MCNC: 2.5 MG/DL (ref 0.76–1.27)
DEPRECATED RDW RBC AUTO: 82 FL (ref 37–54)
EOSINOPHIL # BLD AUTO: 0.08 10*3/MM3 (ref 0–0.7)
EOSINOPHIL NFR BLD AUTO: 0.7 % (ref 0.3–6.2)
ERYTHROCYTE [DISTWIDTH] IN BLOOD BY AUTOMATED COUNT: 21.2 % (ref 11.5–14.5)
GFR SERPL CREATININE-BSD FRML MDRD: 28 ML/MIN/1.73
GLOBULIN UR ELPH-MCNC: 3.3 GM/DL
GLUCOSE BLD-MCNC: 126 MG/DL (ref 65–99)
HCT VFR BLD AUTO: 21.5 % (ref 40.4–52.2)
HCT VFR BLD AUTO: 21.7 % (ref 40.4–52.2)
HCT VFR BLD AUTO: 22 % (ref 40.4–52.2)
HGB BLD-MCNC: 7.1 G/DL (ref 13.7–17.6)
HGB BLD-MCNC: 7.2 G/DL (ref 13.7–17.6)
HGB BLD-MCNC: 7.2 G/DL (ref 13.7–17.6)
IMM GRANULOCYTES # BLD: 0.05 10*3/MM3 (ref 0–0.03)
IMM GRANULOCYTES NFR BLD: 0.4 % (ref 0–0.5)
INR PPP: 1.98 (ref 0.9–1.1)
LYMPHOCYTES # BLD AUTO: 1.07 10*3/MM3 (ref 0.9–4.8)
LYMPHOCYTES NFR BLD AUTO: 9.4 % (ref 19.6–45.3)
MCH RBC QN AUTO: 35.7 PG (ref 27–32.7)
MCHC RBC AUTO-ENTMCNC: 32.7 G/DL (ref 32.6–36.4)
MCV RBC AUTO: 109 FL (ref 79.8–96.2)
MONOCYTES # BLD AUTO: 2.22 10*3/MM3 (ref 0.2–1.2)
MONOCYTES NFR BLD AUTO: 19.6 % (ref 5–12)
NEUTROPHILS # BLD AUTO: 7.88 10*3/MM3 (ref 1.9–8.1)
NEUTROPHILS NFR BLD AUTO: 69.5 % (ref 42.7–76)
PLATELET # BLD AUTO: 101 10*3/MM3 (ref 140–500)
PMV BLD AUTO: 9.4 FL (ref 6–12)
POTASSIUM BLD-SCNC: 4.8 MMOL/L (ref 3.5–5.2)
PROT SERPL-MCNC: 7.2 G/DL (ref 6–8.5)
PROTHROMBIN TIME: 22.1 SECONDS (ref 11.7–14.2)
RBC # BLD AUTO: 1.99 10*6/MM3 (ref 4.6–6)
SODIUM BLD-SCNC: 127 MMOL/L (ref 136–145)
UNIT  ABO: NORMAL
UNIT  RH: NORMAL
WBC NRBC COR # BLD: 11.34 10*3/MM3 (ref 4.5–10.7)

## 2018-12-08 PROCEDURE — 85014 HEMATOCRIT: CPT | Performed by: INTERNAL MEDICINE

## 2018-12-08 PROCEDURE — 25010000002 VANCOMYCIN 10 G RECONSTITUTED SOLUTION: Performed by: INTERNAL MEDICINE

## 2018-12-08 PROCEDURE — 99231 SBSQ HOSP IP/OBS SF/LOW 25: CPT | Performed by: INTERNAL MEDICINE

## 2018-12-08 PROCEDURE — 99232 SBSQ HOSP IP/OBS MODERATE 35: CPT | Performed by: INTERNAL MEDICINE

## 2018-12-08 PROCEDURE — 85018 HEMOGLOBIN: CPT | Performed by: INTERNAL MEDICINE

## 2018-12-08 PROCEDURE — 25010000002 CEFEPIME PER 500 MG: Performed by: INTERNAL MEDICINE

## 2018-12-08 PROCEDURE — 86900 BLOOD TYPING SEROLOGIC ABO: CPT

## 2018-12-08 PROCEDURE — 36415 COLL VENOUS BLD VENIPUNCTURE: CPT | Performed by: INTERNAL MEDICINE

## 2018-12-08 PROCEDURE — 94799 UNLISTED PULMONARY SVC/PX: CPT

## 2018-12-08 PROCEDURE — 80053 COMPREHEN METABOLIC PANEL: CPT | Performed by: INTERNAL MEDICINE

## 2018-12-08 PROCEDURE — 36430 TRANSFUSION BLD/BLD COMPNT: CPT

## 2018-12-08 PROCEDURE — P9016 RBC LEUKOCYTES REDUCED: HCPCS

## 2018-12-08 PROCEDURE — 85025 COMPLETE CBC W/AUTO DIFF WBC: CPT | Performed by: INTERNAL MEDICINE

## 2018-12-08 PROCEDURE — 85610 PROTHROMBIN TIME: CPT | Performed by: INTERNAL MEDICINE

## 2018-12-08 RX ORDER — BUMETANIDE 0.25 MG/ML
4 INJECTION INTRAMUSCULAR; INTRAVENOUS EVERY 6 HOURS
Status: COMPLETED | OUTPATIENT
Start: 2018-12-08 | End: 2018-12-08

## 2018-12-08 RX ADMIN — IPRATROPIUM BROMIDE AND ALBUTEROL SULFATE 3 ML: 2.5; .5 SOLUTION RESPIRATORY (INHALATION) at 08:03

## 2018-12-08 RX ADMIN — HYDROXYZINE HYDROCHLORIDE 50 MG: 50 TABLET, FILM COATED ORAL at 21:15

## 2018-12-08 RX ADMIN — Medication 2 SPRAY: at 08:45

## 2018-12-08 RX ADMIN — IPRATROPIUM BROMIDE AND ALBUTEROL SULFATE 3 ML: 2.5; .5 SOLUTION RESPIRATORY (INHALATION) at 12:16

## 2018-12-08 RX ADMIN — CEFEPIME 2 G: 2 INJECTION, POWDER, FOR SOLUTION INTRAVENOUS at 21:15

## 2018-12-08 RX ADMIN — IPRATROPIUM BROMIDE AND ALBUTEROL SULFATE 3 ML: 2.5; .5 SOLUTION RESPIRATORY (INHALATION) at 20:25

## 2018-12-08 RX ADMIN — PANTOPRAZOLE SODIUM 40 MG: 40 TABLET, DELAYED RELEASE ORAL at 08:44

## 2018-12-08 RX ADMIN — MIDODRINE HYDROCHLORIDE 10 MG: 5 TABLET ORAL at 18:47

## 2018-12-08 RX ADMIN — GUAIFENESIN 600 MG: 600 TABLET, EXTENDED RELEASE ORAL at 21:15

## 2018-12-08 RX ADMIN — VANCOMYCIN HYDROCHLORIDE 1250 MG: 10 INJECTION, POWDER, LYOPHILIZED, FOR SOLUTION INTRAVENOUS at 18:48

## 2018-12-08 RX ADMIN — IPRATROPIUM BROMIDE AND ALBUTEROL SULFATE 3 ML: 2.5; .5 SOLUTION RESPIRATORY (INHALATION) at 16:02

## 2018-12-08 RX ADMIN — LACTULOSE 20 G: 10 SOLUTION ORAL at 21:15

## 2018-12-08 RX ADMIN — SODIUM CHLORIDE, PRESERVATIVE FREE 3 ML: 5 INJECTION INTRAVENOUS at 08:45

## 2018-12-08 RX ADMIN — BUMETANIDE 4 MG: 0.25 INJECTION INTRAMUSCULAR; INTRAVENOUS at 21:14

## 2018-12-08 RX ADMIN — BUMETANIDE 4 MG: 0.25 INJECTION INTRAMUSCULAR; INTRAVENOUS at 16:05

## 2018-12-08 RX ADMIN — CEFEPIME 2 G: 2 INJECTION, POWDER, FOR SOLUTION INTRAVENOUS at 08:45

## 2018-12-08 RX ADMIN — SODIUM CHLORIDE, PRESERVATIVE FREE 3 ML: 5 INJECTION INTRAVENOUS at 21:16

## 2018-12-08 RX ADMIN — GUAIFENESIN 600 MG: 600 TABLET, EXTENDED RELEASE ORAL at 08:44

## 2018-12-08 RX ADMIN — MIDODRINE HYDROCHLORIDE 10 MG: 5 TABLET ORAL at 12:08

## 2018-12-08 RX ADMIN — VANCOMYCIN HYDROCHLORIDE 1250 MG: 10 INJECTION, POWDER, LYOPHILIZED, FOR SOLUTION INTRAVENOUS at 06:38

## 2018-12-08 RX ADMIN — MIDODRINE HYDROCHLORIDE 10 MG: 5 TABLET ORAL at 08:44

## 2018-12-08 RX ADMIN — LACTULOSE 20 G: 10 SOLUTION ORAL at 08:48

## 2018-12-08 NOTE — PROGRESS NOTES
"   LOS: 3 days   Patient Care Team:  Cydney Chase MD as PCP - General (Family Medicine)    Chief Complaint: cough    Subjective     History of Present Illness    Subjective:  Symptoms:  Stable.    Diet:  Adequate intake.    Activity level: Impaired due to weakness.    Pain:  He complains of pain that is mild.      His cough is better at the present time.  No abdominal distention   History taken from: patient chart family    Objective     Vital Signs  Temp:  [97.9 °F (36.6 °C)-98.9 °F (37.2 °C)] 97.9 °F (36.6 °C)  Heart Rate:  [62-82] 75  Resp:  [16-20] 16  BP: ()/(44-61) 103/50    Objective:  General Appearance:  Ill-appearing.    Vital signs: (most recent): Blood pressure 103/50, pulse 75, temperature 97.9 °F (36.6 °C), temperature source Oral, resp. rate 16, height 182.9 cm (72\"), weight 127 kg (279 lb), SpO2 92 %.    Output: Producing urine.    Lungs:  Normal effort.    Heart: Tachycardia.    Abdomen: Abdomen is soft.              Results Review:     I reviewed the patient's new clinical results.    Medication Review: done    Assessment/Plan       Symptomatic anemia    Elevated liver function tests    Alcoholic hepatitis without ascites    Esophageal varices (CMS/HCC)    Hyponatremia    LÓPEZ (acute kidney injury) (CMS/HCC)    Anasarca    Cough    Epistaxis    Hemoptysis    Rhinovirus infection    Pericardial effusion    assessement /plan  etoh hepatitis  Renal failure  URI    Continue supportive care, prognosis guarded discussed with family     Willie Gutierrez MD  12/08/18  10:43 AM    Time: Critical care 22 min      "

## 2018-12-08 NOTE — PROGRESS NOTES
NEPHROLOGY PROGRESS NOTE    PATIENT IDENTIFICATION:   Name:  Enrique Ramirez      MRN:  [id number]     48 y.o.  male             Reason for visit: LÓPEZ    SUBJECTIVE:   Feels very tired; UOP fair, and BP's low but stable; little appetite; hemoptysis less    OBJECTIVE:  Vitals:    12/08/18 1206 12/08/18 1208 12/08/18 1216 12/08/18 1331   BP: 113/60   106/49   BP Location: Left arm   Left arm   Patient Position: Lying   Lying   Pulse:  80 77 77   Resp:   16 20   Temp:    98.1 °F (36.7 °C)   TempSrc:    Oral   SpO2:   91% 93%   Weight:       Height:               Body mass index is 37.84 kg/m².    Intake/Output Summary (Last 24 hours) at 12/8/2018 1444  Last data filed at 12/8/2018 0811  Gross per 24 hour   Intake 512 ml   Output 950 ml   Net -438 ml     Wt Readings from Last 1 Encounters:   12/08/18 0618 127 kg (279 lb)   12/07/18 0530 125 kg (276 lb)   12/06/18 1457 121 kg (267 lb)   12/06/18 0600 121 kg (267 lb 8 oz)   12/05/18 1814 124 kg (273 lb)   12/05/18 1238 119 kg (262 lb 6.4 oz)     Wt Readings from Last 3 Encounters:   12/08/18 127 kg (279 lb)   11/13/18 118 kg (260 lb)   11/09/18 118 kg (260 lb 12.8 oz)         Exam:  NAD; pleasant; oriented; looks older than stated age  Overweight; jaundiced  MMM; AT/NC  No eye d/c; no scleral icterus  No JVD; no carotid bruits  Coarse bilat; rales and few rhonchi; diminished in bases; not labored  RRR, no rub  Soft, NT, +D, BS+  +2-3 edema  No clubbing  No asterixis  Moves all extremities   Speech fluent  Mood depressed; flat affect      Scheduled meds:      cefepime 2 g Intravenous Q12H   guaiFENesin 600 mg Oral Q12H   hydrOXYzine 50 mg Oral Nightly   ipratropium-albuterol 3 mL Nebulization 4x Daily - RT   lactulose 20 g Oral BID   midodrine 10 mg Oral TID AC   oxymetazoline 2 spray Each Nare BID   pantoprazole 40 mg Oral QAM AC   sodium chloride 3 mL Intravenous Q12H   vancomycin 1,250 mg Intravenous Q12H     IV meds:                          Pharmacy to dose  vancomycin        Data Review:    Results from last 7 days   Lab Units  12/08/18   0553  12/07/18   0556  12/06/18   0559   SODIUM mmol/L  127*  127*  126*   POTASSIUM mmol/L  4.8  5.0  5.0   CHLORIDE mmol/L  91*  91*  90*   CO2 mmol/L  19.7*  20.3*  21.0*   BUN mg/dL  45*  39*  35*   CREATININE mg/dL  2.50*  2.48*  1.80*   CALCIUM mg/dL  9.4  9.3  9.3   BILIRUBIN mg/dL  10.8*  12.5*  12.9*   ALK PHOS U/L  80  83  84   ALT (SGPT) U/L  36  44*  49*   AST (SGOT) U/L  41*  46*  51*   GLUCOSE mg/dL  126*  128*  90     Estimated Creatinine Clearance: 50.9 mL/min (A) (by C-G formula based on SCr of 2.5 mg/dL (H)).  Results from last 7 days   Lab Units  12/07/18   0556  12/06/18   2207   SODIUM UR mmol/L   --   <20   CREATININE UR mg/dL   --   267.7   URIC ACID mg/dL  11.3*   --            Results from last 7 days   Lab Units  12/08/18   0553  12/08/18   0023  12/07/18   1748  12/07/18   1150  12/07/18   0556   12/06/18   0559   12/05/18   1306   WBC 10*3/mm3  11.34*   --    --    --   12.31*   --   11.84*   --   11.34*   HEMOGLOBIN g/dL  7.1*  7.2*  6.8*  7.3*  7.3*   < >  7.0*   < >  6.7*   PLATELETS 10*3/mm3  101*   --    --    --   131*   --   109*   --   149    < > = values in this interval not displayed.       Results from last 7 days   Lab Units  12/08/18   0553  12/07/18   0557  12/06/18   0559  12/05/18   1306   INR   1.98*  1.91*  1.86*  1.83*             ASSESSMENT:     Symptomatic anemia    Elevated liver function tests    Alcoholic hepatitis without ascites    Esophageal varices (CMS/HCC)    Hyponatremia    LÓPEZ (acute kidney injury) (CMS/HCC)    Anasarca    Cough    Epistaxis    Hemoptysis    Rhinovirus infection    Pericardial effusion    Pneumonia    1.  LÓPEZ, non-oliguric now, unchanged: prerenal from ABLA, hypotension, and profound liver injury. Reasonable UOP and stable BP's argue against HRS somewhat.  HypoNa with hypervolemia; stable [place] UA with Na-avid urine; u/s w/o hydro.  Tremendous edema, with  surprisingly stable serum alb  2.  ABLA  3.  Alcoholic cirrhosis  4.  Hypotension  5.  Hemoptysis  6.  TCP      PLAN:  1.  More diuretic today  2.  If can achieve negative fluid balance, will give PRBC's  3.  Surveillance labs    Rush Alvarez MD  12/8/2018    2:44 PM

## 2018-12-08 NOTE — PROGRESS NOTES
[facility] INPATIENT PROGRESS NOTE         [address]    2018      PATIENT IDENTIFICATION:  Name: Enrique Ramirez ADMIT: 2018   : 1970  PCP: Cydney Chase MD    MRN: [id number] LOS: 3 days   AGE/SEX: 48 y.o. male  ROOM: [facility]                     LOS 3    Reason for visit: Hemoptysis      SUBJECTIVE:    Bloody sputum has significantly improved. Denies chest pain. Diminished breath sounds bilaterally.    Objective   OBJECTIVE:    Vital Sign Min/Max for last 24 hours  Temp  Min: 97.9 °F (36.6 °C)  Max: 98.9 °F (37.2 °C)   BP  Min: 95/47  Max: 112/61   Pulse  Min: 62  Max: 82   Resp  Min: 16  Max: 20   SpO2  Min: 91 %  Max: 96 %   No Data Recorded   Weight  Min: 127 kg (279 lb)  Max: 127 kg (279 lb)                         Body mass index is 37.84 kg/m².    Intake/Output Summary (Last 24 hours) at 2018 1020  Last data filed at 2018 0811  Gross per 24 hour   Intake 512 ml   Output 950 ml   Net -438 ml         Exam:  GEN:  No distress, appears stated age  EYES:   PERRL, anicteric sclera  ENT:    External ears/nose normal, OP clear  NECK:  No adenopathy, midline trachea  LUNGS: Normal chest on inspection, palpation and diminished bilaterally with scattered coarse breath sounds at bases on auscultation  CV:  Normal S1S2, without murmur  ABD:  Non tender, non distended, no hepatosplenomegaly, +BS  EXT:  No edema, cyanosis or clubbing    Scheduled meds:    cefepime 2 g Intravenous Q12H   guaiFENesin 600 mg Oral Q12H   hydrOXYzine 50 mg Oral Nightly   ipratropium-albuterol 3 mL Nebulization 4x Daily - RT   lactulose 20 g Oral BID   midodrine 10 mg Oral TID AC   oxymetazoline 2 spray Each Nare BID   pantoprazole 40 mg Oral QAM AC   sodium chloride 3 mL Intravenous Q12H   vancomycin 1,250 mg Intravenous Q12H     IV meds:                        Pharmacy to dose vancomycin      Data Review:  Results from last 7 days   Lab Units  18   0553  18   0556  18   0559   12/05/18   1306   SODIUM mmol/L  127*  127*  126*  125*   POTASSIUM mmol/L  4.8  5.0  5.0  5.4*   CHLORIDE mmol/L  91*  91*  90*  88*   CO2 mmol/L  19.7*  20.3*  21.0*  22.8   BUN mg/dL  45*  39*  35*  35*   CREATININE mg/dL  2.50*  2.48*  1.80*  1.89*   GLUCOSE mg/dL  126*  128*  90  113*   CALCIUM mg/dL  9.4  9.3  9.3  9.2         Estimated Creatinine Clearance: 50.9 mL/min (A) (by C-G formula based on SCr of 2.5 mg/dL (H)).  Results from last 7 days   Lab Units  12/08/18   0553  12/08/18   0023  12/07/18   1748  12/07/18   1150  12/07/18   0556   12/06/18   0559   12/05/18   1306   WBC 10*3/mm3  11.34*   --    --    --   12.31*   --   11.84*   --   11.34*   HEMOGLOBIN g/dL  7.1*  7.2*  6.8*  7.3*  7.3*   < >  7.0*   < >  6.7*   PLATELETS 10*3/mm3  101*   --    --    --   131*   --   109*   --   149    < > = values in this interval not displayed.     Results from last 7 days   Lab Units  12/08/18   0553  12/07/18   0557  12/06/18   0559  12/05/18   1306   INR   1.98*  1.91*  1.86*  1.83*     Results from last 7 days   Lab Units  12/08/18   0553  12/07/18   0556  12/06/18   0559  12/05/18   1306   ALT (SGPT) U/L  36  44*  49*  59*   AST (SGOT) U/L  41*  46*  51*  76*         Results from last 7 days   Lab Units  12/07/18   0556  12/06/18   0559   PROCALCITONIN ng/mL  0.43*  0.27*         Chest x-ray 12/7 viewed shows infiltrate/atelectasis and increased pleural effusion on the left with cardiomegaly and pulmonary vascular congestion      Microbiology reviewed  Human Rhinovirus/Enterovirus Detected Abnormal             )Assessment   ASSESSMENT:      Active Hospital Problems    Diagnosis Date Noted   • **Symptomatic anemia [D64.9] 12/05/2018   • Rhinovirus infection [B34.8] 12/06/2018   • Pericardial effusion [I31.3] 12/06/2018   • LÓPEZ (acute kidney injury) (CMS/HCC) [N17.9] 12/05/2018   • Anasarca [R60.1] 12/05/2018   • Cough [R05] 12/05/2018   • Epistaxis [R04.0] 12/05/2018   • Hemoptysis [R04.2] 12/05/2018   •  Hyponatremia [E87.1] 10/25/2018   • Esophageal varices (CMS/HCC) [I85.00] 10/24/2018   • Elevated liver function tests [R94.5] 06/22/2018   • Alcoholic hepatitis without ascites [K70.10] 06/22/2018      Resolved Hospital Problems   No resolved problems to display.     Pneumonia  Hemoptysis  Coagulopathy secondary to liver disease  Mediastinal lymphadenopathy  Bilateral pleural effusions and anasarca  Cirrhosis/hepatitis  Obesity/?AL  URI with rhinovirus  Anemia  Acute kidney injury      PLAN:    Hemoptysis secondary to coagulopathy from liver disease and infectious pneumonia.  If worsens will require bronchoscopy but seems to be improving fairly quickly.  Continue antibiotics.  Encourage pulmonary toilet.  Pleural effusion secondary to parapneumonic effusion on top of liver disease with anasarca.  If worsens may require thoracentesis.  Continue bronchodilators for help with clearance.    Enrique Obrien MD  Pulmonary and Critical Care Medicine  [facility], [facility]  12/8/2018    10:20 AM

## 2018-12-08 NOTE — PROGRESS NOTES
[facility]  Cardiology Progress Note    Patient Identification:  Name: Enrique Ramirez  Age: 48 y.o.  Sex: male  :  1970  MRN: [id number]                 Follow Up / Chief Complaint: pericardial effusion        Interval History:      He reports that over the course of the last 3 or 4 days he's had significant worsening in dyspnea on exertion, orthopnea, fatigue, and swelling.  He has noticed a 10 pound weight gain overnight just prior to admit.  He has had issues with coughing up blood while he lays down.   Denies any syncope near syncope, palpitations, chest pain or pressure.          Subjective: Shortness of breat and cough, but denies chest pain and pressure      Objective:  Temp:  [98 °F (36.7 °C)-99 °F (37.2 °C)] 98.8 °F (37.1 °C)  Heart Rate:  [65-75] 75  Resp:  [16-20] 16  BP: ()/(40-58) 109/44  SpO2:  [91 %-96 %] 92 %    Past Medical History:  Past Medical History:   Diagnosis Date   • Esophageal varices (CMS/HCC)    • Gallstones    • Gout    • Hepatitis, alcoholic    • History of transfusion    • Pre-diabetes      Past Surgical History:  Past Surgical History:   Procedure Laterality Date   • COLONOSCOPY     • FOOT SURGERY          Social History:   Social History     Tobacco Use   • Smoking status: Never Smoker   • Smokeless tobacco: Former User     Types: Chew   Substance Use Topics   • Alcohol use: No     Comment: last drink        Family History:  Family History   Problem Relation Age of Onset   • Lung cancer Mother    • Heart attack Father           Allergies:  Allergies   Allergen Reactions   • Zinc Nausea And Vomiting     Scheduled Meds:    cefepime 2 g Q12H   guaiFENesin 600 mg Q12H   hydrOXYzine 50 mg Nightly   ipratropium-albuterol 3 mL 4x Daily - RT   lactulose 20 g BID   midodrine 10 mg TID AC   oxymetazoline 2 spray BID   pantoprazole 40 mg QAM AC   sodium chloride 3 mL Q12H   vancomycin 1,250 mg Q12H           INTAKE AND OUTPUT:    Intake/Output Summary (Last  24 hours) at 2018 0954  Last data filed at 2018 0811  Gross per 24 hour   Intake 512 ml   Output 950 ml   Net -438 ml       Review of Systems:   GI: Denies n/v  Cardiac: Denies chest pain/pressure  Pulmonary:  Shortness of breath and cough    Constitutional:  Temp:  [97.9 °F (36.6 °C)-98.9 °F (37.2 °C)] 97.9 °F (36.6 °C)  Heart Rate:  [62-82] 75  Resp:  [16-20] 16  BP: ()/(44-61) 103/50   SpO2:  [91 %-96 %] 92 %    Physical Exam:  General:  Appears in no acute distress  Eyes: no conjunctival drainage, or EOM normal  HEENT:  No JVD. Thyroid not visibly enlarged. No mucosal pallor or cyanosis  Respiratory: Respirations regular and unlabored at rest. BBS with good air entry in all fields. No crackles, rubs or wheezes auscultated  Cardiovascular: S1S2 Regular rate and rhythm. No murmur, rub or gallop. No carotid bruits. DP/PT pulses +2     . 2-3+pretibial pitting edema  Gastrointestinal: Abdomen soft, flat, non tender. Bowel sounds present. No hepatosplenomegaly. No ascites  Musculoskeletal: HESS x4. No abnormal movements  Extremities: No digital clubbing or cyanosis  Skin:   Skin warm and dry to touch. No rashes    Neuro: AAO x3 CN II-XII grossly intact  Psych: Mood and affect normal, pleasant and cooperative          Cardiographics  Telemetry:                  EC/26/18      10/24/18             Echocardiogram:      Interpretation Summary previous 18     · Left ventricular systolic function is normal. Estimated EF = 70%.  · Mild tricuspid valve regurgitation is present.  · Calculated right ventricular systolic pressure from tricuspid regurgitation is 38 mmHg.  · Bubble study was positive but technically difficult. Not clear if this was late bubbles crossing or consistent with a patent foramen ovale.            Imaging  Chest X-ray: 18     CT of abd pelvis 18  IMPRESSION:  Persistent patchy densities in the mid to lower lungs. Increased left  more than right pleural effusions.  Cardiomegaly with persistent mild  pulmonary vascular congestion. Continued follow-up/further evaluation  recommended as indicated.     IMPRESSION:  1. Basilar predominant opacities favored to reflect atelectasis although  pneumonia not excluded.  2. Right greater than left pleural effusions, moderate pericardial  effusion.  3. Mild mediastinal adenopathy.  4. Homogeneous splenic enlargement.                Lab Review           Results from last 7 days   Lab Units  12/08/18   0553   SODIUM mmol/L  127*   POTASSIUM mmol/L  4.8   BUN mg/dL  45*   CREATININE mg/dL  2.50*   CALCIUM mg/dL  9.4        Results from last 7 days   Lab Units  12/08/18   0553  12/08/18   0023  12/07/18   1748   12/07/18   0556   12/06/18   0559   WBC 10*3/mm3  11.34*   --    --    --   12.31*   --   11.84*   HEMOGLOBIN g/dL  7.1*  7.2*  6.8*   < >  7.3*   < >  7.0*   HEMATOCRIT %  21.7*  22.0*  21.4*   < >  21.3*   < >  21.4*   PLATELETS 10*3/mm3  101*   --    --    --   131*   --   109*    < > = values in this interval not displayed.     Results from last 7 days   Lab Units  12/08/18   0553  12/07/18   0557  12/06/18   0559   INR   1.98*  1.91*  1.86*         Assessment:  1. moderate pericardial effusion  2.  shortness of breath  3. alcoholic hepatitis  4. Anasarca  5. Anemia  6. 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 Rhinovirus infection  8. Thrombocytopenia - 12/6 Plt Cout 109,000 (149,000 on 12/5/18)  9. Auto anticoagulated from liver dz - INR on admit 1.83 is 1.86 on 12/6/1810. LÓPEZ  10. ETOH hepatitis  11. Hemoptysis  12. 6. Cardiomegaly:    Plan  1. moderate pericardial effusion:   ECHO on 12/05/18 showed tricuspid valve regurgitation is present, left atrial cavity size is mildly dilated, right ventricular cavity is borderline dilated, mild mitral valve regurgitation is present, with EF 66%, and no pericardial effusion present. Will redo echo on monday    2.  shortness of breath:  CXR showed presistent patchy densities in the mid to lower lungs.  "Increased left more than right pleural effusions. Cardiomegaly with persistent mild pulmonary vascular congestion.  Defer to pulmonology      3. alcoholic hepatitis: defer to GI    4. Anemia: 12/07/18 hgb 7.3, 2 units of blood given on 12/07/18     5. Cardiomegaly: Cardiomegaly with persistent mild pulmonary vascular congestion      Labs/tests ordered for am: Echo for Monday 12/8/2018  MD ALISSON Lawson/Transcription:   \"Dictated utilizing Dragon dictation\".     "

## 2018-12-08 NOTE — PLAN OF CARE
Problem: Patient Care Overview  Goal: Plan of Care Review  Outcome: Ongoing (interventions implemented as appropriate)    Goal: Individualization and Mutuality  Outcome: Ongoing (interventions implemented as appropriate)    Goal: Discharge Needs Assessment  Outcome: Ongoing (interventions implemented as appropriate)    Goal: Interprofessional Rounds/Family Conf  Outcome: Ongoing (interventions implemented as appropriate)      Problem: Anemia (Adult)  Goal: Symptom Improvement  Outcome: Ongoing (interventions implemented as appropriate)      Problem: Fall Risk (Adult)  Goal: Absence of Fall  Outcome: Ongoing (interventions implemented as appropriate)

## 2018-12-08 NOTE — PROGRESS NOTES
" LOS: 3 days     Name: Enrique Ramirez  Age: 48 y.o.  Sex: male  :  1970  MRN: [id number]         Primary Care Physician: Cydney Chase MD    Subjective   Subjective  No new complaints.  Hemoptysis improving.    Objective   Vital Signs  Temp:  [97.9 °F (36.6 °C)-98.9 °F (37.2 °C)] 97.9 °F (36.6 °C)  Heart Rate:  [62-82] 75  Resp:  [16-20] 16  BP: ()/(44-61) 103/50  Body mass index is 37.84 kg/m².    Objective:  General Appearance:  Comfortable, in no acute distress and ill-appearing.    Vital signs: (most recent): Blood pressure 103/50, pulse 75, temperature 97.9 °F (36.6 °C), temperature source Oral, resp. rate 16, height 182.9 cm (72\"), weight 127 kg (279 lb), SpO2 92 %.    Lungs:  Normal effort and normal respiratory rate.  There are decreased breath sounds.    Heart: Normal rate.  Regular rhythm.    Abdomen: Abdomen is soft and distended.  There are signs of ascites. Bowel sounds are normal.   There is no abdominal tenderness.     Extremities: There is no dependent edema or local swelling.    Neurological: Patient is alert and oriented to person, place and time.    Skin:  Warm and dry.              Results Review:       I reviewed the patient's new clinical results.    Results from last 7 days   Lab Units  18   0553  18   0023  18   1748  18   1150  18   0556  18   0012  18   1718  18   0559   18   1306   WBC 10*3/mm3  11.34*   --    --    --   12.31*   --    --   11.84*   --   11.34*   HEMOGLOBIN g/dL  7.1*  7.2*  6.8*  7.3*  7.3*  7.2*  7.2*  7.0*   < >  6.7*   PLATELETS 10*3/mm3  101*   --    --    --   131*   --    --   109*   --   149    < > = values in this interval not displayed.     Results from last 7 days   Lab Units  18   0553  18   0556  18   0559  18   1306   SODIUM mmol/L  127*  127*  126*  125*   POTASSIUM mmol/L  4.8  5.0  5.0  5.4*   CHLORIDE mmol/L  91*  91*  90*  88*   CO2 mmol/L  19.7*  20.3*  21.0*  " 22.8   BUN mg/dL  45*  39*  35*  35*   CREATININE mg/dL  2.50*  2.48*  1.80*  1.89*   CALCIUM mg/dL  9.4  9.3  9.3  9.2   GLUCOSE mg/dL  126*  128*  90  113*     Results from last 7 days   Lab Units  12/08/18   0553  12/07/18   0557  12/06/18   0559  12/05/18   1306   INR   1.98*  1.91*  1.86*  1.83*             Scheduled Meds:     cefepime 2 g Intravenous Q12H   guaiFENesin 600 mg Oral Q12H   hydrOXYzine 50 mg Oral Nightly   ipratropium-albuterol 3 mL Nebulization 4x Daily - RT   lactulose 20 g Oral BID   midodrine 10 mg Oral TID AC   oxymetazoline 2 spray Each Nare BID   pantoprazole 40 mg Oral QAM AC   sodium chloride 3 mL Intravenous Q12H   vancomycin 1,250 mg Intravenous Q12H     PRN Meds:   •  bisacodyl  •  calcium carbonate  •  nitroglycerin  •  ondansetron **OR** ondansetron ODT **OR** ondansetron  •  oxyCODONE  •  Pharmacy to dose vancomycin  •  sodium chloride  •  [COMPLETED] Insert peripheral IV **AND** sodium chloride  •  sodium chloride  •  zolpidem  Continuous Infusions:    Pharmacy to dose vancomycin        Assessment/Plan   Active Hospital Problems    Diagnosis Date Noted   • **Symptomatic anemia [D64.9] 12/05/2018   • Pneumonia [J18.9] 12/08/2018   • Rhinovirus infection [B34.8] 12/06/2018   • Pericardial effusion [I31.3] 12/06/2018   • LÓPEZ (acute kidney injury) (CMS/HCC) [N17.9] 12/05/2018   • Anasarca [R60.1] 12/05/2018   • Cough [R05] 12/05/2018   • Epistaxis [R04.0] 12/05/2018   • Hemoptysis [R04.2] 12/05/2018   • Hyponatremia [E87.1] 10/25/2018   • Esophageal varices (CMS/HCC) [I85.00] 10/24/2018   • Elevated liver function tests [R94.5] 06/22/2018   • Alcoholic hepatitis without ascites [K70.10] 06/22/2018      Resolved Hospital Problems   No resolved problems to display.       Assessment & Plan    Pneumonia  - Continue Cefepime and Vanc for now  - Await sputum culture and MRSA results    Symptomatic Anemia  - Hgb low but stable, continue to monitor.  S/p 2 units PRBCs on 12/5  - monitor Hgb  and transfuse as needed     Hemoptysis  - Improving  - 2/2 liver disease coagulopathy and PNA     LÓPEZ  - Cr stable today, per nephrology  - Received bumex x1 yesterday     Pericardial Effusion  - apparent on CT chest, not present on echocardiogram in October  -  Echocardiogram with tiny effusion not able to be tapped, no tamponade  - Repeat ECHO planned for monday     EtOH Hepatitis  - possible secondary cirrhosis  -nadolol, lasix, and spironolactone held  - RUQ u/s revealing GB mass-GI following and considering MRCP when renal function stabilizes     DVT Prophylaxis  - SCDs     Thanks to consultants          Michele Dixon MD  [facility]  12/08/18  11:34 AM

## 2018-12-08 NOTE — PLAN OF CARE
Problem: Patient Care Overview  Goal: Plan of Care Review  Outcome: Ongoing (interventions implemented as appropriate)      Problem: Anemia (Adult)  Goal: Symptom Improvement  Outcome: Ongoing (interventions implemented as appropriate)      Problem: Fall Risk (Adult)  Goal: Absence of Fall  Outcome: Ongoing (interventions implemented as appropriate)

## 2018-12-09 LAB
ALBUMIN SERPL-MCNC: 4.2 G/DL (ref 3.5–5.2)
ALBUMIN/GLOB SERPL: 1.3 G/DL
ALP SERPL-CCNC: 90 U/L (ref 39–117)
ALT SERPL W P-5'-P-CCNC: 37 U/L (ref 1–41)
ANION GAP SERPL CALCULATED.3IONS-SCNC: 17.7 MMOL/L
AST SERPL-CCNC: 42 U/L (ref 1–40)
BASOPHILS # BLD AUTO: 0.07 10*3/MM3 (ref 0–0.2)
BASOPHILS NFR BLD AUTO: 0.6 % (ref 0–1.5)
BILIRUB SERPL-MCNC: 10.8 MG/DL (ref 0.1–1.2)
BUN BLD-MCNC: 42 MG/DL (ref 6–20)
BUN/CREAT SERPL: 19.2 (ref 7–25)
CALCIUM SPEC-SCNC: 9.7 MG/DL (ref 8.6–10.5)
CHLORIDE SERPL-SCNC: 91 MMOL/L (ref 98–107)
CO2 SERPL-SCNC: 20.3 MMOL/L (ref 22–29)
CREAT BLD-MCNC: 2.19 MG/DL (ref 0.76–1.27)
DEPRECATED RDW RBC AUTO: 75.8 FL (ref 37–54)
EOSINOPHIL # BLD AUTO: 0.1 10*3/MM3 (ref 0–0.7)
EOSINOPHIL NFR BLD AUTO: 0.9 % (ref 0.3–6.2)
ERYTHROCYTE [DISTWIDTH] IN BLOOD BY AUTOMATED COUNT: 20.4 % (ref 11.5–14.5)
GFR SERPL CREATININE-BSD FRML MDRD: 33 ML/MIN/1.73
GLOBULIN UR ELPH-MCNC: 3.3 GM/DL
GLUCOSE BLD-MCNC: 119 MG/DL (ref 65–99)
HCT VFR BLD AUTO: 21.9 % (ref 40.4–52.2)
HCT VFR BLD AUTO: 22.1 % (ref 40.4–52.2)
HGB BLD-MCNC: 7.4 G/DL (ref 13.7–17.6)
HGB BLD-MCNC: 7.5 G/DL (ref 13.7–17.6)
IMM GRANULOCYTES # BLD: 0.03 10*3/MM3 (ref 0–0.03)
IMM GRANULOCYTES NFR BLD: 0.3 % (ref 0–0.5)
INR PPP: 2.02 (ref 0.9–1.1)
LYMPHOCYTES # BLD AUTO: 1.03 10*3/MM3 (ref 0.9–4.8)
LYMPHOCYTES NFR BLD AUTO: 9.1 % (ref 19.6–45.3)
MAGNESIUM SERPL-MCNC: 1.9 MG/DL (ref 1.6–2.6)
MCH RBC QN AUTO: 35.4 PG (ref 27–32.7)
MCHC RBC AUTO-ENTMCNC: 34.2 G/DL (ref 32.6–36.4)
MCV RBC AUTO: 103.3 FL (ref 79.8–96.2)
MONOCYTES # BLD AUTO: 1.91 10*3/MM3 (ref 0.2–1.2)
MONOCYTES NFR BLD AUTO: 16.9 % (ref 5–12)
MRSA SPEC QL CULT: NORMAL
NEUTROPHILS # BLD AUTO: 8.2 10*3/MM3 (ref 1.9–8.1)
NEUTROPHILS NFR BLD AUTO: 72.5 % (ref 42.7–76)
PHOSPHATE SERPL-MCNC: 4 MG/DL (ref 2.5–4.5)
PLATELET # BLD AUTO: 101 10*3/MM3 (ref 140–500)
PMV BLD AUTO: 8.9 FL (ref 6–12)
POTASSIUM BLD-SCNC: 4.2 MMOL/L (ref 3.5–5.2)
PROT SERPL-MCNC: 7.5 G/DL (ref 6–8.5)
PROTHROMBIN TIME: 22.5 SECONDS (ref 11.7–14.2)
RBC # BLD AUTO: 2.12 10*6/MM3 (ref 4.6–6)
SODIUM BLD-SCNC: 129 MMOL/L (ref 136–145)
VANCOMYCIN TROUGH SERPL-MCNC: 25.2 MCG/ML (ref 5–20)
VANCOMYCIN TROUGH SERPL-MCNC: 28.1 MCG/ML (ref 5–20)
WBC NRBC COR # BLD: 11.31 10*3/MM3 (ref 4.5–10.7)

## 2018-12-09 PROCEDURE — 85014 HEMATOCRIT: CPT | Performed by: INTERNAL MEDICINE

## 2018-12-09 PROCEDURE — 25010000002 VANCOMYCIN 10 G RECONSTITUTED SOLUTION: Performed by: INTERNAL MEDICINE

## 2018-12-09 PROCEDURE — 85610 PROTHROMBIN TIME: CPT | Performed by: INTERNAL MEDICINE

## 2018-12-09 PROCEDURE — 94799 UNLISTED PULMONARY SVC/PX: CPT

## 2018-12-09 PROCEDURE — 99231 SBSQ HOSP IP/OBS SF/LOW 25: CPT | Performed by: NURSE PRACTITIONER

## 2018-12-09 PROCEDURE — 25010000002 CEFEPIME PER 500 MG: Performed by: INTERNAL MEDICINE

## 2018-12-09 PROCEDURE — 99231 SBSQ HOSP IP/OBS SF/LOW 25: CPT | Performed by: INTERNAL MEDICINE

## 2018-12-09 PROCEDURE — 85018 HEMOGLOBIN: CPT | Performed by: INTERNAL MEDICINE

## 2018-12-09 PROCEDURE — 84100 ASSAY OF PHOSPHORUS: CPT | Performed by: INTERNAL MEDICINE

## 2018-12-09 PROCEDURE — 80202 ASSAY OF VANCOMYCIN: CPT | Performed by: INTERNAL MEDICINE

## 2018-12-09 PROCEDURE — 85025 COMPLETE CBC W/AUTO DIFF WBC: CPT | Performed by: INTERNAL MEDICINE

## 2018-12-09 PROCEDURE — 83735 ASSAY OF MAGNESIUM: CPT | Performed by: INTERNAL MEDICINE

## 2018-12-09 PROCEDURE — 80053 COMPREHEN METABOLIC PANEL: CPT | Performed by: INTERNAL MEDICINE

## 2018-12-09 PROCEDURE — 36415 COLL VENOUS BLD VENIPUNCTURE: CPT | Performed by: INTERNAL MEDICINE

## 2018-12-09 RX ORDER — BUMETANIDE 0.25 MG/ML
4 INJECTION INTRAMUSCULAR; INTRAVENOUS EVERY 6 HOURS
Status: COMPLETED | OUTPATIENT
Start: 2018-12-09 | End: 2018-12-09

## 2018-12-09 RX ADMIN — IPRATROPIUM BROMIDE AND ALBUTEROL SULFATE 3 ML: 2.5; .5 SOLUTION RESPIRATORY (INHALATION) at 11:16

## 2018-12-09 RX ADMIN — SODIUM CHLORIDE, PRESERVATIVE FREE 3 ML: 5 INJECTION INTRAVENOUS at 10:45

## 2018-12-09 RX ADMIN — BUMETANIDE 4 MG: 0.25 INJECTION INTRAMUSCULAR; INTRAVENOUS at 17:53

## 2018-12-09 RX ADMIN — CEFEPIME 2 G: 2 INJECTION, POWDER, FOR SOLUTION INTRAVENOUS at 21:54

## 2018-12-09 RX ADMIN — BUMETANIDE 4 MG: 0.25 INJECTION INTRAMUSCULAR; INTRAVENOUS at 21:56

## 2018-12-09 RX ADMIN — Medication 2 SPRAY: at 21:54

## 2018-12-09 RX ADMIN — SODIUM CHLORIDE, PRESERVATIVE FREE 3 ML: 5 INJECTION INTRAVENOUS at 23:04

## 2018-12-09 RX ADMIN — CEFEPIME 2 G: 2 INJECTION, POWDER, FOR SOLUTION INTRAVENOUS at 10:44

## 2018-12-09 RX ADMIN — PANTOPRAZOLE SODIUM 40 MG: 40 TABLET, DELAYED RELEASE ORAL at 10:40

## 2018-12-09 RX ADMIN — HYDROXYZINE HYDROCHLORIDE 50 MG: 50 TABLET, FILM COATED ORAL at 21:54

## 2018-12-09 RX ADMIN — LACTULOSE 20 G: 10 SOLUTION ORAL at 21:54

## 2018-12-09 RX ADMIN — Medication 2 SPRAY: at 10:44

## 2018-12-09 RX ADMIN — MIDODRINE HYDROCHLORIDE 10 MG: 5 TABLET ORAL at 17:54

## 2018-12-09 RX ADMIN — MIDODRINE HYDROCHLORIDE 10 MG: 5 TABLET ORAL at 10:40

## 2018-12-09 RX ADMIN — IPRATROPIUM BROMIDE AND ALBUTEROL SULFATE 3 ML: 2.5; .5 SOLUTION RESPIRATORY (INHALATION) at 07:08

## 2018-12-09 RX ADMIN — GUAIFENESIN 600 MG: 600 TABLET, EXTENDED RELEASE ORAL at 10:40

## 2018-12-09 RX ADMIN — IPRATROPIUM BROMIDE AND ALBUTEROL SULFATE 3 ML: 2.5; .5 SOLUTION RESPIRATORY (INHALATION) at 15:03

## 2018-12-09 RX ADMIN — GUAIFENESIN 600 MG: 600 TABLET, EXTENDED RELEASE ORAL at 21:54

## 2018-12-09 RX ADMIN — VANCOMYCIN HYDROCHLORIDE 1250 MG: 10 INJECTION, POWDER, LYOPHILIZED, FOR SOLUTION INTRAVENOUS at 06:12

## 2018-12-09 RX ADMIN — IPRATROPIUM BROMIDE AND ALBUTEROL SULFATE 3 ML: 2.5; .5 SOLUTION RESPIRATORY (INHALATION) at 20:02

## 2018-12-09 NOTE — PROGRESS NOTES
"Pharmacokinetic Consult - Vancomycin Dosing (Follow-up Note)    Enrique Ramirez is on day 3/7 pharmacy to dose vancomycin for possible HCAP pneumonia.  Pharmacy dosing vancomycin per Dr. Valdez' request.   Goal trough: 15-20 mg/L   Other antimicrobials:  Cefepime - duration 7 days    Relevant clinical data and objective history reviewed:  48 y.o. male 182.9 cm (72\") 127 kg (279 lb)    Past Medical History:   Diagnosis Date   • Esophageal varices (CMS/HCC)    • Gallstones    • Gout    • Hepatitis, alcoholic    • History of transfusion    • Pre-diabetes      Creatinine   Date Value Ref Range Status   12/09/2018 2.19 (H) 0.76 - 1.27 mg/dL Final   12/08/2018 2.50 (H) 0.76 - 1.27 mg/dL Final   12/07/2018 2.48 (H) 0.76 - 1.27 mg/dL Final     BUN   Date Value Ref Range Status   12/09/2018 42 (H) 6 - 20 mg/dL Final     Estimated Creatinine Clearance: 58.1 mL/min (A) (by C-G formula based on SCr of 2.19 mg/dL (H)).    Lab Results   Component Value Date    WBC 11.31 (H) 12/09/2018     Temp Readings from Last 3 Encounters:   12/09/18 98.5 °F (36.9 °C) (Oral)   11/13/18 98.6 °F (37 °C) (Oral)   11/09/18 97.6 °F (36.4 °C) (Oral)     Baseline culture/source/susceptibility:   12/7 Respiratory culture - mixed morphology    Anti-Infectives (From admission, onward)    Ordered     Dose/Rate Route Frequency Start Stop    12/07/18 1409  vancomycin 1250 mg/250 mL 0.9% NS IVPB ([facility])     Ordering Provider:  Valentino Valdez MD    1,250 mg  over 120 Minutes Intravenous Every 12 Hours 12/08/18 0700 12/15/18 0659    12/07/18 1350  cefepime (MAXIPIME) 2 g/100 mL 0.9% NS (mbp)     Ordering Provider:  Valentino Valdez MD    2 g  over 4 Hours Intravenous Every 12 Hours 12/07/18 2100 12/14/18 2059 12/07/18 1350  cefepime (MAXIPIME) 2 g/100 mL 0.9% NS (mbp)     Ordering Provider:  Valentino Valdez MD    2 g  200 mL/hr over 30 Minutes Intravenous Once 12/07/18 1500 12/07/18 1529    12/07/18 1350  vancomycin 2000 mg/500 mL 0.9% NS IVPB ([facility])   "   Ordering Provider:  Valentino Valdez MD    15 mg/kg × 125 kg Intravenous Once 12/07/18 1445 12/07/18 1849    12/07/18 1350  Pharmacy to dose vancomycin     Ordering Provider:  Valentino Valdez MD     Does not apply Continuous PRN 12/07/18 1350 12/14/18 1349         Lab Results   Component Value Date    [facility] 25.20 (C) 12/09/2018       Vancomycin dosing history/ levels  12/7 1849  Vancomycin 2000 mg loading dose  12/8 0638  Vancomycin 1250 mg  12/8 1848  Vancomycin 1250 mg  12/9 0612  Vancomycin 1250 mg  12/9 0627  Vancomycin trough drawn while Vancomycin infusing- level = 25.2 mg/L      Assessment/Plan    Vancomycin trough level drawn while medication was infusing - level is incorrect. Will monitor serum creatinine at least every 48 hours per dosing recommendations. Vancomycin trough level to be drawn prior to next dose due at 1900 on 12/9/18, spoke with RN. Pharmacy will continue to follow daily while on vancomycin and adjust as needed. Thank you for consult.    Ifeoma Faustin, [facility]   Staff Clinical Pharmacist

## 2018-12-09 NOTE — PROGRESS NOTES
NEPHROLOGY PROGRESS NOTE    PATIENT IDENTIFICATION:   Name:  Enrique Ramirez      MRN:  [id number]     48 y.o.  male             Reason for visit: LÓPEZ    SUBJECTIVE:   Feels better, with less fatigue; swelling same; hemoptysis continues    OBJECTIVE:  Vitals:    12/09/18 1037 12/09/18 1040 12/09/18 1117 12/09/18 1407   BP:  114/56  119/65   BP Location:  Left arm  Left arm   Patient Position:  Sitting  Lying   Pulse: 86 90 89 86   Resp:  18 20 20   Temp:  98.4 °F (36.9 °C)  98.6 °F (37 °C)   TempSrc:  Oral  Oral   SpO2:       Weight:       Height:               Body mass index is 37.84 kg/m².    Intake/Output Summary (Last 24 hours) at 12/9/2018 1502  Last data filed at 12/9/2018 1040  Gross per 24 hour   Intake 480 ml   Output 700 ml   Net -220 ml     Wt Readings from Last 1 Encounters:   12/08/18 0618 127 kg (279 lb)   12/07/18 0530 125 kg (276 lb)   12/06/18 1457 121 kg (267 lb)   12/06/18 0600 121 kg (267 lb 8 oz)   12/05/18 1814 124 kg (273 lb)   12/05/18 1238 119 kg (262 lb 6.4 oz)     Wt Readings from Last 3 Encounters:   12/08/18 127 kg (279 lb)   11/13/18 118 kg (260 lb)   11/09/18 118 kg (260 lb 12.8 oz)         Exam:  NAD; pleasant; oriented; looks older than stated age  Overweight; jaundiced  MMM; AT/NC  No eye d/c; no scleral icterus  No JVD; no carotid bruits  Coarse bilat; few rales; diminished in bases; not labored  RRR, no rub  Soft, NT, +D, BS+  +2-3 edema  No clubbing  No asterixis  Moves all extremities   Speech fluent  Mood depressed; flat affect      Scheduled meds:      cefepime 2 g Intravenous Q12H   guaiFENesin 600 mg Oral Q12H   hydrOXYzine 50 mg Oral Nightly   ipratropium-albuterol 3 mL Nebulization 4x Daily - RT   lactulose 20 g Oral BID   midodrine 10 mg Oral TID AC   oxymetazoline 2 spray Each Nare BID   pantoprazole 40 mg Oral QAM AC   sodium chloride 3 mL Intravenous Q12H   vancomycin 1,250 mg Intravenous Q12H     IV meds:                          Pharmacy to dose vancomycin         Data Review:    Results from last 7 days   Lab Units  12/09/18 0627 12/08/18   0553  12/07/18   0556   SODIUM mmol/L  129*  127*  127*   POTASSIUM mmol/L  4.2  4.8  5.0   CHLORIDE mmol/L  91*  91*  91*   CO2 mmol/L  20.3*  19.7*  20.3*   BUN mg/dL  42*  45*  39*   CREATININE mg/dL  2.19*  2.50*  2.48*   CALCIUM mg/dL  9.7  9.4  9.3   BILIRUBIN mg/dL  10.8*  10.8*  12.5*   ALK PHOS U/L  90  80  83   ALT (SGPT) U/L  37  36  44*   AST (SGOT) U/L  42*  41*  46*   GLUCOSE mg/dL  119*  126*  128*     Estimated Creatinine Clearance: 58.1 mL/min (A) (by C-G formula based on SCr of 2.19 mg/dL (H)).  Results from last 7 days   Lab Units  12/07/18   0556  12/06/18   2207   SODIUM UR mmol/L   --   <20   CREATININE UR mg/dL   --   267.7   URIC ACID mg/dL  11.3*   --      Results from last 7 days   Lab Units  12/09/18 0627   MAGNESIUM mg/dL  1.9   PHOSPHORUS mg/dL  4.0       Results from last 7 days   Lab Units  12/09/18 0627  12/08/18   1957  12/08/18   0553  12/08/18   0023  12/07/18   1748   12/07/18   0556   12/06/18   0559   12/05/18   1306   WBC 10*3/mm3  11.31*   --   11.34*   --    --    --   12.31*   --   11.84*   --   11.34*   HEMOGLOBIN g/dL  7.5*  7.2*  7.1*  7.2*  6.8*   < >  7.3*   < >  7.0*   < >  6.7*   PLATELETS 10*3/mm3  101*   --   101*   --    --    --   131*   --   109*   --   149    < > = values in this interval not displayed.       Results from last 7 days   Lab Units  12/09/18 0627  12/08/18   0553  12/07/18   0557  12/06/18   0559  12/05/18   1306   INR   2.02*  1.98*  1.91*  1.86*  1.83*             ASSESSMENT:     Symptomatic anemia    Elevated liver function tests    Alcoholic hepatitis without ascites    Esophageal varices (CMS/HCC)    Hyponatremia    LÓPEZ (acute kidney injury) (CMS/HCC)    Anasarca    Cough    Epistaxis    Hemoptysis    Rhinovirus infection    Pericardial effusion    Pneumonia    1.  LÓPEZ, non-oliguric, slightly better: prerenal from ABLA, hypotension, and profound liver  injury. Improving UOP and stable BP's argue against HRS, as does improving SCr.  HypoNa with hypervolemia; stable [place] UA with Na-avid urine; u/s w/o hydro.  Tremendous edema, with surprisingly stable serum alb  2.  ABLA  3.  Alcoholic cirrhosis  4.  Hypotension  5.  Hemoptysis  6.  TCP      PLAN:  1.  More diuretic today: bumex 4 mg iv x2  2.  Surveillance labs    Rush Alvarez MD  12/9/2018    3:02 PM

## 2018-12-09 NOTE — PROGRESS NOTES
" LOS: 4 days     Name: Enrique Ramirez  Age: 48 y.o.  Sex: male  :  1970  MRN: [id number]         Primary Care Physician: Cydney Chase MD    Subjective   Subjective  No new complaints.  States that he is feeling a little better the past couple days.    Objective   Vital Signs  Temp:  [98.1 °F (36.7 °C)-98.5 °F (36.9 °C)] 98.5 °F (36.9 °C)  Heart Rate:  [77-90] 90  Resp:  [16-20] 18  BP: (103-114)/(49-65) 114/56  Body mass index is 37.84 kg/m².    Objective:  General Appearance:  Comfortable, in no acute distress and ill-appearing.    Vital signs: (most recent): Blood pressure 114/56, pulse 90, temperature 98.5 °F (36.9 °C), temperature source Oral, resp. rate 18, height 182.9 cm (72\"), weight 127 kg (279 lb), SpO2 95 %.    Lungs:  Normal effort and normal respiratory rate.  There are decreased breath sounds.    Heart: Normal rate.  Regular rhythm.    Abdomen: Abdomen is soft.  Bowel sounds are normal.   There is no abdominal tenderness.     Extremities: There is dependent edema.  There is no local swelling.    Neurological: Patient is alert and oriented to person, place and time.    Skin:  Warm and dry.  (Jaundiced)            Results Review:       I reviewed the patient's new clinical results.    Results from last 7 days   Lab Units  18   0627  18   1957  18   0553  18   0023  18   1748  18   1150  18   0556   18   0559   18   1306   WBC 10*3/mm3  11.31*   --   11.34*   --    --    --   12.31*   --   11.84*   --   11.34*   HEMOGLOBIN g/dL  7.5*  7.2*  7.1*  7.2*  6.8*  7.3*  7.3*   < >  7.0*   < >  6.7*   PLATELETS 10*3/mm3  101*   --   101*   --    --    --   131*   --   109*   --   149    < > = values in this interval not displayed.     Results from last 7 days   Lab Units  18   0627  18   0553  18   0556  18   0559  18   1306   SODIUM mmol/L  129*  127*  127*  126*  125*   POTASSIUM mmol/L  4.2  4.8  5.0  5.0  5.4* "   CHLORIDE mmol/L  91*  91*  91*  90*  88*   CO2 mmol/L  20.3*  19.7*  20.3*  21.0*  22.8   BUN mg/dL  42*  45*  39*  35*  35*   CREATININE mg/dL  2.19*  2.50*  2.48*  1.80*  1.89*   CALCIUM mg/dL  9.7  9.4  9.3  9.3  9.2   GLUCOSE mg/dL  119*  126*  128*  90  113*     Results from last 7 days   Lab Units  12/09/18   0627  12/08/18   0553  12/07/18   0557  12/06/18   0559  12/05/18   1306   INR   2.02*  1.98*  1.91*  1.86*  1.83*             Scheduled Meds:     cefepime 2 g Intravenous Q12H   guaiFENesin 600 mg Oral Q12H   hydrOXYzine 50 mg Oral Nightly   ipratropium-albuterol 3 mL Nebulization 4x Daily - RT   lactulose 20 g Oral BID   midodrine 10 mg Oral TID AC   oxymetazoline 2 spray Each Nare BID   pantoprazole 40 mg Oral QAM AC   sodium chloride 3 mL Intravenous Q12H   vancomycin 1,250 mg Intravenous Q12H     PRN Meds:   •  bisacodyl  •  calcium carbonate  •  nitroglycerin  •  ondansetron **OR** ondansetron ODT **OR** ondansetron  •  oxyCODONE  •  Pharmacy to dose vancomycin  •  sodium chloride  •  [COMPLETED] Insert peripheral IV **AND** sodium chloride  •  sodium chloride  •  zolpidem  Continuous Infusions:    Pharmacy to dose vancomycin        Assessment/Plan   Active Hospital Problems    Diagnosis Date Noted   • **Symptomatic anemia [D64.9] 12/05/2018   • Pneumonia [J18.9] 12/08/2018   • Rhinovirus infection [B34.8] 12/06/2018   • Pericardial effusion [I31.3] 12/06/2018   • LÓPEZ (acute kidney injury) (CMS/HCC) [N17.9] 12/05/2018   • Anasarca [R60.1] 12/05/2018   • Cough [R05] 12/05/2018   • Epistaxis [R04.0] 12/05/2018   • Hemoptysis [R04.2] 12/05/2018   • Hyponatremia [E87.1] 10/25/2018   • Esophageal varices (CMS/HCC) [I85.00] 10/24/2018   • Elevated liver function tests [R94.5] 06/22/2018   • Alcoholic hepatitis without ascites [K70.10] 06/22/2018      Resolved Hospital Problems   No resolved problems to display.       Assessment & Plan    Pneumonia  - Continue Cefepime and Vanc for now  - Repeat chest  x-ray in the morning and likely can tailor antibiotics thereafter if does not show worsening.  - Await sputum culture, MRSA screen negative     Symptomatic Anemia  - Hgb low but stable, continue to monitor.  S/p 2 units PRBCs on 12/5  - monitor Hgb and transfuse as needed     Hemoptysis  - Improving  - 2/2 liver disease coagulopathy and PNA     LÓPEZ  - Cr stable today, per nephrology  - Attempting to diurese     Pericardial Effusion  - apparent on CT chest, not present on echocardiogram in October  -  Echocardiogram with tiny effusion not able to be tapped, no tamponade  - Repeat ECHO planned for monday     EtOH Hepatitis  - possible secondary cirrhosis  -nadolol, lasix, and spironolactone held  - RUQ u/s revealing GB mass-GI following and considering MRCP when renal function stabilizes     DVT Prophylaxis  - SCDs     Thanks to consultants          Michele Dixon MD  [facility]  12/09/18  10:59 AM

## 2018-12-09 NOTE — PROGRESS NOTES
[facility] INPATIENT PROGRESS NOTE         [address]    2018      PATIENT IDENTIFICATION:  Name: Enrique Ramirez ADMIT: 2018   : 1970  PCP: Cydney Chase MD    MRN: [id number] LOS: 4 days   AGE/SEX: 48 y.o. male  ROOM: [facility]                     LOS 4    Reason for visit: Hemoptysis      SUBJECTIVE:    Denies new complaints. He says the bloody sputum has improved however there is still cups at the bedside light streaks be a minute. Very weak and hearing. Coarse breath sounds bilaterally.    Objective   OBJECTIVE:    Vital Sign Min/Max for last 24 hours  Temp  Min: 98.1 °F (36.7 °C)  Max: 98.5 °F (36.9 °C)   BP  Min: 103/53  Max: 114/65   Pulse  Min: 77  Max: 90   Resp  Min: 16  Max: 20   SpO2  Min: 91 %  Max: 95 %   No Data Recorded   No Data Recorded                         Body mass index is 37.84 kg/m².    Intake/Output Summary (Last 24 hours) at 2018 1037  Last data filed at 2018 0400  Gross per 24 hour   Intake --   Output 700 ml   Net -700 ml         Exam:  GEN:  No distress, appears stated age  EYES:   PERRL, anicteric sclera  ENT:    External ears/nose normal, OP clear  NECK:  No adenopathy, midline trachea  LUNGS: Normal chest on inspection, palpation and diminished bilaterally with scattered coarse breath sounds at bases on auscultation  CV:  Normal S1S2, without murmur  ABD:  Non tender, non distended, no hepatosplenomegaly, +BS  EXT:  No edema, cyanosis or clubbing    Scheduled meds:      cefepime 2 g Intravenous Q12H   guaiFENesin 600 mg Oral Q12H   hydrOXYzine 50 mg Oral Nightly   ipratropium-albuterol 3 mL Nebulization 4x Daily - RT   lactulose 20 g Oral BID   midodrine 10 mg Oral TID AC   oxymetazoline 2 spray Each Nare BID   pantoprazole 40 mg Oral QAM AC   sodium chloride 3 mL Intravenous Q12H   vancomycin 1,250 mg Intravenous Q12H     IV meds:                          Pharmacy to dose vancomycin      Data Review:  Results from last 7 days   Lab Units   12/09/18   0627  12/08/18   0553  12/07/18   0556  12/06/18   0559  12/05/18   1306   SODIUM mmol/L  129*  127*  127*  126*  125*   POTASSIUM mmol/L  4.2  4.8  5.0  5.0  5.4*   CHLORIDE mmol/L  91*  91*  91*  90*  88*   CO2 mmol/L  20.3*  19.7*  20.3*  21.0*  22.8   BUN mg/dL  42*  45*  39*  35*  35*   CREATININE mg/dL  2.19*  2.50*  2.48*  1.80*  1.89*   GLUCOSE mg/dL  119*  126*  128*  90  113*   CALCIUM mg/dL  9.7  9.4  9.3  9.3  9.2         Estimated Creatinine Clearance: 58.1 mL/min (A) (by C-G formula based on SCr of 2.19 mg/dL (H)).  Results from last 7 days   Lab Units  12/09/18 0627 12/08/18   1957  12/08/18   0553  12/08/18   0023  12/07/18   1748   12/07/18   0556   12/06/18   0559   12/05/18   1306   WBC 10*3/mm3  11.31*   --   11.34*   --    --    --   12.31*   --   11.84*   --   11.34*   HEMOGLOBIN g/dL  7.5*  7.2*  7.1*  7.2*  6.8*   < >  7.3*   < >  7.0*   < >  6.7*   PLATELETS 10*3/mm3  101*   --   101*   --    --    --   131*   --   109*   --   149    < > = values in this interval not displayed.     Results from last 7 days   Lab Units  12/09/18 0627 12/08/18   0553  12/07/18   0557  12/06/18   0559  12/05/18   1306   INR   2.02*  1.98*  1.91*  1.86*  1.83*     Results from last 7 days   Lab Units  12/09/18 0627  12/08/18   0553  12/07/18   0556  12/06/18   0559  12/05/18   1306   ALT (SGPT) U/L  37  36  44*  49*  59*   AST (SGOT) U/L  42*  41*  46*  51*  76*         Results from last 7 days   Lab Units  12/07/18   0556  12/06/18   0559   PROCALCITONIN ng/mL  0.43*  0.27*         Chest x-ray 12/7 viewed shows infiltrate/atelectasis and increased pleural effusion on the left with cardiomegaly and pulmonary vascular congestion      Microbiology reviewed  Human Rhinovirus/Enterovirus Detected Abnormal             )Assessment   ASSESSMENT:      Active Hospital Problems    Diagnosis Date Noted   • **Symptomatic anemia [D64.9] 12/05/2018   • Pneumonia [J18.9] 12/08/2018   • Rhinovirus infection  [B34.8] 12/06/2018   • Pericardial effusion [I31.3] 12/06/2018   • LÓPEZ (acute kidney injury) (CMS/HCC) [N17.9] 12/05/2018   • Anasarca [R60.1] 12/05/2018   • Cough [R05] 12/05/2018   • Epistaxis [R04.0] 12/05/2018   • Hemoptysis [R04.2] 12/05/2018   • Hyponatremia [E87.1] 10/25/2018   • Esophageal varices (CMS/HCC) [I85.00] 10/24/2018   • Elevated liver function tests [R94.5] 06/22/2018   • Alcoholic hepatitis without ascites [K70.10] 06/22/2018      Resolved Hospital Problems   No resolved problems to display.     Pneumonia  Hemoptysis  Coagulopathy secondary to liver disease  Mediastinal lymphadenopathy  Bilateral pleural effusions and anasarca  Cirrhosis/hepatitis  Obesity/?AL  URI with rhinovirus  Anemia  Acute kidney injury      PLAN:    Hemoptysis secondary to coagulopathy from liver disease and infectious pneumonia.  If worsens will require bronchoscopy but seems to be improving.  Continue antibiotics.  Encourage pulmonary toilet.  Pleural effusion secondary to parapneumonic effusion on top of liver disease with anasarca.  If worsens may require thoracentesis.  Continue bronchodilators for help with clearance.  Repeat CXR in morning for f/u infiltrate and effusions.    Enrique Obrien MD  Pulmonary and Critical Care Medicine  [facility], [facility]  12/9/2018    10:37 AM

## 2018-12-09 NOTE — PROGRESS NOTES
[facility]  Cardiology Progress Note    Patient Identification:  Name: Enrique Ramirez  Age: 48 y.o.  Sex: male  :  1970  MRN: [id number]                 Follow Up / Chief Complaint: pericardial effusion seen on CT        Interval History: echo on  showed no pericardial effusion.  feeling better.  Less orthopnea.  Has had multiple transfusions.  Continues with nose bleeds and some coughing up of blood.           Subjective: Shortness of breath and cough remain, but denies chest pain and pressure, orthopnea better      Objective:  Temp:  [98.3 °F (36.8 °C)-98.6 °F (37 °C)] 98.6 °F (37 °C)  Heart Rate:  [79-90] 84  Resp:  [18-20] 18  BP: (103-119)/(53-65) 119/65   SpO2:  [95 %] 95 %    Past Medical History:  Past Medical History:   Diagnosis Date   • Esophageal varices (CMS/HCC)    • Gallstones    • Gout    • Hepatitis, alcoholic    • History of transfusion    • Pre-diabetes      Past Surgical History:  Past Surgical History:   Procedure Laterality Date   • COLONOSCOPY     • FOOT SURGERY          Social History:   Social History     Tobacco Use   • Smoking status: Never Smoker   • Smokeless tobacco: Former User     Types: Chew   Substance Use Topics   • Alcohol use: No     Comment: last drink        Family History:  Family History   Problem Relation Age of Onset   • Lung cancer Mother    • Heart attack Father           Allergies:  Allergies   Allergen Reactions   • Zinc Nausea And Vomiting     Scheduled Meds:    bumetanide 4 mg Q6H   cefepime 2 g Q12H   guaiFENesin 600 mg Q12H   hydrOXYzine 50 mg Nightly   ipratropium-albuterol 3 mL 4x Daily - RT   lactulose 20 g BID   midodrine 10 mg TID AC   oxymetazoline 2 spray BID   pantoprazole 40 mg QAM AC   sodium chloride 3 mL Q12H   vancomycin 1,250 mg Q12H           INTAKE AND OUTPUT:    Intake/Output Summary (Last 24 hours) at 2018 1641  Last data filed at 2018 1040  Gross per 24 hour   Intake 480 ml   Output 700 ml   Net -220 ml      18 0618  127 kg (279 lb)   18 0530  125 kg (276 lb)   18 1457  121 kg (267 lb)   18 0600  121 kg (267 lb 8 oz)   18 1814  124 kg (273 lb)   18 12:38:05  119 kg (262 lb 6.4 oz)       Review of Systems:   GI: Denies n/v, abd distention improved  Cardiac: Denies chest pain/pressure, + orthopnea  Pulmonary:  Shortness of breath and cough continue    Constitutional:  Temp:  [98.3 °F (36.8 °C)-98.6 °F (37 °C)] 98.6 °F (37 °C)  Heart Rate:  [79-90] 84  Resp:  [18-20] 18  BP: (103-119)/(53-65) 119/65   SpO2:  [95 %] 95 %    Physical Exam:  General:  Appears in no acute distress, jaundiced  Eyes: no conjunctival drainage, icteric.   HEENT:  + JVD. Thyroid not visibly enlarged. No mucosal pallor or cyanosis  Respiratory: Respirations regular and unlabored at rest. BBS with good air entry in all fields. No crackles, rubs or wheezes auscultated  Cardiovascular: S1S2 Regular rate and rhythm. Soft systolic murmur LSB; no rub or gallop. No carotid bruits. DP/PT pulses +2     . 2-3+pretibial pitting edema  Gastrointestinal: Abdomen soft, obese, distention improved. non tender. Bowel sounds present.   Musculoskeletal: HESS x4. No abnormal movements  Extremities: No digital clubbing or cyanosis  Skin:   Skin warm and dry to touch. No rashes    Neuro: AAO x3 CN II-XII grossly intact  Psych: Mood and affect normal, pleasant and cooperative      Cardiographics  Telemetry:                  EC/06/18      10/24/18             Echocardiogram:       Echocardiogram Findings 18    Left Ventricle Calculated EF = 66%. Estimated EF was in agreement with the calculated EF. Normal left ventricular cavity size and wall thickness noted. All left ventricular wall segments contract normally. Left ventricular diastolic function is normal.   Right Ventricle Right ventricular cavity is borderline dilated.   Left Atrium Left atrial cavity size is mildly dilated. Left atrial volume is mildly increased.    Right Atrium Right atrial cavity size is borderline dilated.   Aortic Valve The aortic valve is abnormal in structure. There is thickening of the aortic valve. Trace aortic valve regurgitation is present. No aortic valve stenosis is present.   Mitral Valve The mitral valve is grossly normal in structure. Mild mitral valve regurgitation is present. No significant mitral valve stenosis is present.   Tricuspid Valve The tricuspid valve is grossly normal. Mild tricuspid valve regurgitation is present.   Pulmonic Valve The pulmonic valve is grossly normal in structure. There is trace pulmonic valve regurgitation present.   Greater Vessels Borderline dilation of the aortic root is present. Borderline dilation of the sinuses of Valsalva is present.   Pericardium The pericardium is normal. There is no evidence of pericardial effusion.              Interpretation Summary previous 1/26/18     · Left ventricular systolic function is normal. Estimated EF = 70%.  · Mild tricuspid valve regurgitation is present.  · Calculated right ventricular systolic pressure from tricuspid regurgitation is 38 mmHg.  · Bubble study was positive but technically difficult. Not clear if this was late bubbles crossing or consistent with a patent foramen ovale.            Imaging  Chest X-ray: 12/5/18     CT of abd pelvis 12/5/18  IMPRESSION:  Persistent patchy densities in the mid to lower lungs. Increased left  more than right pleural effusions. Cardiomegaly with persistent mild  pulmonary vascular congestion. Continued follow-up/further evaluation  recommended as indicated.     IMPRESSION:  1. Basilar predominant opacities favored to reflect atelectasis although  pneumonia not excluded.  2. Right greater than left pleural effusions, moderate pericardial  effusion.  3. Mild mediastinal adenopathy.  4. Homogeneous splenic enlargement.       Lab Review       Results from last 7 days   Lab Units  12/09/18   0627   MAGNESIUM mg/dL  1.9     Results  from last 7 days   Lab Units  12/09/18   0627   SODIUM mmol/L  129*   POTASSIUM mmol/L  4.2   BUN mg/dL  42*   CREATININE mg/dL  2.19*   CALCIUM mg/dL  9.7        Results from last 7 days   Lab Units  12/09/18   0627  12/08/18   1957  12/08/18   0553   12/07/18   0556   WBC 10*3/mm3  11.31*   --   11.34*   --   12.31*   HEMOGLOBIN g/dL  7.5*  7.2*  7.1*   < >  7.3*   HEMATOCRIT %  21.9*  21.5*  21.7*   < >  21.3*   PLATELETS 10*3/mm3  101*   --   101*   --   131*    < > = values in this interval not displayed.     Results from last 7 days   Lab Units  12/09/18   0627  12/08/18   0553  12/07/18   0557   INR   2.02*  1.98*  1.91*         Assessment:  1. moderate pericardial effusion on CT.  Non seen on stat echo.    2.  shortness of breath  3. alcoholic hepatitis - gi following.  Possible MRI and MRCP once kidney function stablized.   4. Anasarca  5. Anemia: despite transfusions. Hgb 7.5.    6. 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 Rhinovirus infection  8. Thrombocytopenia - 12/6 Plt Cout 109,000 (149,000 on 12/5/18)  9. Auto anticoagulated from liver dz - INR 2.02 12/9.   10. LÓPEZ  11. ETOH hepatitis  12. Hemoptysis  13. Cardiomegaly:    Plan  1. moderate pericardial effusion on CT:   ECHO on 12/05/18 showed tricuspid valve regurgitation is present, left atrial cavity size is mildly dilated, right ventricular cavity is borderline dilated, mild mitral valve regurgitation is present, with EF 66%, and no pericardial effusion present. Will redo echo on monday    2.  shortness of breath:  + rhinovirus CXR showed presistent patchy densities in the mid to lower lungs. Increased left more than right pleural effusions. Cardiomegaly with persistent mild pulmonary vascular congestion.  Defer to pulmonology      3. alcoholic hepatitis: defer to GI    4. Acute blood loss Anemia: persistent despite transfusions. 12/9 7.5.       5. Cardiomegaly: Cardiomegaly with persistent mild pulmonary vascular congestion      Labs/tests ordered for am:  "Echo for Monday 12/9/2018  Radha Mata, APRN      ALISSON Kurtz/Transcription:   \"Dictated utilizing Dragon dictation\".     "

## 2018-12-09 NOTE — PROGRESS NOTES
"   LOS: 4 days   Patient Care Team:  Cydney Chase MD as PCP - General (Family Medicine)    Chief Complaint: liver disease    Subjective     History of Present Illness    Subjective:  Symptoms:  Stable.    Diet:  Adequate intake.    Activity level: Impaired due to weakness.      Feeling better today, less shortness of air,  Making urine in good spirits  History taken from: patient chart family    Objective     Vital Signs  Temp:  [98.3 °F (36.8 °C)-98.5 °F (36.9 °C)] 98.5 °F (36.9 °C)  Heart Rate:  [78-90] 89  Resp:  [18-20] 20  BP: (103-114)/(53-65) 114/56    Objective:  General Appearance:  Ill-appearing.    Vital signs: (most recent): Blood pressure 114/56, pulse 89, temperature 98.5 °F (36.9 °C), temperature source Oral, resp. rate 20, height 182.9 cm (72\"), weight 127 kg (279 lb), SpO2 95 %.    Output: Producing urine.    Lungs:  Normal effort.    Heart: Normal rate.    Abdomen: Abdomen is soft.  There is no abdominal tenderness.     Skin:  Warm.              Results Review:     I reviewed the patient's new clinical results.  Discussed with DR VASQUEZ    Medication Review: DONE    Assessment/Plan       Symptomatic anemia    Elevated liver function tests    Alcoholic hepatitis without ascites    Esophageal varices (CMS/HCC)    Hyponatremia    LÓPEZ (acute kidney injury) (CMS/HCC)    Anasarca    Cough    Epistaxis    Hemoptysis    Rhinovirus infection    Pericardial effusion    Pneumonia      Assessment:  (Etoh hepatitis l  Renal failure  URI  Abnormal gallbladder on imaging).     Plan:   (Continue supportive care  Eventual MRI/MRCP  ).       Willie Gutierrez MD  12/09/18  1:36 PM    Time: Critical care 15 min      "

## 2018-12-10 ENCOUNTER — APPOINTMENT (OUTPATIENT)
Dept: CARDIOLOGY | Facility: HOSPITAL | Age: 48
End: 2018-12-10

## 2018-12-10 ENCOUNTER — APPOINTMENT (OUTPATIENT)
Dept: GENERAL RADIOLOGY | Facility: HOSPITAL | Age: 48
End: 2018-12-10

## 2018-12-10 LAB
ALBUMIN SERPL-MCNC: 3.8 G/DL (ref 3.5–5.2)
ALBUMIN/GLOB SERPL: 1.2 G/DL
ALP SERPL-CCNC: 89 U/L (ref 39–117)
ALT SERPL W P-5'-P-CCNC: 33 U/L (ref 1–41)
ANION GAP SERPL CALCULATED.3IONS-SCNC: 15.3 MMOL/L
AST SERPL-CCNC: 43 U/L (ref 1–40)
BACTERIA SPEC RESP CULT: NORMAL
BASOPHILS # BLD AUTO: 0.06 10*3/MM3 (ref 0–0.2)
BASOPHILS NFR BLD AUTO: 0.6 % (ref 0–1.5)
BH CV ECHO MEAS - AO ROOT AREA (BSA CORRECTED): 1.3
BH CV ECHO MEAS - AO ROOT AREA: 8 CM^2
BH CV ECHO MEAS - AO ROOT DIAM: 3.2 CM
BH CV ECHO MEAS - BSA(HAYCOCK): 2.6 M^2
BH CV ECHO MEAS - BSA: 2.5 M^2
BH CV ECHO MEAS - BZI_BMI: 37.8 KILOGRAMS/M^2
BH CV ECHO MEAS - BZI_METRIC_HEIGHT: 182.9 CM
BH CV ECHO MEAS - BZI_METRIC_WEIGHT: 126.6 KG
BH CV ECHO MEAS - EDV(CUBED): 314.4 ML
BH CV ECHO MEAS - EDV(MOD-SP2): 172 ML
BH CV ECHO MEAS - EDV(MOD-SP4): 98 ML
BH CV ECHO MEAS - EDV(TEICH): 239.2 ML
BH CV ECHO MEAS - EF(CUBED): 86.4 %
BH CV ECHO MEAS - EF(MOD-BP): 70 %
BH CV ECHO MEAS - EF(MOD-SP2): 70.3 %
BH CV ECHO MEAS - EF(MOD-SP4): 71.4 %
BH CV ECHO MEAS - EF(TEICH): 78.7 %
BH CV ECHO MEAS - ESV(CUBED): 42.9 ML
BH CV ECHO MEAS - ESV(MOD-SP2): 51 ML
BH CV ECHO MEAS - ESV(MOD-SP4): 28 ML
BH CV ECHO MEAS - ESV(TEICH): 50.9 ML
BH CV ECHO MEAS - FS: 48.5 %
BH CV ECHO MEAS - IVS/LVPW: 1
BH CV ECHO MEAS - IVSD: 0.8 CM
BH CV ECHO MEAS - LV DIASTOLIC VOL/BSA (35-75): 39.9 ML/M^2
BH CV ECHO MEAS - LV MASS(C)D: 232.1 GRAMS
BH CV ECHO MEAS - LV MASS(C)DI: 94.6 GRAMS/M^2
BH CV ECHO MEAS - LV SYSTOLIC VOL/BSA (12-30): 11.4 ML/M^2
BH CV ECHO MEAS - LVIDD: 6.8 CM
BH CV ECHO MEAS - LVIDS: 3.5 CM
BH CV ECHO MEAS - LVLD AP2: 8.5 CM
BH CV ECHO MEAS - LVLD AP4: 8 CM
BH CV ECHO MEAS - LVLS AP2: 7.2 CM
BH CV ECHO MEAS - LVLS AP4: 6.6 CM
BH CV ECHO MEAS - LVOT AREA (M): 3.1 CM^2
BH CV ECHO MEAS - LVOT AREA: 3.1 CM^2
BH CV ECHO MEAS - LVOT DIAM: 2 CM
BH CV ECHO MEAS - LVPWD: 0.8 CM
BH CV ECHO MEAS - RAP SYSTOLE: 3 MMHG
BH CV ECHO MEAS - RVSP: 36.6 MMHG
BH CV ECHO MEAS - SI(CUBED): 110.7 ML/M^2
BH CV ECHO MEAS - SI(MOD-SP2): 49.3 ML/M^2
BH CV ECHO MEAS - SI(MOD-SP4): 28.5 ML/M^2
BH CV ECHO MEAS - SI(TEICH): 76.8 ML/M^2
BH CV ECHO MEAS - SV(CUBED): 271.6 ML
BH CV ECHO MEAS - SV(MOD-SP2): 121 ML
BH CV ECHO MEAS - SV(MOD-SP4): 70 ML
BH CV ECHO MEAS - SV(TEICH): 188.4 ML
BH CV ECHO MEAS - TR MAX VEL: 290 CM/SEC
BH CV VAS BP RIGHT ARM: NORMAL MMHG
BILIRUB SERPL-MCNC: 10.1 MG/DL (ref 0.1–1.2)
BUN BLD-MCNC: 37 MG/DL (ref 6–20)
BUN/CREAT SERPL: 20.3 (ref 7–25)
CALCIUM SPEC-SCNC: 9.3 MG/DL (ref 8.6–10.5)
CHLORIDE SERPL-SCNC: 91 MMOL/L (ref 98–107)
CO2 SERPL-SCNC: 21.7 MMOL/L (ref 22–29)
CREAT BLD-MCNC: 1.82 MG/DL (ref 0.76–1.27)
DEPRECATED RDW RBC AUTO: 73.9 FL (ref 37–54)
EOSINOPHIL # BLD AUTO: 0.08 10*3/MM3 (ref 0–0.7)
EOSINOPHIL NFR BLD AUTO: 0.9 % (ref 0.3–6.2)
ERYTHROCYTE [DISTWIDTH] IN BLOOD BY AUTOMATED COUNT: 19.6 % (ref 11.5–14.5)
GFR SERPL CREATININE-BSD FRML MDRD: 40 ML/MIN/1.73
GLOBULIN UR ELPH-MCNC: 3.1 GM/DL
GLUCOSE BLD-MCNC: 118 MG/DL (ref 65–99)
GRAM STN SPEC: NORMAL
HCT VFR BLD AUTO: 20.8 % (ref 40.4–52.2)
HCT VFR BLD AUTO: 20.9 % (ref 40.4–52.2)
HCT VFR BLD AUTO: 21.6 % (ref 40.4–52.2)
HGB BLD-MCNC: 7 G/DL (ref 13.7–17.6)
HGB BLD-MCNC: 7 G/DL (ref 13.7–17.6)
HGB BLD-MCNC: 7.1 G/DL (ref 13.7–17.6)
IMM GRANULOCYTES # BLD: 0.03 10*3/MM3 (ref 0–0.03)
IMM GRANULOCYTES NFR BLD: 0.3 % (ref 0–0.5)
INR PPP: 2.16 (ref 0.9–1.1)
LV EF 2D ECHO EST: 70 %
LYMPHOCYTES # BLD AUTO: 0.96 10*3/MM3 (ref 0.9–4.8)
LYMPHOCYTES NFR BLD AUTO: 10.3 % (ref 19.6–45.3)
MAGNESIUM SERPL-MCNC: 1.9 MG/DL (ref 1.6–2.6)
MAXIMAL PREDICTED HEART RATE: 174 BPM
MCH RBC QN AUTO: 34.8 PG (ref 27–32.7)
MCHC RBC AUTO-ENTMCNC: 33.5 G/DL (ref 32.6–36.4)
MCV RBC AUTO: 104 FL (ref 79.8–96.2)
MONOCYTES # BLD AUTO: 1.61 10*3/MM3 (ref 0.2–1.2)
MONOCYTES NFR BLD AUTO: 17.3 % (ref 5–12)
NEUTROPHILS # BLD AUTO: 6.62 10*3/MM3 (ref 1.9–8.1)
NEUTROPHILS NFR BLD AUTO: 70.9 % (ref 42.7–76)
PLATELET # BLD AUTO: 88 10*3/MM3 (ref 140–500)
PMV BLD AUTO: 9.5 FL (ref 6–12)
POTASSIUM BLD-SCNC: 3.5 MMOL/L (ref 3.5–5.2)
PROT SERPL-MCNC: 6.9 G/DL (ref 6–8.5)
PROTHROMBIN TIME: 23.7 SECONDS (ref 11.7–14.2)
RBC # BLD AUTO: 2.01 10*6/MM3 (ref 4.6–6)
SODIUM BLD-SCNC: 128 MMOL/L (ref 136–145)
STRESS TARGET HR: 148 BPM
T4 FREE SERPL-MCNC: 1.51 NG/DL (ref 0.93–1.7)
TSH SERPL DL<=0.05 MIU/L-ACNC: 2.23 MIU/ML (ref 0.27–4.2)
VANCOMYCIN SERPL-MCNC: 22.8 MCG/ML (ref 5–40)
WBC NRBC COR # BLD: 9.33 10*3/MM3 (ref 4.5–10.7)

## 2018-12-10 PROCEDURE — 80202 ASSAY OF VANCOMYCIN: CPT | Performed by: INTERNAL MEDICINE

## 2018-12-10 PROCEDURE — 93325 DOPPLER ECHO COLOR FLOW MAPG: CPT | Performed by: INTERNAL MEDICINE

## 2018-12-10 PROCEDURE — 93321 DOPPLER ECHO F-UP/LMTD STD: CPT

## 2018-12-10 PROCEDURE — 84443 ASSAY THYROID STIM HORMONE: CPT | Performed by: INTERNAL MEDICINE

## 2018-12-10 PROCEDURE — 99232 SBSQ HOSP IP/OBS MODERATE 35: CPT | Performed by: NURSE PRACTITIONER

## 2018-12-10 PROCEDURE — 83735 ASSAY OF MAGNESIUM: CPT | Performed by: INTERNAL MEDICINE

## 2018-12-10 PROCEDURE — 85610 PROTHROMBIN TIME: CPT | Performed by: INTERNAL MEDICINE

## 2018-12-10 PROCEDURE — 93321 DOPPLER ECHO F-UP/LMTD STD: CPT | Performed by: INTERNAL MEDICINE

## 2018-12-10 PROCEDURE — 80053 COMPREHEN METABOLIC PANEL: CPT | Performed by: INTERNAL MEDICINE

## 2018-12-10 PROCEDURE — 85014 HEMATOCRIT: CPT | Performed by: INTERNAL MEDICINE

## 2018-12-10 PROCEDURE — 94799 UNLISTED PULMONARY SVC/PX: CPT

## 2018-12-10 PROCEDURE — 84439 ASSAY OF FREE THYROXINE: CPT | Performed by: INTERNAL MEDICINE

## 2018-12-10 PROCEDURE — 93308 TTE F-UP OR LMTD: CPT

## 2018-12-10 PROCEDURE — 25010000002 CEFEPIME PER 500 MG: Performed by: INTERNAL MEDICINE

## 2018-12-10 PROCEDURE — 36415 COLL VENOUS BLD VENIPUNCTURE: CPT | Performed by: INTERNAL MEDICINE

## 2018-12-10 PROCEDURE — 85018 HEMOGLOBIN: CPT | Performed by: INTERNAL MEDICINE

## 2018-12-10 PROCEDURE — 93325 DOPPLER ECHO COLOR FLOW MAPG: CPT

## 2018-12-10 PROCEDURE — 93308 TTE F-UP OR LMTD: CPT | Performed by: INTERNAL MEDICINE

## 2018-12-10 PROCEDURE — 85025 COMPLETE CBC W/AUTO DIFF WBC: CPT | Performed by: INTERNAL MEDICINE

## 2018-12-10 PROCEDURE — 99232 SBSQ HOSP IP/OBS MODERATE 35: CPT | Performed by: INTERNAL MEDICINE

## 2018-12-10 PROCEDURE — 71046 X-RAY EXAM CHEST 2 VIEWS: CPT

## 2018-12-10 RX ORDER — BENZONATATE 100 MG/1
100 CAPSULE ORAL 3 TIMES DAILY PRN
Status: DISCONTINUED | OUTPATIENT
Start: 2018-12-10 | End: 2018-12-18 | Stop reason: HOSPADM

## 2018-12-10 RX ORDER — BUMETANIDE 0.25 MG/ML
4 INJECTION INTRAMUSCULAR; INTRAVENOUS EVERY 6 HOURS
Status: DISCONTINUED | OUTPATIENT
Start: 2018-12-10 | End: 2018-12-11 | Stop reason: DRUGHIGH

## 2018-12-10 RX ADMIN — MIDODRINE HYDROCHLORIDE 10 MG: 5 TABLET ORAL at 08:33

## 2018-12-10 RX ADMIN — CEFEPIME 2 G: 2 INJECTION, POWDER, FOR SOLUTION INTRAVENOUS at 21:49

## 2018-12-10 RX ADMIN — GUAIFENESIN 600 MG: 600 TABLET, EXTENDED RELEASE ORAL at 08:33

## 2018-12-10 RX ADMIN — Medication 2 SPRAY: at 08:34

## 2018-12-10 RX ADMIN — MIDODRINE HYDROCHLORIDE 10 MG: 5 TABLET ORAL at 18:24

## 2018-12-10 RX ADMIN — CEFEPIME 2 G: 2 INJECTION, POWDER, FOR SOLUTION INTRAVENOUS at 08:40

## 2018-12-10 RX ADMIN — BUMETANIDE 4 MG: 0.25 INJECTION INTRAMUSCULAR; INTRAVENOUS at 17:23

## 2018-12-10 RX ADMIN — LACTULOSE 20 G: 10 SOLUTION ORAL at 21:48

## 2018-12-10 RX ADMIN — LACTULOSE 20 G: 10 SOLUTION ORAL at 08:34

## 2018-12-10 RX ADMIN — SODIUM CHLORIDE, PRESERVATIVE FREE 3 ML: 5 INJECTION INTRAVENOUS at 08:41

## 2018-12-10 RX ADMIN — HYDROXYZINE HYDROCHLORIDE 50 MG: 50 TABLET, FILM COATED ORAL at 21:49

## 2018-12-10 RX ADMIN — MIDODRINE HYDROCHLORIDE 10 MG: 5 TABLET ORAL at 12:40

## 2018-12-10 RX ADMIN — IPRATROPIUM BROMIDE AND ALBUTEROL SULFATE 3 ML: 2.5; .5 SOLUTION RESPIRATORY (INHALATION) at 12:31

## 2018-12-10 RX ADMIN — IPRATROPIUM BROMIDE AND ALBUTEROL SULFATE 3 ML: 2.5; .5 SOLUTION RESPIRATORY (INHALATION) at 15:16

## 2018-12-10 RX ADMIN — PANTOPRAZOLE SODIUM 40 MG: 40 TABLET, DELAYED RELEASE ORAL at 08:34

## 2018-12-10 RX ADMIN — SODIUM CHLORIDE, PRESERVATIVE FREE 3 ML: 5 INJECTION INTRAVENOUS at 21:49

## 2018-12-10 RX ADMIN — GUAIFENESIN 600 MG: 600 TABLET, EXTENDED RELEASE ORAL at 21:48

## 2018-12-10 NOTE — PAYOR COMM NOTE
"Enrique Ramirez WOODY (48 y.o. Male)                     ATTENTION;  CONTINUED STAY CLINICALS CASE REF # [id number] , REPLY TO UR DEPT                   [address]  OR UR  [phone]         Date of Birth Social Security Number Address Home Phone MRN    1970  [id number] [facility] [address] [phone] [id number]    [place] Marital Status          Taoism        Admission Date Admission Type Admitting Provider Attending Provider Department, Room/Bed    12/5/18 Emergency Valentino Valdez MD [place], Robert Russell, MD [address], [address]    Discharge Date Discharge Disposition Discharge Destination                       Attending Provider:  Michele Dixon MD    Allergies:  Zinc    Isolation:  Droplet   Infection:  Rhinovirus  (12/05/18)   Code Status:  CPR    Ht:  182.9 cm (72\")   Wt:  127 kg (279 lb)    Admission Cmt:  None   Principal Problem:  Symptomatic anemia [D64.9]                 Active Insurance as of 12/5/2018     Primary Coverage     Payor Plan Insurance Group Employer/Plan Group    [employer] PPO [id number]     Payor Plan Address Payor Plan Phone Number Payor Plan Fax Number Effective Dates    [id number] [phone]  4/16/2017 - None Entered    [place] [address]       Subscriber Name Subscriber Birth Date Member ID       RUSSELL WALKER 12/17/1977 [id number]                 Emergency Contacts      (Rel.) Home Phone Work Phone Mobile Phone    Russell Ramirez (Spouse) [phone] -- --            Lines, Drains & Airways    Active LDAs     Name:   Placement date:   Placement time:   Site:   Days:    Peripheral IV 12/05/18 1643 Right Wrist   12/05/18    1643    Wrist   4                    Byron Hassan MD   Physician   Gastroenterology   Progress Notes   Signed   Date of Service:  12/10/2018  9:05 AM               Signed        Expand All Collapse All  "         Show:Clear all  [x]Manual[x]Template[x]Copied    Added by:  [x]Byron Hassan MD      []Leanne for details      [facility]  Inpatient Progress Note     Reason for Follow Up:  Alcoholic liver disease        Subjective         Interval History:   C/o hemoptysis. NO nausea or vomiting. NO melena. Trending of H/H reviewed.     Current Facility-Administered Medications:   •  bisacodyl (DULCOLAX) EC tablet 5 mg, 5 mg, Oral, Daily PRN, Valentino Valdez MD  •  calcium carbonate (TUMS) chewable tablet 500 mg (200 mg elemental), 2 tablet, Oral, BID PRN, Valentino Valdez MD  •  cefepime (MAXIPIME) 2 g/100 mL 0.9% NS (mbp), 2 g, Intravenous, Q12H, Valentino Valdez MD, 2 g at 12/10/18 0840  •  guaiFENesin (MUCINEX) 12 hr tablet 600 mg, 600 mg, Oral, Q12H, Valentino Valdez MD, 600 mg at 12/10/18 0833  •  hydrOXYzine (ATARAX) tablet 50 mg, 50 mg, Oral, Nightly, mEerald White MD, 50 mg at 12/09/18 2154  •  ipratropium-albuterol (DUO-NEB) nebulizer solution 3 mL, 3 mL, Nebulization, 4x Daily - RT, Valentino Valdez MD, 3 mL at 12/09/18 2002  •  lactulose (CHRONULAC) 10 GM/15ML solution 20 g, 20 g, Oral, BID, Valentino Valdez MD, 20 g at 12/10/18 0834  •  midodrine (PROAMATINE) tablet 10 mg, 10 mg, Oral, TID AC, Emerald White MD, 10 mg at 12/10/18 0833  •  nitroglycerin (NITROSTAT) SL tablet 0.4 mg, 0.4 mg, Sublingual, Q5 Min PRN, Valentino Valdez MD  •  ondansetron (ZOFRAN) tablet 4 mg, 4 mg, Oral, Q6H PRN **OR** ondansetron ODT (ZOFRAN-ODT) disintegrating tablet 4 mg, 4 mg, Oral, Q6H PRN **OR** ondansetron (ZOFRAN) injection 4 mg, 4 mg, Intravenous, Q6H PRN, Valentino Valdez MD  •  oxyCODONE (ROXICODONE) immediate release tablet 5 mg, 5 mg, Oral, Q4H PRN, Valentino Valdez MD  •  oxymetazoline (AFRIN) nasal spray 2 spray, 2 spray, Each Nare, BID, Valentino Valdez MD, 2 spray at 12/10/18 0834  •  pantoprazole (PROTONIX) EC tablet 40 mg, 40 mg, Oral, QAM AC, Valentino Valdez,  MD, 40 mg at 12/10/18 0834  •  Pharmacy to dose vancomycin, , Does not apply, Continuous PRN, Valentino Valdez MD  •  sodium chloride (OCEAN) nasal spray 2 spray, 2 spray, Each Nare, PRN, Ozzie Painter MD  •  [COMPLETED] Insert peripheral IV, , , Once **AND** sodium chloride 0.9 % flush 10 mL, 10 mL, Intravenous, PRN, Gurinder Burnett MD  •  sodium chloride 0.9 % flush 3 mL, 3 mL, Intravenous, Q12H, Valentino Valdez MD, 3 mL at 12/10/18 0841  •  sodium chloride 0.9 % flush 3-10 mL, 3-10 mL, Intravenous, PRN, Valentino Valdez MD  •  Vancomycin Pharmacy Intermittent Dosing, , Does not apply, Daily, Valentino Valdez MD  •  zolpidem (AMBIEN) tablet 5 mg, 5 mg, Oral, Nightly PRN, Valentino Valdez MD, 5 mg at 12/05/18 2103  Review of Systems:               The following systems were reviewed and negative;  musculoskeletal and neurological          Objective         Vital Signs  Temp:  [97.9 °F (36.6 °C)-98.7 °F (37.1 °C)] 97.9 °F (36.6 °C)  Heart Rate:  [84-96] 90  Resp:  [18-20] 18  BP: ()/(56-65) 90/60  Body mass index is 37.84 kg/m².     Intake/Output Summary (Last 24 hours) at 12/10/2018 0905  Last data filed at 12/10/2018 0200      Gross per 24 hour   Intake 1537 ml   Output 1235 ml   Net 302 ml      No intake/output data recorded.                Physical Exam:              General: patient awake, alert and cooperative. +jaundiced              Eyes: Normal lids and lashes, + scleral icterus              Neck: supple, normal ROM              Skin: warm and dry, not jaundiced              Cardiovascular: regular rhythm and rate, no murmurs auscultated              Pulm: clear to auscultation bilaterally, regular and unlabored              Abdomen: soft, nontender, nondistended; normal bowel sounds              Rectal: deferred              Extremities: no rash, 2+ pitting pretibial edema              Psychiatric: Normal mood and behavior; memory intact                Results Review:                I  reviewed the patient's new clinical results.              Results from last 7 days   Lab Units  12/10/18   0638  12/09/18 2026 12/09/18 0627 12/08/18   0553   WBC 10*3/mm3  9.33   --   11.31*   --   11.34*   HEMOGLOBIN g/dL  7.0*  7.4*  7.5*   < >  7.1*   HEMATOCRIT %  20.9*  22.1*  21.9*   < >  21.7*   PLATELETS 10*3/mm3  88*   --   101*   --   101*    < > = values in this interval not displayed.             Results from last 7 days   Lab Units  12/10/18   0644  12/09/18 0627 12/08/18   0553   SODIUM mmol/L  128*  129*  127*   POTASSIUM mmol/L  3.5  4.2  4.8   CHLORIDE mmol/L  91*  91*  91*   CO2 mmol/L  21.7*  20.3*  19.7*   BUN mg/dL  37*  42*  45*   CREATININE mg/dL  1.82*  2.19*  2.50*   CALCIUM mg/dL  9.3  9.7  9.4   BILIRUBIN mg/dL  10.1*  10.8*  10.8*   ALK PHOS U/L  89  90  80   ALT (SGPT) U/L  33  37  36   AST (SGOT) U/L  43*  42*  41*   GLUCOSE mg/dL  118*  119*  126*             Results from last 7 days   Lab Units  12/10/18   0638  12/09/18 0627 12/08/18   0553   INR    2.16*  2.02*  1.98*            Lab Results   Lab Value Date/Time     LIPASE 51 12/05/2018 1306     LIPASE 53 11/28/2018 1101     LIPASE 38 10/24/2018 1439     LIPASE 45 06/21/2018 1906         Radiology:  XR Chest PA & Lateral   Final Result       XR Chest 1 View   Final Result   Persistent mid to lower lung infiltrate/atelectasis, with   increased pleural effusion apparent on the left, continued follow-up   recommended. Cardiomegaly, pulmonary vascular congestion.       This report was finalized on 12/7/2018 5:58 PM by Dr. Rojas Dubon M.D.           CT Chest Without Contrast   Final Result   1. Basilar predominant opacities favored to reflect atelectasis although   pneumonia not excluded.   2. Right greater than left pleural effusions, moderate pericardial   effusion.   3. Mild mediastinal adenopathy.   4. Homogeneous splenic enlargement.                           .        This report was finalized on 12/7/2018  5:53 AM by Valentino Nguyen M.D.           US Renal Bilateral   Final Result   Ascites. Otherwise negative renal sonogram. There is no   evidence of obstruction.       This report was finalized on 12/6/2018 8:05 PM by Dr. Brannon Cruz M.D.           XR Chest 1 View   Final Result   Persistent patchy densities in the mid to lower lungs. Increased left   more than right pleural effusions. Cardiomegaly with persistent mild   pulmonary vascular congestion. Continued follow-up/further evaluation   recommended as indicated.                   This report was finalized on 12/5/2018 1:49 PM by Dr. Rojas Dubon M.D.                      Assessment/Plan             Patient Active Problem List   Diagnosis   • Acute upper GI bleed   • Gout   • Pre-diabetes   • Cholelithiasis   • Elevated liver function tests   • Alcohol abuse   • Alcoholic hepatitis without ascites   • Thrombocytopenia (CMS/HCC)   • Esophagitis   • Folate deficiency   • Acute anemia   • Esophageal varices (CMS/HCC)   • Hepatitis, alcoholic   • Ascites   • Portal hypertension (CMS/HCC)   • Hyponatremia   • Symptomatic anemia   • LÓPEZ (acute kidney injury) (CMS/HCC)   • Anasarca   • Cough   • Epistaxis   • Hemoptysis   • Rhinovirus infection   • Pericardial effusion   • Pneumonia         I discussed the patients findings and my recommendations with patient.     Assessment/Recommendations:  1) Alcoholic liver disease.  2) 1+ esophageal varices seen on 6/18 EGD  3) Hemoptysis - Pulmonary is following.   4) Abnormal gallbladder on recent US:  IMPRESSION:  1. Within the gallbladder, there is a rounded area of soft tissue  echogenicity that appears to contain flow and is suspicious for a  gallbladder mass. Tumefactive sludge is also a possibility and there is  sludge within the gallbladder. This finding represents a change compared  to the previous ultrasound of the gallbladder 06/21/2018. Attempt at  further evaluation could be performed with a CT or MRI  if  cholecystectomy is not performed. There is no biliary duct dilatation.  2. Mild ascites/perihepatic fluid.  3. Diffuse fat infiltration of the liver.     Discussed with Emerald White MD in the morning on 2018.     Patient was told by Dr. JOE White that this was not cancer in the gallbladder.   5) Anemia - I will check some anemia labs and check serial H/H, transfuse as needed. His last colonoscopy was about 3 yrs ago and was unrevealing. His last EGD was in  and showed nonbleeding esophageal varices, portal hypertensive gastropathy, and esophagitis. Continue the Protonix po.     MD Micah Barrios Mark Edwin, MD   Physician   Pulmonology   Progress Notes   Signed   Date of Service:  12/10/2018  9:04 AM               Signed        Expand All Collapse All          Show:Clear all  [x]Manual[x]Template[x]Copied    Added by:  [x]Enrique Obrien MD      []Leanne for details           [facility] INPATIENT PROGRESS NOTE                                                                 [address]     12/10/2018        PATIENT IDENTIFICATION:  Name: Enrique Ramirez ADMIT: 2018   : 1970  PCP: Cydney Chase MD    MRN: [id number] LOS: 5 days   AGE/SEX: 48 y.o. male  ROOM: [facility]                                                                                                     LOS 5     Reason for visit: Hemoptysis        SUBJECTIVE:                            Coughing a lot at the time of my visit this morning. Nobody sputum at this time but he did have episode of coughing up blood last night again. Add cough suppressant.        Objective      OBJECTIVE:     Vital Sign Min/Max for last 24 hours  Temp  Min: 97.9 °F (36.6 °C)  Max: 98.7 °F (37.1 °C)   BP  Min: 90/60  Max: 119/65   Pulse  Min: 84  Max: 96   Resp  Min: 18  Max: 20   SpO2  Min: 94 %  Max: 94 %   No Data Recorded   Weight  Min: 127 kg (279 lb)  Max: 127 kg (279 lb)                                                         Body mass index is 37.84 kg/m².     Intake/Output Summary (Last 24 hours) at 12/10/2018 1035  Last data filed at 12/10/2018 0200      Gross per 24 hour   Intake 1537 ml   Output 1235 ml   Net 302 ml            Exam:  GEN:               No distress, appears stated age  EYES:              PERRL, anicteric sclera  ENT:                External ears/nose normal, OP clear  NECK:             No adenopathy, midline trachea  LUNGS:           Normal chest on inspection, palpation and diminished bilaterally with scattered coarse breath sounds at bases on auscultation  CV:                  Normal S1S2, without murmur  ABD:                Non tender, non distended, no hepatosplenomegaly, +BS  EXT:                No edema, cyanosis or clubbing     Scheduled meds:            cefepime 2 g Intravenous Q12H   guaiFENesin 600 mg Oral Q12H   hydrOXYzine 50 mg Oral Nightly   ipratropium-albuterol 3 mL Nebulization 4x Daily - RT   lactulose 20 g Oral BID   midodrine 10 mg Oral TID AC   oxymetazoline 2 spray Each Nare BID   pantoprazole 40 mg Oral QAM AC   sodium chloride 3 mL Intravenous Q12H      IV meds:                        Data Review:           Results from last 7 days   Lab Units  12/10/18   0644  12/09/18   0627  12/08/18   0553  12/07/18   0556  12/06/18   0559   SODIUM mmol/L  128*  129*  127*  127*  126*   POTASSIUM mmol/L  3.5  4.2  4.8  5.0  5.0   CHLORIDE mmol/L  91*  91*  91*  91*  90*   CO2 mmol/L  21.7*  20.3*  19.7*  20.3*  21.0*   BUN mg/dL  37*  42*  45*  39*  35*   CREATININE mg/dL  1.82*  2.19*  2.50*  2.48*  1.80*   GLUCOSE mg/dL  118*  119*  126*  128*  90   CALCIUM mg/dL  9.3  9.7  9.4  9.3  9.3          Estimated Creatinine Clearance: 69.9 mL/min (A) (by C-G formula based on SCr of 1.82 mg/dL (H)).               Results from last 7 days   Lab Units  12/10/18   0638  12/09/18 2026 12/09/18 0627 12/08/18 1957 12/08/18   0553    12/07/18   0556     12/06/18   0559   WBC 10*3/mm3  9.33   --   11.31*   --   11.34*   --   12.31*   --   11.84*   HEMOGLOBIN g/dL  7.0*  7.4*  7.5*  7.2*  7.1*   < >  7.3*   < >  7.0*   PLATELETS 10*3/mm3  88*   --   101*   --   101*   --   131*   --   109*    < > = values in this interval not displayed.               Results from last 7 days   Lab Units  12/10/18   0638  12/09/18   0627  12/08/18   0553  12/07/18   0557  12/06/18   0559   INR    2.16*  2.02*  1.98*  1.91*  1.86*               Results from last 7 days   Lab Units  12/10/18   0644  12/09/18   0627  12/08/18   0553  12/07/18   0556  12/06/18   0559   ALT (SGPT) U/L  33  37  36  44*  49*   AST (SGOT) U/L  43*  42*  41*  46*  51*                Results from last 7 days   Lab Units  12/07/18   0556  12/06/18   0559   PROCALCITONIN ng/mL  0.43*  0.27*                                                     Chest x-ray 12/7 viewed shows infiltrate/atelectasis and increased pleural effusion on the left with cardiomegaly and pulmonary vascular congestion        Chest x-ray 12/10 viewed shows patchy atelectasis and infiltrate both lower lung zones with bilateral pleural effusions showing no significant change from previous.           Microbiology reviewed  Human Rhinovirus/Enterovirus Detected Abnormal                  )    Assessment      ASSESSMENT:                      Active Hospital Problems     Diagnosis Date Noted   • **Symptomatic anemia [D64.9] 12/05/2018   • Pneumonia [J18.9] 12/08/2018   • Rhinovirus infection [B34.8] 12/06/2018   • Pericardial effusion [I31.3] 12/06/2018   • LÓPEZ (acute kidney injury) (CMS/HCC) [N17.9] 12/05/2018   • Anasarca [R60.1] 12/05/2018   • Cough [R05] 12/05/2018   • Epistaxis [R04.0] 12/05/2018   • Hemoptysis [R04.2] 12/05/2018   • Hyponatremia [E87.1] 10/25/2018   • Esophageal varices (CMS/HCC) [I85.00] 10/24/2018   • Elevated liver function tests [R94.5] 06/22/2018   • Alcoholic hepatitis without ascites [K70.10] 06/22/2018       Resolved  Hospital Problems   No resolved problems to display.      Pneumonia  Hemoptysis  Coagulopathy secondary to liver disease  Mediastinal lymphadenopathy  Bilateral pleural effusions and anasarca  Cirrhosis/hepatitis  Obesity/?AL  URI with rhinovirus  Anemia  Acute kidney injury        PLAN:     Hemoptysis secondary to coagulopathy from liver disease and infectious pneumonia.  If worsens will require bronchoscopy but seems to be improving.  Continue antibiotics.  Encourage pulmonary toilet.  And cough suppressant.  Pleural effusion secondary to parapneumonic effusion on top of liver disease with anasarca.  If worsens may require thoracentesis.  Continue bronchodilators for help with clearance.  Repeat CXR for f/u infiltrate and effusions shows no change.     Enrique Obrien MD  Pulmonary and Critical Care Medicine  [facility]  12/10/2018     10:35 AM                                      Michele Dixon MD   Physician   Medicine   Progress Notes   Signed   Date of Service:  12/10/2018 10:54 AM               Signed        Expand All Collapse All          Show:Clear all  [x]Manual[x]Template[x]Copied    Added by:  [x]Michele Dixon MD      []ver for details       LOS: 5 days      Name: Enrique Ramirez  Age: 48 y.o.  Sex: male  :  1970  MRN: [id number]         Primary Care Physician: Cydney Chase MD        Subjective      Subjective  Complains of hemoptysis today.  States he produced about a quarter cup blood earlier this morning.  Denies shortness of breath.  States he otherwise feels pretty well today.          Objective      Vital Signs  Temp:  [97.9 °F (36.6 °C)-98.7 °F (37.1 °C)] 97.9 °F (36.6 °C)  Heart Rate:  [84-96] 90  Resp:  [18-20] 18  BP: ()/(56-65) 90/60  Body mass index is 37.84 kg/m².     Objective:  General Appearance:  Comfortable and in no acute distress.    Vital signs: (most recent): Blood pressure 90/60, pulse 90, temperature 97.9 °F (36.6 °C),  "temperature source Oral, resp. rate 18, height 182.9 cm (72\"), weight 127 kg (279 lb), SpO2 94 %.    Lungs:  Normal effort and normal respiratory rate.    Heart: Normal rate.  Regular rhythm.    Abdomen: Abdomen is soft.  Bowel sounds are normal.   There is no abdominal tenderness.     Extremities: There is no dependent edema or local swelling.    Neurological: Patient is alert and oriented to person, place and time.    Skin:  Warm and dry.                   Results Review:       I reviewed the patient's new clinical results.                      Results from last 7 days   Lab Units  12/10/18   0638  12/09/18 2026  12/09/18   0627  12/08/18   1957  12/08/18   0553  12/08/18   0023  12/07/18   1748    12/07/18   0556    12/06/18   0559    12/05/18   1306   WBC 10*3/mm3  9.33   --   11.31*   --   11.34*   --    --    --   12.31*   --   11.84*   --   11.34*   HEMOGLOBIN g/dL  7.0*  7.4*  7.5*  7.2*  7.1*  7.2*  6.8*   < >  7.3*   < >  7.0*   < >  6.7*   PLATELETS 10*3/mm3  88*   --   101*   --   101*   --    --    --   131*   --   109*   --   149    < > = values in this interval not displayed.                Results from last 7 days   Lab Units  12/10/18   0644  12/09/18   0627  12/08/18   0553  12/07/18   0556  12/06/18   0559  12/05/18   1306   SODIUM mmol/L  128*  129*  127*  127*  126*  125*   POTASSIUM mmol/L  3.5  4.2  4.8  5.0  5.0  5.4*   CHLORIDE mmol/L  91*  91*  91*  91*  90*  88*   CO2 mmol/L  21.7*  20.3*  19.7*  20.3*  21.0*  22.8   BUN mg/dL  37*  42*  45*  39*  35*  35*   CREATININE mg/dL  1.82*  2.19*  2.50*  2.48*  1.80*  1.89*   CALCIUM mg/dL  9.3  9.7  9.4  9.3  9.3  9.2   GLUCOSE mg/dL  118*  119*  126*  128*  90  113*                Results from last 7 days   Lab Units  12/10/18   0638  12/09/18   0627  12/08/18   0553  12/07/18   0557  12/06/18   0559  12/05/18   1306   INR    2.16*  2.02*  1.98*  1.91*  1.86*  1.83*                  Scheduled Meds:      cefepime 2 g Intravenous Q12H "   guaiFENesin 600 mg Oral Q12H   hydrOXYzine 50 mg Oral Nightly   ipratropium-albuterol 3 mL Nebulization 4x Daily - RT   lactulose 20 g Oral BID   midodrine 10 mg Oral TID AC   oxymetazoline 2 spray Each Nare BID   pantoprazole 40 mg Oral QAM AC   sodium chloride 3 mL Intravenous Q12H      PRN Meds:   •  benzonatate  •  bisacodyl  •  calcium carbonate  •  nitroglycerin  •  ondansetron **OR** ondansetron ODT **OR** ondansetron  •  oxyCODONE  •  sodium chloride  •  [COMPLETED] Insert peripheral IV **AND** sodium chloride  •  sodium chloride  •  zolpidem  Continuous Infusions:          Assessment/Plan           Active Hospital Problems     Diagnosis Date Noted   • **Symptomatic anemia [D64.9] 12/05/2018   • Pneumonia [J18.9] 12/08/2018   • Rhinovirus infection [B34.8] 12/06/2018   • Pericardial effusion [I31.3] 12/06/2018   • LÓPEZ (acute kidney injury) (CMS/HCC) [N17.9] 12/05/2018   • Anasarca [R60.1] 12/05/2018   • Cough [R05] 12/05/2018   • Epistaxis [R04.0] 12/05/2018   • Hemoptysis [R04.2] 12/05/2018   • Hyponatremia [E87.1] 10/25/2018   • Esophageal varices (CMS/HCC) [I85.00] 10/24/2018   • Elevated liver function tests [R94.5] 06/22/2018   • Alcoholic hepatitis without ascites [K70.10] 06/22/2018       Resolved Hospital Problems   No resolved problems to display.         Assessment & Plan     Pneumonia  - Continue Cefepime.  MRSA screen was negative and will discontinue vancomycin     Symptomatic Anemia  - Hgb low but stable, continue to monitor.  S/p 2 units PRBCs on 12/5  - monitor Hgb and transfuse as needed     Hemoptysis  -  he has had recurrence of hemoptysis this morning.  Pulmonology following.  May need bronchoscopy.  - 2/2 liver disease coagulopathy and PNA     LÓPEZ  - Cr stable today, per nephrology  - Attempting to diurese     Pericardial Effusion  - Apparent on CT chest, not present on echocardiogram in October  - Echocardiogram with tiny effusion not able to be tapped, no tamponade  - Repeat ECHO  planned for today     EtOH Hepatitis  - possible secondary cirrhosis  -nadolol, lasix, and spironolactone held  - RUQ u/s revealing GB mass-GI following and will need MRCP when renal function stabilizes     DVT Prophylaxis  - SCDs        Michele Dixon MD  [facility]  12/10/18  10:54 AM                            ED to Hosp-Admission (Current) on 12/5/2018            Detailed Report        Michelle Gomez MD   Physician   Nephrology   Progress Notes   Signed   Date of Service:  12/10/2018  2:10 PM               Signed        Expand All Collapse All          Show:Clear all  [x]Manual[x]Template[x]Copied    Added by:  [x]Michelle Gomez MD      []Hover for details                                                      NEPHROLOGY PROGRESS NOTE     PATIENT IDENTIFICATION:              Name:  Enrique Ramirez                                                                       MRN:  [id number]                           48 y.o.  male                                                                      Reason for visit: LÓPEZ     SUBJECTIVE:              Feels better.   Coughed up blood this am.  Bowels moving.  Urinating a lot.  No change in edema. Not soa. Eating.      OBJECTIVE:  Vitals          Vitals:     12/10/18 0829 12/10/18 1231 12/10/18 1237 12/10/18 1239   BP: 90/60     97/52   BP Location: Right arm     Right arm   Patient Position: Sitting     Sitting   Pulse: 90 90 85 85   Resp: 18 16 15     Temp: 97.9 °F (36.6 °C)         TempSrc: Oral         SpO2:   95% 96%     Weight:           Height:                                                       Body mass index is 37.84 kg/m².     Intake/Output Summary (Last 24 hours) at 12/10/2018 1410  Last data filed at 12/10/2018 1201      Gross per 24 hour   Intake 1777 ml   Output 1235 ml   Net 542 ml           Wt Readings from Last 1 Encounters:   12/10/18 0730 127 kg (279 lb)   12/08/18 0618 127 kg (279 lb)   12/07/18 0530 125 kg (276  lb)   12/06/18 1457 121 kg (267 lb)   12/06/18 0600 121 kg (267 lb 8 oz)   12/05/18 1814 124 kg (273 lb)   12/05/18 1238 119 kg (262 lb 6.4 oz)          Wt Readings from Last 3 Encounters:   12/10/18 127 kg (279 lb)   11/13/18 118 kg (260 lb)   11/09/18 118 kg (260 lb 12.8 oz)            Exam:  NAD; pleasant; oriented; looks older than stated age  Overweight; jaundiced  HEENT MMM; AT/NC  No eye d/c; scleral icterus  Lungs Coarse bilat; Decreased bs bases  Heart RRR, no rub, no s3  ABd  + bs, soft, distended, nontender. Body wall edema.   Ext 3 +edema lower ext to hips  No clubbing  No asterixis  Moves all extremities   Speech fluent  Mood depressed; flat affect        Scheduled meds:            cefepime 2 g Intravenous Q12H   guaiFENesin 600 mg Oral Q12H   hydrOXYzine 50 mg Oral Nightly   ipratropium-albuterol 3 mL Nebulization 4x Daily - RT   lactulose 20 g Oral BID   midodrine 10 mg Oral TID AC   oxymetazoline 2 spray Each Nare BID   pantoprazole 40 mg Oral QAM AC   sodium chloride 3 mL Intravenous Q12H      IV meds:                           Data Review:            Results from last 7 days   Lab Units  12/10/18   0644  12/09/18   0627  12/08/18   0553   SODIUM mmol/L  128*  129*  127*   POTASSIUM mmol/L  3.5  4.2  4.8   CHLORIDE mmol/L  91*  91*  91*   CO2 mmol/L  21.7*  20.3*  19.7*   BUN mg/dL  37*  42*  45*   CREATININE mg/dL  1.82*  2.19*  2.50*   CALCIUM mg/dL  9.3  9.7  9.4   BILIRUBIN mg/dL  10.1*  10.8*  10.8*   ALK PHOS U/L  89  90  80   ALT (SGPT) U/L  33  37  36   AST (SGOT) U/L  43*  42*  41*   GLUCOSE mg/dL  118*  119*  126*      Estimated Creatinine Clearance: 69.9 mL/min (A) (by C-G formula based on SCr of 1.82 mg/dL (H)).        Results from last 7 days   Lab Units  12/07/18   0556  12/06/18   2207   SODIUM UR mmol/L   --   <20   CREATININE UR mg/dL   --   267.7   URIC ACID mg/dL  11.3*   --             Results from last 7 days   Lab Units  12/10/18   0644  12/09/18   0627   MAGNESIUM mg/dL  1.9   1.9   PHOSPHORUS mg/dL   --   4.0                       Results from last 7 days   Lab Units  12/10/18   0953  12/10/18   0638  12/09/18 2026 12/09/18 0627 12/08/18 1957  12/08/18   0553    12/07/18   0556    12/06/18   0559   WBC 10*3/mm3   --   9.33   --   11.31*   --   11.34*   --   12.31*   --   11.84*   HEMOGLOBIN g/dL  7.0*  7.0*  7.4*  7.5*  7.2*  7.1*   < >  7.3*   < >  7.0*   PLATELETS 10*3/mm3   --   88*   --   101*   --   101*   --   131*   --   109*    < > = values in this interval not displayed.                  Results from last 7 days   Lab Units  12/10/18   0638  12/09/18   0627  12/08/18   0553  12/07/18   0557  12/06/18   0559   INR    2.16*  2.02*  1.98*  1.91*  1.86*                                      ASSESSMENT:              Symptomatic anemia    Elevated liver function tests    Alcoholic hepatitis without ascites    Esophageal varices (CMS/HCC)    Hyponatremia    LÓPEZ (acute kidney injury) (CMS/HCC)    Anasarca    Cough    Epistaxis    Hemoptysis    Rhinovirus infection    Pericardial effusion    Pneumonia     1.  LÓPEZ, non-oliguric, improving.  prerenal from ABLA, hypotension, and profound liver injury. Improving UOP and stable BP's argue against HRS, as does improving SCr.  Hyponatremia with hypervolemia; stable K.    2.  ABLA  3.  Alcoholic cirrhosis  4.  Hypotension  5.  Hemoptysis,  Will likely need bronch  6.  TCP        PLAN:  1. Bumex IV today        Michelle Gomez MD  12/10/2018     2:10 PM                               ED to Hosp-Admission (Current) on 12/5/2018            Detailed Report

## 2018-12-10 NOTE — PROGRESS NOTES
"Pharmacokinetic Consult - Vancomycin Dosing (Follow-up Note)    Enrique Ramirez is on day 4 pharmacy to dose vancomycin for HCAP.  Pharmacy dosing vancomycin per Dr. Maldonado's request.   Goal random level:  15-20 mg/L     Relevant clinical data and objective history reviewed:  48 y.o. male 182.9 cm (72\") 127 kg (279 lb)    Past Medical History:   Diagnosis Date   • Esophageal varices (CMS/HCC)    • Gallstones    • Gout    • Hepatitis, alcoholic    • History of transfusion    • Pre-diabetes      Creatinine   Date Value Ref Range Status   12/10/2018 1.82 (H) 0.76 - 1.27 mg/dL Final   12/09/2018 2.19 (H) 0.76 - 1.27 mg/dL Final   12/08/2018 2.50 (H) 0.76 - 1.27 mg/dL Final     BUN   Date Value Ref Range Status   12/10/2018 37 (H) 6 - 20 mg/dL Final     Estimated Creatinine Clearance: 69.9 mL/min (A) (by C-G formula based on SCr of 1.82 mg/dL (H)).    Lab Results   Component Value Date    WBC 9.33 12/10/2018     Temp Readings from Last 3 Encounters:   12/10/18 97.9 °F (36.6 °C) (Oral)   11/13/18 98.6 °F (37 °C) (Oral)   11/09/18 97.6 °F (36.4 °C) (Oral)     Baseline culture/source/susceptibility:    RVP (12/5): Human Rhinovirus/Enterovirus +  MRSA screen negative   SCx (12/7): NGTD    Anti-Infectives (From admission, onward)    Ordered     Dose/Rate Route Frequency Start Stop    12/09/18 1938  Vancomycin Pharmacy Intermittent Dosing     Ordering Provider:  Valentino Valdez MD     Does not apply Daily 12/09/18 2030 12/14/18 0859    12/07/18 1350  cefepime (MAXIPIME) 2 g/100 mL 0.9% NS (mbp)     Ordering Provider:  Valentino Valdez MD    2 g  over 4 Hours Intravenous Every 12 Hours 12/07/18 2100 12/14/18 2059 12/07/18 1350  cefepime (MAXIPIME) 2 g/100 mL 0.9% NS (mbp)     Ordering Provider:  Valentino Valdez MD    2 g  200 mL/hr over 30 Minutes Intravenous Once 12/07/18 1500 12/07/18 1529    12/07/18 1350  vancomycin 2000 mg/500 mL 0.9% NS IVPB ([facility])     Ordering Provider:  Valentino Valdez MD    15 mg/kg × 125 kg " Intravenous Once 12/07/18 1445 12/07/18 1849    12/07/18 1350  Pharmacy to dose vancomycin     Ordering Provider:  Valentino Valdez MD     Does not apply Continuous PRN 12/07/18 1350 12/14/18 1349         Lab Results   Component Value Date    VANCOTROUGH 28.10 (C) 12/09/2018     Lab Results   Component Value Date    VANCORANDOM 22.80 12/10/2018     Vancomycin Dosing History   2000 mg IV x1               12/07/18 1849  1250 mg IV q12h              12/08/18 0638              12/08/18 1848              12/09/18 0612                          Trough 12/09 0627: 25.2 mg/L (~15 min after dose charted as given)                           Repeat trough 12/09 1843: 28.1 mg/L (~12.5h after dose)              12/10/18 0644 Vancomycin random level = 22.8 mg/L (~24 hrs after last dose)    Assessment/Plan    Serum creatinine is improving, but patient not clearing Vancomycin as predicted.  Will continue intermittent dosing of Vancomycin while renal is unstable.  Will give a one time dose of Vancomycin 1250 mg IVPB. Will monitor serum creatinine at least every 48 hours per dosing recommendations. Vancomycin random level to be drawn with am labs. Pharmacy will continue to follow daily while on vancomycin and adjust as needed. Thank you for consult.      Ifeoma Faustin, R.Ph.  Clinical Staff Pharmacist

## 2018-12-10 NOTE — PROGRESS NOTES
[facility]  Cardiology Progress Note    Patient Identification:  Name: Enrique Ramirez  Age: 48 y.o.  Sex: male  :  1970  MRN: [id number]                 Follow Up / Chief Complaint: pericardial effusion seen on CT        Interval History: echo on  showed no pericardial effusion.  feeling better.  Less orthopnea.  Has had multiple transfusions.  States he coughed up blood           Subjective: Shortness of breath and cough remain, but denies chest pain and pressure, orthopnea better      Objective:   Temp:  [97.9 °F (36.6 °C)-98.7 °F (37.1 °C)] 97.9 °F (36.6 °C)  Heart Rate:  [81-96] 83  Resp:  [15-20] 15  BP: ()/(49-60) 96/49  SpO2:  [94 %-96 %] 94 %      Past Medical History:  Past Medical History:   Diagnosis Date   • Esophageal varices (CMS/HCC)    • Gallstones    • Gout    • Hepatitis, alcoholic    • History of transfusion    • Pre-diabetes      Past Surgical History:  Past Surgical History:   Procedure Laterality Date   • COLONOSCOPY     • FOOT SURGERY          Social History:   Social History     Tobacco Use   • Smoking status: Never Smoker   • Smokeless tobacco: Former User     Types: Chew   Substance Use Topics   • Alcohol use: No     Comment: last drink        Family History:  Family History   Problem Relation Age of Onset   • Lung cancer Mother    • Heart attack Father           Allergies:  Allergies   Allergen Reactions   • Zinc Nausea And Vomiting     Scheduled Meds:    bumetanide 4 mg Q6H   cefepime 2 g Q12H   guaiFENesin 600 mg Q12H   hydrOXYzine 50 mg Nightly   ipratropium-albuterol 3 mL 4x Daily - RT   lactulose 20 g BID   midodrine 10 mg TID AC   oxymetazoline 2 spray BID   pantoprazole 40 mg QAM AC   sodium chloride 3 mL Q12H           INTAKE AND OUTPUT:    Intake/Output Summary (Last 24 hours) at 12/10/2018 1752  Last data filed at 12/10/2018 1201  Gross per 24 hour   Intake 1777 ml   Output 1235 ml   Net 542 ml     18 0618  127 kg (279 lb)   18  0530  125 kg (276 lb)   18 1457  121 kg (267 lb)   18 0600  121 kg (267 lb 8 oz)   18 1814  124 kg (273 lb)   18 12:38:05  119 kg (262 lb 6.4 oz)       Review of Systems:   GI: Denies n/v, abd distention improved  Cardiac: Denies chest pain/pressure, + orthopnea improved  Pulmonary:  Shortness of breath and cough continue    Constitutional:  Temp:  [97.9 °F (36.6 °C)-98.7 °F (37.1 °C)] 97.9 °F (36.6 °C)  Heart Rate:  [81-96] 83  Resp:  [15-20] 15  BP: ()/(49-60) 96/49   SpO2:  [94 %-96 %] 94 %    Physical Exam:  General:  Appears in no acute distress, jaundiced  Eyes: EOM normal, icteric.   HEENT:  + JVD. Thyroid not visibly enlarged. wet mucosal pallor or cyanosis  Respiratory: Respirations regular and unlabored at rest. BBS with good air entry in all fields. No crackles, rubs or wheezes auscultated  Cardiovascular: S1S2 Regular rate and rhythm. Soft systolic murmur LSB; no rub or gallop. No carotid bruits. DP/PT pulses +2     . 2-3+pretibial pitting edema  Gastrointestinal: Abdomen soft, obese, distention improved. non tender. Bowel sounds present.   Musculoskeletal: HESS x4. No abnormal movements  Extremities: No digital clubbing or cyanosis  Skin:   Skin warm and dry to touch. No rashes    Neuro: AAO x3 CN II-XII grossly intact  Psych: Mood and affect normal, pleasant and cooperative      Cardiographics  Telemetry: SR                 EC/06/18      10/24/18             Echocardiogram:   Interpretation Summary     · Left ventricular systolic function is normal. Estimated EF = 70%.  · Right ventricular cavity is moderately dilated.  · Right atrial cavity size is moderately dilated.  · Moderate tricuspid valve regurgitation is present.  · Estimated right ventricular systolic pressure from tricuspid regurgitation is mildly elevated (35-45 mmHg).  · There is a small (<1cm) pericardial effusion.      Tracing has been interpreted and reviewed      Echocardiogram Findings 18    Left  Ventricle Calculated EF = 66%. Estimated EF was in agreement with the calculated EF. Normal left ventricular cavity size and wall thickness noted. All left ventricular wall segments contract normally. Left ventricular diastolic function is normal.   Right Ventricle Right ventricular cavity is borderline dilated.   Left Atrium Left atrial cavity size is mildly dilated. Left atrial volume is mildly increased.   Right Atrium Right atrial cavity size is borderline dilated.   Aortic Valve The aortic valve is abnormal in structure. There is thickening of the aortic valve. Trace aortic valve regurgitation is present. No aortic valve stenosis is present.   Mitral Valve The mitral valve is grossly normal in structure. Mild mitral valve regurgitation is present. No significant mitral valve stenosis is present.   Tricuspid Valve The tricuspid valve is grossly normal. Mild tricuspid valve regurgitation is present.   Pulmonic Valve The pulmonic valve is grossly normal in structure. There is trace pulmonic valve regurgitation present.   Greater Vessels Borderline dilation of the aortic root is present. Borderline dilation of the sinuses of Valsalva is present.   Pericardium The pericardium is normal. There is no evidence of pericardial effusion.     Interpretation Summary     · Left ventricular systolic function is normal. Estimated EF = 70%.  · Right ventricular cavity is moderately dilated.  · Right atrial cavity size is moderately dilated.  · Moderate tricuspid valve regurgitation is present.  · Estimated right ventricular systolic pressure from tricuspid regurgitation is mildly elevated (35-45 mmHg).  · There is a small (<1cm) pericardial effusion.                 Interpretation Summary previous 1/26/18     · Left ventricular systolic function is normal. Estimated EF = 70%.  · Mild tricuspid valve regurgitation is present.  · Calculated right ventricular systolic pressure from tricuspid regurgitation is 38 mmHg.  · Bubble  study was positive but technically difficult. Not clear if this was late bubbles crossing or consistent with a patent foramen ovale.            Imaging  Chest X-ray: 12/5/18     CT of abd pelvis 12/5/18  IMPRESSION:  Persistent patchy densities in the mid to lower lungs. Increased left  more than right pleural effusions. Cardiomegaly with persistent mild  pulmonary vascular congestion. Continued follow-up/further evaluation  recommended as indicated.     IMPRESSION:  1. Basilar predominant opacities favored to reflect atelectasis although  pneumonia not excluded.  2. Right greater than left pleural effusions, moderate pericardial  effusion.  3. Mild mediastinal adenopathy.  4. Homogeneous splenic enlargement.       Lab Review       Results from last 7 days   Lab Units  12/10/18   0644   MAGNESIUM mg/dL  1.9     Results from last 7 days   Lab Units  12/10/18   0644   SODIUM mmol/L  128*   POTASSIUM mmol/L  3.5   BUN mg/dL  37*   CREATININE mg/dL  1.82*   CALCIUM mg/dL  9.3        Results from last 7 days   Lab Units  12/10/18   0953  12/10/18   0638  12/09/18 2026 12/09/18   0627   12/08/18   0553   WBC 10*3/mm3   --   9.33   --   11.31*   --   11.34*   HEMOGLOBIN g/dL  7.0*  7.0*  7.4*  7.5*   < >  7.1*   HEMATOCRIT %  20.8*  20.9*  22.1*  21.9*   < >  21.7*   PLATELETS 10*3/mm3   --   88*   --   101*   --   101*    < > = values in this interval not displayed.     Results from last 7 days   Lab Units  12/10/18   0638  12/09/18 0627  12/08/18   0553   INR   2.16*  2.02*  1.98*         Assessment:  1. moderate pericardial effusion on CT.  Non seen on stat echo.    2.  shortness of breath  3. alcoholic hepatitis - gi following.  Possible MRI and MRCP once kidney function stablized.   4. Anasarca  5. Anemia: despite transfusions. Hgb 7.5.    6. 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 Rhinovirus infection  8. Thrombocytopenia - 12/6 Plt Cout 109,000 (149,000 on 12/5/18)  9. Auto anticoagulated from liver dz - INR 2.02 12/9.  "  10. LÓPEZ  11. ETOH hepatitis  12. Hemoptysis  13. Cardiomegaly:    Plan  1. moderate pericardial effusion on CT:   ECHO on 12/05/18 showed tricuspid valve regurgitation is present, left atrial cavity size is mildly dilated, right ventricular cavity is borderline dilated, mild mitral valve regurgitation I s present, with EF 66%, and no pericardial effusion present. 12/10/18 Echo showed small <1 cm effusion    2.  shortness of breath:  + rhinovirus CXR showed presistent patchy densities in the mid to lower lungs. Increased left more than right pleural effusions. Cardiomegaly with persistent mild pulmonary vascular congestion.  Defer to pulmonology      3. alcoholic hepatitis: defer to GI    4. Acute blood loss Anemia: persistent despite transfusions. 12/9 7.5.       5. Cardiomegaly: Cardiomegaly with persistent mild pulmonary vascular congestion      Labs/tests ordered for am: ekg    12/10/2018  MD ALISSON Lawson/Transcription:   \"Dictated utilizing Dragon dictation\".     "

## 2018-12-10 NOTE — PROGRESS NOTES
"Pharmacokinetic Consult - Vancomycin Dosing (Follow-up Note)    Enrique Ramirez is a 48 y.o. male who is on day 3 pharmacy to dose vancomycin for HCAP.  Pharmacy dosing vancomycin per Dr. Valdez's request.   Other antimicrobials: cefepime  Goal trough: 15-20 mg/L    Current Vancomycin dose: 1250 mg IV q12h    Relevant clinical data and objective history reviewed:  182.9 cm (72\")  127 kg (279 lb)  Body mass index is 37.84 kg/m².     He has a past medical history of Esophageal varices (CMS/HCC), Gallstones, Gout, Hepatitis, alcoholic, History of transfusion, and Pre-diabetes.    Allergies as of 12/05/2018 - Reviewed 12/05/2018   Allergen Reaction Noted   • Zinc Nausea And Vomiting 10/24/2018     Vital Signs (last 24 hours)       12/08 0700  -  12/09 0659 12/09 0700  -  12/09 1916   Most Recent    Temp (°F) 97.9 -  98.4    98.4 -  98.6     98.6 (37)    Heart Rate 75 -  90    81 -  90     84    Resp 16 -  20    18 -  20     18    /50 -  114/65    114/56 -  119/65     119/65    SpO2 (%) 91 -  95       95        Estimated Creatinine Clearance: 58.1 mL/min (A) (by C-G formula based on SCr of 2.19 mg/dL (H)).  Results from last 7 days   Lab Units  12/09/18   0627  12/08/18   0553  12/07/18   0556   CREATININE mg/dL  2.19*  2.50*  2.48*     Results from last 7 days   Lab Units  12/09/18   0627  12/08/18   0553  12/07/18   0556   WBC 10*3/mm3  11.31*  11.34*  12.31*     Baseline culture/source/susceptibility:   RVP (12/5): Human Rhinovirus/Enterovirus +  MRSA screen negative   SCx (12/7): NGTD    Labs:  PCT (12/7) 0.43    Anti-Infectives (From admission, onward)    Ordered     Dose/Rate Route Frequency Start Stop    12/07/18 1409  vancomycin 1250 mg/250 mL 0.9% NS IVPB ([facility])     Ordering Provider:  Valentino Valdez MD    1,250 mg  over 120 Minutes Intravenous Every 12 Hours 12/08/18 0700 12/15/18 0659    12/07/18 1350  cefepime (MAXIPIME) 2 g/100 mL 0.9% NS (mbp)     Ordering Provider:  Valentino Valdez MD    2 " g  over 4 Hours Intravenous Every 12 Hours 12/07/18 2100 12/14/18 2059    12/07/18 1350  cefepime (MAXIPIME) 2 g/100 mL 0.9% NS (mbp)     Ordering Provider:  Valentino Valdez MD    2 g  200 mL/hr over 30 Minutes Intravenous Once 12/07/18 1500 12/07/18 1529    12/07/18 1350  vancomycin 2000 mg/500 mL 0.9% NS IVPB ([facility])     Ordering Provider:  Valentino Valdez MD    15 mg/kg × 125 kg Intravenous Once 12/07/18 1445 12/07/18 1849    12/07/18 1350  Pharmacy to dose vancomycin     Ordering Provider:  Valentino Valdez MD     Does not apply Continuous PRN 12/07/18 1350 12/14/18 1349         Vancomycin Dosing History   2000 mg IV x1    12/07/18 1849  1250 mg IV q12h   12/08/18 0638   12/08/18 1848   12/09/18 0612    Trough 12/09 0627: 25.2 mg/L (~15 min after dose charted as given)     Repeat trough 12/09 1843: 28.1 mg/L (~12.5h after dose)      Assessment/Plan  Hold scheduled regimen due to elevated vanc trough & unstable SCr and change to intermittent dosing.     1) Vancomycin random level tomorrow with AM labs. Plan to re-dose when level estimated to be <20 mg/L.   2) Will monitor serum creatinine tomorrow with AM labs.    3) Encourage hydration as allowed by MD to help prevent toxic accumulation; monitor for s/sxn of toxicity including increase in SCr and decrease in UOP.    Pharmacy will continue to follow daily while on vancomycin and adjust as needed.     Thank you for this consult,    Lawrence Weber, PharmD, HOMERO, BCPS

## 2018-12-10 NOTE — PLAN OF CARE
Problem: Patient Care Overview  Goal: Plan of Care Review  Outcome: Ongoing (interventions implemented as appropriate)   12/10/18 1817   Coping/Psychosocial   Plan of Care Reviewed With patient   Plan of Care Review   Progress improving   OTHER   Outcome Summary pt vss, pressure still low, iv bumex today. die=uresing well. up ad terri. up in chair most of the day. will continue to monitorl      Goal: Individualization and Mutuality  Outcome: Ongoing (interventions implemented as appropriate)    Goal: Discharge Needs Assessment  Outcome: Ongoing (interventions implemented as appropriate)      Problem: Anemia (Adult)  Goal: Symptom Improvement  Outcome: Ongoing (interventions implemented as appropriate)      Problem: Fall Risk (Adult)  Goal: Absence of Fall  Outcome: Ongoing (interventions implemented as appropriate)

## 2018-12-10 NOTE — PROGRESS NOTES
[facility] INPATIENT PROGRESS NOTE         [address]    12/10/2018      PATIENT IDENTIFICATION:  Name: Enrique Ramirez ADMIT: 2018   : 1970  PCP: Cydney Chase MD    MRN: [id number] LOS: 5 days   AGE/SEX: 48 y.o. male  ROOM: [facility]                     LOS 5    Reason for visit: Hemoptysis      SUBJECTIVE:      Coughing a lot at the time of my visit this morning. Nobody sputum at this time but he did have episode of coughing up blood last night again. Add cough suppressant.    Objective   OBJECTIVE:    Vital Sign Min/Max for last 24 hours  Temp  Min: 97.9 °F (36.6 °C)  Max: 98.7 °F (37.1 °C)   BP  Min: 90/60  Max: 119/65   Pulse  Min: 84  Max: 96   Resp  Min: 18  Max: 20   SpO2  Min: 94 %  Max: 94 %   No Data Recorded   Weight  Min: 127 kg (279 lb)  Max: 127 kg (279 lb)                         Body mass index is 37.84 kg/m².    Intake/Output Summary (Last 24 hours) at 12/10/2018 1035  Last data filed at 12/10/2018 0200  Gross per 24 hour   Intake 1537 ml   Output 1235 ml   Net 302 ml         Exam:  GEN:  No distress, appears stated age  EYES:   PERRL, anicteric sclera  ENT:    External ears/nose normal, OP clear  NECK:  No adenopathy, midline trachea  LUNGS: Normal chest on inspection, palpation and diminished bilaterally with scattered coarse breath sounds at bases on auscultation  CV:  Normal S1S2, without murmur  ABD:  Non tender, non distended, no hepatosplenomegaly, +BS  EXT:  No edema, cyanosis or clubbing    Scheduled meds:      cefepime 2 g Intravenous Q12H   guaiFENesin 600 mg Oral Q12H   hydrOXYzine 50 mg Oral Nightly   ipratropium-albuterol 3 mL Nebulization 4x Daily - RT   lactulose 20 g Oral BID   midodrine 10 mg Oral TID AC   oxymetazoline 2 spray Each Nare BID   pantoprazole 40 mg Oral QAM AC   sodium chloride 3 mL Intravenous Q12H     IV meds:                           Data Review:  Results from last 7 days   Lab Units  12/10/18   0644  18   0627  18   0553   12/07/18   0556  12/06/18   0559   SODIUM mmol/L  128*  129*  127*  127*  126*   POTASSIUM mmol/L  3.5  4.2  4.8  5.0  5.0   CHLORIDE mmol/L  91*  91*  91*  91*  90*   CO2 mmol/L  21.7*  20.3*  19.7*  20.3*  21.0*   BUN mg/dL  37*  42*  45*  39*  35*   CREATININE mg/dL  1.82*  2.19*  2.50*  2.48*  1.80*   GLUCOSE mg/dL  118*  119*  126*  128*  90   CALCIUM mg/dL  9.3  9.7  9.4  9.3  9.3         Estimated Creatinine Clearance: 69.9 mL/min (A) (by C-G formula based on SCr of 1.82 mg/dL (H)).  Results from last 7 days   Lab Units  12/10/18   0638  12/09/18   2026  12/09/18   0627  12/08/18   1957  12/08/18   0553   12/07/18   0556   12/06/18   0559   WBC 10*3/mm3  9.33   --   11.31*   --   11.34*   --   12.31*   --   11.84*   HEMOGLOBIN g/dL  7.0*  7.4*  7.5*  7.2*  7.1*   < >  7.3*   < >  7.0*   PLATELETS 10*3/mm3  88*   --   101*   --   101*   --   131*   --   109*    < > = values in this interval not displayed.     Results from last 7 days   Lab Units  12/10/18   0638  12/09/18 0627 12/08/18   0553  12/07/18   0557  12/06/18   0559   INR   2.16*  2.02*  1.98*  1.91*  1.86*     Results from last 7 days   Lab Units  12/10/18   0644  12/09/18 0627 12/08/18   0553  12/07/18   0556  12/06/18   0559   ALT (SGPT) U/L  33  37  36  44*  49*   AST (SGOT) U/L  43*  42*  41*  46*  51*         Results from last 7 days   Lab Units  12/07/18   0556  12/06/18   0559   PROCALCITONIN ng/mL  0.43*  0.27*         Chest x-ray 12/7 viewed shows infiltrate/atelectasis and increased pleural effusion on the left with cardiomegaly and pulmonary vascular congestion      Chest x-ray 12/10 viewed shows patchy atelectasis and infiltrate both lower lung zones with bilateral pleural effusions showing no significant change from previous.        Microbiology reviewed  Human Rhinovirus/Enterovirus Detected Abnormal             )Assessment   ASSESSMENT:      Active Hospital Problems    Diagnosis Date Noted   • **Symptomatic anemia [D64.9]  12/05/2018   • Pneumonia [J18.9] 12/08/2018   • Rhinovirus infection [B34.8] 12/06/2018   • Pericardial effusion [I31.3] 12/06/2018   • LÓPEZ (acute kidney injury) (CMS/HCC) [N17.9] 12/05/2018   • Anasarca [R60.1] 12/05/2018   • Cough [R05] 12/05/2018   • Epistaxis [R04.0] 12/05/2018   • Hemoptysis [R04.2] 12/05/2018   • Hyponatremia [E87.1] 10/25/2018   • Esophageal varices (CMS/HCC) [I85.00] 10/24/2018   • Elevated liver function tests [R94.5] 06/22/2018   • Alcoholic hepatitis without ascites [K70.10] 06/22/2018      Resolved Hospital Problems   No resolved problems to display.     Pneumonia  Hemoptysis  Coagulopathy secondary to liver disease  Mediastinal lymphadenopathy  Bilateral pleural effusions and anasarca  Cirrhosis/hepatitis  Obesity/?AL  URI with rhinovirus  Anemia  Acute kidney injury      PLAN:    Hemoptysis secondary to coagulopathy from liver disease and infectious pneumonia.  If worsens will require bronchoscopy but seems to be improving.  Continue antibiotics.  Encourage pulmonary toilet.  And cough suppressant.  Pleural effusion secondary to parapneumonic effusion on top of liver disease with anasarca.  If worsens may require thoracentesis.  Continue bronchodilators for help with clearance.  Repeat CXR for f/u infiltrate and effusions shows no change.    Enrique Obrien MD  Pulmonary and Critical Care Medicine  [facility]  12/10/2018    10:35 AM

## 2018-12-10 NOTE — PROGRESS NOTES
" LOS: 5 days     Name: Enrique Ramirez  Age: 48 y.o.  Sex: male  :  1970  MRN: [id number]         Primary Care Physician: Cydney Chase MD    Subjective   Subjective  Complains of hemoptysis today.  States he produced about a quarter cup blood earlier this morning.  Denies shortness of breath.  States he otherwise feels pretty well today.    Objective   Vital Signs  Temp:  [97.9 °F (36.6 °C)-98.7 °F (37.1 °C)] 97.9 °F (36.6 °C)  Heart Rate:  [84-96] 90  Resp:  [18-20] 18  BP: ()/(56-65) 90/60  Body mass index is 37.84 kg/m².    Objective:  General Appearance:  Comfortable and in no acute distress.    Vital signs: (most recent): Blood pressure 90/60, pulse 90, temperature 97.9 °F (36.6 °C), temperature source Oral, resp. rate 18, height 182.9 cm (72\"), weight 127 kg (279 lb), SpO2 94 %.    Lungs:  Normal effort and normal respiratory rate.    Heart: Normal rate.  Regular rhythm.    Abdomen: Abdomen is soft.  Bowel sounds are normal.   There is no abdominal tenderness.     Extremities: There is no dependent edema or local swelling.    Neurological: Patient is alert and oriented to person, place and time.    Skin:  Warm and dry.              Results Review:       I reviewed the patient's new clinical results.    Results from last 7 days   Lab Units  12/10/18   0638  18   0627  18   1957  18   0553  18   0023  18   1748   18   0556   18   0559   18   1306   WBC 10*3/mm3  9.33   --   11.31*   --   11.34*   --    --    --   12.31*   --   11.84*   --   11.34*   HEMOGLOBIN g/dL  7.0*  7.4*  7.5*  7.2*  7.1*  7.2*  6.8*   < >  7.3*   < >  7.0*   < >  6.7*   PLATELETS 10*3/mm3  88*   --   101*   --   101*   --    --    --   131*   --   109*   --   149    < > = values in this interval not displayed.     Results from last 7 days   Lab Units  12/10/18   0644  18   0627  18   0553  18   0556  18   0559  18   1306   SODIUM " mmol/L  128*  129*  127*  127*  126*  125*   POTASSIUM mmol/L  3.5  4.2  4.8  5.0  5.0  5.4*   CHLORIDE mmol/L  91*  91*  91*  91*  90*  88*   CO2 mmol/L  21.7*  20.3*  19.7*  20.3*  21.0*  22.8   BUN mg/dL  37*  42*  45*  39*  35*  35*   CREATININE mg/dL  1.82*  2.19*  2.50*  2.48*  1.80*  1.89*   CALCIUM mg/dL  9.3  9.7  9.4  9.3  9.3  9.2   GLUCOSE mg/dL  118*  119*  126*  128*  90  113*     Results from last 7 days   Lab Units  12/10/18   0638  12/09/18   0627  12/08/18   0553  12/07/18   0557  12/06/18   0559  12/05/18   1306   INR   2.16*  2.02*  1.98*  1.91*  1.86*  1.83*             Scheduled Meds:     cefepime 2 g Intravenous Q12H   guaiFENesin 600 mg Oral Q12H   hydrOXYzine 50 mg Oral Nightly   ipratropium-albuterol 3 mL Nebulization 4x Daily - RT   lactulose 20 g Oral BID   midodrine 10 mg Oral TID AC   oxymetazoline 2 spray Each Nare BID   pantoprazole 40 mg Oral QAM AC   sodium chloride 3 mL Intravenous Q12H     PRN Meds:   •  benzonatate  •  bisacodyl  •  calcium carbonate  •  nitroglycerin  •  ondansetron **OR** ondansetron ODT **OR** ondansetron  •  oxyCODONE  •  sodium chloride  •  [COMPLETED] Insert peripheral IV **AND** sodium chloride  •  sodium chloride  •  zolpidem  Continuous Infusions:       Assessment/Plan   Active Hospital Problems    Diagnosis Date Noted   • **Symptomatic anemia [D64.9] 12/05/2018   • Pneumonia [J18.9] 12/08/2018   • Rhinovirus infection [B34.8] 12/06/2018   • Pericardial effusion [I31.3] 12/06/2018   • LÓPEZ (acute kidney injury) (CMS/HCC) [N17.9] 12/05/2018   • Anasarca [R60.1] 12/05/2018   • Cough [R05] 12/05/2018   • Epistaxis [R04.0] 12/05/2018   • Hemoptysis [R04.2] 12/05/2018   • Hyponatremia [E87.1] 10/25/2018   • Esophageal varices (CMS/HCC) [I85.00] 10/24/2018   • Elevated liver function tests [R94.5] 06/22/2018   • Alcoholic hepatitis without ascites [K70.10] 06/22/2018      Resolved Hospital Problems   No resolved problems to display.       Assessment &  Plan    Pneumonia  - Continue Cefepime.  MRSA screen was negative and will discontinue vancomycin    Symptomatic Anemia  - Hgb low but stable, continue to monitor.  S/p 2 units PRBCs on 12/5  - monitor Hgb and transfuse as needed     Hemoptysis  -  he has had recurrence of hemoptysis this morning.  Pulmonology following.  May need bronchoscopy.  - 2/2 liver disease coagulopathy and PNA     LÓPEZ  - Cr stable today, per nephrology  - Attempting to diurese     Pericardial Effusion  - Apparent on CT chest, not present on echocardiogram in October  - Echocardiogram with tiny effusion not able to be tapped, no tamponade  - Repeat ECHO planned for today     EtOH Hepatitis  - possible secondary cirrhosis  -nadolol, lasix, and spironolactone held  - RUQ u/s revealing GB mass-GI following and will need MRCP when renal function stabilizes     DVT Prophylaxis  - SCDs      Michele Dixon MD  [facility]  12/10/18  10:54 AM

## 2018-12-10 NOTE — PLAN OF CARE
Problem: Patient Care Overview  Goal: Plan of Care Review  Outcome: Ongoing (interventions implemented as appropriate)   12/10/18 0456   Coping/Psychosocial   Plan of Care Reviewed With patient   Plan of Care Review   Progress no change   OTHER   Outcome Summary Pt rested well throughout the night. Up at terri to bathroom. VSS with NSR. Will continue to monitor.        Problem: Anemia (Adult)  Goal: Symptom Improvement  Outcome: Ongoing (interventions implemented as appropriate)      Problem: Fall Risk (Adult)  Goal: Absence of Fall  Outcome: Ongoing (interventions implemented as appropriate)

## 2018-12-10 NOTE — PROGRESS NOTES
NEPHROLOGY PROGRESS NOTE    PATIENT IDENTIFICATION:   Name:  Enrique Ramirez      MRN:  [id number]     48 y.o.  male             Reason for visit: LÓPEZ    SUBJECTIVE:   Feels better.   Coughed up blood this am.  Bowels moving.  Urinating a lot.  No change in edema. Not soa. Eating.     OBJECTIVE:  Vitals:    12/10/18 0829 12/10/18 1231 12/10/18 1237 12/10/18 1239   BP: 90/60   97/52   BP Location: Right arm   Right arm   Patient Position: Sitting   Sitting   Pulse: 90 90 85 85   Resp: 18 16 15    Temp: 97.9 °F (36.6 °C)      TempSrc: Oral      SpO2:  95% 96%    Weight:       Height:               Body mass index is 37.84 kg/m².    Intake/Output Summary (Last 24 hours) at 12/10/2018 1410  Last data filed at 12/10/2018 1201  Gross per 24 hour   Intake 1777 ml   Output 1235 ml   Net 542 ml     Wt Readings from Last 1 Encounters:   12/10/18 0730 127 kg (279 lb)   12/08/18 0618 127 kg (279 lb)   12/07/18 0530 125 kg (276 lb)   12/06/18 1457 121 kg (267 lb)   12/06/18 0600 121 kg (267 lb 8 oz)   12/05/18 1814 124 kg (273 lb)   12/05/18 1238 119 kg (262 lb 6.4 oz)     Wt Readings from Last 3 Encounters:   12/10/18 127 kg (279 lb)   11/13/18 118 kg (260 lb)   11/09/18 118 kg (260 lb 12.8 oz)         Exam:  NAD; pleasant; oriented; looks older than stated age  Overweight; jaundiced  HEENT MMM; AT/NC  No eye d/c; scleral icterus  Lungs Coarse bilat; Decreased bs bases  Heart RRR, no rub, no s3  ABd  + bs, soft, distended, nontender. Body wall edema.   Ext 3 +edema lower ext to hips  No clubbing  No asterixis  Moves all extremities   Speech fluent  Mood depressed; flat affect      Scheduled meds:      cefepime 2 g Intravenous Q12H   guaiFENesin 600 mg Oral Q12H   hydrOXYzine 50 mg Oral Nightly   ipratropium-albuterol 3 mL Nebulization 4x Daily - RT   lactulose 20 g Oral BID   midodrine 10 mg Oral TID AC   oxymetazoline 2 spray Each Nare BID   pantoprazole 40 mg Oral QAM AC   sodium chloride 3 mL Intravenous Q12H     IV meds:                              Data Review:    Results from last 7 days   Lab Units  12/10/18   0644  12/09/18   0627  12/08/18   0553   SODIUM mmol/L  128*  129*  127*   POTASSIUM mmol/L  3.5  4.2  4.8   CHLORIDE mmol/L  91*  91*  91*   CO2 mmol/L  21.7*  20.3*  19.7*   BUN mg/dL  37*  42*  45*   CREATININE mg/dL  1.82*  2.19*  2.50*   CALCIUM mg/dL  9.3  9.7  9.4   BILIRUBIN mg/dL  10.1*  10.8*  10.8*   ALK PHOS U/L  89  90  80   ALT (SGPT) U/L  33  37  36   AST (SGOT) U/L  43*  42*  41*   GLUCOSE mg/dL  118*  119*  126*     Estimated Creatinine Clearance: 69.9 mL/min (A) (by C-G formula based on SCr of 1.82 mg/dL (H)).  Results from last 7 days   Lab Units  12/07/18   0556  12/06/18   2207   SODIUM UR mmol/L   --   <20   CREATININE UR mg/dL   --   267.7   URIC ACID mg/dL  11.3*   --      Results from last 7 days   Lab Units  12/10/18   0644  12/09/18   0627   MAGNESIUM mg/dL  1.9  1.9   PHOSPHORUS mg/dL   --   4.0       Results from last 7 days   Lab Units  12/10/18   0953  12/10/18   0638  12/09/18 2026  12/09/18 0627  12/08/18 1957  12/08/18   0553   12/07/18   0556   12/06/18   0559   WBC 10*3/mm3   --   9.33   --   11.31*   --   11.34*   --   12.31*   --   11.84*   HEMOGLOBIN g/dL  7.0*  7.0*  7.4*  7.5*  7.2*  7.1*   < >  7.3*   < >  7.0*   PLATELETS 10*3/mm3   --   88*   --   101*   --   101*   --   131*   --   109*    < > = values in this interval not displayed.       Results from last 7 days   Lab Units  12/10/18   0638  12/09/18   0627  12/08/18   0553  12/07/18   0557  12/06/18   0559   INR   2.16*  2.02*  1.98*  1.91*  1.86*             ASSESSMENT:     Symptomatic anemia    Elevated liver function tests    Alcoholic hepatitis without ascites    Esophageal varices (CMS/HCC)    Hyponatremia    LÓPEZ (acute kidney injury) (CMS/HCC)    Anasarca    Cough    Epistaxis    Hemoptysis    Rhinovirus infection    Pericardial effusion    Pneumonia    1.  LÓPEZ, non-oliguric, improving.  prerenal from ABLA,  hypotension, and profound liver injury. Improving UOP and stable BP's argue against HRS, as does improving SCr.  Hyponatremia with hypervolemia; stable K.    2.  ABLA  3.  Alcoholic cirrhosis  4.  Hypotension  5.  Hemoptysis,  Will likely need bronch  6.  TCP      PLAN:  1. Bumex IV today      Michelle Gomez MD  12/10/2018    2:10 PM

## 2018-12-11 ENCOUNTER — APPOINTMENT (OUTPATIENT)
Dept: MRI IMAGING | Facility: HOSPITAL | Age: 48
End: 2018-12-11
Attending: INTERNAL MEDICINE

## 2018-12-11 LAB
ABO GROUP BLD: NORMAL
ALBUMIN SERPL-MCNC: 3.9 G/DL (ref 3.5–5.2)
ALBUMIN/GLOB SERPL: 1.3 G/DL
ALP SERPL-CCNC: 99 U/L (ref 39–117)
ALT SERPL W P-5'-P-CCNC: 31 U/L (ref 1–41)
ANION GAP SERPL CALCULATED.3IONS-SCNC: 15 MMOL/L
AST SERPL-CCNC: 45 U/L (ref 1–40)
BASOPHILS # BLD AUTO: 0.07 10*3/MM3 (ref 0–0.2)
BASOPHILS NFR BLD AUTO: 0.7 % (ref 0–1.5)
BILIRUB SERPL-MCNC: 9.6 MG/DL (ref 0.1–1.2)
BLD GP AB SCN SERPL QL: NEGATIVE
BUN BLD-MCNC: 33 MG/DL (ref 6–20)
BUN/CREAT SERPL: 22.8 (ref 7–25)
CALCIUM SPEC-SCNC: 9.1 MG/DL (ref 8.6–10.5)
CHLORIDE SERPL-SCNC: 92 MMOL/L (ref 98–107)
CO2 SERPL-SCNC: 24 MMOL/L (ref 22–29)
CREAT BLD-MCNC: 1.45 MG/DL (ref 0.76–1.27)
DEPRECATED RDW RBC AUTO: 70.6 FL (ref 37–54)
EOSINOPHIL # BLD AUTO: 0.09 10*3/MM3 (ref 0–0.7)
EOSINOPHIL NFR BLD AUTO: 0.9 % (ref 0.3–6.2)
ERYTHROCYTE [DISTWIDTH] IN BLOOD BY AUTOMATED COUNT: 18.9 % (ref 11.5–14.5)
FERRITIN SERPL-MCNC: 1041 NG/ML (ref 30–400)
GFR SERPL CREATININE-BSD FRML MDRD: 52 ML/MIN/1.73
GLOBULIN UR ELPH-MCNC: 3.1 GM/DL
GLUCOSE BLD-MCNC: 109 MG/DL (ref 65–99)
HCT VFR BLD AUTO: 19.6 % (ref 40.4–52.2)
HCT VFR BLD AUTO: 19.8 % (ref 40.4–52.2)
HCT VFR BLD AUTO: 25.6 % (ref 40.4–52.2)
HGB BLD-MCNC: 6.7 G/DL (ref 13.7–17.6)
HGB BLD-MCNC: 6.8 G/DL (ref 13.7–17.6)
HGB BLD-MCNC: 8.3 G/DL (ref 13.7–17.6)
IMM GRANULOCYTES # BLD: 0.03 10*3/MM3 (ref 0–0.03)
IMM GRANULOCYTES NFR BLD: 0.3 % (ref 0–0.5)
INR PPP: 2.12 (ref 0.9–1.1)
IRON 24H UR-MRATE: 149 MCG/DL (ref 59–158)
IRON SATN MFR SERPL: 86 % (ref 20–50)
LYMPHOCYTES # BLD AUTO: 0.93 10*3/MM3 (ref 0.9–4.8)
LYMPHOCYTES NFR BLD AUTO: 9.8 % (ref 19.6–45.3)
MCH RBC QN AUTO: 34.9 PG (ref 27–32.7)
MCHC RBC AUTO-ENTMCNC: 33.8 G/DL (ref 32.6–36.4)
MCV RBC AUTO: 103.1 FL (ref 79.8–96.2)
MONOCYTES # BLD AUTO: 1.6 10*3/MM3 (ref 0.2–1.2)
MONOCYTES NFR BLD AUTO: 16.9 % (ref 5–12)
NEUTROPHILS # BLD AUTO: 6.8 10*3/MM3 (ref 1.9–8.1)
NEUTROPHILS NFR BLD AUTO: 71.7 % (ref 42.7–76)
NRBC BLD MANUAL-RTO: 0 /100 WBC (ref 0–0)
PLATELET # BLD AUTO: 90 10*3/MM3 (ref 140–500)
PMV BLD AUTO: 8.9 FL (ref 6–12)
POTASSIUM BLD-SCNC: 3.5 MMOL/L (ref 3.5–5.2)
PROT SERPL-MCNC: 7 G/DL (ref 6–8.5)
PROTHROMBIN TIME: 23.4 SECONDS (ref 11.7–14.2)
RBC # BLD AUTO: 1.92 10*6/MM3 (ref 4.6–6)
RETICS/RBC NFR AUTO: 5.26 % (ref 0.5–1.5)
RH BLD: POSITIVE
SODIUM BLD-SCNC: 131 MMOL/L (ref 136–145)
T&S EXPIRATION DATE: NORMAL
TIBC SERPL-MCNC: 173 MCG/DL
TRANSFERRIN SERPL-MCNC: 116 MG/DL (ref 200–360)
VIT B12 BLD-MCNC: 809 PG/ML (ref 211–946)
WBC NRBC COR # BLD: 9.49 10*3/MM3 (ref 4.5–10.7)

## 2018-12-11 PROCEDURE — 82607 VITAMIN B-12: CPT | Performed by: INTERNAL MEDICINE

## 2018-12-11 PROCEDURE — 93005 ELECTROCARDIOGRAM TRACING: CPT | Performed by: INTERNAL MEDICINE

## 2018-12-11 PROCEDURE — 82728 ASSAY OF FERRITIN: CPT | Performed by: INTERNAL MEDICINE

## 2018-12-11 PROCEDURE — 85610 PROTHROMBIN TIME: CPT | Performed by: INTERNAL MEDICINE

## 2018-12-11 PROCEDURE — 93010 ELECTROCARDIOGRAM REPORT: CPT | Performed by: INTERNAL MEDICINE

## 2018-12-11 PROCEDURE — 84466 ASSAY OF TRANSFERRIN: CPT | Performed by: INTERNAL MEDICINE

## 2018-12-11 PROCEDURE — 86901 BLOOD TYPING SEROLOGIC RH(D): CPT | Performed by: HOSPITALIST

## 2018-12-11 PROCEDURE — P9016 RBC LEUKOCYTES REDUCED: HCPCS

## 2018-12-11 PROCEDURE — 86900 BLOOD TYPING SEROLOGIC ABO: CPT

## 2018-12-11 PROCEDURE — 99231 SBSQ HOSP IP/OBS SF/LOW 25: CPT | Performed by: NURSE PRACTITIONER

## 2018-12-11 PROCEDURE — 86900 BLOOD TYPING SEROLOGIC ABO: CPT | Performed by: HOSPITALIST

## 2018-12-11 PROCEDURE — 86850 RBC ANTIBODY SCREEN: CPT | Performed by: HOSPITALIST

## 2018-12-11 PROCEDURE — 80053 COMPREHEN METABOLIC PANEL: CPT | Performed by: INTERNAL MEDICINE

## 2018-12-11 PROCEDURE — 36430 TRANSFUSION BLD/BLD COMPNT: CPT

## 2018-12-11 PROCEDURE — 85018 HEMOGLOBIN: CPT | Performed by: INTERNAL MEDICINE

## 2018-12-11 PROCEDURE — 25010000002 CEFEPIME 2 G/NS 100 ML SOLUTION: Performed by: INTERNAL MEDICINE

## 2018-12-11 PROCEDURE — 85045 AUTOMATED RETICULOCYTE COUNT: CPT | Performed by: INTERNAL MEDICINE

## 2018-12-11 PROCEDURE — 83540 ASSAY OF IRON: CPT | Performed by: INTERNAL MEDICINE

## 2018-12-11 PROCEDURE — 99232 SBSQ HOSP IP/OBS MODERATE 35: CPT | Performed by: INTERNAL MEDICINE

## 2018-12-11 PROCEDURE — 85025 COMPLETE CBC W/AUTO DIFF WBC: CPT | Performed by: INTERNAL MEDICINE

## 2018-12-11 PROCEDURE — 94799 UNLISTED PULMONARY SVC/PX: CPT

## 2018-12-11 PROCEDURE — 86923 COMPATIBILITY TEST ELECTRIC: CPT

## 2018-12-11 PROCEDURE — 85014 HEMATOCRIT: CPT | Performed by: INTERNAL MEDICINE

## 2018-12-11 PROCEDURE — 74181 MRI ABDOMEN W/O CONTRAST: CPT

## 2018-12-11 PROCEDURE — 25010000002 CEFEPIME PER 500 MG: Performed by: INTERNAL MEDICINE

## 2018-12-11 PROCEDURE — 82747 ASSAY OF FOLIC ACID RBC: CPT | Performed by: INTERNAL MEDICINE

## 2018-12-11 RX ORDER — POTASSIUM CHLORIDE 750 MG/1
60 CAPSULE, EXTENDED RELEASE ORAL ONCE
Status: COMPLETED | OUTPATIENT
Start: 2018-12-11 | End: 2018-12-11

## 2018-12-11 RX ORDER — BUMETANIDE 0.25 MG/ML
2 INJECTION INTRAMUSCULAR; INTRAVENOUS EVERY 8 HOURS
Status: DISCONTINUED | OUTPATIENT
Start: 2018-12-11 | End: 2018-12-13

## 2018-12-11 RX ADMIN — POTASSIUM CHLORIDE 60 MEQ: 750 CAPSULE, EXTENDED RELEASE ORAL at 08:40

## 2018-12-11 RX ADMIN — LACTULOSE 20 G: 10 SOLUTION ORAL at 08:39

## 2018-12-11 RX ADMIN — Medication 2 SPRAY: at 08:40

## 2018-12-11 RX ADMIN — IPRATROPIUM BROMIDE AND ALBUTEROL SULFATE 3 ML: 2.5; .5 SOLUTION RESPIRATORY (INHALATION) at 13:01

## 2018-12-11 RX ADMIN — IPRATROPIUM BROMIDE AND ALBUTEROL SULFATE 3 ML: 2.5; .5 SOLUTION RESPIRATORY (INHALATION) at 16:33

## 2018-12-11 RX ADMIN — BUMETANIDE 2 MG: 0.25 INJECTION INTRAMUSCULAR; INTRAVENOUS at 20:24

## 2018-12-11 RX ADMIN — SODIUM CHLORIDE, PRESERVATIVE FREE 3 ML: 5 INJECTION INTRAVENOUS at 08:40

## 2018-12-11 RX ADMIN — MIDODRINE HYDROCHLORIDE 10 MG: 5 TABLET ORAL at 18:34

## 2018-12-11 RX ADMIN — MIDODRINE HYDROCHLORIDE 10 MG: 5 TABLET ORAL at 11:50

## 2018-12-11 RX ADMIN — CEFEPIME 2 G: 2 INJECTION, POWDER, FOR SOLUTION INTRAVENOUS at 10:54

## 2018-12-11 RX ADMIN — HYDROXYZINE HYDROCHLORIDE 50 MG: 50 TABLET, FILM COATED ORAL at 20:23

## 2018-12-11 RX ADMIN — BUMETANIDE 4 MG: 0.25 INJECTION INTRAMUSCULAR; INTRAVENOUS at 00:23

## 2018-12-11 RX ADMIN — LACTULOSE 20 G: 10 SOLUTION ORAL at 20:24

## 2018-12-11 RX ADMIN — CEFEPIME 2 G: 2 INJECTION, POWDER, FOR SOLUTION INTRAVENOUS at 20:24

## 2018-12-11 RX ADMIN — MIDODRINE HYDROCHLORIDE 10 MG: 5 TABLET ORAL at 08:39

## 2018-12-11 RX ADMIN — GUAIFENESIN 600 MG: 600 TABLET, EXTENDED RELEASE ORAL at 20:23

## 2018-12-11 RX ADMIN — GUAIFENESIN 600 MG: 600 TABLET, EXTENDED RELEASE ORAL at 08:39

## 2018-12-11 RX ADMIN — BUMETANIDE 4 MG: 0.25 INJECTION INTRAMUSCULAR; INTRAVENOUS at 05:54

## 2018-12-11 RX ADMIN — IPRATROPIUM BROMIDE AND ALBUTEROL SULFATE 3 ML: 2.5; .5 SOLUTION RESPIRATORY (INHALATION) at 07:58

## 2018-12-11 RX ADMIN — BUMETANIDE 2 MG: 0.25 INJECTION INTRAMUSCULAR; INTRAVENOUS at 11:50

## 2018-12-11 RX ADMIN — PANTOPRAZOLE SODIUM 40 MG: 40 TABLET, DELAYED RELEASE ORAL at 08:39

## 2018-12-11 NOTE — PROGRESS NOTES
[facility]  Inpatient Progress Note    Reason for Follow Up:  Alcoholic liver disease    Subjective     Interval History:   Not coughing so much. No melena or rectal bleeding. H/H has decreased. He will be transfused today.    Current Facility-Administered Medications:   •  benzonatate (TESSALON) capsule 100 mg, 100 mg, Oral, TID PRN, Enrique Obrien MD  •  bisacodyl (DULCOLAX) EC tablet 5 mg, 5 mg, Oral, Daily PRN, Valentino Valdez MD  •  bumetanide (BUMEX) injection 4 mg, 4 mg, Intravenous, Q6H, Michelle Gomez MD, 4 mg at 12/11/18 0554  •  calcium carbonate (TUMS) chewable tablet 500 mg (200 mg elemental), 2 tablet, Oral, BID PRN, Valentino Valdez MD  •  cefepime (MAXIPIME) 2 g/100 mL 0.9% NS (mbp), 2 g, Intravenous, Q12H, Valentino Valdez MD, 2 g at 12/10/18 2149  •  guaiFENesin (MUCINEX) 12 hr tablet 600 mg, 600 mg, Oral, Q12H, Valentino Valdez MD, 600 mg at 12/10/18 2148  •  hydrOXYzine (ATARAX) tablet 50 mg, 50 mg, Oral, Nightly, Emerald White MD, 50 mg at 12/10/18 2149  •  ipratropium-albuterol (DUO-NEB) nebulizer solution 3 mL, 3 mL, Nebulization, 4x Daily - RT, Valentino Valdez MD, 3 mL at 12/10/18 1516  •  lactulose (CHRONULAC) 10 GM/15ML solution 20 g, 20 g, Oral, BID, Valentino Valdez MD, 20 g at 12/10/18 2148  •  midodrine (PROAMATINE) tablet 10 mg, 10 mg, Oral, TID AC, Emerald White MD, 10 mg at 12/10/18 1824  •  nitroglycerin (NITROSTAT) SL tablet 0.4 mg, 0.4 mg, Sublingual, Q5 Min PRN, Valentino Valdez MD  •  ondansetron (ZOFRAN) tablet 4 mg, 4 mg, Oral, Q6H PRN **OR** ondansetron ODT (ZOFRAN-ODT) disintegrating tablet 4 mg, 4 mg, Oral, Q6H PRN **OR** ondansetron (ZOFRAN) injection 4 mg, 4 mg, Intravenous, Q6H PRN, Valentino Valdez MD  •  oxyCODONE (ROXICODONE) immediate release tablet 5 mg, 5 mg, Oral, Q4H PRN, Valentino Valdez MD  •  oxymetazoline (AFRIN) nasal spray 2 spray, 2 spray, Each Nare, BID, Valentino Valdez MD, 2 spray at  12/10/18 0834  •  pantoprazole (PROTONIX) EC tablet 40 mg, 40 mg, Oral, QAM AC, Valentino Valdez MD, 40 mg at 12/10/18 0834  •  sodium chloride (OCEAN) nasal spray 2 spray, 2 spray, Each Nare, PRN, Ozzie Painter MD  •  [COMPLETED] Insert peripheral IV, , , Once **AND** sodium chloride 0.9 % flush 10 mL, 10 mL, Intravenous, PRN, Gurinder Burnett MD  •  sodium chloride 0.9 % flush 3 mL, 3 mL, Intravenous, Q12H, Valentino Valdez MD, 3 mL at 12/10/18 2149  •  sodium chloride 0.9 % flush 3-10 mL, 3-10 mL, Intravenous, PRN, Valentino Valdez MD  •  zolpidem (AMBIEN) tablet 5 mg, 5 mg, Oral, Nightly PRN, Valentino Valdez MD, 5 mg at 12/05/18 2103  Review of Systems:    The following systems were reviewed and negative;  cardiovascular, musculoskeletal and neurological    Objective     Vital Signs  Temp:  [97.9 °F (36.6 °C)-98.5 °F (36.9 °C)] 98.5 °F (36.9 °C)  Heart Rate:  [81-97] 94  Resp:  [15-18] 15  BP: ()/(45-60) 102/45  Body mass index is 35.8 kg/m².    Intake/Output Summary (Last 24 hours) at 12/11/2018 0737  Last data filed at 12/11/2018 0552  Gross per 24 hour   Intake 1410 ml   Output 2650 ml   Net -1240 ml     No intake/output data recorded.     Physical Exam:   General: patient awake, alert and cooperative   Eyes: Normal lids and lashes, + scleral icterus   Neck: supple, normal ROM   Skin: warm and dry, not jaundiced   Cardiovascular: regular rhythm and rate, no murmurs auscultated   Pulm: clear to auscultation bilaterally, regular and unlabored   Abdomen: soft, nontender, nondistended; normal bowel sounds   Rectal: deferred   Extremities: no rash or edema   Psychiatric: Normal mood and behavior; memory intact     Results Review:     I reviewed the patient's new clinical results.    Results from last 7 days   Lab Units  12/11/18   0605  12/10/18   1952  12/10/18   0953  12/10/18   0638   12/09/18 0627   WBC 10*3/mm3  9.49   --    --   9.33   --   11.31*   HEMOGLOBIN g/dL  6.7*  7.1*  7.0*   7.0*   < >  7.5*   HEMATOCRIT %  19.8*  21.6*  20.8*  20.9*   < >  21.9*   PLATELETS 10*3/mm3  90*   --    --   88*   --   101*    < > = values in this interval not displayed.     Results from last 7 days   Lab Units  12/11/18   0548  12/10/18   0644  12/09/18   0627   SODIUM mmol/L  131*  128*  129*   POTASSIUM mmol/L  3.5  3.5  4.2   CHLORIDE mmol/L  92*  91*  91*   CO2 mmol/L  24.0  21.7*  20.3*   BUN mg/dL  33*  37*  42*   CREATININE mg/dL  1.45*  1.82*  2.19*   CALCIUM mg/dL  9.1  9.3  9.7   BILIRUBIN mg/dL  9.6*  10.1*  10.8*   ALK PHOS U/L  99  89  90   ALT (SGPT) U/L  31  33  37   AST (SGOT) U/L  45*  43*  42*   GLUCOSE mg/dL  109*  118*  119*     Results from last 7 days   Lab Units  12/11/18   0548  12/10/18   0638  12/09/18   0627   INR   2.12*  2.16*  2.02*     Lab Results   Lab Value Date/Time    LIPASE 51 12/05/2018 1306    LIPASE 53 11/28/2018 1101    LIPASE 38 10/24/2018 1439    LIPASE 45 06/21/2018 1906       Radiology:  XR Chest PA & Lateral   Final Result      XR Chest 1 View   Final Result   Persistent mid to lower lung infiltrate/atelectasis, with   increased pleural effusion apparent on the left, continued follow-up   recommended. Cardiomegaly, pulmonary vascular congestion.       This report was finalized on 12/7/2018 5:58 PM by Dr. Rojas Dubon M.D.          CT Chest Without Contrast   Final Result   1. Basilar predominant opacities favored to reflect atelectasis although   pneumonia not excluded.   2. Right greater than left pleural effusions, moderate pericardial   effusion.   3. Mild mediastinal adenopathy.   4. Homogeneous splenic enlargement.                           .        This report was finalized on 12/7/2018 5:53 AM by Valentino Nguyen M.D.          US Renal Bilateral   Final Result   Ascites. Otherwise negative renal sonogram. There is no   evidence of obstruction.       This report was finalized on 12/6/2018 8:05 PM by Dr. Brannon Cruz M.D.          XR Chest 1 View    Final Result   Persistent patchy densities in the mid to lower lungs. Increased left   more than right pleural effusions. Cardiomegaly with persistent mild   pulmonary vascular congestion. Continued follow-up/further evaluation   recommended as indicated.                   This report was finalized on 12/5/2018 1:49 PM by Dr. Rojas Dubon M.D.              Assessment/Plan     Patient Active Problem List   Diagnosis   • Acute upper GI bleed   • Gout   • Pre-diabetes   • Cholelithiasis   • Elevated liver function tests   • Alcohol abuse   • Alcoholic hepatitis without ascites   • Thrombocytopenia (CMS/HCC)   • Esophagitis   • Folate deficiency   • Acute anemia   • Esophageal varices (CMS/HCC)   • Hepatitis, alcoholic   • Ascites   • Portal hypertension (CMS/HCC)   • Hyponatremia   • Symptomatic anemia   • LÓPEZ (acute kidney injury) (CMS/HCC)   • Anasarca   • Cough   • Epistaxis   • Hemoptysis   • Rhinovirus infection   • Pericardial effusion   • Pneumonia       I discussed the patients findings and my recommendations with patient.    Assessment/Recommendations:  1) Alcoholic liver disease. He claims no ETOH in 2 mos.  2) 1+ esophageal varices seen on 6/18 EGD  3) Hemoptysis - Pulmonary is following.   4) Abnormal gallbladder on recent US. I will order an MRCP without IV contrast  5) Anemia - He will be transfused today.  His last colonoscopy was about 3 yrs ago and was unrevealing. His last EGD was in 6/18 and showed nonbleeding esophageal varices, portal hypertensive gastropathy, and esophagitis. Continue the Protonix po.    Byron Hassan MD

## 2018-12-11 NOTE — PLAN OF CARE
Problem: Patient Care Overview  Goal: Plan of Care Review  Outcome: Ongoing (interventions implemented as appropriate)   12/11/18 1811   Coping/Psychosocial   Plan of Care Reviewed With patient   Plan of Care Review   Progress improving   OTHER   Outcome Summary 2 units RBC today, ERCP this am, VSS, no c/o at thist linda, will continue to monitor closely      Goal: Individualization and Mutuality  Outcome: Ongoing (interventions implemented as appropriate)    Goal: Discharge Needs Assessment  Outcome: Ongoing (interventions implemented as appropriate)    Goal: Interprofessional Rounds/Family Conf  Outcome: Ongoing (interventions implemented as appropriate)      Problem: Anemia (Adult)  Goal: Symptom Improvement  Outcome: Ongoing (interventions implemented as appropriate)      Problem: Fall Risk (Adult)  Goal: Absence of Fall  Outcome: Ongoing (interventions implemented as appropriate)      Problem: Renal Failure/Kidney Injury, Acute (Adult)  Goal: Signs and Symptoms of Listed Potential Problems Will be Absent, Minimized or Managed (Renal Failure/Kidney Injury, Acute)  Outcome: Outcome(s) achieved Date Met: 12/11/18      Problem: Fluid Volume Excess (Adult)  Goal: Optimal Fluid Balance  Outcome: Ongoing (interventions implemented as appropriate)

## 2018-12-11 NOTE — CONSULTS
Adult Nutrition  Assessment/PES    Patient Name:  Enrique Ramirez  YOB: 1970  MRN: [id number]  Admit Date:  12/5/2018    Assessment Date:  12/11/2018    Comments:  Nutrition screen completed. Will honor food prefs'.    Reason for Assessment     Row Name 12/11/18 0741          Reason for Assessment    Reason For Assessment  -- LOS     Diagnosis  -- etoh abuse, cirrhosis of liver, pericardia effusion, LÓPEZ, anemi         Nutrition/Diet History     Row Name 12/11/18 0742          Nutrition/Diet History    Typical Food/Fluid Intake  still coughing up blood         Anthropometrics     Row Name 12/11/18 0742 12/11/18 0612       Anthropometrics    Weight  --  120 kg (264 lb)       Body Mass Index (BMI)    BMI Assessment  BMI 35-39.9: obesity grade II  --        Labs/Tests/Procedures/Meds     Row Name 12/11/18 0743          Labs/Procedures/Meds    Lab Results Reviewed  reviewed     Lab Results Comments  na, glu, bun, cr H/H, TBILI        Diagnostic Tests/Procedures    Diagnostic Test/Procedure Reviewed  reviewed        Medications    Pertinent Medications Reviewed  reviewed     Pertinent Medications Comments  bumex, protonix         Physical Findings     Row Name 12/11/18 0744          Physical Findings    Overall Physical Appearance  obese     Skin  -- dom 22           Nutrition Prescription Ordered     Row Name 12/11/18 0744          Nutrition Prescription PO    Current PO Diet  Regular     Supplement  Boost Plus (Ensure Enlive, Ensure Plus)     Common Modifiers  Low Sodium;Fluid Restriction     Fluid Restriction mL per Day  Other (comment) 1200     Low Sodium Details  2,000 mg Sodium         Evaluation of Received Nutrient/Fluid Intake     Row Name 12/11/18 0745          PO Evaluation    % PO Intake  %               Problem/Interventions:  Problem 1     Row Name 12/11/18 0745          Nutrition Diagnoses Problem 1    Problem 1  Nutrition Appropriate for Condition at this Time     Etiology (related  to)  -- anemia                 Intervention Goal     Row Name 12/11/18 0746          Intervention Goal    General  Maintain nutrition     PO  Tolerate PO;PO intake (%)     PO Intake %  75 %     Weight  No significant weight loss         Nutrition Intervention     Row Name 12/11/18 0747          Nutrition Intervention    RD/Tech Action  Follow Tx progress;Care plan reviewd;Interview for preference           Education/Evaluation     Row Name 12/11/18 0735          Education    Education  Will Instruct as appropriate        Monitor/Evaluation    Monitor  Per protocol           Electronically signed by:  Edna Juan RD  12/11/18 7:47 AM

## 2018-12-11 NOTE — PROGRESS NOTES
[facility]  Cardiology Progress Note    Patient Identification:  Name: Enrique Ramirez  Age: 48 y.o.  Sex: male  :  1970  MRN: [id number]                 Follow Up / Chief Complaint: pericardial effusion seen on CT        Interval History: echo on  showed no pericardial effusion.  feeling better.  Less orthopnea.  Has had multiple transfusions.  Cough has improved.     Had MRCP 18         Subjective: Shortness of breath and cough remain, but denies chest pain and pressure, orthopnea better.  He is being tranfused.      Objective:   Temp:  [97.9 °F (36.6 °C)-98.7 °F (37.1 °C)] 97.9 °F (36.6 °C)  Heart Rate:  [81-96] 83  Resp:  [15-20] 15  BP: ()/(49-60) 96/49  SpO2:  [91 %-100 %] 94 %      Past Medical History:  Past Medical History:   Diagnosis Date   • Esophageal varices (CMS/HCC)    • Gallstones    • Gout    • Hepatitis, alcoholic    • History of transfusion    • Pre-diabetes      Past Surgical History:  Past Surgical History:   Procedure Laterality Date   • COLONOSCOPY     • FOOT SURGERY          Social History:   Social History     Tobacco Use   • Smoking status: Never Smoker   • Smokeless tobacco: Former User     Types: Chew   Substance Use Topics   • Alcohol use: No     Comment: last drink        Family History:  Family History   Problem Relation Age of Onset   • Lung cancer Mother    • Heart attack Father           Allergies:  Allergies   Allergen Reactions   • Zinc Nausea And Vomiting     Scheduled Meds:    bumetanide 2 mg Q8H   cefepime 2 g Q12H   guaiFENesin 600 mg Q12H   hydrOXYzine 50 mg Nightly   ipratropium-albuterol 3 mL 4x Daily - RT   lactulose 20 g BID   midodrine 10 mg TID AC   oxymetazoline 2 spray BID   pantoprazole 40 mg QAM AC   sodium chloride 3 mL Q12H           INTAKE AND OUTPUT:    Intake/Output Summary (Last 24 hours) at 2018 1551  Last data filed at 2018 1455  Gross per 24 hour   Intake 1190 ml   Output 1950 ml   Net -760 ml        18 0612  120 kg (264 lb)   12/10/18 0730  127 kg (279 lb)   18 0618  127 kg (279 lb)   18 0530  125 kg (276 lb)   18 1457  121 kg (267 lb)   18 0600  121 kg (267 lb 8 oz)   18 1814  124 kg (273 lb)   18 12:38:05  119 kg (262 lb 6.4 oz)         Review of Systems:   GI: Denies n/v, abd distention improved  Cardiac: Denies chest pain/pressure, + orthopnea improved  Pulmonary:  Shortness of breath and cough improved    Constitutional:  Temp:  [97.8 °F (36.6 °C)-99.5 °F (37.5 °C)] 97.8 °F (36.6 °C)  Heart Rate:  [83-97] 83  Resp:  [15-20] 18  BP: ()/(45-55) 97/48   SpO2:  [91 %-100 %] 94 %    Physical Exam:  General:  Appears in no acute distress, jaundiced  Eyes: EOM normal, icteric.   HEENT:  + JVD. Thyroid not visibly enlarged. wet mucosal pallor or cyanosis  Respiratory: Respirations regular and unlabored at rest. BBS with good air entry in all fields. No crackles, rubs or wheezes auscultated  Cardiovascular: S1S2 Regular rate and rhythm. Soft systolic murmur LSB 2/6; no rub or gallop. DP/PT pulses +2     . 2-3+pretibial pitting edema  Gastrointestinal: Abdomen soft, obese, distention improved. non tender. Bowel sounds present.   Musculoskeletal: HESS x4. No abnormal movements  Extremities: No digital clubbing or cyanosis, 3+ edema lower extremities  Skin:   Skin warm and dry to touch. No rashes    Neuro: AAO x3 CN II-XII grossly intact  Psych: Mood and affect normal, pleasant and cooperative      Cardiographics    Telemetry: SR              EC/11/18          12/06/18      10/24/18             Echocardiogram:   Interpretation Summary 12/10/18    · Left ventricular systolic function is normal. Estimated EF = 70%.  · Right ventricular cavity is moderately dilated.  · Right atrial cavity size is moderately dilated.  · Moderate tricuspid valve regurgitation is present.  · Estimated right ventricular systolic pressure from tricuspid regurgitation is mildly  elevated (35-45 mmHg).  · There is a small (<1cm) pericardial effusion.      Tracing has been interpreted and reviewed      Echocardiogram Findings 12/7/18     Left Ventricle Calculated EF = 66%. Estimated EF was in agreement with the calculated EF. Normal left ventricular cavity size and wall thickness noted. All left ventricular wall segments contract normally. Left ventricular diastolic function is normal.   Right Ventricle Right ventricular cavity is borderline dilated.   Left Atrium Left atrial cavity size is mildly dilated. Left atrial volume is mildly increased.   Right Atrium Right atrial cavity size is borderline dilated.   Aortic Valve The aortic valve is abnormal in structure. There is thickening of the aortic valve. Trace aortic valve regurgitation is present. No aortic valve stenosis is present.   Mitral Valve The mitral valve is grossly normal in structure. Mild mitral valve regurgitation is present. No significant mitral valve stenosis is present.   Tricuspid Valve The tricuspid valve is grossly normal. Mild tricuspid valve regurgitation is present.   Pulmonic Valve The pulmonic valve is grossly normal in structure. There is trace pulmonic valve regurgitation present.   Greater Vessels Borderline dilation of the aortic root is present. Borderline dilation of the sinuses of Valsalva is present.   Pericardium The pericardium is normal. There is no evidence of pericardial effusion.     Interpretation Summary     · Left ventricular systolic function is normal. Estimated EF = 70%.  · Right ventricular cavity is moderately dilated.  · Right atrial cavity size is moderately dilated.  · Moderate tricuspid valve regurgitation is present.  · Estimated right ventricular systolic pressure from tricuspid regurgitation is mildly elevated (35-45 mmHg).  · There is a small (<1cm) pericardial effusion.           Interpretation Summary previous 1/26/18     · Left ventricular systolic function is normal. Estimated EF =  70%.  · Mild tricuspid valve regurgitation is present.  · Calculated right ventricular systolic pressure from tricuspid regurgitation is 38 mmHg.  · Bubble study was positive but technically difficult. Not clear if this was late bubbles crossing or consistent with a patent foramen ovale.            Imaging  Chest X-ray: 12/5/18     CT of abd pelvis 12/5/18  IMPRESSION:  Persistent patchy densities in the mid to lower lungs. Increased left  more than right pleural effusions. Cardiomegaly with persistent mild  pulmonary vascular congestion. Continued follow-up/further evaluation  recommended as indicated.     IMPRESSION:  1. Basilar predominant opacities favored to reflect atelectasis although  pneumonia not excluded.  2. Right greater than left pleural effusions, moderate pericardial  effusion.  3. Mild mediastinal adenopathy.  4. Homogeneous splenic enlargement.       Lab Review       Results from last 7 days   Lab Units  12/10/18   0644   MAGNESIUM mg/dL  1.9     Results from last 7 days   Lab Units  12/11/18   0548   SODIUM mmol/L  131*   POTASSIUM mmol/L  3.5   BUN mg/dL  33*   CREATININE mg/dL  1.45*   CALCIUM mg/dL  9.1        Results from last 7 days   Lab Units  12/11/18   0824  12/11/18   0605  12/10/18   1952   12/10/18   0638   12/09/18   0627   WBC 10*3/mm3   --   9.49   --    --   9.33   --   11.31*   HEMOGLOBIN g/dL  6.8*  6.7*  7.1*   < >  7.0*   < >  7.5*   HEMATOCRIT %  19.6*  19.8*  21.6*   < >  20.9*   < >  21.9*   PLATELETS 10*3/mm3   --   90*   --    --   88*   --   101*    < > = values in this interval not displayed.     Results from last 7 days   Lab Units  12/11/18   0548  12/10/18   0638  12/09/18   0627   INR   2.12*  2.16*  2.02*         Assessment:  1. moderate pericardial effusion on CT.  Non seen on stat echo 12/6.  Repeat Echo 12/10 showed < 1cm pericardial effusion with no tamponade.   2.  shortness of breath  3. alcoholic hepatitis - gi following.   MRCP once kidney function  "stablized.   4. Anasarca  5. Anemia: multiple transfusions.   6. 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 Rhinovirus infection  8. Thrombocytopenia   9. Auto anticoagulated from liver dz - INR 2.11 on 12/11.   10. LÓPEZ  11. ETOH hepatitis  12. Hemoptysis - pulm following   13. Cardiomegaly:    Plan  1. moderate pericardial effusion on CT:   ECHO on 12/05/18 showed tricuspid valve regurgitation is present, left atrial cavity size is mildly dilated, right ventricular cavity is borderline dilated, mild mitral valve regurgitation I s present, with EF 66%, and no pericardial effusion present. 12/10/18 Echo showed small <1 cm effusion    2.  shortness of breath:  + rhinovirus CXR showed presistent patchy densities in the mid to lower lungs. Increased left more than right pleural effusions. Cardiomegaly with persistent mild pulmonary vascular congestion.  Defer to pulmonology      3. alcoholic hepatitis: defer to GI    4. Acute blood loss Anemia: persistent despite transfusions. 12/11 6.8 (7.1).       5. Cardiomegaly: Cardiomegaly with persistent mild pulmonary vascular congestion      Reviewed echo in detail with pt.  Will discuss timing of repeat echo to reassess effusion with DR. Jasso.  For now will follow prn.     12/11/2018  VAZQUEZ Eddy/Transcription:   \"Dictated utilizing Dragon dictation\".     "

## 2018-12-11 NOTE — PROGRESS NOTES
NEPHROLOGY PROGRESS NOTE    PATIENT IDENTIFICATION:   Name:  Enrique Ramirez      MRN:  [id number]     48 y.o.  male             Reason for visit: LÓPEZ    SUBJECTIVE:     Seen and examined. Going for MRCP. Wife is on bedside. Swollen. Sitting up in chair.      OBJECTIVE:  Vitals:    12/11/18 0552 12/11/18 0612 12/11/18 0700 12/11/18 0758   BP: 102/45  102/46    BP Location: Left arm  Right arm    Patient Position: Lying  Sitting    Pulse: 94  96 90   Resp:   18    Temp:   99.5 °F (37.5 °C)    TempSrc:   Oral    SpO2: 93%   94%   Weight:  120 kg (264 lb)     Height:               Body mass index is 35.8 kg/m².    Intake/Output Summary (Last 24 hours) at 12/11/2018 0818  Last data filed at 12/11/2018 0552  Gross per 24 hour   Intake 1410 ml   Output 2650 ml   Net -1240 ml     Wt Readings from Last 1 Encounters:   12/11/18 0612 120 kg (264 lb)   12/10/18 0730 127 kg (279 lb)   12/08/18 0618 127 kg (279 lb)   12/07/18 0530 125 kg (276 lb)   12/06/18 1457 121 kg (267 lb)   12/06/18 0600 121 kg (267 lb 8 oz)   12/05/18 1814 124 kg (273 lb)   12/05/18 1238 119 kg (262 lb 6.4 oz)     Wt Readings from Last 3 Encounters:   12/11/18 120 kg (264 lb)   11/13/18 118 kg (260 lb)   11/09/18 118 kg (260 lb 12.8 oz)         Exam:  NAD; pleasant; oriented; looks older than stated age  Overweight; jaundiced  HEENT MMM; AT/NC  No eye d/c; scleral icterus  Lungs Coarse bilat; Decreased bs bases  Heart RRR, no rub, no s3  ABd  + bs, soft, distended, nontender. Body wall edema.   Ext 3 +edema lower ext to hips  No clubbing  No asterixis  Moves all extremities   Speech fluent  Mood depressed; flat affect      Scheduled meds:      bumetanide 2 mg Intravenous Q8H   bumetanide 4 mg Intravenous Q6H   cefepime 2 g Intravenous Q12H   guaiFENesin 600 mg Oral Q12H   hydrOXYzine 50 mg Oral Nightly   ipratropium-albuterol 3 mL Nebulization 4x Daily - RT   lactulose 20 g Oral BID   midodrine 10 mg Oral TID AC   oxymetazoline 2 spray Each Nare BID    pantoprazole 40 mg Oral QAM AC   potassium chloride 60 mEq Oral Once   sodium chloride 3 mL Intravenous Q12H     IV meds:                             Data Review:    Results from last 7 days   Lab Units  12/11/18   0548  12/10/18   0644  12/09/18   0627   SODIUM mmol/L  131*  128*  129*   POTASSIUM mmol/L  3.5  3.5  4.2   CHLORIDE mmol/L  92*  91*  91*   CO2 mmol/L  24.0  21.7*  20.3*   BUN mg/dL  33*  37*  42*   CREATININE mg/dL  1.45*  1.82*  2.19*   CALCIUM mg/dL  9.1  9.3  9.7   BILIRUBIN mg/dL  9.6*  10.1*  10.8*   ALK PHOS U/L  99  89  90   ALT (SGPT) U/L  31  33  37   AST (SGOT) U/L  45*  43*  42*   GLUCOSE mg/dL  109*  118*  119*     Estimated Creatinine Clearance: 85.2 mL/min (A) (by C-G formula based on SCr of 1.45 mg/dL (H)).  Results from last 7 days   Lab Units  12/07/18   0556  12/06/18   2207   SODIUM UR mmol/L   --   <20   CREATININE UR mg/dL   --   267.7   URIC ACID mg/dL  11.3*   --      Results from last 7 days   Lab Units  12/10/18   0644  12/09/18   0627   MAGNESIUM mg/dL  1.9  1.9   PHOSPHORUS mg/dL   --   4.0       Results from last 7 days   Lab Units  12/11/18   0605  12/10/18   1952  12/10/18   0953  12/10/18   0638  12/09/18 2026 12/09/18   0627   12/08/18   0553   12/07/18   0556   WBC 10*3/mm3  9.49   --    --   9.33   --   11.31*   --   11.34*   --   12.31*   HEMOGLOBIN g/dL  6.7*  7.1*  7.0*  7.0*  7.4*  7.5*   < >  7.1*   < >  7.3*   PLATELETS 10*3/mm3  90*   --    --   88*   --   101*   --   101*   --   131*    < > = values in this interval not displayed.       Results from last 7 days   Lab Units  12/11/18   0548  12/10/18   0638  12/09/18   0627  12/08/18   0553  12/07/18   0557   INR   2.12*  2.16*  2.02*  1.98*  1.91*             ASSESSMENT:     Symptomatic anemia    Elevated liver function tests    Alcoholic hepatitis without ascites    Esophageal varices (CMS/HCC)    Hyponatremia    LÓPEZ (acute kidney injury) (CMS/HCC)    Anasarca    Cough    Epistaxis    Hemoptysis     Rhinovirus infection    Pericardial effusion    Pneumonia    1.  LÓPEZ, non-oliguric, improving.  prerenal from ABLA, hypotension, and profound liver injury. Improving UOP and stable BP's argue against HRS, as does improving SCr.  Hyponatremia with hypervolemia;   2.  ABLA  3.  Alcoholic cirrhosis  4.  Hypotension  5.  Hemoptysis,  Will likely need bronch  6.  TCP      PLAN:    Replete potassium  Start aggressive diuresis  Likely will D/C midodrine at Am  Blood TX  MRCP per GI  D/W nurse  Surveillance labs      Erin Bellamy MD  12/11/2018    8:18 AM

## 2018-12-11 NOTE — PLAN OF CARE
Problem: Patient Care Overview  Goal: Plan of Care Review  Outcome: Ongoing (interventions implemented as appropriate)   12/11/18 0320   Coping/Psychosocial   Plan of Care Reviewed With patient   Plan of Care Review   Progress no change   OTHER   Outcome Summary Hgb stable at 7.1 on last recheck. MD aware and no orders to transfuse at this time. Renal diuresing with fluid restriction in place. Ambulating well independently. Ok to shower per MD, but patient wants to wait until AM. Continues coughing up bloody sputum, MD aware. Will possibly need bronch. VSS. Awaiting AM labs. Will continue to monitor.     Goal: Individualization and Mutuality  Outcome: Ongoing (interventions implemented as appropriate)    Goal: Discharge Needs Assessment  Outcome: Ongoing (interventions implemented as appropriate)      Problem: Anemia (Adult)  Goal: Symptom Improvement  Outcome: Ongoing (interventions implemented as appropriate)      Problem: Fall Risk (Adult)  Goal: Absence of Fall  Outcome: Ongoing (interventions implemented as appropriate)      Problem: Renal Failure/Kidney Injury, Acute (Adult)  Goal: Signs and Symptoms of Listed Potential Problems Will be Absent, Minimized or Managed (Renal Failure/Kidney Injury, Acute)  Outcome: Ongoing (interventions implemented as appropriate)      Problem: Fluid Volume Excess (Adult)  Goal: Identify Related Risk Factors and Signs and Symptoms  Outcome: Outcome(s) achieved Date Met: 12/11/18    Goal: Optimal Fluid Balance  Outcome: Ongoing (interventions implemented as appropriate)

## 2018-12-11 NOTE — PROGRESS NOTES
" LOS: 6 days     Name: Enrique Ramirez  Age: 48 y.o.  Sex: male  :  1970  MRN: [id number]         Primary Care Physician: Cydney Chase MD    Subjective   Subjective  No new complaints or overnight events    Objective   Vital Signs  Temp:  [97.9 °F (36.6 °C)-99.5 °F (37.5 °C)] 98 °F (36.7 °C)  Heart Rate:  [81-97] 86  Resp:  [15-20] 20  BP: ()/(45-55) 94/51  Body mass index is 35.8 kg/m².    Objective:  General Appearance:  Comfortable, in no acute distress and ill-appearing.    Vital signs: (most recent): Blood pressure 94/51, pulse 86, temperature 98 °F (36.7 °C), temperature source Oral, resp. rate 20, height 182.9 cm (72\"), weight 120 kg (264 lb), SpO2 100 %.    Lungs:  Normal effort and normal respiratory rate.    Heart: Normal rate.  Regular rhythm.    Abdomen: Abdomen is soft.  Bowel sounds are normal.   There is no abdominal tenderness.     Extremities: There is dependent edema.  There is no local swelling.    Neurological: Patient is alert and oriented to person, place and time.    Skin:  Warm and dry.  (Jaundiced)            Results Review:       I reviewed the patient's new clinical results.    Results from last 7 days   Lab Units  18   0824  18   0605  12/10/18   1952  12/10/18   0953  12/10/18   0638  18   0627   18   0553   18   0556   18   0559   18   1306   WBC 10*3/mm3   --   9.49   --    --   9.33   --   11.31*   --   11.34*   --   12.31*   --   11.84*   --   11.34*   HEMOGLOBIN g/dL  6.8*  6.7*  7.1*  7.0*  7.0*  7.4*  7.5*   < >  7.1*   < >  7.3*   < >  7.0*   < >  6.7*   PLATELETS 10*3/mm3   --   90*   --    --   88*   --   101*   --   101*   --   131*   --   109*   --   149    < > = values in this interval not displayed.     Results from last 7 days   Lab Units  18   0548  12/10/18   0644  18   0627  18   0553  18   0556  18   0559  18   1306   SODIUM mmol/L  131*  128*  129*  127*  127*  " 126*  125*   POTASSIUM mmol/L  3.5  3.5  4.2  4.8  5.0  5.0  5.4*   CHLORIDE mmol/L  92*  91*  91*  91*  91*  90*  88*   CO2 mmol/L  24.0  21.7*  20.3*  19.7*  20.3*  21.0*  22.8   BUN mg/dL  33*  37*  42*  45*  39*  35*  35*   CREATININE mg/dL  1.45*  1.82*  2.19*  2.50*  2.48*  1.80*  1.89*   CALCIUM mg/dL  9.1  9.3  9.7  9.4  9.3  9.3  9.2   GLUCOSE mg/dL  109*  118*  119*  126*  128*  90  113*     Results from last 7 days   Lab Units  12/11/18   0548  12/10/18   0638  12/09/18   0627  12/08/18   0553  12/07/18   0557  12/06/18   0559  12/05/18   1306   INR   2.12*  2.16*  2.02*  1.98*  1.91*  1.86*  1.83*       Scheduled Meds:     bumetanide 2 mg Intravenous Q8H   cefepime 2 g Intravenous Q12H   guaiFENesin 600 mg Oral Q12H   hydrOXYzine 50 mg Oral Nightly   ipratropium-albuterol 3 mL Nebulization 4x Daily - RT   lactulose 20 g Oral BID   midodrine 10 mg Oral TID AC   oxymetazoline 2 spray Each Nare BID   pantoprazole 40 mg Oral QAM AC   sodium chloride 3 mL Intravenous Q12H     PRN Meds:   •  benzonatate  •  bisacodyl  •  calcium carbonate  •  nitroglycerin  •  ondansetron **OR** ondansetron ODT **OR** ondansetron  •  oxyCODONE  •  sodium chloride  •  [COMPLETED] Insert peripheral IV **AND** sodium chloride  •  sodium chloride  •  zolpidem  Continuous Infusions:       Assessment/Plan   Active Hospital Problems    Diagnosis Date Noted   • **Symptomatic anemia [D64.9] 12/05/2018   • Pneumonia [J18.9] 12/08/2018   • Rhinovirus infection [B34.8] 12/06/2018   • Pericardial effusion [I31.3] 12/06/2018   • LÓPEZ (acute kidney injury) (CMS/HCC) [N17.9] 12/05/2018   • Anasarca [R60.1] 12/05/2018   • Cough [R05] 12/05/2018   • Epistaxis [R04.0] 12/05/2018   • Hemoptysis [R04.2] 12/05/2018   • Hyponatremia [E87.1] 10/25/2018   • Esophageal varices (CMS/HCC) [I85.00] 10/24/2018   • Elevated liver function tests [R94.5] 06/22/2018   • Alcoholic hepatitis without ascites [K70.10] 06/22/2018      Resolved Hospital Problems   No  resolved problems to display.       Assessment & Plan    Pneumonia  - Continue Cefepime, day 5.  MRSA screen was negative and vancomycin discontinued     Symptomatic Anemia  - S/p 2 units PRBCs on 12/5.  Hemoglobin has dropped below 7 and will receive another transfusion today  - monitor Hgb and transfuse as needed     Hemoptysis  - Improved today without further recurrence  - 2/2 liver disease coagulopathy and PNA     LÓPEZ  - Creatinine is improving and he remains on diuretics, per nephrology     Pericardial Effusion  - Apparent on CT chest, not present on echocardiogram in October  - Echocardiogram with tiny effusion not able to be tapped, no tamponade  - Repeat ECHO 12/10/18 showed subcentimeter effusion     EtOH Hepatitis  - possible secondary cirrhosis  -nadolol, lasix, and spironolactone held  - RUQ u/s revealing GB mass.  MRCP today without contrast noted.  Per gastroenterology.     DVT Prophylaxis  - SCDs    Michele Dixon MD  [facility]  12/11/18  1:27 PM

## 2018-12-11 NOTE — PROGRESS NOTES
[facility] INPATIENT PROGRESS NOTE         [address]    2018      PATIENT IDENTIFICATION:  Name: Enrique Ramirez ADMIT: 2018   : 1970  PCP: Cydney Chase MD    MRN: [id number] LOS: 6 days   AGE/SEX: 48 y.o. male  ROOM: [facility]                     LOS 6    Reason for visit: Hemoptysis      SUBJECTIVE:      Less cough this morning. Bloody sputum has improved. No new complaints. Discussed with family at bedside. Scattered coarse breath sounds bilaterally.    Objective   OBJECTIVE:    Vital Sign Min/Max for last 24 hours  Temp  Min: 97.9 °F (36.6 °C)  Max: 99.5 °F (37.5 °C)   BP  Min: 96/49  Max: 110/51   Pulse  Min: 81  Max: 97   Resp  Min: 15  Max: 18   SpO2  Min: 91 %  Max: 96 %   No Data Recorded   Weight  Min: 120 kg (264 lb)  Max: 120 kg (264 lb)                         Body mass index is 35.8 kg/m².    Intake/Output Summary (Last 24 hours) at 2018 1013  Last data filed at 2018 0552  Gross per 24 hour   Intake 1050 ml   Output 2650 ml   Net -1600 ml         Exam:  GEN:  No distress, appears stated age  EYES:   PERRL, anicteric sclera  ENT:    External ears/nose normal, OP clear  NECK:  No adenopathy, midline trachea  LUNGS: Normal chest on inspection, palpation and diminished bilaterally with scattered coarse breath sounds at bases on auscultation  CV:  Normal S1S2, without murmur  ABD:  Non tender, non distended, no hepatosplenomegaly, +BS  EXT:  No edema, cyanosis or clubbing    Scheduled meds:      bumetanide 2 mg Intravenous Q8H   cefepime 2 g Intravenous Q12H   guaiFENesin 600 mg Oral Q12H   hydrOXYzine 50 mg Oral Nightly   ipratropium-albuterol 3 mL Nebulization 4x Daily - RT   lactulose 20 g Oral BID   midodrine 10 mg Oral TID AC   oxymetazoline 2 spray Each Nare BID   pantoprazole 40 mg Oral QAM AC   sodium chloride 3 mL Intravenous Q12H     IV meds:                           Data Review:  Results from last 7 days   Lab Units  18   0548  12/10/18    0644  12/09/18   0627  12/08/18   0553  12/07/18   0556   SODIUM mmol/L  131*  128*  129*  127*  127*   POTASSIUM mmol/L  3.5  3.5  4.2  4.8  5.0   CHLORIDE mmol/L  92*  91*  91*  91*  91*   CO2 mmol/L  24.0  21.7*  20.3*  19.7*  20.3*   BUN mg/dL  33*  37*  42*  45*  39*   CREATININE mg/dL  1.45*  1.82*  2.19*  2.50*  2.48*   GLUCOSE mg/dL  109*  118*  119*  126*  128*   CALCIUM mg/dL  9.1  9.3  9.7  9.4  9.3         Estimated Creatinine Clearance: 85.2 mL/min (A) (by C-G formula based on SCr of 1.45 mg/dL (H)).  Results from last 7 days   Lab Units  12/11/18   0824  12/11/18   0605  12/10/18   1952  12/10/18   0953  12/10/18   0638   12/09/18 0627   12/08/18   0553   12/07/18   0556   WBC 10*3/mm3   --   9.49   --    --   9.33   --   11.31*   --   11.34*   --   12.31*   HEMOGLOBIN g/dL  6.8*  6.7*  7.1*  7.0*  7.0*   < >  7.5*   < >  7.1*   < >  7.3*   PLATELETS 10*3/mm3   --   90*   --    --   88*   --   101*   --   101*   --   131*    < > = values in this interval not displayed.     Results from last 7 days   Lab Units  12/11/18 0548  12/10/18   0638  12/09/18 0627 12/08/18   0553  12/07/18   0557   INR   2.12*  2.16*  2.02*  1.98*  1.91*     Results from last 7 days   Lab Units  12/11/18 0548  12/10/18   0644  12/09/18 0627  12/08/18   0553  12/07/18   0556   ALT (SGPT) U/L  31  33  37  36  44*   AST (SGOT) U/L  45*  43*  42*  41*  46*         Results from last 7 days   Lab Units  12/07/18   0556  12/06/18   0559   PROCALCITONIN ng/mL  0.43*  0.27*         Chest x-ray 12/7 viewed shows infiltrate/atelectasis and increased pleural effusion on the left with cardiomegaly and pulmonary vascular congestion      Chest x-ray 12/10 viewed shows patchy atelectasis and infiltrate both lower lung zones with bilateral pleural effusions showing no significant change from previous.        Microbiology reviewed  Human Rhinovirus/Enterovirus Detected Abnormal             )Assessment   ASSESSMENT:      Active  Hospital Problems    Diagnosis Date Noted   • **Symptomatic anemia [D64.9] 12/05/2018   • Pneumonia [J18.9] 12/08/2018   • Rhinovirus infection [B34.8] 12/06/2018   • Pericardial effusion [I31.3] 12/06/2018   • LÓPEZ (acute kidney injury) (CMS/HCC) [N17.9] 12/05/2018   • Anasarca [R60.1] 12/05/2018   • Cough [R05] 12/05/2018   • Epistaxis [R04.0] 12/05/2018   • Hemoptysis [R04.2] 12/05/2018   • Hyponatremia [E87.1] 10/25/2018   • Esophageal varices (CMS/HCC) [I85.00] 10/24/2018   • Elevated liver function tests [R94.5] 06/22/2018   • Alcoholic hepatitis without ascites [K70.10] 06/22/2018      Resolved Hospital Problems   No resolved problems to display.     Pneumonia  Hemoptysis  Coagulopathy secondary to liver disease  Mediastinal lymphadenopathy  Bilateral pleural effusions and anasarca  Cirrhosis/hepatitis  Obesity/?AL  URI with rhinovirus  Anemia  Acute kidney injury      PLAN:    Hemoptysis secondary to coagulopathy from liver disease and infectious pneumonia.  Seems to be improving.  Continue antibiotics.  Continue cough suppressant.    Pleural effusion secondary to parapneumonic effusion on top of liver disease with anasarca.  If worsens may require thoracentesis.  Continue bronchodilators.  Repeat CXR for f/u shows patchy atelectasis and infiltrate both lower lung zones with bilateral effusions showing no significant change compared to 12/7/18 images.  Not requiring more supplemental oxygen.  Plan for transfusion today for worsening anemia.    Enrique Obrien MD  Pulmonary and Critical Care Medicine  [facility], [facility]  12/11/2018    10:13 AM

## 2018-12-12 ENCOUNTER — APPOINTMENT (OUTPATIENT)
Dept: CARDIOLOGY | Facility: HOSPITAL | Age: 48
End: 2018-12-12
Attending: INTERNAL MEDICINE

## 2018-12-12 LAB
ABO + RH BLD: NORMAL
ABO + RH BLD: NORMAL
ALBUMIN SERPL-MCNC: 3.2 G/DL (ref 3.5–5.2)
ALBUMIN/GLOB SERPL: 0.9 G/DL
ALP SERPL-CCNC: 76 U/L (ref 39–117)
ALT SERPL W P-5'-P-CCNC: 30 U/L (ref 1–41)
ANION GAP SERPL CALCULATED.3IONS-SCNC: 15.4 MMOL/L
AST SERPL-CCNC: 43 U/L (ref 1–40)
BASOPHILS # BLD AUTO: 0.05 10*3/MM3 (ref 0–0.2)
BASOPHILS NFR BLD AUTO: 0.6 % (ref 0–1.5)
BH BB BLOOD EXPIRATION DATE: NORMAL
BH BB BLOOD EXPIRATION DATE: NORMAL
BH BB BLOOD TYPE BARCODE: 6200
BH BB BLOOD TYPE BARCODE: 6200
BH BB DISPENSE STATUS: NORMAL
BH BB DISPENSE STATUS: NORMAL
BH BB PRODUCT CODE: NORMAL
BH BB PRODUCT CODE: NORMAL
BH BB UNIT NUMBER: NORMAL
BH BB UNIT NUMBER: NORMAL
BH CV LOW VAS LEFT SAPHENOFEMORAL JUNCTION SPONT: 1
BH CV LOW VAS RIGHT SAPHENOFEMORAL JUNCTION SPONT: 1
BH CV LOWER VASCULAR LEFT COMMON FEMORAL AUGMENT: NORMAL
BH CV LOWER VASCULAR LEFT COMMON FEMORAL COMPETENT: NORMAL
BH CV LOWER VASCULAR LEFT COMMON FEMORAL COMPRESS: NORMAL
BH CV LOWER VASCULAR LEFT COMMON FEMORAL PHASIC: NORMAL
BH CV LOWER VASCULAR LEFT COMMON FEMORAL SPONT: NORMAL
BH CV LOWER VASCULAR LEFT DISTAL FEMORAL COMPRESS: NORMAL
BH CV LOWER VASCULAR LEFT GASTRONEMIUS COMPRESS: NORMAL
BH CV LOWER VASCULAR LEFT GREATER SAPH AK COMPRESS: NORMAL
BH CV LOWER VASCULAR LEFT GREATER SAPH BK COMPRESS: NORMAL
BH CV LOWER VASCULAR LEFT MID FEMORAL AUGMENT: NORMAL
BH CV LOWER VASCULAR LEFT MID FEMORAL COMPETENT: NORMAL
BH CV LOWER VASCULAR LEFT MID FEMORAL COMPRESS: NORMAL
BH CV LOWER VASCULAR LEFT MID FEMORAL PHASIC: NORMAL
BH CV LOWER VASCULAR LEFT MID FEMORAL SPONT: NORMAL
BH CV LOWER VASCULAR LEFT PERONEAL COMPRESS: NORMAL
BH CV LOWER VASCULAR LEFT POPLITEAL AUGMENT: NORMAL
BH CV LOWER VASCULAR LEFT POPLITEAL COMPETENT: NORMAL
BH CV LOWER VASCULAR LEFT POPLITEAL COMPRESS: NORMAL
BH CV LOWER VASCULAR LEFT POPLITEAL PHASIC: NORMAL
BH CV LOWER VASCULAR LEFT POPLITEAL SPONT: NORMAL
BH CV LOWER VASCULAR LEFT POSTERIOR TIBIAL COMPRESS: NORMAL
BH CV LOWER VASCULAR LEFT PROXIMAL FEMORAL COMPRESS: NORMAL
BH CV LOWER VASCULAR LEFT SAPHENOFEMORAL JUNCTION AUGMENT: NORMAL
BH CV LOWER VASCULAR LEFT SAPHENOFEMORAL JUNCTION COMPETENT: NORMAL
BH CV LOWER VASCULAR LEFT SAPHENOFEMORAL JUNCTION COMPRESS: NORMAL
BH CV LOWER VASCULAR LEFT SAPHENOFEMORAL JUNCTION PHASIC: NORMAL
BH CV LOWER VASCULAR LEFT SAPHENOFEMORAL JUNCTION SPONT: NORMAL
BH CV LOWER VASCULAR RIGHT COMMON FEMORAL AUGMENT: NORMAL
BH CV LOWER VASCULAR RIGHT COMMON FEMORAL COMPETENT: NORMAL
BH CV LOWER VASCULAR RIGHT COMMON FEMORAL COMPRESS: NORMAL
BH CV LOWER VASCULAR RIGHT COMMON FEMORAL PHASIC: NORMAL
BH CV LOWER VASCULAR RIGHT COMMON FEMORAL SPONT: NORMAL
BH CV LOWER VASCULAR RIGHT DISTAL FEMORAL COMPRESS: NORMAL
BH CV LOWER VASCULAR RIGHT GASTRONEMIUS COMPRESS: NORMAL
BH CV LOWER VASCULAR RIGHT GREATER SAPH AK COMPRESS: NORMAL
BH CV LOWER VASCULAR RIGHT GREATER SAPH BK COMPRESS: NORMAL
BH CV LOWER VASCULAR RIGHT MID FEMORAL AUGMENT: NORMAL
BH CV LOWER VASCULAR RIGHT MID FEMORAL COMPETENT: NORMAL
BH CV LOWER VASCULAR RIGHT MID FEMORAL COMPRESS: NORMAL
BH CV LOWER VASCULAR RIGHT MID FEMORAL PHASIC: NORMAL
BH CV LOWER VASCULAR RIGHT MID FEMORAL SPONT: NORMAL
BH CV LOWER VASCULAR RIGHT PERONEAL COMPRESS: NORMAL
BH CV LOWER VASCULAR RIGHT POPLITEAL AUGMENT: NORMAL
BH CV LOWER VASCULAR RIGHT POPLITEAL COMPETENT: NORMAL
BH CV LOWER VASCULAR RIGHT POPLITEAL COMPRESS: NORMAL
BH CV LOWER VASCULAR RIGHT POPLITEAL PHASIC: NORMAL
BH CV LOWER VASCULAR RIGHT POPLITEAL SPONT: NORMAL
BH CV LOWER VASCULAR RIGHT POSTERIOR TIBIAL COMPRESS: NORMAL
BH CV LOWER VASCULAR RIGHT PROXIMAL FEMORAL COMPRESS: NORMAL
BH CV LOWER VASCULAR RIGHT SAPHENOFEMORAL JUNCTION AUGMENT: NORMAL
BH CV LOWER VASCULAR RIGHT SAPHENOFEMORAL JUNCTION COMPETENT: NORMAL
BH CV LOWER VASCULAR RIGHT SAPHENOFEMORAL JUNCTION COMPRESS: NORMAL
BH CV LOWER VASCULAR RIGHT SAPHENOFEMORAL JUNCTION PHASIC: NORMAL
BH CV LOWER VASCULAR RIGHT SAPHENOFEMORAL JUNCTION SPONT: NORMAL
BILIRUB SERPL-MCNC: 10.2 MG/DL (ref 0.1–1.2)
BUN BLD-MCNC: 31 MG/DL (ref 6–20)
BUN/CREAT SERPL: 20.7 (ref 7–25)
CALCIUM SPEC-SCNC: 9 MG/DL (ref 8.6–10.5)
CHLORIDE SERPL-SCNC: 91 MMOL/L (ref 98–107)
CO2 SERPL-SCNC: 24.6 MMOL/L (ref 22–29)
CREAT BLD-MCNC: 1.5 MG/DL (ref 0.76–1.27)
DEPRECATED RDW RBC AUTO: 81.6 FL (ref 37–54)
EOSINOPHIL # BLD AUTO: 0.09 10*3/MM3 (ref 0–0.7)
EOSINOPHIL NFR BLD AUTO: 1.1 % (ref 0.3–6.2)
ERYTHROCYTE [DISTWIDTH] IN BLOOD BY AUTOMATED COUNT: 22 % (ref 11.5–14.5)
FOLATE BLD-MCNC: 445.3 NG/ML
FOLATE RBC-MCNC: 2344 NG/ML
GFR SERPL CREATININE-BSD FRML MDRD: 50 ML/MIN/1.73
GLOBULIN UR ELPH-MCNC: 3.6 GM/DL
GLUCOSE BLD-MCNC: 115 MG/DL (ref 65–99)
HCT VFR BLD AUTO: 19 % (ref 37.5–51)
HCT VFR BLD AUTO: 21.9 % (ref 40.4–52.2)
HCT VFR BLD AUTO: 23 % (ref 40.4–52.2)
HGB BLD-MCNC: 7.3 G/DL (ref 13.7–17.6)
HGB BLD-MCNC: 7.8 G/DL (ref 13.7–17.6)
IMM GRANULOCYTES # BLD: 0 10*3/MM3 (ref 0–0.03)
IMM GRANULOCYTES NFR BLD: 0 % (ref 0–0.5)
INR PPP: 2.16 (ref 0.9–1.1)
LYMPHOCYTES # BLD AUTO: 1.24 10*3/MM3 (ref 0.9–4.8)
LYMPHOCYTES NFR BLD AUTO: 15.7 % (ref 19.6–45.3)
MCH RBC QN AUTO: 35.1 PG (ref 27–32.7)
MCHC RBC AUTO-ENTMCNC: 33.3 G/DL (ref 32.6–36.4)
MCV RBC AUTO: 105.3 FL (ref 79.8–96.2)
MONOCYTES # BLD AUTO: 1.06 10*3/MM3 (ref 0.2–1.2)
MONOCYTES NFR BLD AUTO: 13.4 % (ref 5–12)
NEUTROPHILS # BLD AUTO: 5.47 10*3/MM3 (ref 1.9–8.1)
NEUTROPHILS NFR BLD AUTO: 69.2 % (ref 42.7–76)
PLATELET # BLD AUTO: 71 10*3/MM3 (ref 140–500)
PMV BLD AUTO: 8.8 FL (ref 6–12)
POTASSIUM BLD-SCNC: 3.6 MMOL/L (ref 3.5–5.2)
PROT SERPL-MCNC: 6.8 G/DL (ref 6–8.5)
PROTHROMBIN TIME: 23.7 SECONDS (ref 11.7–14.2)
RBC # BLD AUTO: 2.08 10*6/MM3 (ref 4.6–6)
SODIUM BLD-SCNC: 131 MMOL/L (ref 136–145)
UNIT  ABO: NORMAL
UNIT  ABO: NORMAL
UNIT  RH: NORMAL
UNIT  RH: NORMAL
WBC NRBC COR # BLD: 7.91 10*3/MM3 (ref 4.5–10.7)

## 2018-12-12 PROCEDURE — 86900 BLOOD TYPING SEROLOGIC ABO: CPT

## 2018-12-12 PROCEDURE — 93970 EXTREMITY STUDY: CPT

## 2018-12-12 PROCEDURE — 25010000002 CEFEPIME PER 500 MG: Performed by: INTERNAL MEDICINE

## 2018-12-12 PROCEDURE — 94799 UNLISTED PULMONARY SVC/PX: CPT

## 2018-12-12 PROCEDURE — 85014 HEMATOCRIT: CPT | Performed by: INTERNAL MEDICINE

## 2018-12-12 PROCEDURE — P9016 RBC LEUKOCYTES REDUCED: HCPCS

## 2018-12-12 PROCEDURE — 25010000002 ALBUMIN HUMAN 25% PER 50 ML

## 2018-12-12 PROCEDURE — 36415 COLL VENOUS BLD VENIPUNCTURE: CPT | Performed by: INTERNAL MEDICINE

## 2018-12-12 PROCEDURE — 85610 PROTHROMBIN TIME: CPT | Performed by: INTERNAL MEDICINE

## 2018-12-12 PROCEDURE — 99232 SBSQ HOSP IP/OBS MODERATE 35: CPT | Performed by: INTERNAL MEDICINE

## 2018-12-12 PROCEDURE — 36430 TRANSFUSION BLD/BLD COMPNT: CPT

## 2018-12-12 PROCEDURE — 85018 HEMOGLOBIN: CPT | Performed by: INTERNAL MEDICINE

## 2018-12-12 PROCEDURE — 85025 COMPLETE CBC W/AUTO DIFF WBC: CPT | Performed by: INTERNAL MEDICINE

## 2018-12-12 PROCEDURE — P9047 ALBUMIN (HUMAN), 25%, 50ML: HCPCS

## 2018-12-12 PROCEDURE — 80053 COMPREHEN METABOLIC PANEL: CPT | Performed by: INTERNAL MEDICINE

## 2018-12-12 RX ORDER — POTASSIUM CHLORIDE 750 MG/1
40 CAPSULE, EXTENDED RELEASE ORAL ONCE
Status: COMPLETED | OUTPATIENT
Start: 2018-12-12 | End: 2018-12-12

## 2018-12-12 RX ORDER — ALBUMIN (HUMAN) 12.5 G/50ML
25 SOLUTION INTRAVENOUS EVERY 8 HOURS
Status: DISCONTINUED | OUTPATIENT
Start: 2018-12-12 | End: 2018-12-13

## 2018-12-12 RX ADMIN — MIDODRINE HYDROCHLORIDE 10 MG: 5 TABLET ORAL at 08:10

## 2018-12-12 RX ADMIN — GUAIFENESIN 600 MG: 600 TABLET, EXTENDED RELEASE ORAL at 21:14

## 2018-12-12 RX ADMIN — CEFEPIME 2 G: 2 INJECTION, POWDER, FOR SOLUTION INTRAVENOUS at 08:11

## 2018-12-12 RX ADMIN — GUAIFENESIN 600 MG: 600 TABLET, EXTENDED RELEASE ORAL at 08:10

## 2018-12-12 RX ADMIN — ALBUMIN HUMAN 25 G: 0.25 SOLUTION INTRAVENOUS at 21:29

## 2018-12-12 RX ADMIN — SODIUM CHLORIDE, PRESERVATIVE FREE 3 ML: 5 INJECTION INTRAVENOUS at 08:11

## 2018-12-12 RX ADMIN — POTASSIUM CHLORIDE 40 MEQ: 750 CAPSULE, EXTENDED RELEASE ORAL at 18:22

## 2018-12-12 RX ADMIN — LACTULOSE 20 G: 10 SOLUTION ORAL at 23:05

## 2018-12-12 RX ADMIN — BUMETANIDE 2 MG: 0.25 INJECTION INTRAMUSCULAR; INTRAVENOUS at 11:17

## 2018-12-12 RX ADMIN — IPRATROPIUM BROMIDE AND ALBUTEROL SULFATE 3 ML: 2.5; .5 SOLUTION RESPIRATORY (INHALATION) at 06:39

## 2018-12-12 RX ADMIN — BUMETANIDE 2 MG: 0.25 INJECTION INTRAMUSCULAR; INTRAVENOUS at 21:15

## 2018-12-12 RX ADMIN — IPRATROPIUM BROMIDE AND ALBUTEROL SULFATE 3 ML: 2.5; .5 SOLUTION RESPIRATORY (INHALATION) at 20:34

## 2018-12-12 RX ADMIN — HYDROXYZINE HYDROCHLORIDE 50 MG: 50 TABLET, FILM COATED ORAL at 21:14

## 2018-12-12 RX ADMIN — Medication 2 SPRAY: at 08:11

## 2018-12-12 RX ADMIN — MIDODRINE HYDROCHLORIDE 10 MG: 5 TABLET ORAL at 11:17

## 2018-12-12 RX ADMIN — MIDODRINE HYDROCHLORIDE 10 MG: 5 TABLET ORAL at 18:22

## 2018-12-12 RX ADMIN — PANTOPRAZOLE SODIUM 40 MG: 40 TABLET, DELAYED RELEASE ORAL at 08:10

## 2018-12-12 RX ADMIN — BUMETANIDE 2 MG: 0.25 INJECTION INTRAMUSCULAR; INTRAVENOUS at 04:50

## 2018-12-12 RX ADMIN — LACTULOSE 20 G: 10 SOLUTION ORAL at 08:11

## 2018-12-12 RX ADMIN — CEFEPIME 2 G: 2 INJECTION, POWDER, FOR SOLUTION INTRAVENOUS at 21:18

## 2018-12-12 NOTE — PROGRESS NOTES
[facility]  Inpatient Progress Note    Reason for Follow Up:  Alcoholic liver disease    Subjective     Interval History:   Feeling a little better today.  Minimal hemoptysis.  Hb has responded to PRBCs.    Current Facility-Administered Medications:   •  benzonatate (TESSALON) capsule 100 mg, 100 mg, Oral, TID PRN, Enrique Obrien MD  •  bisacodyl (DULCOLAX) EC tablet 5 mg, 5 mg, Oral, Daily PRN, Valentino Valdez MD  •  bumetanide (BUMEX) injection 2 mg, 2 mg, Intravenous, Q8H, Erin Bellamy MD, 2 mg at 12/12/18 0450  •  calcium carbonate (TUMS) chewable tablet 500 mg (200 mg elemental), 2 tablet, Oral, BID PRN, Valentino Valdez MD  •  cefepime (MAXIPIME) 2 g/100 mL 0.9% NS (mbp), 2 g, Intravenous, Q12H, Valentino Valdez MD, 2 g at 12/12/18 0811  •  guaiFENesin (MUCINEX) 12 hr tablet 600 mg, 600 mg, Oral, Q12H, Valentino Valdez MD, 600 mg at 12/12/18 0810  •  hydrOXYzine (ATARAX) tablet 50 mg, 50 mg, Oral, Nightly, Emerald White MD, 50 mg at 12/11/18 2023  •  ipratropium-albuterol (DUO-NEB) nebulizer solution 3 mL, 3 mL, Nebulization, 4x Daily - RT, Valentino Valdez MD, 3 mL at 12/12/18 0639  •  lactulose (CHRONULAC) 10 GM/15ML solution 20 g, 20 g, Oral, BID, Valentino Valdez MD, 20 g at 12/12/18 0811  •  midodrine (PROAMATINE) tablet 10 mg, 10 mg, Oral, TID AC, Emerald White MD, 10 mg at 12/12/18 0810  •  nitroglycerin (NITROSTAT) SL tablet 0.4 mg, 0.4 mg, Sublingual, Q5 Min PRN, Valentino Valdez MD  •  ondansetron (ZOFRAN) tablet 4 mg, 4 mg, Oral, Q6H PRN **OR** ondansetron ODT (ZOFRAN-ODT) disintegrating tablet 4 mg, 4 mg, Oral, Q6H PRN **OR** ondansetron (ZOFRAN) injection 4 mg, 4 mg, Intravenous, Q6H PRN, Valentino Valdez MD  •  oxyCODONE (ROXICODONE) immediate release tablet 5 mg, 5 mg, Oral, Q4H PRN, Valentino Valdez MD  •  pantoprazole (PROTONIX) EC tablet 40 mg, 40 mg, Oral, [facility], Valentino Valdez MD, 40 mg at 12/12/18 0810  •  sodium chloride  (OCEAN) nasal spray 2 spray, 2 spray, Each Nare, PRN, Ozzie Painter MD  •  [COMPLETED] Insert peripheral IV, , , Once **AND** sodium chloride 0.9 % flush 10 mL, 10 mL, Intravenous, PRN, Gurinder Burnett MD  •  sodium chloride 0.9 % flush 3 mL, 3 mL, Intravenous, Q12H, Valentino Valdez MD, 3 mL at 12/12/18 0811  •  sodium chloride 0.9 % flush 3-10 mL, 3-10 mL, Intravenous, PRN, Valentino Valdez MD  •  zolpidem (AMBIEN) tablet 5 mg, 5 mg, Oral, Nightly PRN, Valentino Valdez MD, 5 mg at 12/05/18 2103  Review of Systems:    The following systems were reviewed and negative;  cardiovascular, musculoskeletal and neurological    Objective     Vital Signs  Temp:  [97.8 °F (36.6 °C)-99.1 °F (37.3 °C)] 98.7 °F (37.1 °C)  Heart Rate:  [80-98] 85  Resp:  [16-20] 18  BP: ()/(42-55) 104/48  Body mass index is 35.67 kg/m².    Intake/Output Summary (Last 24 hours) at 12/12/2018 0916  Last data filed at 12/12/2018 0902  Gross per 24 hour   Intake 1210 ml   Output --   Net 1210 ml     I/O this shift:  In: 50 [P.O.:50]  Out: -      Physical Exam:   General: patient awake, alert and cooperative   Cardiovascular: regular rhythm and rate, no murmurs auscultated   Pulm: clear to auscultation bilaterally, regular and unlabored   Abdomen: soft, nontender, nondistended; normal bowel sounds   Rectal: deferred   Extremities: no rash or edema   Psychiatric: Normal mood and behavior; memory intact     Results Review:     I reviewed the patient's new clinical results.    Results from last 7 days   Lab Units  12/12/18   0754  12/11/18   2037  12/11/18   0824  12/11/18   0605   12/10/18   0638   WBC 10*3/mm3  7.91   --    --   9.49   --   9.33   HEMOGLOBIN g/dL  7.3*  8.3*  6.8*  6.7*   < >  7.0*   HEMATOCRIT %  21.9*  25.6*  19.6*  19.8*   < >  20.9*   PLATELETS 10*3/mm3  71*   --    --   90*   --   88*    < > = values in this interval not displayed.     Results from last 7 days   Lab Units  12/12/18   0754  12/11/18   0548   12/10/18   0644   SODIUM mmol/L  131*  131*  128*   POTASSIUM mmol/L  3.6  3.5  3.5   CHLORIDE mmol/L  91*  92*  91*   CO2 mmol/L  24.6  24.0  21.7*   BUN mg/dL  31*  33*  37*   CREATININE mg/dL  1.50*  1.45*  1.82*   CALCIUM mg/dL  9.0  9.1  9.3   BILIRUBIN mg/dL  10.2*  9.6*  10.1*   ALK PHOS U/L  76  99  89   ALT (SGPT) U/L  30  31  33   AST (SGOT) U/L  43*  45*  43*   GLUCOSE mg/dL  115*  109*  118*     Results from last 7 days   Lab Units  12/12/18   0754  12/11/18   0548  12/10/18   0638   INR   2.16*  2.12*  2.16*     Lab Results   Lab Value Date/Time    LIPASE 51 12/05/2018 1306    LIPASE 53 11/28/2018 1101    LIPASE 38 10/24/2018 1439    LIPASE 45 06/21/2018 1906       Radiology:  MRI abdomen wo contrast mrcp   Preliminary Result   This exam is limited by absence of contrast.   1. The liver demonstrates periportal low signal intensity on both T1 and   T2-weighted images that is pronounced on the out of phase images   suggestive presence of fat in the periportal region. These findings may   be suggestive of a chronic liver disease. Primary biliary cirrhosis and   alcoholic hepatitis are on the differential.   2. Splenomegaly and small ascites. Bilateral small to moderate size   layering pleural effusions with bibasilar atelectasis.   2. Limited evaluation of the gallbladder secondary to absence of   contrast, however cholelithiasis and gallstones are identified on this   current study.          XR Chest PA & Lateral   Final Result      XR Chest 1 View   Final Result   Persistent mid to lower lung infiltrate/atelectasis, with   increased pleural effusion apparent on the left, continued follow-up   recommended. Cardiomegaly, pulmonary vascular congestion.       This report was finalized on 12/7/2018 5:58 PM by Dr. Rojas Dubon M.D.          CT Chest Without Contrast   Final Result   1. Basilar predominant opacities favored to reflect atelectasis although   pneumonia not excluded.   2. Right greater  than left pleural effusions, moderate pericardial   effusion.   3. Mild mediastinal adenopathy.   4. Homogeneous splenic enlargement.                           .        This report was finalized on 12/7/2018 5:53 AM by Valentino Nguyen M.D.          US Renal Bilateral   Final Result   Ascites. Otherwise negative renal sonogram. There is no   evidence of obstruction.       This report was finalized on 12/6/2018 8:05 PM by Dr. Brannon Cruz M.D.          XR Chest 1 View   Final Result   Persistent patchy densities in the mid to lower lungs. Increased left   more than right pleural effusions. Cardiomegaly with persistent mild   pulmonary vascular congestion. Continued follow-up/further evaluation   recommended as indicated.                   This report was finalized on 12/5/2018 1:49 PM by Dr. Rojas Dubon M.D.              Assessment/Plan     Patient Active Problem List   Diagnosis   • Acute upper GI bleed   • Gout   • Pre-diabetes   • Cholelithiasis   • Elevated liver function tests   • Alcohol abuse   • Alcoholic hepatitis without ascites   • Thrombocytopenia (CMS/HCC)   • Esophagitis   • Folate deficiency   • Acute anemia   • Esophageal varices (CMS/HCC)   • Hepatitis, alcoholic   • Ascites   • Portal hypertension (CMS/HCC)   • Hyponatremia   • Symptomatic anemia   • LÓPEZ (acute kidney injury) (CMS/HCC)   • Anasarca   • Cough   • Epistaxis   • Hemoptysis   • Rhinovirus infection   • Pericardial effusion   • Pneumonia       Assessment/Recommendations:  1) Alcoholic liver disease. He claims no ETOH in 2 mos.  He has completed full course of oral steroids with relatively minimal change in his TB  2) 1+ esophageal varices seen on 6/18 EGD  3) Hemoptysis - Pulmonary is following.   4) Abnormal gallbladder on recent US. MRCP with stones/sludge, no concerning findings  5) Anemia - likely secondary to #3.  He has responded to PRBCs          Manjeet Eddy M.D.  [facility]  [address] [place] [address]  Office: [phone]

## 2018-12-12 NOTE — PLAN OF CARE
Problem: Patient Care Overview  Goal: Plan of Care Review  Outcome: Ongoing (interventions implemented as appropriate)   12/12/18 0459   Coping/Psychosocial   Plan of Care Reviewed With patient   Plan of Care Review   Progress improving   OTHER   Outcome Summary still coughing up small amounts of bright red blood, up ad terri, room air, Hgb stabilized after PRBC's yesterday, till continue to monitor     Goal: Individualization and Mutuality  Outcome: Ongoing (interventions implemented as appropriate)    Goal: Discharge Needs Assessment  Outcome: Ongoing (interventions implemented as appropriate)      Problem: Anemia (Adult)  Goal: Symptom Improvement  Outcome: Ongoing (interventions implemented as appropriate)      Problem: Fall Risk (Adult)  Goal: Absence of Fall  Outcome: Ongoing (interventions implemented as appropriate)      Problem: Fluid Volume Excess (Adult)  Goal: Optimal Fluid Balance  Outcome: Ongoing (interventions implemented as appropriate)

## 2018-12-12 NOTE — PLAN OF CARE
Problem: Patient Care Overview  Goal: Plan of Care Review  Outcome: Ongoing (interventions implemented as appropriate)   12/12/18 9044   Coping/Psychosocial   Plan of Care Reviewed With patient   Plan of Care Review   Progress no change     Goal: Individualization and Mutuality  Outcome: Ongoing (interventions implemented as appropriate)    Goal: Discharge Needs Assessment  Outcome: Ongoing (interventions implemented as appropriate)    Goal: Interprofessional Rounds/Family Conf  Outcome: Ongoing (interventions implemented as appropriate)      Problem: Anemia (Adult)  Goal: Symptom Improvement  Outcome: Ongoing (interventions implemented as appropriate)      Problem: Fall Risk (Adult)  Goal: Absence of Fall  Outcome: Ongoing (interventions implemented as appropriate)      Problem: Fluid Volume Excess (Adult)  Goal: Optimal Fluid Balance  Outcome: Ongoing (interventions implemented as appropriate)

## 2018-12-12 NOTE — PROGRESS NOTES
" LOS: 7 days     Name: Enrique Ramirez  Age: 48 y.o.  Sex: male  :  1970  MRN: [id number]         Primary Care Physician: Cydney Chase MD    Subjective   Subjective  Complains of warm, red, swollen legs.  No other new complaints.  Denies shortness of breath.  States that his hemoptysis is \"85% better\"    Objective   Vital Signs  Temp:  [97.8 °F (36.6 °C)-99.1 °F (37.3 °C)] 98.7 °F (37.1 °C)  Heart Rate:  [80-98] 85  Resp:  [16-20] 18  BP: ()/(42-55) 104/48  Body mass index is 35.67 kg/m².    Objective:  General Appearance:  Comfortable and in no acute distress (Chronically ill-appearing).    Vital signs: (most recent): Blood pressure 104/48, pulse 85, temperature 98.7 °F (37.1 °C), temperature source Oral, resp. rate 18, height 182.9 cm (72\"), weight 119 kg (263 lb), SpO2 95 %.    HEENT: (Scleral icterus)    Lungs:  Normal effort and normal respiratory rate.    Heart: Normal rate.  Regular rhythm.    Abdomen: Abdomen is soft.  Bowel sounds are normal.   There is no abdominal tenderness.     Extremities: There is dependent edema.  There is no local swelling.  (Warmth and redness to the bilateral lower extremities, right greater than left)  Neurological: Patient is alert and oriented to person, place and time.    Skin:  Warm and dry.  (Diffusely jaundiced)            Results Review:       I reviewed the patient's new clinical results.    Results from last 7 days   Lab Units  18   0754  187  18   0824  18   0605  12/10/18   1952  12/10/18   0953  12/10/18   0638   18   0627   18   0553   18   0556   18   0559   WBC 10*3/mm3  7.91   --    --   9.49   --    --   9.33   --   11.31*   --   11.34*   --   12.31*   --   11.84*   HEMOGLOBIN g/dL  7.3*  8.3*  6.8*  6.7*  7.1*  7.0*  7.0*   < >  7.5*   < >  7.1*   < >  7.3*   < >  7.0*   PLATELETS 10*3/mm3  71*   --    --   90*   --    --   88*   --   101*   --   101*   --   131*   --   109*    < > = values in " this interval not displayed.     Results from last 7 days   Lab Units  12/12/18   0754  12/11/18   0548  12/10/18   0644  12/09/18   0627  12/08/18   0553  12/07/18   0556  12/06/18   0559   SODIUM mmol/L  131*  131*  128*  129*  127*  127*  126*   POTASSIUM mmol/L  3.6  3.5  3.5  4.2  4.8  5.0  5.0   CHLORIDE mmol/L  91*  92*  91*  91*  91*  91*  90*   CO2 mmol/L  24.6  24.0  21.7*  20.3*  19.7*  20.3*  21.0*   BUN mg/dL  31*  33*  37*  42*  45*  39*  35*   CREATININE mg/dL  1.50*  1.45*  1.82*  2.19*  2.50*  2.48*  1.80*   CALCIUM mg/dL  9.0  9.1  9.3  9.7  9.4  9.3  9.3   GLUCOSE mg/dL  115*  109*  118*  119*  126*  128*  90     Results from last 7 days   Lab Units  12/12/18   0754  12/11/18   0548  12/10/18   0638  12/09/18   0627  12/08/18   0553  12/07/18   0557  12/06/18   0559   INR   2.16*  2.12*  2.16*  2.02*  1.98*  1.91*  1.86*             Scheduled Meds:     bumetanide 2 mg Intravenous Q8H   cefepime 2 g Intravenous Q12H   guaiFENesin 600 mg Oral Q12H   hydrOXYzine 50 mg Oral Nightly   ipratropium-albuterol 3 mL Nebulization 4x Daily - RT   lactulose 20 g Oral BID   midodrine 10 mg Oral TID AC   pantoprazole 40 mg Oral QAM AC   sodium chloride 3 mL Intravenous Q12H     PRN Meds:   •  benzonatate  •  bisacodyl  •  calcium carbonate  •  nitroglycerin  •  ondansetron **OR** ondansetron ODT **OR** ondansetron  •  oxyCODONE  •  sodium chloride  •  [COMPLETED] Insert peripheral IV **AND** sodium chloride  •  sodium chloride  •  zolpidem  Continuous Infusions:       Assessment/Plan   Active Hospital Problems    Diagnosis Date Noted   • **Symptomatic anemia [D64.9] 12/05/2018   • Pneumonia [J18.9] 12/08/2018   • Rhinovirus infection [B34.8] 12/06/2018   • Pericardial effusion [I31.3] 12/06/2018   • LÓPEZ (acute kidney injury) (CMS/HCC) [N17.9] 12/05/2018   • Anasarca [R60.1] 12/05/2018   • Cough [R05] 12/05/2018   • Epistaxis [R04.0] 12/05/2018   • Hemoptysis [R04.2] 12/05/2018   • Hyponatremia [E87.1] 10/25/2018    • Esophageal varices (CMS/HCC) [I85.00] 10/24/2018   • Elevated liver function tests [R94.5] 06/22/2018   • Alcoholic hepatitis without ascites [K70.10] 06/22/2018      Resolved Hospital Problems   No resolved problems to display.       Assessment & Plan    Pneumonia  - Continue Cefepime, day 6 of 7.  MRSA screen was negative and vancomycin discontinued     Symptomatic Anemia  - S/p 2 units PRBCs on 12/5 and 12/11  - monitor Hgb and transfuse as needed     Hemoptysis  - Improved   - 2/2 liver disease coagulopathy and PNA     LÓPEZ  - remains on diuretics, per nephrology     Pericardial Effusion  - Apparent on CT chest, not present on echocardiogram in October  - Echocardiogram with tiny effusion not able to be tapped, no tamponade  - Repeat ECHO 12/10/18 showed subcentimeter effusion     EtOH Hepatitis  - possible secondary cirrhosis  -nadolol, lasix, and spironolactone held  - RUQ u/s revealing GB mass.  MRCP today without contrast noted.  Per gastroenterology nothing to do.  - Has completed a course of steroids with no significant change in TB     DVT Prophylaxis  - SCDs  - Check LE doppler for complaints of red, warm BLE    Michele Dixon MD  [facility]  12/12/18  10:03 AM

## 2018-12-12 NOTE — PROGRESS NOTES
BLEV doppler completed.  Preliminary results:  Negative for DVT and STP in bilateral lower extremities.  Results given to TIA Thomas.

## 2018-12-12 NOTE — PROGRESS NOTES
NEPHROLOGY PROGRESS NOTE    PATIENT IDENTIFICATION:   Name:  Enrique Ramirez      MRN:  [id number]     48 y.o.  male             Reason for visit: LÓPEZ    SUBJECTIVE:     Seen and examined.  Laying in bed. Very swollen. Had MRCP yesterday.     OBJECTIVE:  Vitals:    12/12/18 0645 12/12/18 0809 12/12/18 1156 12/12/18 1315   BP:  104/48  104/51   BP Location:  Left arm  Left arm   Patient Position:  Lying  Sitting   Pulse: 84 85 79 83   Resp: 16 18 16   Temp:  98.7 °F (37.1 °C)  98.7 °F (37.1 °C)   TempSrc:  Oral  Oral   SpO2: 100% 95% 91% 95%   Weight:       Height:               Body mass index is 35.67 kg/m².    Intake/Output Summary (Last 24 hours) at 12/12/2018 1605  Last data filed at 12/12/2018 1159  Gross per 24 hour   Intake 1310 ml   Output --   Net 1310 ml     Wt Readings from Last 1 Encounters:   12/12/18 0616 119 kg (263 lb)   12/11/18 0612 120 kg (264 lb)   12/10/18 0730 127 kg (279 lb)   12/08/18 0618 127 kg (279 lb)   12/07/18 0530 125 kg (276 lb)   12/06/18 1457 121 kg (267 lb)   12/06/18 0600 121 kg (267 lb 8 oz)   12/05/18 1814 124 kg (273 lb)   12/05/18 1238 119 kg (262 lb 6.4 oz)     Wt Readings from Last 3 Encounters:   12/12/18 119 kg (263 lb)   11/13/18 118 kg (260 lb)   11/09/18 118 kg (260 lb 12.8 oz)         Exam:  NAD; pleasant; oriented; looks older than stated age  Overweight; jaundiced  HEENT MMM; AT/NC  No eye d/c; scleral icterus  Lungs Coarse bilat; Decreased bs bases  Heart RRR, no rub, no s3  ABd  + bs, soft, distended, nontender. Body wall edema.   Ext 3 +edema lower ext to hips  No clubbing  No asterixis  Moves all extremities   Speech fluent  Mood depressed; flat affect      Scheduled meds:      bumetanide 2 mg Intravenous Q8H   cefepime 2 g Intravenous Q12H   guaiFENesin 600 mg Oral Q12H   hydrOXYzine 50 mg Oral Nightly   ipratropium-albuterol 3 mL Nebulization 4x Daily - RT   lactulose 20 g Oral BID   midodrine 10 mg Oral TID AC   pantoprazole 40 mg Oral QAM AC   sodium  chloride 3 mL Intravenous Q12H     IV meds:                             Data Review:    Results from last 7 days   Lab Units  12/12/18   0754  12/11/18   0548  12/10/18   0644   SODIUM mmol/L  131*  131*  128*   POTASSIUM mmol/L  3.6  3.5  3.5   CHLORIDE mmol/L  91*  92*  91*   CO2 mmol/L  24.6  24.0  21.7*   BUN mg/dL  31*  33*  37*   CREATININE mg/dL  1.50*  1.45*  1.82*   CALCIUM mg/dL  9.0  9.1  9.3   BILIRUBIN mg/dL  10.2*  9.6*  10.1*   ALK PHOS U/L  76  99  89   ALT (SGPT) U/L  30  31  33   AST (SGOT) U/L  43*  45*  43*   GLUCOSE mg/dL  115*  109*  118*     Estimated Creatinine Clearance: 82 mL/min (A) (by C-G formula based on SCr of 1.5 mg/dL (H)).  Results from last 7 days   Lab Units  12/07/18   0556  12/06/18   2207   SODIUM UR mmol/L   --   <20   CREATININE UR mg/dL   --   267.7   URIC ACID mg/dL  11.3*   --      Results from last 7 days   Lab Units  12/10/18   0644  12/09/18   0627   MAGNESIUM mg/dL  1.9  1.9   PHOSPHORUS mg/dL   --   4.0       Results from last 7 days   Lab Units  12/12/18   0754  12/11/18 2037  12/11/18   0824  12/11/18   0605  12/10/18   1952   12/10/18   0638   12/09/18   0627   12/08/18   0553   WBC 10*3/mm3  7.91   --    --   9.49   --    --   9.33   --   11.31*   --   11.34*   HEMOGLOBIN g/dL  7.3*  8.3*  6.8*  6.7*  7.1*   < >  7.0*   < >  7.5*   < >  7.1*   PLATELETS 10*3/mm3  71*   --    --   90*   --    --   88*   --   101*   --   101*    < > = values in this interval not displayed.       Results from last 7 days   Lab Units  12/12/18   0754 12/11/18   0548  12/10/18   0638  12/09/18   0627  12/08/18   0553   INR   2.16*  2.12*  2.16*  2.02*  1.98*             ASSESSMENT:     Symptomatic anemia    Elevated liver function tests    Alcoholic hepatitis without ascites    Esophageal varices (CMS/HCC)    Hyponatremia    LÓPEZ (acute kidney injury) (CMS/HCC)    Anasarca    Cough    Epistaxis    Hemoptysis    Rhinovirus infection    Pericardial effusion    Pneumonia    1.  LÓPEZ,  non-oliguric, improving.  prerenal from ABLA, hypotension, and profound liver injury. Improving Hyponatremia with hypervolemia;   2.  ABLA  3.  Alcoholic cirrhosis  4.  Hypotension  5.  Hemoptysis,  Pulmonary on baord  6.  TCP: PLT is down 77        PLAN:    Replete potassium  Continue Bumex and add Albumin  Blood TX as needed  D/W nurse  Surveillance labs      Erin Bellamy MD  12/12/2018    4:05 PM

## 2018-12-12 NOTE — PROGRESS NOTES
[facility] INPATIENT PROGRESS NOTE         [address]    2018      PATIENT IDENTIFICATION:  Name: Enrique Ramirez ADMIT: 2018   : 1970  PCP: Cydney Chase MD    MRN: [id number] LOS: 7 days   AGE/SEX: 48 y.o. male  ROOM: [address]    Reason for visit: Hemoptysis      SUBJECTIVE:      Resting comfortably. Cough has significantly improved. No further bloody sputum. Diminished breath sounds bilaterally. 2+ edema.    Objective   OBJECTIVE:    Vital Sign Min/Max for last 24 hours  Temp  Min: 97.8 °F (36.6 °C)  Max: 99.1 °F (37.3 °C)   BP  Min: 90/45  Max: 104/48   Pulse  Min: 80  Max: 98   Resp  Min: 16  Max: 20   SpO2  Min: 94 %  Max: 100 %   No Data Recorded   Weight  Min: 119 kg (263 lb)  Max: 119 kg (263 lb)                         Body mass index is 35.67 kg/m².    Intake/Output Summary (Last 24 hours) at 2018 0959  Last data filed at 2018 0902  Gross per 24 hour   Intake 1210 ml   Output --   Net 1210 ml         Exam:  GEN:  No distress, appears stated age  EYES:   PERRL, anicteric sclera  ENT:    External ears/nose normal, OP clear  NECK:  No adenopathy, midline trachea  LUNGS: Normal chest on inspection, palpation and diminished bilaterally with scattered coarse breath sounds at bases on auscultation  CV:  Normal S1S2, without murmur  ABD:  Non tender, non distended, no hepatosplenomegaly, +BS  EXT:  No cyanosis or clubbing. 2+ edema    Scheduled meds:      bumetanide 2 mg Intravenous Q8H   cefepime 2 g Intravenous Q12H   guaiFENesin 600 mg Oral Q12H   hydrOXYzine 50 mg Oral Nightly   ipratropium-albuterol 3 mL Nebulization 4x Daily - RT   lactulose 20 g Oral BID   midodrine 10 mg Oral TID AC   pantoprazole 40 mg Oral QAM AC   sodium chloride 3 mL Intravenous Q12H     IV meds:                           Data Review:  Results from last 7 days   Lab Units  18   0754  18   0548  12/10/18   0644  18   0627  18   0553    SODIUM mmol/L  131*  131*  128*  129*  127*   POTASSIUM mmol/L  3.6  3.5  3.5  4.2  4.8   CHLORIDE mmol/L  91*  92*  91*  91*  91*   CO2 mmol/L  24.6  24.0  21.7*  20.3*  19.7*   BUN mg/dL  31*  33*  37*  42*  45*   CREATININE mg/dL  1.50*  1.45*  1.82*  2.19*  2.50*   GLUCOSE mg/dL  115*  109*  118*  119*  126*   CALCIUM mg/dL  9.0  9.1  9.3  9.7  9.4         Estimated Creatinine Clearance: 82 mL/min (A) (by C-G formula based on SCr of 1.5 mg/dL (H)).  Results from last 7 days   Lab Units  12/12/18 0754 12/11/18 2037 12/11/18 0824  12/11/18 0605  12/10/18   1952   12/10/18   0638   12/09/18 0627   12/08/18   0553   WBC 10*3/mm3  7.91   --    --   9.49   --    --   9.33   --   11.31*   --   11.34*   HEMOGLOBIN g/dL  7.3*  8.3*  6.8*  6.7*  7.1*   < >  7.0*   < >  7.5*   < >  7.1*   PLATELETS 10*3/mm3  71*   --    --   90*   --    --   88*   --   101*   --   101*    < > = values in this interval not displayed.     Results from last 7 days   Lab Units  12/12/18 0754 12/11/18   0548  12/10/18   0638  12/09/18   0627  12/08/18   0553   INR   2.16*  2.12*  2.16*  2.02*  1.98*     Results from last 7 days   Lab Units  12/12/18 0754 12/11/18   0548  12/10/18   0644  12/09/18   0627  12/08/18   0553   ALT (SGPT) U/L  30  31  33  37  36   AST (SGOT) U/L  43*  45*  43*  42*  41*         Results from last 7 days   Lab Units  12/07/18   0556  12/06/18   0559   PROCALCITONIN ng/mL  0.43*  0.27*         Chest x-ray 12/7 viewed shows infiltrate/atelectasis and increased pleural effusion on the left with cardiomegaly and pulmonary vascular congestion      Chest x-ray 12/10 viewed shows patchy atelectasis and infiltrate both lower lung zones with bilateral pleural effusions showing no significant change from previous.        Microbiology reviewed  Human Rhinovirus/Enterovirus Detected Abnormal             )Assessment   ASSESSMENT:      Active Hospital Problems    Diagnosis Date Noted   • **Symptomatic anemia  [D64.9] 12/05/2018   • Pneumonia [J18.9] 12/08/2018   • Rhinovirus infection [B34.8] 12/06/2018   • Pericardial effusion [I31.3] 12/06/2018   • LÓPEZ (acute kidney injury) (CMS/HCC) [N17.9] 12/05/2018   • Anasarca [R60.1] 12/05/2018   • Cough [R05] 12/05/2018   • Epistaxis [R04.0] 12/05/2018   • Hemoptysis [R04.2] 12/05/2018   • Hyponatremia [E87.1] 10/25/2018   • Esophageal varices (CMS/HCC) [I85.00] 10/24/2018   • Elevated liver function tests [R94.5] 06/22/2018   • Alcoholic hepatitis without ascites [K70.10] 06/22/2018      Resolved Hospital Problems   No resolved problems to display.     Pneumonia  Hemoptysis  Coagulopathy secondary to liver disease  Mediastinal lymphadenopathy  Bilateral pleural effusions and anasarca  Cirrhosis/hepatitis  Obesity/?AL  URI with rhinovirus  Anemia  Acute kidney injury      PLAN:    Hemoptysis secondary to coagulopathy from liver disease and infectious pneumonia.  Seems to be improving.  Continue cough suppressant.    Pleural effusion secondary to parapneumonic effusion on top of liver disease with anasarca.    Continue bronchodilators.  S/P pRBC yesterday.      Enrique Obrien MD  Pulmonary and Critical Care Medicine  [facility], [facility]  12/12/2018    9:59 AM

## 2018-12-13 LAB
ABO + RH BLD: NORMAL
ALBUMIN SERPL-MCNC: 3.5 G/DL (ref 3.5–5.2)
ALBUMIN/GLOB SERPL: 0.9 G/DL
ALP SERPL-CCNC: 77 U/L (ref 39–117)
ALT SERPL W P-5'-P-CCNC: 29 U/L (ref 1–41)
ANION GAP SERPL CALCULATED.3IONS-SCNC: 16.9 MMOL/L
AST SERPL-CCNC: 42 U/L (ref 1–40)
BASOPHILS # BLD AUTO: 0.04 10*3/MM3 (ref 0–0.2)
BASOPHILS NFR BLD AUTO: 0.5 % (ref 0–1.5)
BH BB BLOOD EXPIRATION DATE: NORMAL
BH BB BLOOD TYPE BARCODE: 6200
BH BB DISPENSE STATUS: NORMAL
BH BB PRODUCT CODE: NORMAL
BH BB UNIT NUMBER: NORMAL
BILIRUB SERPL-MCNC: 11 MG/DL (ref 0.1–1.2)
BUN BLD-MCNC: 34 MG/DL (ref 6–20)
BUN/CREAT SERPL: 21.8 (ref 7–25)
CALCIUM SPEC-SCNC: 9.5 MG/DL (ref 8.6–10.5)
CHLORIDE SERPL-SCNC: 91 MMOL/L (ref 98–107)
CO2 SERPL-SCNC: 24.1 MMOL/L (ref 22–29)
CREAT BLD-MCNC: 1.56 MG/DL (ref 0.76–1.27)
DEPRECATED RDW RBC AUTO: 80.6 FL (ref 37–54)
EOSINOPHIL # BLD AUTO: 0.09 10*3/MM3 (ref 0–0.7)
EOSINOPHIL NFR BLD AUTO: 1.2 % (ref 0.3–6.2)
ERYTHROCYTE [DISTWIDTH] IN BLOOD BY AUTOMATED COUNT: 21.6 % (ref 11.5–14.5)
GFR SERPL CREATININE-BSD FRML MDRD: 48 ML/MIN/1.73
GLOBULIN UR ELPH-MCNC: 3.8 GM/DL
GLUCOSE BLD-MCNC: 131 MG/DL (ref 65–99)
HCT VFR BLD AUTO: 24.6 % (ref 40.4–52.2)
HGB BLD-MCNC: 8 G/DL (ref 13.7–17.6)
IMM GRANULOCYTES # BLD: 0 10*3/MM3 (ref 0–0.03)
IMM GRANULOCYTES NFR BLD: 0 % (ref 0–0.5)
INR PPP: 2.09 (ref 0.9–1.1)
LYMPHOCYTES # BLD AUTO: 1 10*3/MM3 (ref 0.9–4.8)
LYMPHOCYTES NFR BLD AUTO: 13.5 % (ref 19.6–45.3)
MAGNESIUM SERPL-MCNC: 1.5 MG/DL (ref 1.6–2.6)
MCH RBC QN AUTO: 34 PG (ref 27–32.7)
MCHC RBC AUTO-ENTMCNC: 32.5 G/DL (ref 32.6–36.4)
MCV RBC AUTO: 104.7 FL (ref 79.8–96.2)
MONOCYTES # BLD AUTO: 0.85 10*3/MM3 (ref 0.2–1.2)
MONOCYTES NFR BLD AUTO: 11.5 % (ref 5–12)
NEUTROPHILS # BLD AUTO: 5.43 10*3/MM3 (ref 1.9–8.1)
NEUTROPHILS NFR BLD AUTO: 73.3 % (ref 42.7–76)
PHOSPHATE SERPL-MCNC: 3.3 MG/DL (ref 2.5–4.5)
PLATELET # BLD AUTO: 65 10*3/MM3 (ref 140–500)
PMV BLD AUTO: 8.9 FL (ref 6–12)
POTASSIUM BLD-SCNC: 4 MMOL/L (ref 3.5–5.2)
PROT SERPL-MCNC: 7.3 G/DL (ref 6–8.5)
PROTHROMBIN TIME: 23.1 SECONDS (ref 11.7–14.2)
RBC # BLD AUTO: 2.35 10*6/MM3 (ref 4.6–6)
SODIUM BLD-SCNC: 132 MMOL/L (ref 136–145)
UNIT  ABO: NORMAL
UNIT  RH: NORMAL
WBC NRBC COR # BLD: 7.41 10*3/MM3 (ref 4.5–10.7)

## 2018-12-13 PROCEDURE — P9047 ALBUMIN (HUMAN), 25%, 50ML: HCPCS | Performed by: INTERNAL MEDICINE

## 2018-12-13 PROCEDURE — 25010000002 CEFEPIME PER 500 MG: Performed by: INTERNAL MEDICINE

## 2018-12-13 PROCEDURE — 36415 COLL VENOUS BLD VENIPUNCTURE: CPT | Performed by: INTERNAL MEDICINE

## 2018-12-13 PROCEDURE — 83735 ASSAY OF MAGNESIUM: CPT | Performed by: INTERNAL MEDICINE

## 2018-12-13 PROCEDURE — 84100 ASSAY OF PHOSPHORUS: CPT | Performed by: INTERNAL MEDICINE

## 2018-12-13 PROCEDURE — 99232 SBSQ HOSP IP/OBS MODERATE 35: CPT | Performed by: INTERNAL MEDICINE

## 2018-12-13 PROCEDURE — 85610 PROTHROMBIN TIME: CPT | Performed by: INTERNAL MEDICINE

## 2018-12-13 PROCEDURE — 85025 COMPLETE CBC W/AUTO DIFF WBC: CPT | Performed by: INTERNAL MEDICINE

## 2018-12-13 PROCEDURE — 25010000002 ALBUMIN HUMAN 25% PER 50 ML: Performed by: INTERNAL MEDICINE

## 2018-12-13 PROCEDURE — 80053 COMPREHEN METABOLIC PANEL: CPT | Performed by: INTERNAL MEDICINE

## 2018-12-13 PROCEDURE — 94799 UNLISTED PULMONARY SVC/PX: CPT

## 2018-12-13 RX ORDER — IPRATROPIUM BROMIDE AND ALBUTEROL SULFATE 2.5; .5 MG/3ML; MG/3ML
3 SOLUTION RESPIRATORY (INHALATION) EVERY 4 HOURS PRN
Status: DISCONTINUED | OUTPATIENT
Start: 2018-12-13 | End: 2018-12-18 | Stop reason: HOSPADM

## 2018-12-13 RX ORDER — BUMETANIDE 0.25 MG/ML
4 INJECTION INTRAMUSCULAR; INTRAVENOUS EVERY 6 HOURS
Status: DISCONTINUED | OUTPATIENT
Start: 2018-12-13 | End: 2018-12-14

## 2018-12-13 RX ORDER — ALBUMIN (HUMAN) 12.5 G/50ML
25 SOLUTION INTRAVENOUS EVERY 8 HOURS
Status: COMPLETED | OUTPATIENT
Start: 2018-12-13 | End: 2018-12-14

## 2018-12-13 RX ADMIN — GUAIFENESIN 600 MG: 600 TABLET, EXTENDED RELEASE ORAL at 08:19

## 2018-12-13 RX ADMIN — MIDODRINE HYDROCHLORIDE 10 MG: 5 TABLET ORAL at 18:02

## 2018-12-13 RX ADMIN — MIDODRINE HYDROCHLORIDE 10 MG: 5 TABLET ORAL at 11:57

## 2018-12-13 RX ADMIN — BUMETANIDE 2 MG: 0.25 INJECTION INTRAMUSCULAR; INTRAVENOUS at 05:25

## 2018-12-13 RX ADMIN — PANTOPRAZOLE SODIUM 40 MG: 40 TABLET, DELAYED RELEASE ORAL at 08:18

## 2018-12-13 RX ADMIN — SODIUM CHLORIDE, PRESERVATIVE FREE 3 ML: 5 INJECTION INTRAVENOUS at 08:19

## 2018-12-13 RX ADMIN — ALBUMIN HUMAN 25 G: 0.25 SOLUTION INTRAVENOUS at 13:40

## 2018-12-13 RX ADMIN — IPRATROPIUM BROMIDE AND ALBUTEROL SULFATE 3 ML: 2.5; .5 SOLUTION RESPIRATORY (INHALATION) at 07:13

## 2018-12-13 RX ADMIN — LACTULOSE 20 G: 10 SOLUTION ORAL at 08:19

## 2018-12-13 RX ADMIN — ALBUMIN HUMAN 25 G: 0.25 SOLUTION INTRAVENOUS at 05:25

## 2018-12-13 RX ADMIN — BUMETANIDE 4 MG: 0.25 INJECTION INTRAMUSCULAR; INTRAVENOUS at 18:02

## 2018-12-13 RX ADMIN — BUMETANIDE 2 MG: 0.25 INJECTION INTRAMUSCULAR; INTRAVENOUS at 11:57

## 2018-12-13 RX ADMIN — LACTULOSE 20 G: 10 SOLUTION ORAL at 21:08

## 2018-12-13 RX ADMIN — HYDROXYZINE HYDROCHLORIDE 50 MG: 50 TABLET, FILM COATED ORAL at 21:08

## 2018-12-13 RX ADMIN — CEFEPIME 2 G: 2 INJECTION, POWDER, FOR SOLUTION INTRAVENOUS at 08:18

## 2018-12-13 RX ADMIN — GUAIFENESIN 600 MG: 600 TABLET, EXTENDED RELEASE ORAL at 21:08

## 2018-12-13 RX ADMIN — IPRATROPIUM BROMIDE AND ALBUTEROL SULFATE 3 ML: 2.5; .5 SOLUTION RESPIRATORY (INHALATION) at 11:55

## 2018-12-13 RX ADMIN — MIDODRINE HYDROCHLORIDE 10 MG: 5 TABLET ORAL at 08:18

## 2018-12-13 RX ADMIN — ALBUMIN (HUMAN) 25 G: 12.5 SOLUTION INTRAVENOUS at 21:08

## 2018-12-13 RX ADMIN — CEFEPIME 2 G: 2 INJECTION, POWDER, FOR SOLUTION INTRAVENOUS at 21:52

## 2018-12-13 NOTE — CONSULTS
"Purpose of the visit was to evaluate for: goals of care/advanced care planning, support for patient/family, hospice referral/discussion, pain/symptom management and comfort care. Spoke with RN as well as patient and family and discussed palliative care, goals of care, care options, resuscitation status and [facility].      Assessment:    Patient is palliative care appropriate for community based services with [facility] or SNF with palliative care (list reasons): History of alcoholic cirrhosis, esophageal varices, and gout.  Patient was admitted with cough, anemia, and bilateral leg edema.  Patient c/o pain in legs and tiredness. Patient is alert and oriented x 4, ambulatory, and still eating a fair amount.  PPS 70%    Recommendations/Plan: [facility] evaluation for community based services.    Other Comments: Spoke with patient and wife regarding goals of care, palliative care, palliative care unit, and hospice care.  Patient stated \"I just want to go home and be comfortable\".  I asked if he would want to come back to the hospital if he got in distress and we was not sure if he would or not.  Patient stated he is tired of being in the hospital and understands he is not likely to get much better, but he wants to go home and be with his family and friends.  I gave them a brochure about [facility] services and encouraged them to call.  We discussed code status, patient and wife agreed to talk further with each other.  Thank you for allowing us to care for this patient.  Please let the palliative care team know if we can be of further assistance.    "

## 2018-12-13 NOTE — PROGRESS NOTES
[facility] INPATIENT PROGRESS NOTE         [address]    2018      PATIENT IDENTIFICATION:  Name: Enrique Ramirez ADMIT: 2018   : 1970  PCP: Cydney Chase MD    MRN: [id number] LOS: 8 days   AGE/SEX: 48 y.o. male  ROOM: [facility]                     LOS 8    Reason for visit: Hemoptysis      SUBJECTIVE:   Slept well last night. No new complaints this morning. Cough improved. No bloody sputum.     Objective   OBJECTIVE:    Vital Sign Min/Max for last 24 hours  Temp  Min: 97.8 °F (36.6 °C)  Max: 98.7 °F (37.1 °C)   BP  Min: 98/51  Max: 109/53   Pulse  Min: 77  Max: 89   Resp  Min: 16  Max: 20   SpO2  Min: 91 %  Max: 99 %   No Data Recorded   Weight  Min: 118 kg (260 lb 8 oz)  Max: 118 kg (260 lb 8 oz)                         Body mass index is 35.33 kg/m².    Intake/Output Summary (Last 24 hours) at 2018 09  Last data filed at 2018  Gross per 24 hour   Intake 1300 ml   Output --   Net 1300 ml         Exam:  GEN:  No distress, appears stated age  EYES:   PERRL, anicteric sclera  ENT:    External ears/nose normal, OP clear  NECK:  No adenopathy, midline trachea  LUNGS: Normal chest on inspection, palpation and diminished bilaterally with scattered coarse breath sounds at bases on auscultation  CV:  Normal S1S2, without murmur  ABD:  Non tender, non distended, no hepatosplenomegaly, +BS  EXT:  No cyanosis or clubbing. 2+ edema    Scheduled meds:      albumin human 25 g Intravenous Q8H   bumetanide 2 mg Intravenous Q8H   cefepime 2 g Intravenous Q12H   guaiFENesin 600 mg Oral Q12H   hydrOXYzine 50 mg Oral Nightly   ipratropium-albuterol 3 mL Nebulization 4x Daily - RT   lactulose 20 g Oral BID   midodrine 10 mg Oral TID AC   pantoprazole 40 mg Oral QAM AC   sodium chloride 3 mL Intravenous Q12H     IV meds:                           Data Review:  Results from last 7 days   Lab Units  18   0803  18   0754  18   0548  12/10/18   0644  18   0627    SODIUM mmol/L  132*  131*  131*  128*  129*   POTASSIUM mmol/L  4.0  3.6  3.5  3.5  4.2   CHLORIDE mmol/L  91*  91*  92*  91*  91*   CO2 mmol/L  24.1  24.6  24.0  21.7*  20.3*   BUN mg/dL  34*  31*  33*  37*  42*   CREATININE mg/dL  1.56*  1.50*  1.45*  1.82*  2.19*   GLUCOSE mg/dL  131*  115*  109*  118*  119*   CALCIUM mg/dL  9.5  9.0  9.1  9.3  9.7         Estimated Creatinine Clearance: 78.5 mL/min (A) (by C-G formula based on SCr of 1.56 mg/dL (H)).  Results from last 7 days   Lab Units  12/13/18   0804  12/12/18 2251 12/12/18 0754  12/11/18 2037  12/11/18   0824  12/11/18   0605   12/10/18   0638   12/09/18   0627   WBC 10*3/mm3  7.41   --   7.91   --    --   9.49   --   9.33   --   11.31*   HEMOGLOBIN g/dL  8.0*  7.8*  7.3*  8.3*  6.8*  6.7*   < >  7.0*   < >  7.5*   PLATELETS 10*3/mm3  65*   --   71*   --    --   90*   --   88*   --   101*    < > = values in this interval not displayed.     Results from last 7 days   Lab Units  12/13/18   0803  12/12/18   0754  12/11/18   0548  12/10/18   0638  12/09/18   0627   INR   2.09*  2.16*  2.12*  2.16*  2.02*     Results from last 7 days   Lab Units  12/13/18   0803  12/12/18   0754  12/11/18   0548  12/10/18   0644  12/09/18   0627   ALT (SGPT) U/L  29  30  31  33  37   AST (SGOT) U/L  42*  43*  45*  43*  42*         Results from last 7 days   Lab Units  12/07/18   0556   PROCALCITONIN ng/mL  0.43*         Chest x-ray 12/7 viewed shows infiltrate/atelectasis and increased pleural effusion on the left with cardiomegaly and pulmonary vascular congestion      Chest x-ray 12/10 viewed shows patchy atelectasis and infiltrate both lower lung zones with bilateral pleural effusions showing no significant change from previous.        Microbiology reviewed  Human Rhinovirus/Enterovirus Detected Abnormal         Lower extremity Dopplers performed 12/12 reviewed        )Assessment   ASSESSMENT:      Active Hospital Problems    Diagnosis Date Noted   • **Symptomatic  anemia [D64.9] 12/05/2018   • Pneumonia [J18.9] 12/08/2018   • Rhinovirus infection [B34.8] 12/06/2018   • Pericardial effusion [I31.3] 12/06/2018   • LÓPEZ (acute kidney injury) (CMS/HCC) [N17.9] 12/05/2018   • Anasarca [R60.1] 12/05/2018   • Cough [R05] 12/05/2018   • Epistaxis [R04.0] 12/05/2018   • Hemoptysis [R04.2] 12/05/2018   • Hyponatremia [E87.1] 10/25/2018   • Esophageal varices (CMS/HCC) [I85.00] 10/24/2018   • Elevated liver function tests [R94.5] 06/22/2018   • Alcoholic hepatitis without ascites [K70.10] 06/22/2018      Resolved Hospital Problems   No resolved problems to display.     Pneumonia  Hemoptysis  Coagulopathy secondary to liver disease  Mediastinal lymphadenopathy  Bilateral pleural effusions and anasarca  Cirrhosis/hepatitis  Obesity/?AL  URI with rhinovirus  Anemia  Acute kidney injury      PLAN:    Hemoptysis secondary to coagulopathy from liver disease and infectious pneumonia.  Seems to be improving.  Continue cough suppressant.    Pleural effusion secondary to parapneumonic effusion on top of liver disease with anasarca.    Continue bronchodilators.        Enrique Obrien MD  Pulmonary and Critical Care Medicine  [facility], [facility]  12/13/2018    9:43 AM

## 2018-12-13 NOTE — PLAN OF CARE
"Problem: Patient Care Overview  Goal: Plan of Care Review  Outcome: Ongoing (interventions implemented as appropriate)   12/13/18 8160   Coping/Psychosocial   Plan of Care Reviewed With patient   Plan of Care Review   Progress no change   OTHER   Outcome Summary palliative consult today, pt and wife discussing goals of care and pt expresses wishes to \"just go home\", iv lasix dose and frequency increased, vss, will continue to monitor closely      Goal: Individualization and Mutuality  Outcome: Ongoing (interventions implemented as appropriate)    Goal: Discharge Needs Assessment  Outcome: Ongoing (interventions implemented as appropriate)    Goal: Interprofessional Rounds/Family Conf  Outcome: Ongoing (interventions implemented as appropriate)      Problem: Anemia (Adult)  Goal: Symptom Improvement  Outcome: Ongoing (interventions implemented as appropriate)      Problem: Fall Risk (Adult)  Goal: Absence of Fall  Outcome: Ongoing (interventions implemented as appropriate)      Problem: Fluid Volume Excess (Adult)  Goal: Optimal Fluid Balance  Outcome: Ongoing (interventions implemented as appropriate)        "

## 2018-12-13 NOTE — PROGRESS NOTES
" LOS: 8 days     Name: Enrique Ramirez  Age: 48 y.o.  Sex: male  :  1970  MRN: [id number]         Primary Care Physician: Cydney Chase MD    Subjective   Subjective  No new complaints today.  Begins asking me about what his long-term prognosis is.  He states to me \"I just want to be given pills and sent home\".  He also states \"you all fix one thing and then 3 things break\".  States he is tired of being in the hospital.  Feels like he wants to give up and that his outlook is hopeless.    Objective   Vital Signs  Temp:  [97.8 °F (36.6 °C)-98.7 °F (37.1 °C)] 98.4 °F (36.9 °C)  Heart Rate:  [77-95] 86  Resp:  [16-20] 20  BP: ()/(50-54) 101/52  Body mass index is 35.33 kg/m².    Objective:  General Appearance:  Comfortable and in no acute distress (Acutely and chronically ill-appearing).    Vital signs: (most recent): Blood pressure 101/52, pulse 86, temperature 98.4 °F (36.9 °C), temperature source Oral, resp. rate 20, height 182.9 cm (72\"), weight 118 kg (260 lb 8 oz), SpO2 97 %.    Lungs:  Normal effort and normal respiratory rate.  There are decreased breath sounds.    Heart: Normal rate.  Regular rhythm.    Abdomen: Abdomen is soft.  Bowel sounds are normal.   There is no abdominal tenderness.     Extremities: There is dependent edema.  There is no local swelling.    Neurological: Patient is alert and oriented to person, place and time.    Skin:  Warm and dry.              Results Review:       I reviewed the patient's new clinical results.    Results from last 7 days   Lab Units  18   0804  18   2251  18   0754  18   2037  18   0824  18   0605  12/10/18   1952   12/10/18   0638   18   0627   18   0553   18   0556   WBC 10*3/mm3  7.41   --   7.91   --    --   9.49   --    --   9.33   --   11.31*   --   11.34*   --   12.31*   HEMOGLOBIN g/dL  8.0*  7.8*  7.3*  8.3*  6.8*  6.7*  7.1*   < >  7.0*   < >  7.5*   < >  7.1*   < >  7.3*   PLATELETS 10*3/mm3  " 65*   --   71*   --    --   90*   --    --   88*   --   101*   --   101*   --   131*    < > = values in this interval not displayed.     Results from last 7 days   Lab Units  12/13/18   0803  12/12/18   0754  12/11/18   0548  12/10/18   0644  12/09/18   0627  12/08/18   0553  12/07/18   0556   SODIUM mmol/L  132*  131*  131*  128*  129*  127*  127*   POTASSIUM mmol/L  4.0  3.6  3.5  3.5  4.2  4.8  5.0   CHLORIDE mmol/L  91*  91*  92*  91*  91*  91*  91*   CO2 mmol/L  24.1  24.6  24.0  21.7*  20.3*  19.7*  20.3*   BUN mg/dL  34*  31*  33*  37*  42*  45*  39*   CREATININE mg/dL  1.56*  1.50*  1.45*  1.82*  2.19*  2.50*  2.48*   CALCIUM mg/dL  9.5  9.0  9.1  9.3  9.7  9.4  9.3   GLUCOSE mg/dL  131*  115*  109*  118*  119*  126*  128*     Results from last 7 days   Lab Units  12/13/18   0803  12/12/18   0754  12/11/18   0548  12/10/18   0638  12/09/18   0627  12/08/18   0553  12/07/18   0557   INR   2.09*  2.16*  2.12*  2.16*  2.02*  1.98*  1.91*             Scheduled Meds:     albumin human 25 g Intravenous Q8H   bumetanide 2 mg Intravenous Q8H   cefepime 2 g Intravenous Q12H   guaiFENesin 600 mg Oral Q12H   hydrOXYzine 50 mg Oral Nightly   ipratropium-albuterol 3 mL Nebulization 4x Daily - RT   lactulose 20 g Oral BID   midodrine 10 mg Oral TID AC   pantoprazole 40 mg Oral QAM AC   sodium chloride 3 mL Intravenous Q12H     PRN Meds:   benzonatate  •  bisacodyl  •  calcium carbonate  •  nitroglycerin  •  ondansetron **OR** ondansetron ODT **OR** ondansetron  •  oxyCODONE  •  sodium chloride  •  [COMPLETED] Insert peripheral IV **AND** sodium chloride  •  sodium chloride  •  zolpidem  Continuous Infusions:       Assessment/Plan   Active Hospital Problems    Diagnosis Date Noted   • **Symptomatic anemia [D64.9] 12/05/2018   • Pneumonia [J18.9] 12/08/2018   • Rhinovirus infection [B34.8] 12/06/2018   • Pericardial effusion [I31.3] 12/06/2018   • LÓPEZ (acute kidney injury) (CMS/HCC) [N17.9] 12/05/2018   • Anasarca [R60.1]  "12/05/2018   • Cough [R05] 12/05/2018   • Epistaxis [R04.0] 12/05/2018   • Hemoptysis [R04.2] 12/05/2018   • Hyponatremia [E87.1] 10/25/2018   • Esophageal varices (CMS/HCC) [I85.00] 10/24/2018   • Elevated liver function tests [R94.5] 06/22/2018   • Alcoholic hepatitis without ascites [K70.10] 06/22/2018      Resolved Hospital Problems   No resolved problems to display.       Assessment & Plan    Pneumonia  - Continue Cefepime, day 7 of 7.  MRSA screen was negative and vancomycin discontinued.  Pulmonology following.     Symptomatic Anemia  - S/p 2 units PRBCs on 12/5 and 12/11  - monitor Hgb and transfuse as needed     Hemoptysis  - Improved   - 2/2 liver disease coagulopathy and PNA     LÓPEZ  - remains on diuretics, per nephrology  - Creatinine trending up    Pericardial Effusion  - Apparent on CT chest, not present on echocardiogram in October  - Echocardiogram with tiny effusion not able to be tapped, no tamponade  - Repeat ECHO 12/10/18 showed subcentimeter effusion     EtOH Hepatitis  - possible secondary cirrhosis  -nadolol, lasix, and spironolactone held  - RUQ u/s revealing GB mass.  MRCP today without contrast noted.  Per gastroenterology nothing to do.  - Has completed a course of steroids with no significant change in TB     DVT Prophylaxis  - SCDs  - BLE doppler negative    Goals of Care  Today the patient has expressed to me sentiment of wanting to change his goals of care.  He states he is tired of being in the hospital and receiving aggressive treatment.  He states \"I just want to be given pills and go home\".  I did discuss with him his overall relatively poor prognosis stemming from his alcoholic liver disease.  He wishes to discuss with the palliative care team his goals of care moving forward.  I will place a consult for this.    30 minutes spent with the patient today with over 50% of that time spent counseling    Michele Dixon MD  [facility]  12/13/18  12:54 " PM

## 2018-12-13 NOTE — PLAN OF CARE
Problem: Patient Care Overview  Goal: Plan of Care Review  Outcome: Ongoing (interventions implemented as appropriate)   12/13/18 0435   Coping/Psychosocial   Plan of Care Reviewed With patient   Plan of Care Review   Progress no change   OTHER   Outcome Summary complaints of fatigue, clustered care tonight and he has rested fairly well, still coughing up bright red blood about 30 ml, edema has improved, room air, tolerating diet, will continue to monitor      Goal: Individualization and Mutuality  Outcome: Ongoing (interventions implemented as appropriate)    Goal: Discharge Needs Assessment  Outcome: Ongoing (interventions implemented as appropriate)      Problem: Anemia (Adult)  Goal: Symptom Improvement  Outcome: Ongoing (interventions implemented as appropriate)      Problem: Fall Risk (Adult)  Goal: Absence of Fall  Outcome: Ongoing (interventions implemented as appropriate)      Problem: Fluid Volume Excess (Adult)  Goal: Optimal Fluid Balance  Outcome: Ongoing (interventions implemented as appropriate)

## 2018-12-13 NOTE — PROGRESS NOTES
[facility], [place], operated by [facility]  Inpatient Progress Note    Reason for Follow Up:  Alcoholic liver disease    Subjective     Interval History:   Some additional hemptysis o/n per nursing, although pt does not recall this.  He required addition unit of blood o/n, am labs are pending.    Current Facility-Administered Medications:   •  albumin human 25 % IV SOLN 25 g, 25 g, Intravenous, Q8H, Erin Bellamy MD, 25 g at 12/13/18 0525  •  benzonatate (TESSALON) capsule 100 mg, 100 mg, Oral, TID PRN, Enrique Obrien MD  •  bisacodyl (DULCOLAX) EC tablet 5 mg, 5 mg, Oral, Daily PRN, Valentino Valdez MD  •  bumetanide (BUMEX) injection 2 mg, 2 mg, Intravenous, Q8H, Erin Bellamy MD, 2 mg at 12/13/18 0525  •  calcium carbonate (TUMS) chewable tablet 500 mg (200 mg elemental), 2 tablet, Oral, BID PRN, Valentino Valdez MD  •  cefepime (MAXIPIME) 2 g/100 mL 0.9% NS (mbp), 2 g, Intravenous, Q12H, Valentino Valdez MD, 2 g at 12/12/18 2118  •  guaiFENesin (MUCINEX) 12 hr tablet 600 mg, 600 mg, Oral, Q12H, Valentino Valdez MD, 600 mg at 12/12/18 2114  •  hydrOXYzine (ATARAX) tablet 50 mg, 50 mg, Oral, Nightly, Emerald White MD, 50 mg at 12/12/18 2114  •  ipratropium-albuterol (DUO-NEB) nebulizer solution 3 mL, 3 mL, Nebulization, 4x Daily - RT, Valentino Valdez MD, 3 mL at 12/13/18 0713  •  lactulose (CHRONULAC) 10 GM/15ML solution 20 g, 20 g, Oral, BID, Valentino Valdez MD, 20 g at 12/12/18 2305  •  midodrine (PROAMATINE) tablet 10 mg, 10 mg, Oral, TID AC, Emerald White MD, 10 mg at 12/12/18 1822  •  nitroglycerin (NITROSTAT) SL tablet 0.4 mg, 0.4 mg, Sublingual, Q5 Min PRN, Valentino Vladez MD  •  ondansetron (ZOFRAN) tablet 4 mg, 4 mg, Oral, Q6H PRN **OR** ondansetron ODT (ZOFRAN-ODT) disintegrating tablet 4 mg, 4 mg, Oral, Q6H PRN **OR** ondansetron (ZOFRAN) injection 4 mg, 4 mg, Intravenous, Q6H PRN, Valentino Valdez MD  •  oxyCODONE (ROXICODONE) immediate release tablet 5 mg, 5 mg,  Oral, Q4H PRN, Valentino Valdez MD  •  pantoprazole (PROTONIX) EC tablet 40 mg, 40 mg, Oral, QAM AC, Valentino Valdez MD, 40 mg at 12/12/18 0810  •  sodium chloride (OCEAN) nasal spray 2 spray, 2 spray, Each Nare, PRN, Ozzie Painter MD  •  [COMPLETED] Insert peripheral IV, , , Once **AND** sodium chloride 0.9 % flush 10 mL, 10 mL, Intravenous, PRN, Gurinder Burnett MD  •  sodium chloride 0.9 % flush 3 mL, 3 mL, Intravenous, Q12H, Valentino Valdez MD, 3 mL at 12/12/18 0811  •  sodium chloride 0.9 % flush 3-10 mL, 3-10 mL, Intravenous, PRN, Valentino Valdez MD  •  zolpidem (AMBIEN) tablet 5 mg, 5 mg, Oral, Nightly PRN, Valentino Valdez MD, 5 mg at 12/05/18 2103  Review of Systems:    The following systems were reviewed and negative;  cardiovascular, musculoskeletal and neurological    Objective     Vital Signs  Temp:  [97.8 °F (36.6 °C)-98.7 °F (37.1 °C)] 98.4 °F (36.9 °C)  Heart Rate:  [77-89] 89  Resp:  [16-20] 20  BP: ()/(48-54) 101/52  Body mass index is 35.33 kg/m².    Intake/Output Summary (Last 24 hours) at 12/13/2018 0755  Last data filed at 12/12/2018 2129  Gross per 24 hour   Intake 1350 ml   Output --   Net 1350 ml     No intake/output data recorded.     Physical Exam:   General: patient awake, alert and cooperative   Cardiovascular: regular rhythm and rate, no murmurs auscultated   Pulm: clear to auscultation bilaterally, regular and unlabored   Abdomen: soft, nontender, nondistended; normal bowel sounds   Rectal: deferred   Extremities: no rash or edema   Psychiatric: Normal mood and behavior; memory intact     Results Review:     I reviewed the patient's new clinical results.    Results from last 7 days   Lab Units  12/12/18   0571  12/12/18   0754  12/11/18   2037   12/11/18   0605   12/10/18   0638   WBC 10*3/mm3   --   7.91   --    --   9.49   --   9.33   HEMOGLOBIN g/dL  7.8*  7.3*  8.3*   < >  6.7*   < >  7.0*   HEMATOCRIT %  23.0*  21.9*  25.6*   < >  19.8*   < >  20.9*    PLATELETS 10*3/mm3   --   71*   --    --   90*   --   88*    < > = values in this interval not displayed.     Results from last 7 days   Lab Units  12/12/18 0754  12/11/18   0548  12/10/18   0644   SODIUM mmol/L  131*  131*  128*   POTASSIUM mmol/L  3.6  3.5  3.5   CHLORIDE mmol/L  91*  92*  91*   CO2 mmol/L  24.6  24.0  21.7*   BUN mg/dL  31*  33*  37*   CREATININE mg/dL  1.50*  1.45*  1.82*   CALCIUM mg/dL  9.0  9.1  9.3   BILIRUBIN mg/dL  10.2*  9.6*  10.1*   ALK PHOS U/L  76  99  89   ALT (SGPT) U/L  30  31  33   AST (SGOT) U/L  43*  45*  43*   GLUCOSE mg/dL  115*  109*  118*     Results from last 7 days   Lab Units  12/12/18   0754  12/11/18   0548  12/10/18   0638   INR   2.16*  2.12*  2.16*     Lab Results   Lab Value Date/Time    LIPASE 51 12/05/2018 1306    LIPASE 53 11/28/2018 1101    LIPASE 38 10/24/2018 1439    LIPASE 45 06/21/2018 1906       Radiology:  MRI abdomen wo contrast mrcp   Final Result   This exam is limited by absence of contrast.   1. The liver demonstrates periportal low signal intensity on both T1 and   T2-weighted images that is pronounced on the out of phase images   suggestive presence of fat in the periportal region. These findings may   be suggestive of a chronic liver disease. Primary biliary cirrhosis and   alcoholic hepatitis are on the differential.   2. Splenomegaly and small ascites. Bilateral small to moderate size   layering pleural effusions with bibasilar atelectasis.   2. Limited evaluation of the gallbladder secondary to absence of   contrast, however cholelithiasis and gallstones are identified on this   current study.       This report was finalized on 12/12/2018 1:33 PM by Dr. Stephanie Schulz M.D.          XR Chest PA & Lateral   Final Result      XR Chest 1 View   Final Result   Persistent mid to lower lung infiltrate/atelectasis, with   increased pleural effusion apparent on the left, continued follow-up   recommended. Cardiomegaly, pulmonary vascular  congestion.       This report was finalized on 12/7/2018 5:58 PM by Dr. Rojas Dubon M.D.          CT Chest Without Contrast   Final Result   1. Basilar predominant opacities favored to reflect atelectasis although   pneumonia not excluded.   2. Right greater than left pleural effusions, moderate pericardial   effusion.   3. Mild mediastinal adenopathy.   4. Homogeneous splenic enlargement.                           .        This report was finalized on 12/7/2018 5:53 AM by Valentino Nguyen M.D.          US Renal Bilateral   Final Result   Ascites. Otherwise negative renal sonogram. There is no   evidence of obstruction.       This report was finalized on 12/6/2018 8:05 PM by Dr. Brannon Cruz M.D.          XR Chest 1 View   Final Result   Persistent patchy densities in the mid to lower lungs. Increased left   more than right pleural effusions. Cardiomegaly with persistent mild   pulmonary vascular congestion. Continued follow-up/further evaluation   recommended as indicated.                   This report was finalized on 12/5/2018 1:49 PM by Dr. Rojas Dubon M.D.              Assessment/Plan     Patient Active Problem List   Diagnosis   • Acute upper GI bleed   • Gout   • Pre-diabetes   • Cholelithiasis   • Elevated liver function tests   • Alcohol abuse   • Alcoholic hepatitis without ascites   • Thrombocytopenia (CMS/HCC)   • Esophagitis   • Folate deficiency   • Acute anemia   • Esophageal varices (CMS/HCC)   • Hepatitis, alcoholic   • Ascites   • Portal hypertension (CMS/HCC)   • Hyponatremia   • Symptomatic anemia   • LÓPEZ (acute kidney injury) (CMS/HCC)   • Anasarca   • Cough   • Epistaxis   • Hemoptysis   • Rhinovirus infection   • Pericardial effusion   • Pneumonia       Assessment/Recommendations:  1) Alcoholic liver disease. He claims no ETOH in 2 mos.  He has completed full course of oral steroids with relatively minimal change in his TB  2) 1+ esophageal varices seen on 6/18 EGD  3)  Hemoptysis - Pulmonary is following.   4) Abnormal gallbladder on recent US. MRCP with stones/sludge, no concerning findings  5) Anemia - likely secondary to #3.  Await am labs          Manjeet Eddy M.D.  [facility]  [address] [place] [address]  Office: [phone]

## 2018-12-13 NOTE — PAYOR COMM NOTE
"Enrique Ramirez (48 y.o. Male)                   ATTENTION;   CONTINUED STAY CLINICALS CASE REF# [id number] , FOR 12/12/18                 REPLY TO UR DEPT ,VERO BELL N  OR UR  [phone]         Date of Birth Social Security Number Address Home Phone MRN    1970  [id number] [address] [phone] [id number]    Tenriism Marital Status          Caodaism        Admission Date Admission Type Admitting Provider Attending Provider Department, Room/Bed    12/5/18 Emergency Valentino Valdez MD [place], Robert Russell, MD [address], [address]1    Discharge Date Discharge Disposition Discharge Destination                       Attending Provider:  Michele Dixon MD    Allergies:  Zinc    Isolation:  Droplet   Infection:  Rhinovirus  (12/05/18)   Code Status:  CPR    Ht:  182.9 cm (72\")   Wt:  118 kg (260 lb 8 oz)    Admission Cmt:  None   Principal Problem:  Symptomatic anemia [D64.9]                 Active Insurance as of 12/5/2018     Primary Coverage     Payor Plan Insurance Group Employer/Plan Group    [employer] BLUE [employer]OSS [employer] BLUE CROSS BLUE SHIELD PPO [id number]     Payor Plan Address Payor Plan Phone Number Payor Plan Fax Number Effective Dates    [address] [phone]  4/16/2017 - None Entered    [place] [address]       Subscriber Name Subscriber Birth Date Member ID       RUSSELL WALKER 12/17/1977 [id number]                 Emergency Contacts      (Rel.) Home Phone Work Phone Mobile Phone    Russell Ramirez (Spouse) [phone] -- --            Lines, Drains & Airways    Active LDAs     Name:   Placement date:   Placement time:   Site:   Days:    Peripheral IV 12/05/18 1643 Right Wrist   12/05/18    1643    Wrist   7    Peripheral IV 12/11/18 1131 Left Forearm   12/11/18    1131    Forearm   2                Manjeet Eddy MD   Physician   Gastroenterology   Progress Notes "   Signed   Date of Service:  12/12/2018  9:16 AM               Signed        Expand All Collapse All          Show:Clear all  [x]Manual[x]Template[x]Copied    Added by:  [x]Manjeet Eddy MD      []Leanne for details      [facility]  Inpatient Progress Note     Reason for Follow Up:  Alcoholic liver disease        Subjective         Interval History:   Feeling a little better today.  Minimal hemoptysis.  Hb has responded to PRBCs.     Current Facility-Administered Medications:   •  benzonatate (TESSALON) capsule 100 mg, 100 mg, Oral, TID PRN, Enrique Obrien MD  •  bisacodyl (DULCOLAX) EC tablet 5 mg, 5 mg, Oral, Daily PRN, Valentino Valdez MD  •  bumetanide (BUMEX) injection 2 mg, 2 mg, Intravenous, Q8H, Erin Bellamy MD, 2 mg at 12/12/18 0450  •  calcium carbonate (TUMS) chewable tablet 500 mg (200 mg elemental), 2 tablet, Oral, BID PRN, Valentino Valdez MD  •  cefepime (MAXIPIME) 2 g/100 mL 0.9% NS (mbp), 2 g, Intravenous, Q12H, Valentino Valdez MD, 2 g at 12/12/18 0811  •  guaiFENesin (MUCINEX) 12 hr tablet 600 mg, 600 mg, Oral, Q12H, Valentino Valdez MD, 600 mg at 12/12/18 0810  •  hydrOXYzine (ATARAX) tablet 50 mg, 50 mg, Oral, Nightly, Emerald White MD, 50 mg at 12/11/18 2023  •  ipratropium-albuterol (DUO-NEB) nebulizer solution 3 mL, 3 mL, Nebulization, 4x Daily - RT, Valentino Valdez MD, 3 mL at 12/12/18 0639  •  lactulose (CHRONULAC) 10 GM/15ML solution 20 g, 20 g, Oral, BID, Valentino Valdez MD, 20 g at 12/12/18 0811  •  midodrine (PROAMATINE) tablet 10 mg, 10 mg, Oral, TID AC, Emerald White MD, 10 mg at 12/12/18 0810  •  nitroglycerin (NITROSTAT) SL tablet 0.4 mg, 0.4 mg, Sublingual, Q5 Min PRN, Valentino Valdez MD  •  ondansetron (ZOFRAN) tablet 4 mg, 4 mg, Oral, Q6H PRN **OR** ondansetron ODT (ZOFRAN-ODT) disintegrating tablet 4 mg, 4 mg, Oral, Q6H PRN **OR** ondansetron (ZOFRAN) injection 4 mg, 4 mg, Intravenous, Q6H PRN, Valentino Valdez  MD YENNY  •  oxyCODONE (ROXICODONE) immediate release tablet 5 mg, 5 mg, Oral, Q4H PRN, Valentino Valdez MD  •  pantoprazole (PROTONIX) EC tablet 40 mg, 40 mg, Oral, QAM AC, Valentino Valdez MD, 40 mg at 12/12/18 0810  •  sodium chloride (OCEAN) nasal spray 2 spray, 2 spray, Each Nare, PRN, Ozzie Painter MD  •  [COMPLETED] Insert peripheral IV, , , Once **AND** sodium chloride 0.9 % flush 10 mL, 10 mL, Intravenous, PRN, Gurinder Burnett MD  •  sodium chloride 0.9 % flush 3 mL, 3 mL, Intravenous, Q12H, Valentino Valdez MD, 3 mL at 12/12/18 0811  •  sodium chloride 0.9 % flush 3-10 mL, 3-10 mL, Intravenous, PRN, Valentino Valdez MD  •  zolpidem (AMBIEN) tablet 5 mg, 5 mg, Oral, Nightly PRN, Valentino Valdez MD, 5 mg at 12/05/18 2103  Review of Systems:               The following systems were reviewed and negative;  cardiovascular, musculoskeletal and neurological          Objective         Vital Signs  Temp:  [97.8 °F (36.6 °C)-99.1 °F (37.3 °C)] 98.7 °F (37.1 °C)  Heart Rate:  [80-98] 85  Resp:  [16-20] 18  BP: ()/(42-55) 104/48  Body mass index is 35.67 kg/m².     Intake/Output Summary (Last 24 hours) at 12/12/2018 0916  Last data filed at 12/12/2018 0902      Gross per 24 hour   Intake 1210 ml   Output --   Net 1210 ml      I/O this shift:  In: 50 [P.O.:50]  Out: -                 Physical Exam:              General: patient awake, alert and cooperative              Cardiovascular: regular rhythm and rate, no murmurs auscultated              Pulm: clear to auscultation bilaterally, regular and unlabored              Abdomen: soft, nontender, nondistended; normal bowel sounds              Rectal: deferred              Extremities: no rash or edema              Psychiatric: Normal mood and behavior; memory intact                Results Review:                I reviewed the patient's new clinical results.               Results from last 7 days   Lab Units  12/12/18   0754  12/11/18 2037 12/11/18    0824  12/11/18   0605    12/10/18   0638   WBC 10*3/mm3  7.91   --    --   9.49   --   9.33   HEMOGLOBIN g/dL  7.3*  8.3*  6.8*  6.7*   < >  7.0*   HEMATOCRIT %  21.9*  25.6*  19.6*  19.8*   < >  20.9*   PLATELETS 10*3/mm3  71*   --    --   90*   --   88*    < > = values in this interval not displayed.             Results from last 7 days   Lab Units  12/12/18   0754  12/11/18   0548  12/10/18   0644   SODIUM mmol/L  131*  131*  128*   POTASSIUM mmol/L  3.6  3.5  3.5   CHLORIDE mmol/L  91*  92*  91*   CO2 mmol/L  24.6  24.0  21.7*   BUN mg/dL  31*  33*  37*   CREATININE mg/dL  1.50*  1.45*  1.82*   CALCIUM mg/dL  9.0  9.1  9.3   BILIRUBIN mg/dL  10.2*  9.6*  10.1*   ALK PHOS U/L  76  99  89   ALT (SGPT) U/L  30  31  33   AST (SGOT) U/L  43*  45*  43*   GLUCOSE mg/dL  115*  109*  118*             Results from last 7 days   Lab Units  12/12/18   0754  12/11/18   0548  12/10/18   0638   INR    2.16*  2.12*  2.16*            Lab Results   Lab Value Date/Time     LIPASE 51 12/05/2018 1306     LIPASE 53 11/28/2018 1101     LIPASE 38 10/24/2018 1439     LIPASE 45 06/21/2018 1906         Radiology:  MRI abdomen wo contrast mrcp   Preliminary Result   This exam is limited by absence of contrast.   1. The liver demonstrates periportal low signal intensity on both T1 and   T2-weighted images that is pronounced on the out of phase images   suggestive presence of fat in the periportal region. These findings may   be suggestive of a chronic liver disease. Primary biliary cirrhosis and   alcoholic hepatitis are on the differential.   2. Splenomegaly and small ascites. Bilateral small to moderate size   layering pleural effusions with bibasilar atelectasis.   2. Limited evaluation of the gallbladder secondary to absence of   contrast, however cholelithiasis and gallstones are identified on this   current study.           XR Chest PA & Lateral   Final Result       XR Chest 1 View   Final Result   Persistent mid to lower lung  infiltrate/atelectasis, with   increased pleural effusion apparent on the left, continued follow-up   recommended. Cardiomegaly, pulmonary vascular congestion.       This report was finalized on 12/7/2018 5:58 PM by Dr. Rojas Dubon M.D.           CT Chest Without Contrast   Final Result   1. Basilar predominant opacities favored to reflect atelectasis although   pneumonia not excluded.   2. Right greater than left pleural effusions, moderate pericardial   effusion.   3. Mild mediastinal adenopathy.   4. Homogeneous splenic enlargement.                           .        This report was finalized on 12/7/2018 5:53 AM by Valentino Nguyen M.D.           US Renal Bilateral   Final Result   Ascites. Otherwise negative renal sonogram. There is no   evidence of obstruction.       This report was finalized on 12/6/2018 8:05 PM by Dr. Brannon Cruz M.D.           XR Chest 1 View   Final Result   Persistent patchy densities in the mid to lower lungs. Increased left   more than right pleural effusions. Cardiomegaly with persistent mild   pulmonary vascular congestion. Continued follow-up/further evaluation   recommended as indicated.                   This report was finalized on 12/5/2018 1:49 PM by Dr. Rojas Dubon M.D.                      Assessment/Plan             Patient Active Problem List   Diagnosis   • Acute upper GI bleed   • Gout   • Pre-diabetes   • Cholelithiasis   • Elevated liver function tests   • Alcohol abuse   • Alcoholic hepatitis without ascites   • Thrombocytopenia (CMS/HCC)   • Esophagitis   • Folate deficiency   • Acute anemia   • Esophageal varices (CMS/HCC)   • Hepatitis, alcoholic   • Ascites   • Portal hypertension (CMS/HCC)   • Hyponatremia   • Symptomatic anemia   • LÓPEZ (acute kidney injury) (CMS/HCC)   • Anasarca   • Cough   • Epistaxis   • Hemoptysis   • Rhinovirus infection   • Pericardial effusion   • Pneumonia         Assessment/Recommendations:  1) Alcoholic liver  disease. He claims no ETOH in 2 mos.  He has completed full course of oral steroids with relatively minimal change in his TB  2) 1+ esophageal varices seen on  EGD  3) Hemoptysis - Pulmonary is following.   4) Abnormal gallbladder on recent US. MRCP with stones/sludge, no concerning findings  5) Anemia - likely secondary to #3.  He has responded to PRBCs             Manjeet Eddy M.D.  [facility]  [address] [place] [address]  Office: [phone]                                         Enrique Obrien MD   Physician   Pulmonology   Progress Notes   Signed   Date of Service:  2018  8:56 AM               Signed        Expand All Collapse All          Show:Clear all  [x]Manual[x]Template[x]Copied    Added by:  [x]Enrique Obrien MD      []Leanne for details           [facility] INPATIENT PROGRESS NOTE                                                                 [address]     2018        PATIENT IDENTIFICATION:  Name: Enrique Ramirez ADMIT: 2018   : 1970  PCP: Cydney Chase MD    MRN: [id number] LOS: 7 days   AGE/SEX: 48 y.o. male  ROOM: [facility] [place]                                                                                                     LOS 7     Reason for visit: Hemoptysis        SUBJECTIVE:                            Resting comfortably. Cough has significantly improved. No further bloody sputum. Diminished breath sounds bilaterally. 2+ edema.        Objective      OBJECTIVE:     Vital Sign Min/Max for last 24 hours  Temp  Min: 97.8 °F (36.6 °C)  Max: 99.1 °F (37.3 °C)   BP  Min: 90/45  Max: 104/48   Pulse  Min: 80  Max: 98   Resp  Min: 16  Max: 20   SpO2  Min: 94 %  Max: 100 %   No Data Recorded   Weight  Min: 119 kg (263 lb)  Max: 119 kg (263 lb)                                                        Body mass index is 35.67 kg/m².     Intake/Output Summary (Last 24 hours) at 2018 0959  Last  data filed at 12/12/2018 0902      Gross per 24 hour   Intake 1210 ml   Output --   Net 1210 ml            Exam:  GEN:               No distress, appears stated age  EYES:              PERRL, anicteric sclera  ENT:                External ears/nose normal, OP clear  NECK:             No adenopathy, midline trachea  LUNGS:           Normal chest on inspection, palpation and diminished bilaterally with scattered coarse breath sounds at bases on auscultation  CV:                  Normal S1S2, without murmur  ABD:                Non tender, non distended, no hepatosplenomegaly, +BS  EXT:                No cyanosis or clubbing. 2+ edema     Scheduled meds:            bumetanide 2 mg Intravenous Q8H   cefepime 2 g Intravenous Q12H   guaiFENesin 600 mg Oral Q12H   hydrOXYzine 50 mg Oral Nightly   ipratropium-albuterol 3 mL Nebulization 4x Daily - RT   lactulose 20 g Oral BID   midodrine 10 mg Oral TID AC   pantoprazole 40 mg Oral QAM AC   sodium chloride 3 mL Intravenous Q12H      IV meds:                        Data Review:           Results from last 7 days   Lab Units  12/12/18   0754  12/11/18   0548  12/10/18   0644  12/09/18   0627  12/08/18   0553   SODIUM mmol/L  131*  131*  128*  129*  127*   POTASSIUM mmol/L  3.6  3.5  3.5  4.2  4.8   CHLORIDE mmol/L  91*  92*  91*  91*  91*   CO2 mmol/L  24.6  24.0  21.7*  20.3*  19.7*   BUN mg/dL  31*  33*  37*  42*  45*   CREATININE mg/dL  1.50*  1.45*  1.82*  2.19*  2.50*   GLUCOSE mg/dL  115*  109*  118*  119*  126*   CALCIUM mg/dL  9.0  9.1  9.3  9.7  9.4          Estimated Creatinine Clearance: 82 mL/min (A) (by C-G formula based on SCr of 1.5 mg/dL (H)).                 Results from last 7 days   Lab Units  12/12/18   0754  12/11/18 2037 12/11/18   0824  12/11/18   0605  12/10/18   1952    12/10/18   0638    12/09/18   0627    12/08/18   0553   WBC 10*3/mm3  7.91   --    --   9.49   --    --   9.33   --   11.31*   --   11.34*   HEMOGLOBIN g/dL  7.3*  8.3*  6.8*  6.7*   7.1*   < >  7.0*   < >  7.5*   < >  7.1*   PLATELETS 10*3/mm3  71*   --    --   90*   --    --   88*   --   101*   --   101*    < > = values in this interval not displayed.               Results from last 7 days   Lab Units  12/12/18   0754  12/11/18   0548  12/10/18   0638  12/09/18   0627  12/08/18   0553   INR    2.16*  2.12*  2.16*  2.02*  1.98*               Results from last 7 days   Lab Units  12/12/18   0754  12/11/18   0548  12/10/18   0644  12/09/18   0627  12/08/18   0553   ALT (SGPT) U/L  30  31  33  37  36   AST (SGOT) U/L  43*  45*  43*  42*  41*                Results from last 7 days   Lab Units  12/07/18   0556  12/06/18   0559   PROCALCITONIN ng/mL  0.43*  0.27*                                                     Chest x-ray 12/7 viewed shows infiltrate/atelectasis and increased pleural effusion on the left with cardiomegaly and pulmonary vascular congestion        Chest x-ray 12/10 viewed shows patchy atelectasis and infiltrate both lower lung zones with bilateral pleural effusions showing no significant change from previous.          Microbiology reviewed  Human Rhinovirus/Enterovirus Detected Abnormal                  )    Assessment      ASSESSMENT:                      Active Hospital Problems     Diagnosis Date Noted   • **Symptomatic anemia [D64.9] 12/05/2018   • Pneumonia [J18.9] 12/08/2018   • Rhinovirus infection [B34.8] 12/06/2018   • Pericardial effusion [I31.3] 12/06/2018   • LÓPEZ (acute kidney injury) (CMS/HCC) [N17.9] 12/05/2018   • Anasarca [R60.1] 12/05/2018   • Cough [R05] 12/05/2018   • Epistaxis [R04.0] 12/05/2018   • Hemoptysis [R04.2] 12/05/2018   • Hyponatremia [E87.1] 10/25/2018   • Esophageal varices (CMS/HCC) [I85.00] 10/24/2018   • Elevated liver function tests [R94.5] 06/22/2018   • Alcoholic hepatitis without ascites [K70.10] 06/22/2018       Resolved Hospital Problems   No resolved problems to display.      Pneumonia  Hemoptysis  Coagulopathy secondary to liver  "disease  Mediastinal lymphadenopathy  Bilateral pleural effusions and anasarca  Cirrhosis/hepatitis  Obesity/?AL  URI with rhinovirus  Anemia  Acute kidney injury        PLAN:     Hemoptysis secondary to coagulopathy from liver disease and infectious pneumonia.  Seems to be improving.  Continue cough suppressant.    Pleural effusion secondary to parapneumonic effusion on top of liver disease with anasarca.    Continue bronchodilators.  S/P pRBC yesterday.        Enrique Obrien MD  Pulmonary and Critical Care Medicine  [facility]  2018     9:59 AM                                Michele Dixon MD   Physician   Medicine   Progress Notes   Signed   Date of Service:  2018 10:03 AM               Signed        Expand All Collapse All          Show:Clear all  [x]Manual[x]Template[x]Copied    Added by:  [x]Michele Dixon MD      []Hover for details       LOS: 7 days      Name: Enrique Ramirez  Age: 48 y.o.  Sex: male  :  1970  MRN: [id number]         Primary Care Physician: Cydney Chase MD        Subjective      Subjective  Complains of warm, red, swollen legs.  No other new complaints.  Denies shortness of breath.  States that his hemoptysis is \"85% better\"          Objective      Vital Signs  Temp:  [97.8 °F (36.6 °C)-99.1 °F (37.3 °C)] 98.7 °F (37.1 °C)  Heart Rate:  [80-98] 85  Resp:  [16-20] 18  BP: ()/(42-55) 104/48  Body mass index is 35.67 kg/m².     Objective:  General Appearance:  Comfortable and in no acute distress (Chronically ill-appearing).    Vital signs: (most recent): Blood pressure 104/48, pulse 85, temperature 98.7 °F (37.1 °C), temperature source Oral, resp. rate 18, height 182.9 cm (72\"), weight 119 kg (263 lb), SpO2 95 %.    HEENT: (Scleral icterus)    Lungs:  Normal effort and normal respiratory rate.    Heart: Normal rate.  Regular rhythm.    Abdomen: Abdomen is soft.  Bowel sounds are normal.   There is no abdominal tenderness.   "   Extremities: There is dependent edema.  There is no local swelling.  (Warmth and redness to the bilateral lower extremities, right greater than left)  Neurological: Patient is alert and oriented to person, place and time.    Skin:  Warm and dry.  (Diffusely jaundiced)                 Results Review:       I reviewed the patient's new clinical results.                        Results from last 7 days   Lab Units  12/12/18   0754  12/11/18 2037  12/11/18 0824  12/11/18   0605  12/10/18   1952  12/10/18   0953  12/10/18   0638    12/09/18   0627    12/08/18   0553    12/07/18   0556    12/06/18   0559   WBC 10*3/mm3  7.91   --    --   9.49   --    --   9.33   --   11.31*   --   11.34*   --   12.31*   --   11.84*   HEMOGLOBIN g/dL  7.3*  8.3*  6.8*  6.7*  7.1*  7.0*  7.0*   < >  7.5*   < >  7.1*   < >  7.3*   < >  7.0*   PLATELETS 10*3/mm3  71*   --    --   90*   --    --   88*   --   101*   --   101*   --   131*   --   109*    < > = values in this interval not displayed.                 Results from last 7 days   Lab Units  12/12/18   0754  12/11/18   0548  12/10/18   0644  12/09/18   0627  12/08/18   0553  12/07/18   0556  12/06/18   0559   SODIUM mmol/L  131*  131*  128*  129*  127*  127*  126*   POTASSIUM mmol/L  3.6  3.5  3.5  4.2  4.8  5.0  5.0   CHLORIDE mmol/L  91*  92*  91*  91*  91*  91*  90*   CO2 mmol/L  24.6  24.0  21.7*  20.3*  19.7*  20.3*  21.0*   BUN mg/dL  31*  33*  37*  42*  45*  39*  35*   CREATININE mg/dL  1.50*  1.45*  1.82*  2.19*  2.50*  2.48*  1.80*   CALCIUM mg/dL  9.0  9.1  9.3  9.7  9.4  9.3  9.3   GLUCOSE mg/dL  115*  109*  118*  119*  126*  128*  90                 Results from last 7 days   Lab Units  12/12/18   0754  12/11/18   0548  12/10/18   0638  12/09/18   0627  12/08/18   0553  12/07/18   0557  12/06/18   0559   INR    2.16*  2.12*  2.16*  2.02*  1.98*  1.91*  1.86*                  Scheduled Meds:      bumetanide 2 mg Intravenous Q8H   cefepime 2 g Intravenous Q12H    guaiFENesin 600 mg Oral Q12H   hydrOXYzine 50 mg Oral Nightly   ipratropium-albuterol 3 mL Nebulization 4x Daily - RT   lactulose 20 g Oral BID   midodrine 10 mg Oral TID AC   pantoprazole 40 mg Oral QAM AC   sodium chloride 3 mL Intravenous Q12H      PRN Meds:   •  benzonatate  •  bisacodyl  •  calcium carbonate  •  nitroglycerin  •  ondansetron **OR** ondansetron ODT **OR** ondansetron  •  oxyCODONE  •  sodium chloride  •  [COMPLETED] Insert peripheral IV **AND** sodium chloride  •  sodium chloride  •  zolpidem  Continuous Infusions:          Assessment/Plan           Active Hospital Problems     Diagnosis Date Noted   • **Symptomatic anemia [D64.9] 12/05/2018   • Pneumonia [J18.9] 12/08/2018   • Rhinovirus infection [B34.8] 12/06/2018   • Pericardial effusion [I31.3] 12/06/2018   • LÓPEZ (acute kidney injury) (CMS/HCC) [N17.9] 12/05/2018   • Anasarca [R60.1] 12/05/2018   • Cough [R05] 12/05/2018   • Epistaxis [R04.0] 12/05/2018   • Hemoptysis [R04.2] 12/05/2018   • Hyponatremia [E87.1] 10/25/2018   • Esophageal varices (CMS/HCC) [I85.00] 10/24/2018   • Elevated liver function tests [R94.5] 06/22/2018   • Alcoholic hepatitis without ascites [K70.10] 06/22/2018       Resolved Hospital Problems   No resolved problems to display.         Assessment & Plan     Pneumonia  - Continue Cefepime, day 6 of 7.  MRSA screen was negative and vancomycin discontinued     Symptomatic Anemia  - S/p 2 units PRBCs on 12/5 and 12/11  - monitor Hgb and transfuse as needed     Hemoptysis  - Improved   - 2/2 liver disease coagulopathy and PNA     LÓPEZ  - remains on diuretics, per nephrology     Pericardial Effusion  - Apparent on CT chest, not present on echocardiogram in October  - Echocardiogram with tiny effusion not able to be tapped, no tamponade  - Repeat ECHO 12/10/18 showed subcentimeter effusion     EtOH Hepatitis  - possible secondary cirrhosis  -nadolol, lasix, and spironolactone held  - RUQ u/s revealing GB mass.  MRCP today  without contrast noted.  Per gastroenterology nothing to do.  - Has completed a course of steroids with no significant change in TB     DVT Prophylaxis  - SCDs  - Check LE doppler for complaints of red, warm BLE     Michele Dixon MD  [facility]  12/12/18  10:03 AM                         Erin Bellamy MD   Physician   Nephrology   Progress Notes   Signed   Date of Service:  12/12/2018  4:05 PM               Signed        Expand All Collapse All          Show:Clear all  [x]Manual[x]Template[x]Copied    Added by:  [x]Erin Bellamy MD      []Hover for details                                                      NEPHROLOGY PROGRESS NOTE     PATIENT IDENTIFICATION:              Name:  Enrique Ramirez                                                                       MRN:  [id number]                           48 y.o.  male                                                                      Reason for visit: LÓPEZ     SUBJECTIVE:                 Seen and examined.  Laying in bed. Very swollen. Had MRCP yesterday.      OBJECTIVE:  Vitals          Vitals:     12/12/18 0645 12/12/18 0809 12/12/18 1156 12/12/18 1315   BP:   104/48   104/51   BP Location:   Left arm   Left arm   Patient Position:   Lying   Sitting   Pulse: 84 85 79 83   Resp: 16 18   16   Temp:   98.7 °F (37.1 °C)   98.7 °F (37.1 °C)   TempSrc:   Oral   Oral   SpO2: 100% 95% 91% 95%   Weight:           Height:                                                       Body mass index is 35.67 kg/m².     Intake/Output Summary (Last 24 hours) at 12/12/2018 1605  Last data filed at 12/12/2018 1159      Gross per 24 hour   Intake 1310 ml   Output --   Net 1310 ml           Wt Readings from Last 1 Encounters:   12/12/18 0616 119 kg (263 lb)   12/11/18 0612 120 kg (264 lb)   12/10/18 0730 127 kg (279 lb)   12/08/18 0618 127 kg (279 lb)   12/07/18 0530 125 kg (276 lb)   12/06/18 1457 121 kg (267 lb)   12/06/18 0600 121 kg (267  lb 8 oz)   12/05/18 1814 124 kg (273 lb)   12/05/18 1238 119 kg (262 lb 6.4 oz)          Wt Readings from Last 3 Encounters:   12/12/18 119 kg (263 lb)   11/13/18 118 kg (260 lb)   11/09/18 118 kg (260 lb 12.8 oz)            Exam:  NAD; pleasant; oriented; looks older than stated age  Overweight; jaundiced  HEENT MMM; AT/NC  No eye d/c; scleral icterus  Lungs Coarse bilat; Decreased bs bases  Heart RRR, no rub, no s3  ABd  + bs, soft, distended, nontender. Body wall edema.   Ext 3 +edema lower ext to hips  No clubbing  No asterixis  Moves all extremities   Speech fluent  Mood depressed; flat affect        Scheduled meds:            bumetanide 2 mg Intravenous Q8H   cefepime 2 g Intravenous Q12H   guaiFENesin 600 mg Oral Q12H   hydrOXYzine 50 mg Oral Nightly   ipratropium-albuterol 3 mL Nebulization 4x Daily - RT   lactulose 20 g Oral BID   midodrine 10 mg Oral TID AC   pantoprazole 40 mg Oral QAM AC   sodium chloride 3 mL Intravenous Q12H      IV meds:                           Data Review:            Results from last 7 days   Lab Units  12/12/18   0754  12/11/18   0548  12/10/18   0644   SODIUM mmol/L  131*  131*  128*   POTASSIUM mmol/L  3.6  3.5  3.5   CHLORIDE mmol/L  91*  92*  91*   CO2 mmol/L  24.6  24.0  21.7*   BUN mg/dL  31*  33*  37*   CREATININE mg/dL  1.50*  1.45*  1.82*   CALCIUM mg/dL  9.0  9.1  9.3   BILIRUBIN mg/dL  10.2*  9.6*  10.1*   ALK PHOS U/L  76  99  89   ALT (SGPT) U/L  30  31  33   AST (SGOT) U/L  43*  45*  43*   GLUCOSE mg/dL  115*  109*  118*      Estimated Creatinine Clearance: 82 mL/min (A) (by C-G formula based on SCr of 1.5 mg/dL (H)).        Results from last 7 days   Lab Units  12/07/18   0556  12/06/18   2207   SODIUM UR mmol/L   --   <20   CREATININE UR mg/dL   --   267.7   URIC ACID mg/dL  11.3*   --             Results from last 7 days   Lab Units  12/10/18   0644  12/09/18   0627   MAGNESIUM mg/dL  1.9  1.9   PHOSPHORUS mg/dL   --   4.0                        Results from last  7 days   Lab Units  18   0754  18   0824  18   0605  12/10/18   1952    12/10/18   0638    18   0627    18   0553   WBC 10*3/mm3  7.91   --    --   9.49   --    --   9.33   --   11.31*   --   11.34*   HEMOGLOBIN g/dL  7.3*  8.3*  6.8*  6.7*  7.1*   < >  7.0*   < >  7.5*   < >  7.1*   PLATELETS 10*3/mm3  71*   --    --   90*   --    --   88*   --   101*   --   101*    < > = values in this interval not displayed.                  Results from last 7 days   Lab Units  18   0754  1848  12/10/18   0638  18   0618   0553   INR    2.16*  2.12*  2.16*  2.02*  1.98*                                      ASSESSMENT:              Symptomatic anemia    Elevated liver function tests    Alcoholic hepatitis without ascites    Esophageal varices (CMS/HCC)    Hyponatremia    LÓPEZ (acute kidney injury) (CMS/HCC)    Anasarca    Cough    Epistaxis    Hemoptysis    Rhinovirus infection    Pericardial effusion    Pneumonia     1.  LÓPEZ, non-oliguric, improving.  prerenal from ABLA, hypotension, and profound liver injury. Improving Hyponatremia with hypervolemia;   2.  ABLA  3.  Alcoholic cirrhosis  4.  Hypotension  5.  Hemoptysis,  Pulmonary on baord  6.  TCP: PLT is down 77           PLAN:     Replete potassium  Continue Bumex and add Albumin  Blood TX as needed  D/W nurse  Surveillance labs        Erin Bellamy MD  2018     4:05 PM                                 Michele Dixon MD   Physician   Medicine   Progress Notes   Addendum   Date of Service:  2018 12:54 PM               Expand All Collapse All          Show:Clear all  [x]Manual[x]Template[x]Copied    Added by:  [x]Michele Dixon MD      []Leanne for details       LOS: 8 days      Name: Enrique Ramirez  Age: 48 y.o.  Sex: male  :  1970  MRN: [id number]         Primary Care Physician: Cydney Chase MD        Subjective      Subjective  No new complaints  "today.  Begins asking me about what his long-term prognosis is.  He states to me \"I just want to be given pills and sent home\".  He also states \"you all fix one thing and then 3 things break\".  States he is tired of being in the hospital.  Feels like he wants to give up and that his outlook is hopeless.          Objective      Vital Signs  Temp:  [97.8 °F (36.6 °C)-98.7 °F (37.1 °C)] 98.4 °F (36.9 °C)  Heart Rate:  [77-95] 86  Resp:  [16-20] 20  BP: ()/(50-54) 101/52  Body mass index is 35.33 kg/m².     Objective:  General Appearance:  Comfortable and in no acute distress (Acutely and chronically ill-appearing).    Vital signs: (most recent): Blood pressure 101/52, pulse 86, temperature 98.4 °F (36.9 °C), temperature source Oral, resp. rate 20, height 182.9 cm (72\"), weight 118 kg (260 lb 8 oz), SpO2 97 %.    Lungs:  Normal effort and normal respiratory rate.  There are decreased breath sounds.    Heart: Normal rate.  Regular rhythm.    Abdomen: Abdomen is soft.  Bowel sounds are normal.   There is no abdominal tenderness.     Extremities: There is dependent edema.  There is no local swelling.    Neurological: Patient is alert and oriented to person, place and time.    Skin:  Warm and dry.                   Results Review:       I reviewed the patient's new clinical results.                        Results from last 7 days   Lab Units  12/13/18   0804  12/12/18   2251  12/12/18   0754  12/11/18   2037  12/11/18   0824  12/11/18   0605  12/10/18   1952    12/10/18   0638    12/09/18   0627    12/08/18   0553    12/07/18   0556   WBC 10*3/mm3  7.41   --   7.91   --    --   9.49   --    --   9.33   --   11.31*   --   11.34*   --   12.31*   HEMOGLOBIN g/dL  8.0*  7.8*  7.3*  8.3*  6.8*  6.7*  7.1*   < >  7.0*   < >  7.5*   < >  7.1*   < >  7.3*   PLATELETS 10*3/mm3  65*   --   71*   --    --   90*   --    --   88*   --   101*   --   101*   --   131*    < > = values in this interval not displayed.               "   Results from last 7 days   Lab Units  12/13/18   0803  12/12/18   0754  12/11/18   0548  12/10/18   0644  12/09/18   0627  12/08/18   0553  12/07/18   0556   SODIUM mmol/L  132*  131*  131*  128*  129*  127*  127*   POTASSIUM mmol/L  4.0  3.6  3.5  3.5  4.2  4.8  5.0   CHLORIDE mmol/L  91*  91*  92*  91*  91*  91*  91*   CO2 mmol/L  24.1  24.6  24.0  21.7*  20.3*  19.7*  20.3*   BUN mg/dL  34*  31*  33*  37*  42*  45*  39*   CREATININE mg/dL  1.56*  1.50*  1.45*  1.82*  2.19*  2.50*  2.48*   CALCIUM mg/dL  9.5  9.0  9.1  9.3  9.7  9.4  9.3   GLUCOSE mg/dL  131*  115*  109*  118*  119*  126*  128*                 Results from last 7 days   Lab Units  12/13/18   0803  12/12/18   0754  12/11/18   0548  12/10/18   0638  12/09/18   0627  12/08/18   0553  12/07/18   0557   INR    2.09*  2.16*  2.12*  2.16*  2.02*  1.98*  1.91*                  Scheduled Meds:      albumin human 25 g Intravenous Q8H   bumetanide 2 mg Intravenous Q8H   cefepime 2 g Intravenous Q12H   guaiFENesin 600 mg Oral Q12H   hydrOXYzine 50 mg Oral Nightly   ipratropium-albuterol 3 mL Nebulization 4x Daily - RT   lactulose 20 g Oral BID   midodrine 10 mg Oral TID AC   pantoprazole 40 mg Oral QAM AC   sodium chloride 3 mL Intravenous Q12H      PRN Meds:   benzonatate  •  bisacodyl  •  calcium carbonate  •  nitroglycerin  •  ondansetron **OR** ondansetron ODT **OR** ondansetron  •  oxyCODONE  •  sodium chloride  •  [COMPLETED] Insert peripheral IV **AND** sodium chloride  •  sodium chloride  •  zolpidem  Continuous Infusions:          Assessment/Plan           Active Hospital Problems     Diagnosis Date Noted   • **Symptomatic anemia [D64.9] 12/05/2018   • Pneumonia [J18.9] 12/08/2018   • Rhinovirus infection [B34.8] 12/06/2018   • Pericardial effusion [I31.3] 12/06/2018   • LÓPEZ (acute kidney injury) (CMS/HCC) [N17.9] 12/05/2018   • Anasarca [R60.1] 12/05/2018   • Cough [R05] 12/05/2018   • Epistaxis [R04.0] 12/05/2018   • Hemoptysis [R04.2] 12/05/2018  "  • Hyponatremia [E87.1] 10/25/2018   • Esophageal varices (CMS/HCC) [I85.00] 10/24/2018   • Elevated liver function tests [R94.5] 06/22/2018   • Alcoholic hepatitis without ascites [K70.10] 06/22/2018       Resolved Hospital Problems   No resolved problems to display.         Assessment & Plan     Pneumonia  - Continue Cefepime, day 7 of 7.  MRSA screen was negative and vancomycin discontinued.  Pulmonology following.     Symptomatic Anemia  - S/p 2 units PRBCs on 12/5 and 12/11  - monitor Hgb and transfuse as needed     Hemoptysis  - Improved   - 2/2 liver disease coagulopathy and PNA     LÓPEZ  - remains on diuretics, per nephrology  - Creatinine trending up     Pericardial Effusion  - Apparent on CT chest, not present on echocardiogram in October  - Echocardiogram with tiny effusion not able to be tapped, no tamponade  - Repeat ECHO 12/10/18 showed subcentimeter effusion     EtOH Hepatitis  - possible secondary cirrhosis  -nadolol, lasix, and spironolactone held  - RUQ u/s revealing GB mass.  MRCP today without contrast noted.  Per gastroenterology nothing to do.  - Has completed a course of steroids with no significant change in TB     DVT Prophylaxis  - SCDs  - BLE doppler negative     Goals of Care  Today the patient has expressed to me sentiment of wanting to change his goals of care.  He states he is tired of being in the hospital and receiving aggressive treatment.  He states \"I just want to be given pills and go home\".  I did discuss with him his overall relatively poor prognosis stemming from his alcoholic liver disease.  He wishes to discuss with the palliative care team his goals of care moving forward.  I will place a consult for this.     30 minutes spent with the patient today with over 50% of that time spent counseling     Michele Dixon MD  [facility]  12/13/18  12:54 PM                  Revision History                                    "

## 2018-12-13 NOTE — PROGRESS NOTES
NEPHROLOGY PROGRESS NOTE    PATIENT IDENTIFICATION:   Name:  Enrique Ramirez      MRN:  [id number]     48 y.o.  male             Reason for visit: LÓPEZ    SUBJECTIVE:     Seen and examined.  Laying in bed. Very swollen. D/W palliative. Wants to have more quality of life.     OBJECTIVE:  Vitals:    12/13/18 0725 12/13/18 1155 12/13/18 1200 12/13/18 1326   BP: 101/52   98/53   BP Location: Left arm   Left arm   Patient Position: Lying   Sitting   Pulse: 89 95 86 84   Resp: 20 20 20 20   Temp: 98.4 °F (36.9 °C)   98.6 °F (37 °C)   TempSrc: Oral   Oral   SpO2: 92% 97%     Weight:       Height:               Body mass index is 35.33 kg/m².    Intake/Output Summary (Last 24 hours) at 12/13/2018 1413  Last data filed at 12/12/2018 2129  Gross per 24 hour   Intake 700 ml   Output --   Net 700 ml     Wt Readings from Last 1 Encounters:   12/13/18 0525 118 kg (260 lb 8 oz)   12/12/18 0616 119 kg (263 lb)   12/11/18 0612 120 kg (264 lb)   12/10/18 0730 127 kg (279 lb)   12/08/18 0618 127 kg (279 lb)   12/07/18 0530 125 kg (276 lb)   12/06/18 1457 121 kg (267 lb)   12/06/18 0600 121 kg (267 lb 8 oz)   12/05/18 1814 124 kg (273 lb)   12/05/18 1238 119 kg (262 lb 6.4 oz)     Wt Readings from Last 3 Encounters:   12/13/18 118 kg (260 lb 8 oz)   11/13/18 118 kg (260 lb)   11/09/18 118 kg (260 lb 12.8 oz)         Exam:  NAD; pleasant; oriented; looks older than stated age  Overweight; jaundiced  HEENT MMM; AT/NC  No eye d/c; scleral icterus  Lungs Coarse bilat; Decreased bs bases  Heart RRR, no rub, no s3  ABd  + bs, soft, distended, nontender. Body wall edema.   Ext 3 +edema lower ext to hips  No clubbing  No asterixis  Moves all extremities   Speech fluent  Mood depressed; flat affect      Scheduled meds:      albumin human 25 g Intravenous Q8H   bumetanide 2 mg Intravenous Q8H   cefepime 2 g Intravenous Q12H   guaiFENesin 600 mg Oral Q12H   hydrOXYzine 50 mg Oral Nightly   ipratropium-albuterol 3 mL Nebulization 4x Daily - RT    lactulose 20 g Oral BID   midodrine 10 mg Oral TID AC   pantoprazole 40 mg Oral QAM AC   sodium chloride 3 mL Intravenous Q12H     IV meds:                             Data Review:    Results from last 7 days   Lab Units  12/13/18   0803  12/12/18   0754  12/11/18   0548   SODIUM mmol/L  132*  131*  131*   POTASSIUM mmol/L  4.0  3.6  3.5   CHLORIDE mmol/L  91*  91*  92*   CO2 mmol/L  24.1  24.6  24.0   BUN mg/dL  34*  31*  33*   CREATININE mg/dL  1.56*  1.50*  1.45*   CALCIUM mg/dL  9.5  9.0  9.1   BILIRUBIN mg/dL  11.0*  10.2*  9.6*   ALK PHOS U/L  77  76  99   ALT (SGPT) U/L  29  30  31   AST (SGOT) U/L  42*  43*  45*   GLUCOSE mg/dL  131*  115*  109*     Estimated Creatinine Clearance: 78.5 mL/min (A) (by C-G formula based on SCr of 1.56 mg/dL (H)).  Results from last 7 days   Lab Units  12/07/18   0556  12/06/18   2207   SODIUM UR mmol/L   --   <20   CREATININE UR mg/dL   --   267.7   URIC ACID mg/dL  11.3*   --      Results from last 7 days   Lab Units  12/13/18   0803  12/10/18   0644  12/09/18   0627   MAGNESIUM mg/dL  1.5*  1.9  1.9   PHOSPHORUS mg/dL  3.3   --   4.0       Results from last 7 days   Lab Units  12/13/18   0804  12/12/18   2251  12/12/18   0754  12/11/18   2037  12/11/18   0824  12/11/18   0605   12/10/18   0638   12/09/18   0627   WBC 10*3/mm3  7.41   --   7.91   --    --   9.49   --   9.33   --   11.31*   HEMOGLOBIN g/dL  8.0*  7.8*  7.3*  8.3*  6.8*  6.7*   < >  7.0*   < >  7.5*   PLATELETS 10*3/mm3  65*   --   71*   --    --   90*   --   88*   --   101*    < > = values in this interval not displayed.       Results from last 7 days   Lab Units  12/13/18   0803  12/12/18   0754  12/11/18   0548  12/10/18   0638  12/09/18   0627   INR   2.09*  2.16*  2.12*  2.16*  2.02*             ASSESSMENT:     Symptomatic anemia    Elevated liver function tests    Alcoholic hepatitis without ascites    Esophageal varices (CMS/HCC)    Hyponatremia    LÓPEZ (acute kidney injury) (CMS/HCC)    Anasarca     Cough    Epistaxis    Hemoptysis    Rhinovirus infection    Pericardial effusion    Pneumonia    1.  LÓPEZ, non-oliguric, improving.  prerenal from ABLA, hypotension, and profound liver injury. Improving Hyponatremia with hypervolemia;   2.  ABLA  3.  Alcoholic cirrhosis  4.  Hypotension  5.  Hemoptysis,  Pulmonary on baord  6.  TCP: PLT is down 77        PLAN:    Increase Bumex and continue Albumin  Palliative is on board !!  Replete potassium as needed  Blood TX as needed  D/W nurse  Surveillance labs      Erin Bellamy MD  12/13/2018    2:13 PM

## 2018-12-14 LAB
ALBUMIN SERPL-MCNC: 3.4 G/DL (ref 3.5–5.2)
ALBUMIN/GLOB SERPL: 1.1 G/DL
ALP SERPL-CCNC: 70 U/L (ref 39–117)
ALT SERPL W P-5'-P-CCNC: 24 U/L (ref 1–41)
ANION GAP SERPL CALCULATED.3IONS-SCNC: 16.1 MMOL/L
AST SERPL-CCNC: 38 U/L (ref 1–40)
BASOPHILS # BLD AUTO: 0.07 10*3/MM3 (ref 0–0.2)
BASOPHILS NFR BLD AUTO: 1.1 % (ref 0–1.5)
BILIRUB SERPL-MCNC: 9.4 MG/DL (ref 0.1–1.2)
BUN BLD-MCNC: 34 MG/DL (ref 6–20)
BUN/CREAT SERPL: 21.1 (ref 7–25)
CALCIUM SPEC-SCNC: 9.3 MG/DL (ref 8.6–10.5)
CHLORIDE SERPL-SCNC: 92 MMOL/L (ref 98–107)
CO2 SERPL-SCNC: 22.9 MMOL/L (ref 22–29)
CREAT BLD-MCNC: 1.61 MG/DL (ref 0.76–1.27)
DEPRECATED RDW RBC AUTO: 73.8 FL (ref 37–54)
EOSINOPHIL # BLD AUTO: 0.13 10*3/MM3 (ref 0–0.7)
EOSINOPHIL NFR BLD AUTO: 2 % (ref 0.3–6.2)
ERYTHROCYTE [DISTWIDTH] IN BLOOD BY AUTOMATED COUNT: 20.9 % (ref 11.5–14.5)
GFR SERPL CREATININE-BSD FRML MDRD: 46 ML/MIN/1.73
GLOBULIN UR ELPH-MCNC: 3.2 GM/DL
GLUCOSE BLD-MCNC: 89 MG/DL (ref 65–99)
HCT VFR BLD AUTO: 21.1 % (ref 40.4–52.2)
HGB BLD-MCNC: 7.4 G/DL (ref 13.7–17.6)
IMM GRANULOCYTES # BLD: 0.02 10*3/MM3 (ref 0–0.03)
IMM GRANULOCYTES NFR BLD: 0.3 % (ref 0–0.5)
INR PPP: 2.2 (ref 0.9–1.1)
LYMPHOCYTES # BLD AUTO: 1.11 10*3/MM3 (ref 0.9–4.8)
LYMPHOCYTES NFR BLD AUTO: 17 % (ref 19.6–45.3)
MCH RBC QN AUTO: 34.6 PG (ref 27–32.7)
MCHC RBC AUTO-ENTMCNC: 35.1 G/DL (ref 32.6–36.4)
MCV RBC AUTO: 98.6 FL (ref 79.8–96.2)
MONOCYTES # BLD AUTO: 0.78 10*3/MM3 (ref 0.2–1.2)
MONOCYTES NFR BLD AUTO: 11.9 % (ref 5–12)
NEUTROPHILS # BLD AUTO: 4.45 10*3/MM3 (ref 1.9–8.1)
NEUTROPHILS NFR BLD AUTO: 68 % (ref 42.7–76)
PLATELET # BLD AUTO: 66 10*3/MM3 (ref 140–500)
PMV BLD AUTO: 9.5 FL (ref 6–12)
POTASSIUM BLD-SCNC: 3.6 MMOL/L (ref 3.5–5.2)
PROT SERPL-MCNC: 6.6 G/DL (ref 6–8.5)
PROTHROMBIN TIME: 24.1 SECONDS (ref 11.7–14.2)
RBC # BLD AUTO: 2.14 10*6/MM3 (ref 4.6–6)
SODIUM BLD-SCNC: 131 MMOL/L (ref 136–145)
WBC NRBC COR # BLD: 6.54 10*3/MM3 (ref 4.5–10.7)

## 2018-12-14 PROCEDURE — 85610 PROTHROMBIN TIME: CPT | Performed by: INTERNAL MEDICINE

## 2018-12-14 PROCEDURE — 99232 SBSQ HOSP IP/OBS MODERATE 35: CPT | Performed by: INTERNAL MEDICINE

## 2018-12-14 PROCEDURE — 36415 COLL VENOUS BLD VENIPUNCTURE: CPT | Performed by: INTERNAL MEDICINE

## 2018-12-14 PROCEDURE — 80053 COMPREHEN METABOLIC PANEL: CPT | Performed by: INTERNAL MEDICINE

## 2018-12-14 PROCEDURE — 25010000002 ALBUMIN HUMAN 25% PER 50 ML: Performed by: INTERNAL MEDICINE

## 2018-12-14 PROCEDURE — 25010000002 CEFEPIME PER 500 MG: Performed by: INTERNAL MEDICINE

## 2018-12-14 PROCEDURE — 85025 COMPLETE CBC W/AUTO DIFF WBC: CPT | Performed by: INTERNAL MEDICINE

## 2018-12-14 PROCEDURE — P9047 ALBUMIN (HUMAN), 25%, 50ML: HCPCS | Performed by: INTERNAL MEDICINE

## 2018-12-14 RX ORDER — POTASSIUM CHLORIDE 750 MG/1
40 CAPSULE, EXTENDED RELEASE ORAL ONCE
Status: COMPLETED | OUTPATIENT
Start: 2018-12-14 | End: 2018-12-14

## 2018-12-14 RX ORDER — BUMETANIDE 0.25 MG/ML
1 INJECTION INTRAMUSCULAR; INTRAVENOUS ONCE
Status: DISCONTINUED | OUTPATIENT
Start: 2018-12-14 | End: 2018-12-16

## 2018-12-14 RX ORDER — ALBUMIN (HUMAN) 12.5 G/50ML
25 SOLUTION INTRAVENOUS EVERY 8 HOURS
Status: DISPENSED | OUTPATIENT
Start: 2018-12-14 | End: 2018-12-15

## 2018-12-14 RX ADMIN — ALBUMIN (HUMAN) 25 G: 12.5 SOLUTION INTRAVENOUS at 12:30

## 2018-12-14 RX ADMIN — LACTULOSE 20 G: 10 SOLUTION ORAL at 20:03

## 2018-12-14 RX ADMIN — SODIUM CHLORIDE, PRESERVATIVE FREE 3 ML: 5 INJECTION INTRAVENOUS at 10:04

## 2018-12-14 RX ADMIN — PANTOPRAZOLE SODIUM 40 MG: 40 TABLET, DELAYED RELEASE ORAL at 08:17

## 2018-12-14 RX ADMIN — BUMETANIDE 4 MG: 0.25 INJECTION INTRAMUSCULAR; INTRAVENOUS at 00:35

## 2018-12-14 RX ADMIN — ALBUMIN (HUMAN) 25 G: 12.5 SOLUTION INTRAVENOUS at 06:07

## 2018-12-14 RX ADMIN — GUAIFENESIN 600 MG: 600 TABLET, EXTENDED RELEASE ORAL at 20:04

## 2018-12-14 RX ADMIN — GUAIFENESIN 600 MG: 600 TABLET, EXTENDED RELEASE ORAL at 08:17

## 2018-12-14 RX ADMIN — HYDROXYZINE HYDROCHLORIDE 50 MG: 50 TABLET, FILM COATED ORAL at 20:04

## 2018-12-14 RX ADMIN — MIDODRINE HYDROCHLORIDE 10 MG: 5 TABLET ORAL at 08:17

## 2018-12-14 RX ADMIN — CEFEPIME 2 G: 2 INJECTION, POWDER, FOR SOLUTION INTRAVENOUS at 08:16

## 2018-12-14 RX ADMIN — MIDODRINE HYDROCHLORIDE 10 MG: 5 TABLET ORAL at 11:30

## 2018-12-14 RX ADMIN — LACTULOSE 20 G: 10 SOLUTION ORAL at 08:17

## 2018-12-14 RX ADMIN — BUMETANIDE 0.5 MG/HR: 0.25 INJECTION INTRAMUSCULAR; INTRAVENOUS at 14:57

## 2018-12-14 RX ADMIN — POTASSIUM CHLORIDE 40 MEQ: 750 CAPSULE, EXTENDED RELEASE ORAL at 14:57

## 2018-12-14 RX ADMIN — ALBUMIN (HUMAN) 25 G: 12.5 SOLUTION INTRAVENOUS at 22:22

## 2018-12-14 RX ADMIN — OXYCODONE HYDROCHLORIDE 5 MG: 5 TABLET ORAL at 20:03

## 2018-12-14 RX ADMIN — BUMETANIDE 4 MG: 0.25 INJECTION INTRAMUSCULAR; INTRAVENOUS at 12:29

## 2018-12-14 RX ADMIN — BUMETANIDE 4 MG: 0.25 INJECTION INTRAMUSCULAR; INTRAVENOUS at 06:07

## 2018-12-14 RX ADMIN — SODIUM CHLORIDE, PRESERVATIVE FREE 3 ML: 5 INJECTION INTRAVENOUS at 20:04

## 2018-12-14 RX ADMIN — MIDODRINE HYDROCHLORIDE 10 MG: 5 TABLET ORAL at 17:55

## 2018-12-14 NOTE — PROGRESS NOTES
[facility] INPATIENT PROGRESS NOTE         [address]    2018      PATIENT IDENTIFICATION:  Name: Enrique Ramirez ADMIT: 2018   : 1970  PCP: Cydney Chase MD    MRN: [id number] LOS: 9 days   AGE/SEX: 48 y.o. male  ROOM: [facility]                     LOS 9    Reason for visit: Hemoptysis      SUBJECTIVE:     Slept well last night. Denies bloody sputum. No new complaints this morning.  No nausea or vomiting.     Objective   OBJECTIVE:    Vital Sign Min/Max for last 24 hours  Temp  Min: 97.7 °F (36.5 °C)  Max: 98.6 °F (37 °C)   BP  Min: 91/49  Max: 105/52   Pulse  Min: 84  Max: 103   Resp  Min: 18  Max: 20   SpO2  Min: 90 %  Max: 99 %   No Data Recorded   Weight  Min: 119 kg (262 lb)  Max: 119 kg (262 lb)                         Body mass index is 35.53 kg/m².    Intake/Output Summary (Last 24 hours) at 2018 1123  Last data filed at 2018 1000  Gross per 24 hour   Intake 1470 ml   Output --   Net 1470 ml         Exam:  GEN:  No distress, appears stated age  EYES:   PERRL, anicteric sclera  ENT:    External ears/nose normal, OP clear  NECK:  No adenopathy, midline trachea  LUNGS: Normal chest on inspection, palpation and diminished bilaterally with scattered coarse breath sounds at bases on auscultation  CV:  Normal S1S2, without murmur  ABD:  Non tender, non distended, no hepatosplenomegaly, +BS  EXT:  No cyanosis or clubbing. 2+ edema    Scheduled meds:      albumin human 25 g Intravenous Q8H   bumetanide 4 mg Intravenous Q6H   cefepime 2 g Intravenous Q12H   guaiFENesin 600 mg Oral Q12H   hydrOXYzine 50 mg Oral Nightly   lactulose 20 g Oral BID   midodrine 10 mg Oral TID AC   pantoprazole 40 mg Oral QAM AC   sodium chloride 3 mL Intravenous Q12H     IV meds:                           Data Review:  Results from last 7 days   Lab Units  18   0349  18   0803  18   0754  18   0548  12/10/18   0644   SODIUM mmol/L  131*  132*  131*  131*  128*    POTASSIUM mmol/L  3.6  4.0  3.6  3.5  3.5   CHLORIDE mmol/L  92*  91*  91*  92*  91*   CO2 mmol/L  22.9  24.1  24.6  24.0  21.7*   BUN mg/dL  34*  34*  31*  33*  37*   CREATININE mg/dL  1.61*  1.56*  1.50*  1.45*  1.82*   GLUCOSE mg/dL  89  131*  115*  109*  118*   CALCIUM mg/dL  9.3  9.5  9.0  9.1  9.3         Estimated Creatinine Clearance: 76.4 mL/min (A) (by C-G formula based on SCr of 1.61 mg/dL (H)).  Results from last 7 days   Lab Units  12/14/18   0349 12/13/18   0804  12/12/18   2251  12/12/18   0754  12/11/18   2037   12/11/18   0605   12/10/18   0638   WBC 10*3/mm3  6.54  7.41   --   7.91   --    --   9.49   --   9.33   HEMOGLOBIN g/dL  7.4*  8.0*  7.8*  7.3*  8.3*   < >  6.7*   < >  7.0*   PLATELETS 10*3/mm3  66*  65*   --   71*   --    --   90*   --   88*    < > = values in this interval not displayed.     Results from last 7 days   Lab Units  12/14/18 0349 12/13/18   0803  12/12/18   0754  12/11/18   0548  12/10/18   0638   INR   2.20*  2.09*  2.16*  2.12*  2.16*     Results from last 7 days   Lab Units  12/14/18   0349 12/13/18   0803  12/12/18   0754  12/11/18   0548  12/10/18   0644   ALT (SGPT) U/L  24  29  30  31  33   AST (SGOT) U/L  38  42*  43*  45*  43*                 Chest x-ray 12/7 viewed shows infiltrate/atelectasis and increased pleural effusion on the left with cardiomegaly and pulmonary vascular congestion      Chest x-ray 12/10 viewed shows patchy atelectasis and infiltrate both lower lung zones with bilateral pleural effusions showing no significant change from previous.        Microbiology reviewed  Human Rhinovirus/Enterovirus Detected Abnormal         Lower extremity Dopplers performed 12/12 reviewed        )Assessment   ASSESSMENT:      Active Hospital Problems    Diagnosis Date Noted   • **Symptomatic anemia [D64.9] 12/05/2018   • Pneumonia [J18.9] 12/08/2018   • Rhinovirus infection [B34.8] 12/06/2018   • Pericardial effusion [I31.3] 12/06/2018   • LÓPEZ (acute kidney  injury) (CMS/HCC) [N17.9] 12/05/2018   • Anasarca [R60.1] 12/05/2018   • Cough [R05] 12/05/2018   • Epistaxis [R04.0] 12/05/2018   • Hemoptysis [R04.2] 12/05/2018   • Hyponatremia [E87.1] 10/25/2018   • Esophageal varices (CMS/HCC) [I85.00] 10/24/2018   • Elevated liver function tests [R94.5] 06/22/2018   • Alcoholic hepatitis without ascites [K70.10] 06/22/2018      Resolved Hospital Problems   No resolved problems to display.     Pneumonia  Hemoptysis  Coagulopathy secondary to liver disease  Mediastinal lymphadenopathy  Bilateral pleural effusions and anasarca  Cirrhosis/hepatitis  Obesity/?AL  URI with rhinovirus  Anemia  Acute kidney injury      PLAN:    Hemoptysis secondary to coagulopathy from liver disease and infectious pneumonia.  Seems to be improving.  Continue cough suppressant.    Pleural effusion secondary to parapneumonic effusion on top of liver disease with anasarca.    Continue bronchodilators.        Enrique Obrien MD  Pulmonary and Critical Care Medicine  [facility]  12/14/2018    11:23 AM

## 2018-12-14 NOTE — PROGRESS NOTES
"[facility]  Inpatient Progress Note    Reason for Follow Up:  Alcoholic liver disease    Subjective     Interval History:   No further hemoptysis overnight.  States he \"just wants to go home\".    Current Facility-Administered Medications:   •  albumin human 25 % IV SOLN 25 g, 25 g, Intravenous, Q8H, Erin Bellamy MD, 25 g at 12/14/18 0607  •  benzonatate (TESSALON) capsule 100 mg, 100 mg, Oral, TID PRN, Enrique Obrien MD  •  bisacodyl (DULCOLAX) EC tablet 5 mg, 5 mg, Oral, Daily PRN, Valentino Valdez MD  •  bumetanide (BUMEX) injection 4 mg, 4 mg, Intravenous, Q6H, Erin Bellamy MD, 4 mg at 12/14/18 0607  •  calcium carbonate (TUMS) chewable tablet 500 mg (200 mg elemental), 2 tablet, Oral, BID PRN, Valentino Valdez MD  •  cefepime (MAXIPIME) 2 g/100 mL 0.9% NS (mbp), 2 g, Intravenous, Q12H, Valentino Valdez MD, 2 g at 12/13/18 2152  •  guaiFENesin (MUCINEX) 12 hr tablet 600 mg, 600 mg, Oral, Q12H, Valentino Valdez MD, 600 mg at 12/13/18 2108  •  hydrOXYzine (ATARAX) tablet 50 mg, 50 mg, Oral, Nightly, Emerald White MD, 50 mg at 12/13/18 2108  •  ipratropium-albuterol (DUO-NEB) nebulizer solution 3 mL, 3 mL, Nebulization, Q4H PRN, Michele Dixon MD  •  lactulose (CHRONULAC) 10 GM/15ML solution 20 g, 20 g, Oral, BID, Valentino Valdez MD, 20 g at 12/13/18 2108  •  midodrine (PROAMATINE) tablet 10 mg, 10 mg, Oral, TID AC, Emerald White MD, 10 mg at 12/13/18 1802  •  nitroglycerin (NITROSTAT) SL tablet 0.4 mg, 0.4 mg, Sublingual, Q5 Min PRN, Valentino Valdez MD  •  ondansetron (ZOFRAN) tablet 4 mg, 4 mg, Oral, Q6H PRN **OR** ondansetron ODT (ZOFRAN-ODT) disintegrating tablet 4 mg, 4 mg, Oral, Q6H PRN **OR** ondansetron (ZOFRAN) injection 4 mg, 4 mg, Intravenous, Q6H PRN, Valentino Valdez MD  •  oxyCODONE (ROXICODONE) immediate release tablet 5 mg, 5 mg, Oral, Q4H PRN, Valentino Valdez MD  •  pantoprazole (PROTONIX) EC tablet 40 mg, 40 mg, Oral, " QAM AC, Valentino Valdez MD, 40 mg at 12/13/18 0818  •  sodium chloride (OCEAN) nasal spray 2 spray, 2 spray, Each Nare, PRN, Ozzie Painter MD  •  [COMPLETED] Insert peripheral IV, , , Once **AND** sodium chloride 0.9 % flush 10 mL, 10 mL, Intravenous, PRN, Gurinder Burnett MD  •  sodium chloride 0.9 % flush 3 mL, 3 mL, Intravenous, Q12H, Valentino Valdez MD, 3 mL at 12/13/18 0819  •  sodium chloride 0.9 % flush 3-10 mL, 3-10 mL, Intravenous, PRN, Valentino Valdez MD  •  zolpidem (AMBIEN) tablet 5 mg, 5 mg, Oral, Nightly PRN, Valentino Valdez MD, 5 mg at 12/05/18 2103  Review of Systems:    The following systems were reviewed and negative;  cardiovascular, musculoskeletal and neurological    Objective     Vital Signs  Temp:  [97.7 °F (36.5 °C)-98.6 °F (37 °C)] 98.2 °F (36.8 °C)  Heart Rate:  [] 95  Resp:  [20] 20  BP: ()/(49-53) 103/49  Body mass index is 35.53 kg/m².    Intake/Output Summary (Last 24 hours) at 12/14/2018 0749  Last data filed at 12/14/2018 0737  Gross per 24 hour   Intake 1270 ml   Output --   Net 1270 ml     I/O this shift:  In: 250 [P.O.:250]  Out: -      Physical Exam:   General: patient awake, alert and cooperative   Cardiovascular: regular rhythm and rate, no murmurs auscultated   Pulm: clear to auscultation bilaterally, regular and unlabored   Abdomen: soft, nontender, nondistended; normal bowel sounds   Rectal: deferred   Extremities: no rash or edema   Psychiatric: Normal mood and behavior; memory intact     Results Review:     I reviewed the patient's new clinical results.    Results from last 7 days   Lab Units  12/14/18   0349  12/13/18   0804  12/12/18   2251  12/12/18   0754   WBC 10*3/mm3  6.54  7.41   --   7.91   HEMOGLOBIN g/dL  7.4*  8.0*  7.8*  7.3*   HEMATOCRIT %  21.1*  24.6*  23.0*  21.9*   PLATELETS 10*3/mm3  66*  65*   --   71*     Results from last 7 days   Lab Units  12/14/18   0349  12/13/18   0803  12/12/18   0754   SODIUM mmol/L  131*  132*  131*    POTASSIUM mmol/L  3.6  4.0  3.6   CHLORIDE mmol/L  92*  91*  91*   CO2 mmol/L  22.9  24.1  24.6   BUN mg/dL  34*  34*  31*   CREATININE mg/dL  1.61*  1.56*  1.50*   CALCIUM mg/dL  9.3  9.5  9.0   BILIRUBIN mg/dL  9.4*  11.0*  10.2*   ALK PHOS U/L  70  77  76   ALT (SGPT) U/L  24  29  30   AST (SGOT) U/L  38  42*  43*   GLUCOSE mg/dL  89  131*  115*     Results from last 7 days   Lab Units  12/14/18   0349  12/13/18   0803  12/12/18   0754   INR   2.20*  2.09*  2.16*     Lab Results   Lab Value Date/Time    LIPASE 51 12/05/2018 1306    LIPASE 53 11/28/2018 1101    LIPASE 38 10/24/2018 1439    LIPASE 45 06/21/2018 1906       Radiology:  MRI abdomen wo contrast mrcp   Final Result   This exam is limited by absence of contrast.   1. The liver demonstrates periportal low signal intensity on both T1 and   T2-weighted images that is pronounced on the out of phase images   suggestive presence of fat in the periportal region. These findings may   be suggestive of a chronic liver disease. Primary biliary cirrhosis and   alcoholic hepatitis are on the differential.   2. Splenomegaly and small ascites. Bilateral small to moderate size   layering pleural effusions with bibasilar atelectasis.   2. Limited evaluation of the gallbladder secondary to absence of   contrast, however cholelithiasis and gallstones are identified on this   current study.       This report was finalized on 12/12/2018 1:33 PM by Dr. Stephanie Schulz M.D.          XR Chest PA & Lateral   Final Result      XR Chest 1 View   Final Result   Persistent mid to lower lung infiltrate/atelectasis, with   increased pleural effusion apparent on the left, continued follow-up   recommended. Cardiomegaly, pulmonary vascular congestion.       This report was finalized on 12/7/2018 5:58 PM by Dr. Rojas Dubon M.D.          CT Chest Without Contrast   Final Result   1. Basilar predominant opacities favored to reflect atelectasis although   pneumonia not  excluded.   2. Right greater than left pleural effusions, moderate pericardial   effusion.   3. Mild mediastinal adenopathy.   4. Homogeneous splenic enlargement.                           .        This report was finalized on 12/7/2018 5:53 AM by Valentino Nguyen M.D.          US Renal Bilateral   Final Result   Ascites. Otherwise negative renal sonogram. There is no   evidence of obstruction.       This report was finalized on 12/6/2018 8:05 PM by Dr. Brannon Cruz M.D.          XR Chest 1 View   Final Result   Persistent patchy densities in the mid to lower lungs. Increased left   more than right pleural effusions. Cardiomegaly with persistent mild   pulmonary vascular congestion. Continued follow-up/further evaluation   recommended as indicated.                   This report was finalized on 12/5/2018 1:49 PM by Dr. Rojas Dubon M.D.              Assessment/Plan     Patient Active Problem List   Diagnosis   • Acute upper GI bleed   • Gout   • Pre-diabetes   • Cholelithiasis   • Elevated liver function tests   • Alcohol abuse   • Alcoholic hepatitis without ascites   • Thrombocytopenia (CMS/HCC)   • Esophagitis   • Folate deficiency   • Acute anemia   • Esophageal varices (CMS/HCC)   • Hepatitis, alcoholic   • Ascites   • Portal hypertension (CMS/HCC)   • Hyponatremia   • Symptomatic anemia   • LÓPEZ (acute kidney injury) (CMS/HCC)   • Anasarca   • Cough   • Epistaxis   • Hemoptysis   • Rhinovirus infection   • Pericardial effusion   • Pneumonia       Assessment/Recommendations:  1) Alcoholic liver disease. He claims no ETOH in 2 mos.  He has completed full course of oral steroids with relatively minimal change in his TB  2) 1+ esophageal varices seen on 6/18 EGD  3) Hemoptysis - Pulmonary is following.   4) Abnormal gallbladder on recent US. MRCP with stones/sludge, no concerning findings  5) Anemia - likely secondary to #3.      I note plans for possible community based palliate care.  I will inform   Christopher of this new development.            Manjeet Eddy M.D.  [facility]  [address] [place] [address]  Office: [phone]

## 2018-12-14 NOTE — PLAN OF CARE
Problem: Patient Care Overview  Goal: Plan of Care Review  Outcome: Ongoing (interventions implemented as appropriate)   12/14/18 0628   Coping/Psychosocial   Plan of Care Reviewed With patient   Plan of Care Review   Progress no change   OTHER   Outcome Summary no complaints, ambulates ad terri, room air, will continue to monitor     Goal: Individualization and Mutuality  Outcome: Ongoing (interventions implemented as appropriate)    Goal: Discharge Needs Assessment  Outcome: Ongoing (interventions implemented as appropriate)      Problem: Anemia (Adult)  Goal: Symptom Improvement  Outcome: Ongoing (interventions implemented as appropriate)      Problem: Fall Risk (Adult)  Goal: Absence of Fall  Outcome: Ongoing (interventions implemented as appropriate)      Problem: Fluid Volume Excess (Adult)  Goal: Optimal Fluid Balance  Outcome: Ongoing (interventions implemented as appropriate)

## 2018-12-14 NOTE — PROGRESS NOTES
Continued Stay Note  [facility]     Patient Name: Enrique Ramirez  MRN: [id number]  Today's Date: 12/14/2018    Admit Date: 12/5/2018    Discharge Plan     Row Name 12/14/18 1528       Plan    Plan  Home    Patient/Family in Agreement with Plan  yes    Plan Comments  Met with patient and discuss dc planning.  He plans home at this time.  Denies needs at this time.  He states his spouse can assist with his care....................Gosia Roberts RN        Discharge Codes    No documentation.             Gosia Roberts RN

## 2018-12-14 NOTE — PLAN OF CARE
Problem: Patient Care Overview  Goal: Plan of Care Review  Outcome: Ongoing (interventions implemented as appropriate)   12/14/18 3137   Coping/Psychosocial   Plan of Care Reviewed With patient   Plan of Care Review   Progress no change   OTHER   Outcome Summary Patient still not able to go home, and at this point cardio and PCP are concerned that he will not be able to keep the weight off on PO diaretics. Still getting IV bumex and albumin together.        Problem: Anemia (Adult)  Goal: Symptom Improvement  Outcome: Ongoing (interventions implemented as appropriate)      Problem: Fall Risk (Adult)  Goal: Absence of Fall  Outcome: Ongoing (interventions implemented as appropriate)      Problem: Fluid Volume Excess (Adult)  Goal: Optimal Fluid Balance  Outcome: Ongoing (interventions implemented as appropriate)

## 2018-12-14 NOTE — PROGRESS NOTES
NEPHROLOGY PROGRESS NOTE    PATIENT IDENTIFICATION:   Name:  Enrique Ramirez      MRN:  [id number]     48 y.o.  male             Reason for visit: LÓPEZ    SUBJECTIVE:     Seen and examined.  Laying in bed. Very swollen. D/W palliative. Wants to have more quality of life.     OBJECTIVE:  Vitals:    12/14/18 0815 12/14/18 0946 12/14/18 1100 12/14/18 1306   BP: 105/52   108/55   BP Location: Left arm   Left arm   Patient Position: Sitting   Lying   Pulse: 86 91 87 79   Resp: 20 18  20   Temp: 98.6 °F (37 °C)   98.2 °F (36.8 °C)   TempSrc:    Oral   SpO2: 93% 91% 91%    Weight:       Height:               Body mass index is 35.53 kg/m².    Intake/Output Summary (Last 24 hours) at 12/14/2018 1344  Last data filed at 12/14/2018 1230  Gross per 24 hour   Intake 2050 ml   Output --   Net 2050 ml     Wt Readings from Last 1 Encounters:   12/14/18 0636 119 kg (262 lb)   12/13/18 0525 118 kg (260 lb 8 oz)   12/12/18 0616 119 kg (263 lb)   12/11/18 0612 120 kg (264 lb)   12/10/18 0730 127 kg (279 lb)   12/08/18 0618 127 kg (279 lb)   12/07/18 0530 125 kg (276 lb)   12/06/18 1457 121 kg (267 lb)   12/06/18 0600 121 kg (267 lb 8 oz)   12/05/18 1814 124 kg (273 lb)   12/05/18 1238 119 kg (262 lb 6.4 oz)     Wt Readings from Last 3 Encounters:   12/14/18 119 kg (262 lb)   11/13/18 118 kg (260 lb)   11/09/18 118 kg (260 lb 12.8 oz)         Exam:  NAD; pleasant; oriented; looks older than stated age  Overweight; jaundiced  HEENT MMM; AT/NC  No eye d/c; scleral icterus  Lungs Coarse bilat; Decreased bs bases  Heart RRR, no rub, no s3  ABd  + bs, soft, distended, nontender. Body wall edema.   Ext 3 +edema lower ext to hips  No clubbing  No asterixis  Moves all extremities   Speech fluent  Mood depressed; flat affect      Scheduled meds:      albumin human 25 g Intravenous Q8H   bumetanide 1 mg Intravenous Once   guaiFENesin 600 mg Oral Q12H   hydrOXYzine 50 mg Oral Nightly   lactulose 20 g Oral BID   midodrine 10 mg Oral TID AC    pantoprazole 40 mg Oral QAM AC   potassium chloride 40 mEq Oral Once   sodium chloride 3 mL Intravenous Q12H     IV meds:                          bumetanide 0.5 mg/hr       Data Review:    Results from last 7 days   Lab Units  12/14/18   0349 12/13/18   0803  12/12/18   0754   SODIUM mmol/L  131*  132*  131*   POTASSIUM mmol/L  3.6  4.0  3.6   CHLORIDE mmol/L  92*  91*  91*   CO2 mmol/L  22.9  24.1  24.6   BUN mg/dL  34*  34*  31*   CREATININE mg/dL  1.61*  1.56*  1.50*   CALCIUM mg/dL  9.3  9.5  9.0   BILIRUBIN mg/dL  9.4*  11.0*  10.2*   ALK PHOS U/L  70  77  76   ALT (SGPT) U/L  24  29  30   AST (SGOT) U/L  38  42*  43*   GLUCOSE mg/dL  89  131*  115*     Estimated Creatinine Clearance: 76.4 mL/min (A) (by C-G formula based on SCr of 1.61 mg/dL (H)).      Results from last 7 days   Lab Units  12/13/18   0803  12/10/18   0644  12/09/18   0627   MAGNESIUM mg/dL  1.5*  1.9  1.9   PHOSPHORUS mg/dL  3.3   --   4.0       Results from last 7 days   Lab Units  12/14/18   0349 12/13/18   0804  12/12/18   2251  12/12/18   0754  12/11/18   2037   12/11/18   0605   12/10/18   0638   WBC 10*3/mm3  6.54  7.41   --   7.91   --    --   9.49   --   9.33   HEMOGLOBIN g/dL  7.4*  8.0*  7.8*  7.3*  8.3*   < >  6.7*   < >  7.0*   PLATELETS 10*3/mm3  66*  65*   --   71*   --    --   90*   --   88*    < > = values in this interval not displayed.       Results from last 7 days   Lab Units  12/14/18   0349 12/13/18   0803  12/12/18   0754  12/11/18   0548  12/10/18   0638   INR   2.20*  2.09*  2.16*  2.12*  2.16*             ASSESSMENT:     Symptomatic anemia    Elevated liver function tests    Alcoholic hepatitis without ascites    Esophageal varices (CMS/HCC)    Hyponatremia    LÓPEZ (acute kidney injury) (CMS/HCC)    Anasarca    Cough    Epistaxis    Hemoptysis    Rhinovirus infection    Pericardial effusion    Pneumonia    1.  LÓPEZ, non-oliguric, improving.  prerenal from ABLA, hypotension, and profound liver injury. Improving  Hyponatremia with hypervolemia;   2.  ABLA  3.  Alcoholic cirrhosis  4.  Hypotension  5.  Hemoptysis,  Pulmonary on baord  6.  TCP: PLT is down 77        PLAN:    Change to Bumex drip and continue Albumin  Palliative is on board !!  Replete potassium as needed  Blood TX as needed  D/W nurse  Surveillance labs      Erin Bellamy MD  12/14/2018    1:44 PM

## 2018-12-14 NOTE — PAYOR COMM NOTE
"[place]rique WOODY (48 y.o. Male)                          ATTENTION;   CONTINUED STAY CLINICALS CASE REF [id number], REPLY TO UR DEPT                        [facility]  OR UR  [phone]         Date of Birth Social Security Number Address Home Phone MRN    1970  [id number] [id number] [phone] [id number]    Islam Marital Status          Protestant        Admission Date Admission Type Admitting Provider Attending Provider Department, Room/Bed    12/5/18 Emergency Valentino Valdez MD [place], Robert Russell, MD [address], [address]/1    Discharge Date Discharge Disposition Discharge Destination                       Attending Provider:  Michele Dixon MD    Allergies:  Zinc    Isolation:  Droplet   Infection:  Rhinovirus  (12/05/18)   Code Status:  CPR    Ht:  182.9 cm (72\")   Wt:  119 kg (262 lb)    Admission Cmt:  None   Principal Problem:  Symptomatic anemia [D64.9]                 Active Insurance as of 12/5/2018     Primary Coverage     Payor Plan Insurance Group Employer/Plan Group    [id number] PPO [id number]     Payor Plan Address Payor Plan Phone Number Payor Plan Fax Number Effective Dates    [phone]  4/16/2017 - None Entered    [place] [address]       Subscriber Name Subscriber Birth Date Member ID       RUSSELL WALKER 12/17/1977 [id number]                 Emergency Contacts      (Rel.) Home Phone Work Phone Mobile Phone    Russell Ramirez (Spouse) [phone] -- --            Lines, Drains & Airways    Active LDAs     Name:   Placement date:   Placement time:   Site:   Days:    Peripheral IV 12/05/18 1643 Right Wrist   12/05/18    1643    Wrist   8    Peripheral IV 12/11/18 1131 Left Forearm   12/11/18    1131    Forearm   2                    Manjeet Eddy MD   Physician   Gastroenterology   Progress Notes   Signed "   Date of Service:  12/13/2018  7:55 AM               Signed        Expand All Collapse All          Show:Clear all  [x]Manual[x]Template[x]Copied    Added by:  [x]Manjeet Eddy MD      []Leanne for details      [facility], [place], [facility]  Inpatient Progress Note     Reason for Follow Up:  Alcoholic liver disease        Subjective         Interval History:   Some additional hemptysis o/n per nursing, although pt does not recall this.  He required addition unit of blood o/n, am labs are pending.     Current Facility-Administered Medications:   •  albumin human 25 % IV SOLN 25 g, 25 g, Intravenous, Q8H, Erin Bellamy MD, 25 g at 12/13/18 0525  •  benzonatate (TESSALON) capsule 100 mg, 100 mg, Oral, TID PRN, Enrique Obrien MD  •  bisacodyl (DULCOLAX) EC tablet 5 mg, 5 mg, Oral, Daily PRN, Valentino Valdez MD  •  bumetanide (BUMEX) injection 2 mg, 2 mg, Intravenous, Q8H, Erin Bellamy MD, 2 mg at 12/13/18 0525  •  calcium carbonate (TUMS) chewable tablet 500 mg (200 mg elemental), 2 tablet, Oral, BID PRN, Valentino Valdez MD  •  cefepime (MAXIPIME) 2 g/100 mL 0.9% NS (mbp), 2 g, Intravenous, Q12H, Valentino Valdez MD, 2 g at 12/12/18 2118  •  guaiFENesin (MUCINEX) 12 hr tablet 600 mg, 600 mg, Oral, Q12H, Valentino Valdez MD, 600 mg at 12/12/18 2114  •  hydrOXYzine (ATARAX) tablet 50 mg, 50 mg, Oral, Nightly, Emerald White MD, 50 mg at 12/12/18 2114  •  ipratropium-albuterol (DUO-NEB) nebulizer solution 3 mL, 3 mL, Nebulization, 4x Daily - RT, Valentino Valdez MD, 3 mL at 12/13/18 0713  •  lactulose (CHRONULAC) 10 GM/15ML solution 20 g, 20 g, Oral, BID, Valentino Valdez MD, 20 g at 12/12/18 2305  •  midodrine (PROAMATINE) tablet 10 mg, 10 mg, Oral, TID AC, Emerald White MD, 10 mg at 12/12/18 1822  •  nitroglycerin (NITROSTAT) SL tablet 0.4 mg, 0.4 mg, Sublingual, Q5 Min PRN, Valentino Valdez MD  •  ondansetron (ZOFRAN) tablet 4 mg, 4 mg, Oral, Q6H PRN **OR**  ondansetron ODT (ZOFRAN-ODT) disintegrating tablet 4 mg, 4 mg, Oral, Q6H PRN **OR** ondansetron (ZOFRAN) injection 4 mg, 4 mg, Intravenous, Q6H PRN, Valentino Valdez MD  •  oxyCODONE (ROXICODONE) immediate release tablet 5 mg, 5 mg, Oral, Q4H PRN, Valentino Valdez MD  •  pantoprazole (PROTONIX) EC tablet 40 mg, 40 mg, Oral, QAM AC, Valentino Valdez MD, 40 mg at 12/12/18 0810  •  sodium chloride (OCEAN) nasal spray 2 spray, 2 spray, Each Nare, PRN, Ozzie Painter MD  •  [COMPLETED] Insert peripheral IV, , , Once **AND** sodium chloride 0.9 % flush 10 mL, 10 mL, Intravenous, PRN, Gurinder Burnett MD  •  sodium chloride 0.9 % flush 3 mL, 3 mL, Intravenous, Q12H, Valentino Valdez MD, 3 mL at 12/12/18 0811  •  sodium chloride 0.9 % flush 3-10 mL, 3-10 mL, Intravenous, PRN, Valentino Valdez MD  •  zolpidem (AMBIEN) tablet 5 mg, 5 mg, Oral, Nightly PRN, Valentino Valdez MD, 5 mg at 12/05/18 2103  Review of Systems:               The following systems were reviewed and negative;  cardiovascular, musculoskeletal and neurological          Objective         Vital Signs  Temp:  [97.8 °F (36.6 °C)-98.7 °F (37.1 °C)] 98.4 °F (36.9 °C)  Heart Rate:  [77-89] 89  Resp:  [16-20] 20  BP: ()/(48-54) 101/52  Body mass index is 35.33 kg/m².     Intake/Output Summary (Last 24 hours) at 12/13/2018 0755  Last data filed at 12/12/2018 2129      Gross per 24 hour   Intake 1350 ml   Output --   Net 1350 ml      No intake/output data recorded.                Physical Exam:              General: patient awake, alert and cooperative              Cardiovascular: regular rhythm and rate, no murmurs auscultated              Pulm: clear to auscultation bilaterally, regular and unlabored              Abdomen: soft, nontender, nondistended; normal bowel sounds              Rectal: deferred              Extremities: no rash or edema              Psychiatric: Normal mood and behavior; memory intact                Results Review:                 I reviewed the patient's new clinical results.                Results from last 7 days   Lab Units  12/12/18   2251  12/12/18   0754  12/11/18 2037    12/11/18   0605    12/10/18   0638   WBC 10*3/mm3   --   7.91   --    --   9.49   --   9.33   HEMOGLOBIN g/dL  7.8*  7.3*  8.3*   < >  6.7*   < >  7.0*   HEMATOCRIT %  23.0*  21.9*  25.6*   < >  19.8*   < >  20.9*   PLATELETS 10*3/mm3   --   71*   --    --   90*   --   88*    < > = values in this interval not displayed.             Results from last 7 days   Lab Units  12/12/18   0754 12/11/18   0548  12/10/18   0644   SODIUM mmol/L  131*  131*  128*   POTASSIUM mmol/L  3.6  3.5  3.5   CHLORIDE mmol/L  91*  92*  91*   CO2 mmol/L  24.6  24.0  21.7*   BUN mg/dL  31*  33*  37*   CREATININE mg/dL  1.50*  1.45*  1.82*   CALCIUM mg/dL  9.0  9.1  9.3   BILIRUBIN mg/dL  10.2*  9.6*  10.1*   ALK PHOS U/L  76  99  89   ALT (SGPT) U/L  30  31  33   AST (SGOT) U/L  43*  45*  43*   GLUCOSE mg/dL  115*  109*  118*             Results from last 7 days   Lab Units  12/12/18   0754 12/11/18   0548  12/10/18   0638   INR    2.16*  2.12*  2.16*            Lab Results   Lab Value Date/Time     LIPASE 51 12/05/2018 1306     LIPASE 53 11/28/2018 1101     LIPASE 38 10/24/2018 1439     LIPASE 45 06/21/2018 1906         Radiology:  MRI abdomen wo contrast mrcp   Final Result   This exam is limited by absence of contrast.   1. The liver demonstrates periportal low signal intensity on both T1 and   T2-weighted images that is pronounced on the out of phase images   suggestive presence of fat in the periportal region. These findings may   be suggestive of a chronic liver disease. Primary biliary cirrhosis and   alcoholic hepatitis are on the differential.   2. Splenomegaly and small ascites. Bilateral small to moderate size   layering pleural effusions with bibasilar atelectasis.   2. Limited evaluation of the gallbladder secondary to absence of   contrast, however cholelithiasis  and gallstones are identified on this   current study.       This report was finalized on 12/12/2018 1:33 PM by Dr. Stephanie Schulz M.D.           XR Chest PA & Lateral   Final Result       XR Chest 1 View   Final Result   Persistent mid to lower lung infiltrate/atelectasis, with   increased pleural effusion apparent on the left, continued follow-up   recommended. Cardiomegaly, pulmonary vascular congestion.       This report was finalized on 12/7/2018 5:58 PM by Dr. Rojas Dubon M.D.           CT Chest Without Contrast   Final Result   1. Basilar predominant opacities favored to reflect atelectasis although   pneumonia not excluded.   2. Right greater than left pleural effusions, moderate pericardial   effusion.   3. Mild mediastinal adenopathy.   4. Homogeneous splenic enlargement.                           .        This report was finalized on 12/7/2018 5:53 AM by Valentino Nguyen M.D.           US Renal Bilateral   Final Result   Ascites. Otherwise negative renal sonogram. There is no   evidence of obstruction.       This report was finalized on 12/6/2018 8:05 PM by Dr. Brannon Cruz M.D.           XR Chest 1 View   Final Result   Persistent patchy densities in the mid to lower lungs. Increased left   more than right pleural effusions. Cardiomegaly with persistent mild   pulmonary vascular congestion. Continued follow-up/further evaluation   recommended as indicated.                   This report was finalized on 12/5/2018 1:49 PM by Dr. Rojas Dubon M.D.                      Assessment/Plan             Patient Active Problem List   Diagnosis   • Acute upper GI bleed   • Gout   • Pre-diabetes   • Cholelithiasis   • Elevated liver function tests   • Alcohol abuse   • Alcoholic hepatitis without ascites   • Thrombocytopenia (CMS/HCC)   • Esophagitis   • Folate deficiency   • Acute anemia   • Esophageal varices (CMS/HCC)   • Hepatitis, alcoholic   • Ascites   • Portal hypertension (CMS/HCC)   •  Hyponatremia   • Symptomatic anemia   • LÓPEZ (acute kidney injury) (CMS/HCC)   • Anasarca   • Cough   • Epistaxis   • Hemoptysis   • Rhinovirus infection   • Pericardial effusion   • Pneumonia         Assessment/Recommendations:  1) Alcoholic liver disease. He claims no ETOH in 2 mos.  He has completed full course of oral steroids with relatively minimal change in his TB  2) 1+ esophageal varices seen on  EGD  3) Hemoptysis - Pulmonary is following.   4) Abnormal gallbladder on recent US. MRCP with stones/sludge, no concerning findings  5) Anemia - likely secondary to #3.  Await am labs             Manjeet Eddy M.D.  [facility]  [address] - [address] [place] [address]  Office: [phone]                                           ED to Hosp-Admission (Current) on 2018            Detailed Report        Enrique Obrien MD   Physician   Pulmonology   Progress Notes   Signed   Date of Service:  2018  7:40 AM               Signed        Expand All Collapse All          Show:Clear all  [x]Manual[x]Template[x]Copied    Added by:  [x]Enrique Obrien MD      []Leanne for details           [facility] INPATIENT PROGRESS NOTE                                                                 [address]     2018        PATIENT IDENTIFICATION:  Name: Enrique Ramirez ADMIT: 2018   : 1970  PCP: Cydney Chase MD    MRN: [id number] LOS: 8 days   AGE/SEX: 48 y.o. male  ROOM: [facility]                                                                                                     LOS 8     Reason for visit: Hemoptysis        SUBJECTIVE:              Slept well last night. No new complaints this morning. Cough improved. No bloody sputum.                  Objective      OBJECTIVE:     Vital Sign Min/Max for last 24 hours  Temp  Min: 97.8 °F (36.6 °C)  Max: 98.7 °F (37.1 °C)   BP  Min: 98/51  Max: 109/53   Pulse  Min: 77  Max: 89   Resp  Min:  16  Max: 20   SpO2  Min: 91 %  Max: 99 %   No Data Recorded   Weight  Min: 118 kg (260 lb 8 oz)  Max: 118 kg (260 lb 8 oz)                                                        Body mass index is 35.33 kg/m².     Intake/Output Summary (Last 24 hours) at 12/13/2018 0943  Last data filed at 12/12/2018 2129      Gross per 24 hour   Intake 1300 ml   Output --   Net 1300 ml            Exam:  GEN:               No distress, appears stated age  EYES:              PERRL, anicteric sclera  ENT:                External ears/nose normal, OP clear  NECK:             No adenopathy, midline trachea  LUNGS:           Normal chest on inspection, palpation and diminished bilaterally with scattered coarse breath sounds at bases on auscultation  CV:                  Normal S1S2, without murmur  ABD:                Non tender, non distended, no hepatosplenomegaly, +BS  EXT:                No cyanosis or clubbing. 2+ edema     Scheduled meds:            albumin human 25 g Intravenous Q8H   bumetanide 2 mg Intravenous Q8H   cefepime 2 g Intravenous Q12H   guaiFENesin 600 mg Oral Q12H   hydrOXYzine 50 mg Oral Nightly   ipratropium-albuterol 3 mL Nebulization 4x Daily - RT   lactulose 20 g Oral BID   midodrine 10 mg Oral TID AC   pantoprazole 40 mg Oral QAM AC   sodium chloride 3 mL Intravenous Q12H      IV meds:                        Data Review:           Results from last 7 days   Lab Units  12/13/18   0803  12/12/18   0754  12/11/18   0548  12/10/18   0644  12/09/18   0627   SODIUM mmol/L  132*  131*  131*  128*  129*   POTASSIUM mmol/L  4.0  3.6  3.5  3.5  4.2   CHLORIDE mmol/L  91*  91*  92*  91*  91*   CO2 mmol/L  24.1  24.6  24.0  21.7*  20.3*   BUN mg/dL  34*  31*  33*  37*  42*   CREATININE mg/dL  1.56*  1.50*  1.45*  1.82*  2.19*   GLUCOSE mg/dL  131*  115*  109*  118*  119*   CALCIUM mg/dL  9.5  9.0  9.1  9.3  9.7          Estimated Creatinine Clearance: 78.5 mL/min (A) (by C-G formula based on SCr of 1.56 mg/dL (H)).                 Results from last 7 days   Lab Units  12/13/18   0804  12/12/18   2251  12/12/18   0754  12/11/18   2037  12/11/18   0824  12/11/18   0605    12/10/18   0638    12/09/18   0627   WBC 10*3/mm3  7.41   --   7.91   --    --   9.49   --   9.33   --   11.31*   HEMOGLOBIN g/dL  8.0*  7.8*  7.3*  8.3*  6.8*  6.7*   < >  7.0*   < >  7.5*   PLATELETS 10*3/mm3  65*   --   71*   --    --   90*   --   88*   --   101*    < > = values in this interval not displayed.               Results from last 7 days   Lab Units  12/13/18   0803  12/12/18   0754  12/11/18   0548  12/10/18   0638  12/09/18   0627   INR    2.09*  2.16*  2.12*  2.16*  2.02*               Results from last 7 days   Lab Units  12/13/18   0803  12/12/18   0754  12/11/18   0548  12/10/18   0644  12/09/18   0627   ALT (SGPT) U/L  29  30  31  33  37   AST (SGOT) U/L  42*  43*  45*  43*  42*               Results from last 7 days   Lab Units  12/07/18   0556   PROCALCITONIN ng/mL  0.43*                                                     Chest x-ray 12/7 viewed shows infiltrate/atelectasis and increased pleural effusion on the left with cardiomegaly and pulmonary vascular congestion        Chest x-ray 12/10 viewed shows patchy atelectasis and infiltrate both lower lung zones with bilateral pleural effusions showing no significant change from previous.          Microbiology reviewed  Human Rhinovirus/Enterovirus Detected Abnormal            Lower extremity Dopplers performed 12/12 reviewed           )    Assessment      ASSESSMENT:                      Active Hospital Problems     Diagnosis Date Noted   • **Symptomatic anemia [D64.9] 12/05/2018   • Pneumonia [J18.9] 12/08/2018   • Rhinovirus infection [B34.8] 12/06/2018   • Pericardial effusion [I31.3] 12/06/2018   • LÓPEZ (acute kidney injury) (CMS/HCC) [N17.9] 12/05/2018   • Anasarca [R60.1] 12/05/2018   • Cough [R05] 12/05/2018   • Epistaxis [R04.0] 12/05/2018   • Hemoptysis [R04.2] 12/05/2018   • Hyponatremia  "[E87.1] 10/25/2018   • Esophageal varices (CMS/HCC) [I85.00] 10/24/2018   • Elevated liver function tests [R94.5] 2018   • Alcoholic hepatitis without ascites [K70.10] 2018       Resolved Hospital Problems   No resolved problems to display.      Pneumonia  Hemoptysis  Coagulopathy secondary to liver disease  Mediastinal lymphadenopathy  Bilateral pleural effusions and anasarca  Cirrhosis/hepatitis  Obesity/?AL  URI with rhinovirus  Anemia  Acute kidney injury        PLAN:     Hemoptysis secondary to coagulopathy from liver disease and infectious pneumonia.  Seems to be improving.  Continue cough suppressant.    Pleural effusion secondary to parapneumonic effusion on top of liver disease with anasarca.    Continue bronchodilators.           Enrique Obrien MD  Pulmonary and Critical Care Medicine  [facility]  2018     9:43 AM                                Michele Dixon MD   Physician   Medicine   Progress Notes   Addendum   Date of Service:  2018 12:54 PM               Expand All Collapse All          Show:Clear all  [x]Manual[x]Template[x]Copied    Added by:  [x]Michele Dixon MD      []Hover for details       LOS: 8 days      Name: Enrique Ramirez  Age: 48 y.o.  Sex: male  :  1970  MRN: [id number]         Primary Care Physician: Cydney Chase MD        Subjective      Subjective  No new complaints today.  Begins asking me about what his long-term prognosis is.  He states to me \"I just want to be given pills and sent home\".  He also states \"you all fix one thing and then 3 things break\".  States he is tired of being in the hospital.  Feels like he wants to give up and that his outlook is hopeless.          Objective      Vital Signs  Temp:  [97.8 °F (36.6 °C)-98.7 °F (37.1 °C)] 98.4 °F (36.9 °C)  Heart Rate:  [77-95] 86  Resp:  [16-20] 20  BP: ()/(50-54) 101/52  Body mass index is 35.33 kg/m².     Objective:  General Appearance:  " "Comfortable and in no acute distress (Acutely and chronically ill-appearing).    Vital signs: (most recent): Blood pressure 101/52, pulse 86, temperature 98.4 °F (36.9 °C), temperature source Oral, resp. rate 20, height 182.9 cm (72\"), weight 118 kg (260 lb 8 oz), SpO2 97 %.    Lungs:  Normal effort and normal respiratory rate.  There are decreased breath sounds.    Heart: Normal rate.  Regular rhythm.    Abdomen: Abdomen is soft.  Bowel sounds are normal.   There is no abdominal tenderness.     Extremities: There is dependent edema.  There is no local swelling.    Neurological: Patient is alert and oriented to person, place and time.    Skin:  Warm and dry.                   Results Review:       I reviewed the patient's new clinical results.                        Results from last 7 days   Lab Units  12/13/18   0804  12/12/18   2251  12/12/18   0754  12/11/18   2037  12/11/18   0824  12/11/18   0605  12/10/18   1952    12/10/18   0638    12/09/18   0627    12/08/18   0553    12/07/18   0556   WBC 10*3/mm3  7.41   --   7.91   --    --   9.49   --    --   9.33   --   11.31*   --   11.34*   --   12.31*   HEMOGLOBIN g/dL  8.0*  7.8*  7.3*  8.3*  6.8*  6.7*  7.1*   < >  7.0*   < >  7.5*   < >  7.1*   < >  7.3*   PLATELETS 10*3/mm3  65*   --   71*   --    --   90*   --    --   88*   --   101*   --   101*   --   131*    < > = values in this interval not displayed.                 Results from last 7 days   Lab Units  12/13/18   0803  12/12/18   0754  12/11/18   0548  12/10/18   0644  12/09/18   0627  12/08/18   0553  12/07/18   0556   SODIUM mmol/L  132*  131*  131*  128*  129*  127*  127*   POTASSIUM mmol/L  4.0  3.6  3.5  3.5  4.2  4.8  5.0   CHLORIDE mmol/L  91*  91*  92*  91*  91*  91*  91*   CO2 mmol/L  24.1  24.6  24.0  21.7*  20.3*  19.7*  20.3*   BUN mg/dL  34*  31*  33*  37*  42*  45*  39*   CREATININE mg/dL  1.56*  1.50*  1.45*  1.82*  2.19*  2.50*  2.48*   CALCIUM mg/dL  9.5  9.0  9.1  9.3  9.7  9.4  9.3 "   GLUCOSE mg/dL  131*  115*  109*  118*  119*  126*  128*                 Results from last 7 days   Lab Units  12/13/18   0803  12/12/18   0754  12/11/18   0548  12/10/18   0638  12/09/18   0627  12/08/18   0553  12/07/18   0557   INR    2.09*  2.16*  2.12*  2.16*  2.02*  1.98*  1.91*                  Scheduled Meds:      albumin human 25 g Intravenous Q8H   bumetanide 2 mg Intravenous Q8H   cefepime 2 g Intravenous Q12H   guaiFENesin 600 mg Oral Q12H   hydrOXYzine 50 mg Oral Nightly   ipratropium-albuterol 3 mL Nebulization 4x Daily - RT   lactulose 20 g Oral BID   midodrine 10 mg Oral TID AC   pantoprazole 40 mg Oral QAM AC   sodium chloride 3 mL Intravenous Q12H      PRN Meds:   benzonatate  •  bisacodyl  •  calcium carbonate  •  nitroglycerin  •  ondansetron **OR** ondansetron ODT **OR** ondansetron  •  oxyCODONE  •  sodium chloride  •  [COMPLETED] Insert peripheral IV **AND** sodium chloride  •  sodium chloride  •  zolpidem  Continuous Infusions:          Assessment/Plan           Active Hospital Problems     Diagnosis Date Noted   • **Symptomatic anemia [D64.9] 12/05/2018   • Pneumonia [J18.9] 12/08/2018   • Rhinovirus infection [B34.8] 12/06/2018   • Pericardial effusion [I31.3] 12/06/2018   • LÓPEZ (acute kidney injury) (CMS/HCC) [N17.9] 12/05/2018   • Anasarca [R60.1] 12/05/2018   • Cough [R05] 12/05/2018   • Epistaxis [R04.0] 12/05/2018   • Hemoptysis [R04.2] 12/05/2018   • Hyponatremia [E87.1] 10/25/2018   • Esophageal varices (CMS/HCC) [I85.00] 10/24/2018   • Elevated liver function tests [R94.5] 06/22/2018   • Alcoholic hepatitis without ascites [K70.10] 06/22/2018       Resolved Hospital Problems   No resolved problems to display.         Assessment & Plan     Pneumonia  - Continue Cefepime, day 7 of 7.  MRSA screen was negative and vancomycin discontinued.  Pulmonology following.     Symptomatic Anemia  - S/p 2 units PRBCs on 12/5 and 12/11  - monitor Hgb and transfuse as  "needed     Hemoptysis  - Improved   - 2/2 liver disease coagulopathy and PNA     LÓPEZ  - remains on diuretics, per nephrology  - Creatinine trending up     Pericardial Effusion  - Apparent on CT chest, not present on echocardiogram in October  - Echocardiogram with tiny effusion not able to be tapped, no tamponade  - Repeat ECHO 12/10/18 showed subcentimeter effusion     EtOH Hepatitis  - possible secondary cirrhosis  -nadolol, lasix, and spironolactone held  - RUQ u/s revealing GB mass.  MRCP today without contrast noted.  Per gastroenterology nothing to do.  - Has completed a course of steroids with no significant change in TB     DVT Prophylaxis  - SCDs  - BLE doppler negative     Goals of Care  Today the patient has expressed to me sentiment of wanting to change his goals of care.  He states he is tired of being in the hospital and receiving aggressive treatment.  He states \"I just want to be given pills and go home\".  I did discuss with him his overall relatively poor prognosis stemming from his alcoholic liver disease.  He wishes to discuss with the palliative care team his goals of care moving forward.  I will place a consult for this.     30 minutes spent with the patient today with over 50% of that time spent counseling     Michele Dixon MD  [facility]  12/13/18  12:54 PM                  Revision History                                Erin Bellamy MD   Physician   Nephrology   Progress Notes   Signed   Date of Service:  12/13/2018  2:13 PM               Signed        Expand All Collapse All          Show:Clear all  [x]Manual[x]Template[x]Copied    Added by:  [x]Erin Bellamy MD      [][facility] for details                                                      NEPHROLOGY PROGRESS NOTE     PATIENT IDENTIFICATION:              Name:  Enrique Ramirez                                                                       MRN:  [id number]                           48 y.o.  " male                                                                      Reason for visit: LÓPEZ     SUBJECTIVE:                 Seen and examined.  Laying in bed. Very swollen. D/W palliative. Wants to have more quality of life.      OBJECTIVE:  Vitals          Vitals:     12/13/18 0725 12/13/18 1155 12/13/18 1200 12/13/18 1326   BP: 101/52     98/53   BP Location: Left arm     Left arm   Patient Position: Lying     Sitting   Pulse: 89 95 86 84   Resp: 20 20 20 20   Temp: 98.4 °F (36.9 °C)     98.6 °F (37 °C)   TempSrc: Oral     Oral   SpO2: 92% 97%       Weight:           Height:                                                       Body mass index is 35.33 kg/m².     Intake/Output Summary (Last 24 hours) at 12/13/2018 1413  Last data filed at 12/12/2018 2129      Gross per 24 hour   Intake 700 ml   Output --   Net 700 ml           Wt Readings from Last 1 Encounters:   12/13/18 0525 118 kg (260 lb 8 oz)   12/12/18 0616 119 kg (263 lb)   12/11/18 0612 120 kg (264 lb)   12/10/18 0730 127 kg (279 lb)   12/08/18 0618 127 kg (279 lb)   12/07/18 0530 125 kg (276 lb)   12/06/18 1457 121 kg (267 lb)   12/06/18 0600 121 kg (267 lb 8 oz)   12/05/18 1814 124 kg (273 lb)   12/05/18 1238 119 kg (262 lb 6.4 oz)          Wt Readings from Last 3 Encounters:   12/13/18 118 kg (260 lb 8 oz)   11/13/18 118 kg (260 lb)   11/09/18 118 kg (260 lb 12.8 oz)            Exam:  NAD; pleasant; oriented; looks older than stated age  Overweight; jaundiced  HEENT MMM; AT/NC  No eye d/c; scleral icterus  Lungs Coarse bilat; Decreased bs bases  Heart RRR, no rub, no s3  ABd  + bs, soft, distended, nontender. Body wall edema.   Ext 3 +edema lower ext to hips  No clubbing  No asterixis  Moves all extremities   Speech fluent  Mood depressed; flat affect        Scheduled meds:            albumin human 25 g Intravenous Q8H   bumetanide 2 mg Intravenous Q8H   cefepime 2 g Intravenous Q12H   guaiFENesin 600 mg Oral Q12H   hydrOXYzine 50 mg Oral Nightly    ipratropium-albuterol 3 mL Nebulization 4x Daily - RT   lactulose 20 g Oral BID   midodrine 10 mg Oral TID AC   pantoprazole 40 mg Oral QAM AC   sodium chloride 3 mL Intravenous Q12H      IV meds:                           Data Review:            Results from last 7 days   Lab Units  12/13/18   0803  12/12/18   0754  12/11/18   0548   SODIUM mmol/L  132*  131*  131*   POTASSIUM mmol/L  4.0  3.6  3.5   CHLORIDE mmol/L  91*  91*  92*   CO2 mmol/L  24.1  24.6  24.0   BUN mg/dL  34*  31*  33*   CREATININE mg/dL  1.56*  1.50*  1.45*   CALCIUM mg/dL  9.5  9.0  9.1   BILIRUBIN mg/dL  11.0*  10.2*  9.6*   ALK PHOS U/L  77  76  99   ALT (SGPT) U/L  29  30  31   AST (SGOT) U/L  42*  43*  45*   GLUCOSE mg/dL  131*  115*  109*      Estimated Creatinine Clearance: 78.5 mL/min (A) (by C-G formula based on SCr of 1.56 mg/dL (H)).        Results from last 7 days   Lab Units  12/07/18   0556  12/06/18   2207   SODIUM UR mmol/L   --   <20   CREATININE UR mg/dL   --   267.7   URIC ACID mg/dL  11.3*   --              Results from last 7 days   Lab Units  12/13/18   0803  12/10/18   0644  12/09/18   0627   MAGNESIUM mg/dL  1.5*  1.9  1.9   PHOSPHORUS mg/dL  3.3   --   4.0                       Results from last 7 days   Lab Units  12/13/18   0804  12/12/18   2251  12/12/18   0754  12/11/18   2037  12/11/18   0824  12/11/18   0605    12/10/18   0638    12/09/18   0627   WBC 10*3/mm3  7.41   --   7.91   --    --   9.49   --   9.33   --   11.31*   HEMOGLOBIN g/dL  8.0*  7.8*  7.3*  8.3*  6.8*  6.7*   < >  7.0*   < >  7.5*   PLATELETS 10*3/mm3  65*   --   71*   --    --   90*   --   88*   --   101*    < > = values in this interval not displayed.                  Results from last 7 days   Lab Units  12/13/18   0803  12/12/18   0754  12/11/18   0548  12/10/18   0638  12/09/18   0627   INR    2.09*  2.16*  2.12*  2.16*  2.02*                                      ASSESSMENT:              Symptomatic anemia    Elevated liver function tests     Alcoholic hepatitis without ascites    Esophageal varices (CMS/HCC)    Hyponatremia    LÓPEZ (acute kidney injury) (CMS/HCC)    Anasarca    Cough    Epistaxis    Hemoptysis    Rhinovirus infection    Pericardial effusion    Pneumonia     1.  LÓPEZ, non-oliguric, improving.  prerenal from ABLA, hypotension, and profound liver injury. Improving Hyponatremia with hypervolemia;   2.  ABLA  3.  Alcoholic cirrhosis  4.  Hypotension  5.  Hemoptysis,  Pulmonary on baord  6.  TCP: PLT is down 77           PLAN:     Increase Bumex and continue Albumin  Palliative is on board !!  Replete potassium as needed  Blood TX as needed  D/W nurse  Surveillance labs        Erin Bellamy MD  12/13/2018     2:13 PM                             Melanie Holm, RN   Registered Nurse   Palliative Care   Consults   Signed   Date of Service:  12/13/2018  3:28 PM            Consult Orders   Inpatient Palliative Care Nurse Consult [[id number]] ordered by Michele Dixon MD at 12/13/18 1002          Signed                 Show:Clear all  [x]Manual[x]Template[]Copied    Added by:  [x]Melanie Holm, RN      [][place]ver for details      Purpose of the visit was to evaluate for: goals of care/advanced care planning, support for patient/family, hospice referral/discussion, pain/symptom management and comfort care. Spoke with RN as well as patient and family and discussed palliative care, goals of care, care options, resuscitation status and [facility].       Assessment:     Patient is palliative care appropriate for community based services with [facility] or SNF with palliative care (list reasons): History of alcoholic cirrhosis, esophageal varices, and gout.  Patient was admitted with cough, anemia, and bilateral leg edema.  Patient c/o pain in legs and tiredness. Patient is alert and oriented x 4, ambulatory, and still eating a fair amount.  PPS 70%     Recommendations/Plan: [facility] evaluation for community based services.     Other Comments:  "Spoke with patient and wife regarding goals of care, palliative care, palliative care unit, and hospice care.  Patient stated \"I just want to go home and be comfortable\".  I asked if he would want to come back to the hospital if he got in distress and we was not sure if he would or not.  Patient stated he is tired of being in the hospital and understands he is not likely to get much better, but he wants to go home and be with his family and friends.  I gave them a brochure about [facility] services and encouraged them to call.  We discussed code status, patient and wife agreed to talk further with each other.  Thank you for allowing us to care for this patient.  Please let the palliative care team know if we can be of further assistance.                  Routing History                      Yessenia Rose RN   Registered Nurse      Plan of Care   Signed   Date of Service:  12/13/2018  5:43 PM               Signed           Problem: Patient Care Overview  Goal: Plan of Care Review  Outcome: Ongoing (interventions implemented as appropriate)    12/13/18 7570   Coping/Psychosocial   Plan of Care Reviewed With patient   Plan of Care Review   Progress no change   OTHER   Outcome Summary palliative consult today, pt and wife discussing goals of care and pt expresses wishes to \"just go home\", iv lasix dose and frequency increased, vss, will continue to monitor closely                                "

## 2018-12-15 LAB
ALBUMIN SERPL-MCNC: 4.1 G/DL (ref 3.5–5.2)
ALBUMIN/GLOB SERPL: 1.2 G/DL
ALP SERPL-CCNC: 77 U/L (ref 39–117)
ALT SERPL W P-5'-P-CCNC: 23 U/L (ref 1–41)
ANION GAP SERPL CALCULATED.3IONS-SCNC: 16.6 MMOL/L
AST SERPL-CCNC: 36 U/L (ref 1–40)
BASOPHILS # BLD AUTO: 0.07 10*3/MM3 (ref 0–0.2)
BASOPHILS NFR BLD AUTO: 1 % (ref 0–1.5)
BILIRUB SERPL-MCNC: 9.6 MG/DL (ref 0.1–1.2)
BUN BLD-MCNC: 36 MG/DL (ref 6–20)
BUN/CREAT SERPL: 18.8 (ref 7–25)
CALCIUM SPEC-SCNC: 10.1 MG/DL (ref 8.6–10.5)
CHLORIDE SERPL-SCNC: 91 MMOL/L (ref 98–107)
CO2 SERPL-SCNC: 22.4 MMOL/L (ref 22–29)
CREAT BLD-MCNC: 1.92 MG/DL (ref 0.76–1.27)
DEPRECATED RDW RBC AUTO: 73.3 FL (ref 37–54)
EOSINOPHIL # BLD AUTO: 0.16 10*3/MM3 (ref 0–0.7)
EOSINOPHIL NFR BLD AUTO: 2.3 % (ref 0.3–6.2)
ERYTHROCYTE [DISTWIDTH] IN BLOOD BY AUTOMATED COUNT: 20.4 % (ref 11.5–14.5)
GFR SERPL CREATININE-BSD FRML MDRD: 38 ML/MIN/1.73
GLOBULIN UR ELPH-MCNC: 3.4 GM/DL
GLUCOSE BLD-MCNC: 81 MG/DL (ref 65–99)
HCT VFR BLD AUTO: 23.1 % (ref 40.4–52.2)
HGB BLD-MCNC: 7.9 G/DL (ref 13.7–17.6)
IMM GRANULOCYTES # BLD: 0.02 10*3/MM3 (ref 0–0.03)
IMM GRANULOCYTES NFR BLD: 0.3 % (ref 0–0.5)
INR PPP: 2.03 (ref 0.9–1.1)
LYMPHOCYTES # BLD AUTO: 1.07 10*3/MM3 (ref 0.9–4.8)
LYMPHOCYTES NFR BLD AUTO: 15.4 % (ref 19.6–45.3)
MCH RBC QN AUTO: 34.1 PG (ref 27–32.7)
MCHC RBC AUTO-ENTMCNC: 34.2 G/DL (ref 32.6–36.4)
MCV RBC AUTO: 99.6 FL (ref 79.8–96.2)
MONOCYTES # BLD AUTO: 1 10*3/MM3 (ref 0.2–1.2)
MONOCYTES NFR BLD AUTO: 14.4 % (ref 5–12)
NEUTROPHILS # BLD AUTO: 4.63 10*3/MM3 (ref 1.9–8.1)
NEUTROPHILS NFR BLD AUTO: 66.9 % (ref 42.7–76)
NRBC BLD MANUAL-RTO: 0 /100 WBC (ref 0–0)
PLATELET # BLD AUTO: 73 10*3/MM3 (ref 140–500)
PMV BLD AUTO: 9.7 FL (ref 6–12)
POTASSIUM BLD-SCNC: 3.6 MMOL/L (ref 3.5–5.2)
PROT SERPL-MCNC: 7.5 G/DL (ref 6–8.5)
PROTHROMBIN TIME: 22.6 SECONDS (ref 11.7–14.2)
RBC # BLD AUTO: 2.32 10*6/MM3 (ref 4.6–6)
SODIUM BLD-SCNC: 130 MMOL/L (ref 136–145)
WBC NRBC COR # BLD: 6.93 10*3/MM3 (ref 4.5–10.7)

## 2018-12-15 PROCEDURE — 36415 COLL VENOUS BLD VENIPUNCTURE: CPT | Performed by: INTERNAL MEDICINE

## 2018-12-15 PROCEDURE — 85610 PROTHROMBIN TIME: CPT | Performed by: INTERNAL MEDICINE

## 2018-12-15 PROCEDURE — 80053 COMPREHEN METABOLIC PANEL: CPT | Performed by: INTERNAL MEDICINE

## 2018-12-15 PROCEDURE — 85025 COMPLETE CBC W/AUTO DIFF WBC: CPT | Performed by: INTERNAL MEDICINE

## 2018-12-15 PROCEDURE — 99232 SBSQ HOSP IP/OBS MODERATE 35: CPT | Performed by: INTERNAL MEDICINE

## 2018-12-15 PROCEDURE — P9047 ALBUMIN (HUMAN), 25%, 50ML: HCPCS | Performed by: INTERNAL MEDICINE

## 2018-12-15 PROCEDURE — 25010000002 ALBUMIN HUMAN 25% PER 50 ML: Performed by: INTERNAL MEDICINE

## 2018-12-15 RX ORDER — OXYCODONE HYDROCHLORIDE 5 MG/1
5 TABLET ORAL EVERY 4 HOURS PRN
Status: DISCONTINUED | OUTPATIENT
Start: 2018-12-15 | End: 2018-12-16

## 2018-12-15 RX ORDER — ALBUMIN (HUMAN) 12.5 G/50ML
50 SOLUTION INTRAVENOUS EVERY 8 HOURS
Status: COMPLETED | OUTPATIENT
Start: 2018-12-15 | End: 2018-12-18

## 2018-12-15 RX ORDER — CITALOPRAM 10 MG/1
10 TABLET ORAL DAILY
Status: DISCONTINUED | OUTPATIENT
Start: 2018-12-15 | End: 2018-12-18 | Stop reason: HOSPADM

## 2018-12-15 RX ADMIN — GUAIFENESIN 600 MG: 600 TABLET, EXTENDED RELEASE ORAL at 07:54

## 2018-12-15 RX ADMIN — SODIUM CHLORIDE, PRESERVATIVE FREE 3 ML: 5 INJECTION INTRAVENOUS at 20:11

## 2018-12-15 RX ADMIN — MIDODRINE HYDROCHLORIDE 10 MG: 5 TABLET ORAL at 07:04

## 2018-12-15 RX ADMIN — MIDODRINE HYDROCHLORIDE 10 MG: 5 TABLET ORAL at 18:22

## 2018-12-15 RX ADMIN — SODIUM CHLORIDE, PRESERVATIVE FREE 3 ML: 5 INJECTION INTRAVENOUS at 07:55

## 2018-12-15 RX ADMIN — ALBUMIN (HUMAN) 25 G: 12.5 SOLUTION INTRAVENOUS at 06:04

## 2018-12-15 RX ADMIN — OXYCODONE HYDROCHLORIDE 5 MG: 5 TABLET ORAL at 11:46

## 2018-12-15 RX ADMIN — OXYCODONE HYDROCHLORIDE 5 MG: 5 TABLET ORAL at 18:45

## 2018-12-15 RX ADMIN — MIDODRINE HYDROCHLORIDE 10 MG: 5 TABLET ORAL at 11:46

## 2018-12-15 RX ADMIN — PANTOPRAZOLE SODIUM 40 MG: 40 TABLET, DELAYED RELEASE ORAL at 07:04

## 2018-12-15 RX ADMIN — GUAIFENESIN 600 MG: 600 TABLET, EXTENDED RELEASE ORAL at 20:11

## 2018-12-15 RX ADMIN — LACTULOSE 20 G: 10 SOLUTION ORAL at 07:55

## 2018-12-15 RX ADMIN — BUMETANIDE 0.5 MG/HR: 0.25 INJECTION INTRAMUSCULAR; INTRAVENOUS at 07:04

## 2018-12-15 RX ADMIN — ALBUMIN (HUMAN) 50 G: 12.5 SOLUTION INTRAVENOUS at 18:27

## 2018-12-15 RX ADMIN — HYDROXYZINE HYDROCHLORIDE 50 MG: 50 TABLET, FILM COATED ORAL at 20:11

## 2018-12-15 RX ADMIN — LACTULOSE 20 G: 10 SOLUTION ORAL at 20:11

## 2018-12-15 RX ADMIN — CITALOPRAM 10 MG: 10 TABLET, FILM COATED ORAL at 11:46

## 2018-12-15 NOTE — PLAN OF CARE
Problem: Patient Care Overview  Goal: Plan of Care Review  Outcome: Ongoing (interventions implemented as appropriate)   12/15/18 9345   Coping/Psychosocial   Plan of Care Reviewed With patient   Plan of Care Review   Progress improving   OTHER   Outcome Summary Bumex drip continues. Pain pill given x1 with good relief. Celexa started per pt request. Amb around nurses station with family today. VSS. Will continue to monitor.

## 2018-12-15 NOTE — PLAN OF CARE
Problem: Patient Care Overview  Goal: Plan of Care Review  Outcome: Ongoing (interventions implemented as appropriate)   12/15/18 0419   Coping/Psychosocial   Plan of Care Reviewed With patient   Plan of Care Review   Progress no change   OTHER   Outcome Summary Remains on Bumex gtt and Albumin. 1200ml fluid restriction, daily weights, and strict I&O. Pt concerned about feeling depressed and would like to be placed on an anti-depressant. VSS, awaiting AM labs. Will continue to monitor closely.     Goal: Individualization and Mutuality  Outcome: Ongoing (interventions implemented as appropriate)    Goal: Discharge Needs Assessment  Outcome: Ongoing (interventions implemented as appropriate)      Problem: Anemia (Adult)  Goal: Symptom Improvement  Outcome: Ongoing (interventions implemented as appropriate)      Problem: Fall Risk (Adult)  Goal: Absence of Fall  Outcome: Ongoing (interventions implemented as appropriate)      Problem: Renal Failure/Kidney Injury, Acute (Adult)  Goal: Signs and Symptoms of Listed Potential Problems Will be Absent, Minimized or Managed (Renal Failure/Kidney Injury, Acute)  Outcome: Ongoing (interventions implemented as appropriate)      Problem: Fluid Volume Excess (Adult)  Goal: Optimal Fluid Balance  Outcome: Ongoing (interventions implemented as appropriate)

## 2018-12-15 NOTE — PROGRESS NOTES
" LOS: 10 days     Name: Enrique Ramirez  Age: 48 y.o.  Sex: male  :  1970  MRN: [id number]         Primary Care Physician: Cydney Chase MD    Subjective   Subjective  Coughed up a small amount of blood-tinged sputum this morning but resolved quickly.  Feels as if his legs are not quite as tight today.    Objective   Vital Signs  Temp:  [97.5 °F (36.4 °C)-98.4 °F (36.9 °C)] 98 °F (36.7 °C)  Heart Rate:  [74-89] 82  Resp:  [18-20] 18  BP: ()/(51-55) 108/54  Body mass index is 35.4 kg/m².    Objective:  General Appearance:  Comfortable, in no acute distress and ill-appearing.    Vital signs: (most recent): Blood pressure 108/54, pulse 82, temperature 98 °F (36.7 °C), temperature source Oral, resp. rate 18, height 182.9 cm (72\"), weight 118 kg (261 lb), SpO2 93 %.    HEENT: (Scleral icterus)    Lungs:  Normal effort and normal respiratory rate.    Heart: Normal rate.  Regular rhythm.    Abdomen: Abdomen is soft.  Bowel sounds are normal.   There is no abdominal tenderness.     Extremities: There is dependent edema.  There is no local swelling.  (3+ pitting edema of the bilateral lower extremities as well as the dependent portions of the torso)  Neurological: Patient is alert and oriented to person, place and time.    Skin:  Warm and dry.  (Diffusely jaundiced)            Results Review:       I reviewed the patient's new clinical results.    Results from last 7 days   Lab Units  12/15/18   0424  18   0349  18   0804  18   2251  18   0754  18   2037  18   0824  18   0605   12/10/18   0638   18   0627   WBC 10*3/mm3  6.93  6.54  7.41   --   7.91   --    --   9.49   --   9.33   --   11.31*   HEMOGLOBIN g/dL  7.9*  7.4*  8.0*  7.8*  7.3*  8.3*  6.8*  6.7*   < >  7.0*   < >  7.5*   PLATELETS 10*3/mm3  73*  66*  65*   --   71*   --    --   90*   --   88*   --   101*    < > = values in this interval not displayed.     Results from last 7 days   Lab Units  12/15/18   " 0424  12/14/18   0349  12/13/18   0803  12/12/18   0754  12/11/18   0548  12/10/18   0644  12/09/18   0627   SODIUM mmol/L  130*  131*  132*  131*  131*  128*  129*   POTASSIUM mmol/L  3.6  3.6  4.0  3.6  3.5  3.5  4.2   CHLORIDE mmol/L  91*  92*  91*  91*  92*  91*  91*   CO2 mmol/L  22.4  22.9  24.1  24.6  24.0  21.7*  20.3*   BUN mg/dL  36*  34*  34*  31*  33*  37*  42*   CREATININE mg/dL  1.92*  1.61*  1.56*  1.50*  1.45*  1.82*  2.19*   CALCIUM mg/dL  10.1  9.3  9.5  9.0  9.1  9.3  9.7   GLUCOSE mg/dL  81  89  131*  115*  109*  118*  119*     Results from last 7 days   Lab Units  12/15/18   0424  12/14/18   0349  12/13/18   0803  12/12/18   0754  12/11/18   0548  12/10/18   0638  12/09/18   0627   INR   2.03*  2.20*  2.09*  2.16*  2.12*  2.16*  2.02*       Scheduled Meds:     albumin human 25 g Intravenous Q8H   bumetanide 1 mg Intravenous Once   citalopram 10 mg Oral Daily   guaiFENesin 600 mg Oral Q12H   hydrOXYzine 50 mg Oral Nightly   lactulose 20 g Oral BID   midodrine 10 mg Oral TID AC   pantoprazole 40 mg Oral QAM AC   sodium chloride 3 mL Intravenous Q12H     PRN Meds:   benzonatate  •  bisacodyl  •  calcium carbonate  •  ipratropium-albuterol  •  nitroglycerin  •  ondansetron **OR** ondansetron ODT **OR** ondansetron  •  oxyCODONE  •  sodium chloride  •  [COMPLETED] Insert peripheral IV **AND** sodium chloride  •  sodium chloride  •  zolpidem  Continuous Infusions:    bumetanide 0.5 mg/hr Last Rate: 0.5 mg/hr (12/15/18 0704)       Assessment/Plan   Active Hospital Problems    Diagnosis Date Noted   • **Symptomatic anemia [D64.9] 12/05/2018   • Pneumonia [J18.9] 12/08/2018   • Rhinovirus infection [B34.8] 12/06/2018   • Pericardial effusion [I31.3] 12/06/2018   • LÓPEZ (acute kidney injury) (CMS/HCC) [N17.9] 12/05/2018   • Anasarca [R60.1] 12/05/2018   • Cough [R05] 12/05/2018   • Epistaxis [R04.0] 12/05/2018   • Hemoptysis [R04.2] 12/05/2018   • Hyponatremia [E87.1] 10/25/2018   • Esophageal varices  (CMS/HCC) [I85.00] 10/24/2018   • Elevated liver function tests [R94.5] 06/22/2018   • Alcoholic hepatitis without ascites [K70.10] 06/22/2018      Resolved Hospital Problems   No resolved problems to display.       Assessment & Plan    Pneumonia  - He has now completed antibiotics at this point.  Pulmonology following.     Symptomatic Anemia  - S/p 2 units PRBCs on 12/5 and 12/11  - monitor Hgb and transfuse as needed     Hemoptysis  - Improved overall  - 2/2 liver disease coagulopathy, TCP and PNA  - No need for bronch currently, per pulm     LÓPEZ  - Placed on a bumex drip yesterday  - Swelling may be slightly better today but overall still extreme     Pericardial Effusion  - Apparent on CT chest, not present on echocardiogram in October  - Echocardiogram with tiny effusion not able to be tapped, no tamponade  - Repeat ECHO 12/10/18 showed subcentimeter effusion and cardiology has signed off     EtOH Hepatitis  - possible secondary cirrhosis  -nadolol, lasix, and spironolactone held  - RUQ u/s revealing GB mass.  MRCP without contrast showed nothing acute.  - Has completed a course of steroids with no significant change in TB     DVT Prophylaxis  - SCDs  - BLE doppler negative     He met with palliative care on 12/13 and is considering his options.  For now he is agreeable to remaining in the hospital to continue to work on diuresis and stabilization of renal function as well as monitoring of his anemia.      Michele Dixon MD  [facility]  12/15/18  12:03 PM

## 2018-12-15 NOTE — PROGRESS NOTES
NEPHROLOGY PROGRESS NOTE    PATIENT IDENTIFICATION:   Name:  Enrique Ramirez      MRN:  [id number]     48 y.o.  male             Reason for visit: LÓPEZ    SUBJECTIVE:     Seen and examined.  Laying in bed. Very swollen.. Wants to have more quality of life. Swelling is about the same or better slightly.     OBJECTIVE:  Vitals:    12/15/18 0600 12/15/18 0703 12/15/18 0842 12/15/18 1411   BP:  102/55 108/54 103/46   BP Location:  Right arm Left arm Left arm   Patient Position:  Sitting Lying Lying   Pulse:  89 82 75   Resp:   18 18   Temp:   98 °F (36.7 °C) 98.1 °F (36.7 °C)   TempSrc:   Oral Oral   SpO2:       Weight: 118 kg (261 lb)      Height:               Body mass index is 35.4 kg/m².    Intake/Output Summary (Last 24 hours) at 12/15/2018 1627  Last data filed at 12/15/2018 0753  Gross per 24 hour   Intake 810 ml   Output 1350 ml   Net -540 ml     Wt Readings from Last 1 Encounters:   12/15/18 0600 118 kg (261 lb)   12/14/18 0636 119 kg (262 lb)   12/13/18 0525 118 kg (260 lb 8 oz)   12/12/18 0616 119 kg (263 lb)   12/11/18 0612 120 kg (264 lb)   12/10/18 0730 127 kg (279 lb)   12/08/18 0618 127 kg (279 lb)   12/07/18 0530 125 kg (276 lb)   12/06/18 1457 121 kg (267 lb)   12/06/18 0600 121 kg (267 lb 8 oz)   12/05/18 1814 124 kg (273 lb)   12/05/18 1238 119 kg (262 lb 6.4 oz)     Wt Readings from Last 3 Encounters:   12/15/18 118 kg (261 lb)   11/13/18 118 kg (260 lb)   11/09/18 118 kg (260 lb 12.8 oz)         Exam:  NAD; pleasant; oriented; looks older than stated age  Overweight; jaundiced  HEENT MMM; AT/NC  No eye d/c; scleral icterus  Lungs Coarse bilat; Decreased bs bases  Heart RRR, no rub, no s3  ABd  + bs, soft, distended, nontender. Body wall edema.   Ext 3 +edema lower ext to hips  No clubbing  No asterixis  Moves all extremities   Speech fluent  Mood depressed; flat affect      Scheduled meds:      albumin human 50 g Intravenous Q8H   bumetanide 1 mg Intravenous Once   citalopram 10 mg Oral Daily    guaiFENesin 600 mg Oral Q12H   hydrOXYzine 50 mg Oral Nightly   lactulose 20 g Oral BID   midodrine 10 mg Oral TID AC   pantoprazole 40 mg Oral QAM AC   sodium chloride 3 mL Intravenous Q12H     IV meds:                          bumetanide 0.5 mg/hr Last Rate: 0.5 mg/hr (12/15/18 0704)       Data Review:    Results from last 7 days   Lab Units  12/15/18   0424  12/14/18   0349  12/13/18   0803   SODIUM mmol/L  130*  131*  132*   POTASSIUM mmol/L  3.6  3.6  4.0   CHLORIDE mmol/L  91*  92*  91*   CO2 mmol/L  22.4  22.9  24.1   BUN mg/dL  36*  34*  34*   CREATININE mg/dL  1.92*  1.61*  1.56*   CALCIUM mg/dL  10.1  9.3  9.5   BILIRUBIN mg/dL  9.6*  9.4*  11.0*   ALK PHOS U/L  77  70  77   ALT (SGPT) U/L  23  24  29   AST (SGOT) U/L  36  38  42*   GLUCOSE mg/dL  81  89  131*     Estimated Creatinine Clearance: 63.8 mL/min (A) (by C-G formula based on SCr of 1.92 mg/dL (H)).      Results from last 7 days   Lab Units  12/13/18   0803  12/10/18   0644  12/09/18   0627   MAGNESIUM mg/dL  1.5*  1.9  1.9   PHOSPHORUS mg/dL  3.3   --   4.0       Results from last 7 days   Lab Units  12/15/18   0424  12/14/18   0349  12/13/18   0804  12/12/18   2251  12/12/18   0754   12/11/18   0605   WBC 10*3/mm3  6.93  6.54  7.41   --   7.91   --   9.49   HEMOGLOBIN g/dL  7.9*  7.4*  8.0*  7.8*  7.3*   < >  6.7*   PLATELETS 10*3/mm3  73*  66*  65*   --   71*   --   90*    < > = values in this interval not displayed.       Results from last 7 days   Lab Units  12/15/18   0424  12/14/18   0349  12/13/18   0803  12/12/18   0754  12/11/18   0548   INR   2.03*  2.20*  2.09*  2.16*  2.12*             ASSESSMENT:     Symptomatic anemia    Elevated liver function tests    Alcoholic hepatitis without ascites    Esophageal varices (CMS/HCC)    Hyponatremia    LÓPEZ (acute kidney injury) (CMS/HCC)    Anasarca    Cough    Epistaxis    Hemoptysis    Rhinovirus infection    Pericardial effusion    Pneumonia    1.  LÓPEZ, non-oliguric, improving.  prerenal  from ABLA, hypotension, and profound liver injury. Improving Hyponatremia with hypervolemia;   2.  ABLA  3.  Alcoholic cirrhosis  4.  Hypotension  5.  Hemoptysis,  Pulmonary on baord  6.  TCP: PLT is down 77        PLAN:    Continue  Bumex drip and continue Albumin  Palliative is on board !!  Replete potassium as needed  Had lengthy discussion with him and his family. For now, they want to continue to diurese  Prognosis is quarded  D/W nurse  Surveillance labs      Erin Bellamy MD  12/15/2018    4:27 PM

## 2018-12-15 NOTE — PROGRESS NOTES
LOS: 10 days   Patient Care Team:  Cydney Chase MD as PCP - General (Family Medicine)    Subjective     Patient doesn't feel real well today he is a little emotionally upset regarding his worsening renal function and fluid status.  He has coughed up a little bit of blood most of it was this morning he said he had about 3 mouthfuls of blood really had any since.    Review of Systems:          Objective     Vital Signs  Vital Sign Min/Max for last 24 hours  Temp  Min: 97.5 °F (36.4 °C)  Max: 98.4 °F (36.9 °C)   BP  Min: 94/51  Max: 108/54   Pulse  Min: 74  Max: 89   Resp  Min: 18  Max: 20   SpO2  Min: 93 %  Max: 97 %   No Data Recorded   Weight  Min: 118 kg (261 lb)  Max: 118 kg (261 lb)        Ventilator/Non-Invasive Ventilation Settings (From admission, onward)    None                       Body mass index is 35.4 kg/m².  I/O last 3 completed shifts:  In: 2720 [P.O.:2160; I.V.:40; IV Piggyback:520]  Out: 1250 [Urine:1250]  I/O this shift:  In: 1074 [P.O.:1074]  Out: 100 [Urine:100]        Physical Exam:  General Appearance: Developed white male resting in bed he looks much older than his stated age  Eyes: Icteric sclera pupils are equal and reactive  ENT: Mucous membranes are little dry no erythema or exudates  Neck: No adenopathy no thyromegaly no jugular venous distention  Lungs: Clear no wheezes or rales he does have some decreased breath sounds little dullness in the left base  Cardiac: Regular rate and rhythm I don't appreciate a murmur  Abdomen: Little obese/distended liver edge is at least 5 cm below the costal margin right midclavicular line and tender no palpable splenomegaly  : Not examined  Musculoskeletal: Grossly normal  Skin: Jaundiced he has a few spider-type angiomata across December chest  Neuro: He is alert oriented cooperative following commands  Extremities/P Vascular: No clubbing or cyanosis he has 2+ pitting pretibial edema bilaterally he also has edema in his presacral and lumbar  regions  MSE: Seems to be quite depressed today       Labs:  Results from last 7 days   Lab Units  12/15/18   0424  12/14/18   0349  12/13/18   0803  12/12/18   0754  12/11/18   0548  12/10/18   0644  12/09/18   0627   GLUCOSE mg/dL  81  89  131*  115*  109*  118*  119*   SODIUM mmol/L  130*  131*  132*  131*  131*  128*  129*   POTASSIUM mmol/L  3.6  3.6  4.0  3.6  3.5  3.5  4.2   MAGNESIUM mg/dL   --    --   1.5*   --    --   1.9  1.9   CO2 mmol/L  22.4  22.9  24.1  24.6  24.0  21.7*  20.3*   CHLORIDE mmol/L  91*  92*  91*  91*  92*  91*  91*   ANION GAP mmol/L  16.6  16.1  16.9  15.4  15.0  15.3  17.7   CREATININE mg/dL  1.92*  1.61*  1.56*  1.50*  1.45*  1.82*  2.19*   BUN mg/dL  36*  34*  34*  31*  33*  37*  42*   BUN / CREAT RATIO   18.8  21.1  21.8  20.7  22.8  20.3  19.2   CALCIUM mg/dL  10.1  9.3  9.5  9.0  9.1  9.3  9.7   EGFR IF NONAFRICN AM mL/min/1.73  38*  46*  48*  50*  52*  40*  33*   ALK PHOS U/L  77  70  77  76  99  89  90   TOTAL PROTEIN g/dL  7.5  6.6  7.3  6.8  7.0  6.9  7.5   ALT (SGPT) U/L  23  24  29  30  31  33  37   AST (SGOT) U/L  36  38  42*  43*  45*  43*  42*   BILIRUBIN mg/dL  9.6*  9.4*  11.0*  10.2*  9.6*  10.1*  10.8*   ALBUMIN g/dL  4.10  3.40*  3.50  3.20*  3.90  3.80  4.20   GLOBULIN gm/dL  3.4  3.2  3.8  3.6  3.1  3.1  3.3     Estimated Creatinine Clearance: 63.8 mL/min (A) (by C-G formula based on SCr of 1.92 mg/dL (H)).      Results from last 7 days   Lab Units  12/15/18   0424  12/14/18   0349  12/13/18   0804  12/12/18   2251  12/12/18   0754  12/11/18   2037  12/11/18   0824  12/11/18   0605   12/10/18   0638   12/09/18   0627   WBC 10*3/mm3  6.93  6.54  7.41   --   7.91   --    --   9.49   --   9.33   --   11.31*   RBC 10*6/mm3  2.32*  2.14*  2.35*   --   2.08*   --    --   1.92*   --   2.01*   --   2.12*   HEMOGLOBIN g/dL  7.9*  7.4*  8.0*  7.8*  7.3*  8.3*  6.8*  6.7*   < >  7.0*   < >  7.5*   HEMATOCRIT %  23.1*  21.1*  24.6*  23.0*  21.9*  25.6*  19.6*  19.8*   < >   20.9*   < >  21.9*   MCV fL  99.6*  98.6*  104.7*   --   105.3*   --    --   103.1*   --   104.0*   --   103.3*   MCH pg  34.1*  34.6*  34.0*   --   35.1*   --    --   34.9*   --   34.8*   --   35.4*   MCHC g/dL  34.2  35.1  32.5*   --   33.3   --    --   33.8   --   33.5   --   34.2   RDW %  20.4*  20.9*  21.6*   --   22.0*   --    --   18.9*   --   19.6*   --   20.4*   RDW-SD fl  73.3*  73.8*  80.6*   --   81.6*   --    --   70.6*   --   73.9*   --   75.8*   MPV fL  9.7  9.5  8.9   --   8.8   --    --   8.9   --   9.5   --   8.9   PLATELETS 10*3/mm3  73*  66*  65*   --   71*   --    --   90*   --   88*   --   101*   NEUTROPHIL % %  66.9  68.0  73.3   --   69.2   --    --   71.7   --   70.9   --   72.5   LYMPHOCYTE % %  15.4*  17.0*  13.5*   --   15.7*   --    --   9.8*   --   10.3*   --   9.1*   MONOCYTES % %  14.4*  11.9  11.5   --   13.4*   --    --   16.9*   --   17.3*   --   16.9*   EOSINOPHIL % %  2.3  2.0  1.2   --   1.1   --    --   0.9   --   0.9   --   0.9   BASOPHIL % %  1.0  1.1  0.5   --   0.6   --    --   0.7   --   0.6   --   0.6   IMM GRAN % %  0.3  0.3  0.0   --   0.0   --    --   0.3   --   0.3   --   0.3   NEUTROS ABS 10*3/mm3  4.63  4.45  5.43   --   5.47   --    --   6.80   --   6.62   --   8.20*   LYMPHS ABS 10*3/mm3  1.07  1.11  1.00   --   1.24   --    --   0.93   --   0.96   --   1.03   MONOS ABS 10*3/mm3  1.00  0.78  0.85   --   1.06   --    --   1.60*   --   1.61*   --   1.91*   EOS ABS 10*3/mm3  0.16  0.13  0.09   --   0.09   --    --   0.09   --   0.08   --   0.10   BASOS ABS 10*3/mm3  0.07  0.07  0.04   --   0.05   --    --   0.07   --   0.06   --   0.07   IMMATURE GRANS (ABS) 10*3/mm3  0.02  0.02  0.00   --   0.00   --    --   0.03   --   0.03   --   0.03   NRBC /100 WBC  0.0   --    --    --    --    --    --   0.0   --    --    --    --     < > = values in this interval not displayed.                 Results from last 7 days   Lab Units  12/10/18   0644   TSH mIU/mL  2.230   FREE T4  ng/dL  1.51         Results from last 7 days   Lab Units  12/15/18   0424  12/14/18   0349  12/13/18   0803  12/12/18   0754  12/11/18   0548  12/10/18   0638  12/09/18   0627   INR   2.03*  2.20*  2.09*  2.16*  2.12*  2.16*  2.02*     Microbiology Results (last 10 days)     Procedure Component Value - Date/Time    Respiratory Culture - Sputum, Cough [[id number]] Collected:  12/07/18 2011    Lab Status:  Final result Specimen:  Sputum from Cough Updated:  12/10/18 1117     Respiratory Culture Moderate growth (3+) Normal Respiratory Catrina     Gram Stain Mixed bacterial morphotypes seen on Gram Stain      No WBCs seen      No epithelial cells seen    MRSA Screen Culture - Swab, Nares [[id number]]  (Normal) Collected:  12/07/18 1540    Lab Status:  Final result Specimen:  Swab from Nares Updated:  12/09/18 1015     MRSA SCREEN CX No Methicillin Resistant Staphylococcus aureus isolated    Respiratory Panel, PCR - Swab, Nasopharynx [[id number]]  (Abnormal) Collected:  12/05/18 1826    Lab Status:  Final result Specimen:  Swab from Nasopharynx Updated:  12/05/18 2007     ADENOVIRUS, PCR Not Detected     Coronavirus 229E Not Detected     Coronavirus HKU1 Not Detected     Coronavirus NL63 Not Detected     Coronavirus OC43 Not Detected     Human Metapneumovirus Not Detected     Human Rhinovirus/Enterovirus Detected     Influenza B PCR Not Detected     Parainfluenza Virus 1 Not Detected     Parainfluenza Virus 2 Not Detected     Parainfluenza Virus 3 Not Detected     Parainfluenza Virus 4 Not Detected     Bordetella pertussis pcr Not Detected     Influenza A H1 2009 PCR Not Detected     Chlamydophila pneumoniae PCR Not Detected     Mycoplasma pneumo by PCR Not Detected     Influenza A PCR Not Detected     Influenza A H3 Not Detected     Influenza A H1 Not Detected     RSV, PCR Not Detected     Bordetella parapertussis PCR Not Detected                albumin human 50 g Intravenous Q8H   bumetanide 1 mg Intravenous Once    citalopram 10 mg Oral Daily   guaiFENesin 600 mg Oral Q12H   hydrOXYzine 50 mg Oral Nightly   lactulose 20 g Oral BID   midodrine 10 mg Oral TID AC   pantoprazole 40 mg Oral QAM AC   sodium chloride 3 mL Intravenous Q12H       bumetanide 0.5 mg/hr Last Rate: 0.5 mg/hr (12/15/18 0704)       Diagnostics:  Ct Chest Without Contrast    Result Date: 12/7/2018  THORACIC CT SCAN WITHOUT CONTRAST  HISTORY: Shortness of breath  COMPARISON: None.  TECHNIQUE:  Radiation dose reduction techniques were utilized, including automated exposure control and exposure modulation based on body size. Axial images of the thorax obtained without IV contrast, per request.  FINDINGS: Respiratory motion degrades image quality. Linear and somewhat irregular areas of pulmonary parenchymal consolidation are present bilaterally, most abundant in the lung bases. These are favored to reflect areas of atelectasis although superimposed pneumonia cannot be entirely excluded. There is a small to moderate-sized right pleural effusion. There is a smaller left effusion. Neither appears loculated. Heart is enlarged with a moderate pericardial effusion. There is mild precarinal adenopathy, 1.8 cm on image 33. Right paratracheal adenopathy on image 32 measures 19 mm. Upper abdomen shows splenomegaly measuring at least 19.5 cm. Mild abdominal ascites is noted along with generalized anasarca of the regional soft tissues.  Unenhanced images of the included upper abdomen are otherwise unremarkable.        1. Basilar predominant opacities favored to reflect atelectasis although pneumonia not excluded. 2. Right greater than left pleural effusions, moderate pericardial effusion. 3. Mild mediastinal adenopathy. 4. Homogeneous splenic enlargement.       .  This report was finalized on 12/7/2018 5:53 AM by Valentino Nguyen M.D.      Us Liver    Result Date: 12/5/2018  RIGHT UPPER QUADRANT ULTRASOUND  HISTORY:  Alcoholic hepatitis.  COMPARISON: CT abdomen and pelvis  with IV contrast 06/21/2018, ultrasound gallbladder 06/21/2018.  FINDINGS: The liver is hyperechoic consistent with fat infiltration. Within the region of the gallbladder fundus there is soft tissue echogenicity which does not shadow. This appears rounded and occupies the majority of the lumen of the mid to proximal gallbladder and has the appearance of a soft tissue mass but could represent tumefactive sludge. This measures 4.1 x 2.5 x 3.3 cm. There appears to be blood flow within this. There is also sludge within the gallbladder layering dependently. Common bile duct measures 3 mm in diameter. The visualized portions of the pancreas are within normal limits.  The right kidney measures 12.9 cm in length and there is no hydronephrosis. There is minimal ascites with perihepatic fluid.      1. Within the gallbladder, there is a rounded area of soft tissue echogenicity that appears to contain flow and is suspicious for a gallbladder mass. Tumefactive sludge is also a possibility and there is sludge within the gallbladder. This finding represents a change compared to the previous ultrasound of the gallbladder 06/21/2018. Attempt at further evaluation could be performed with a CT or MRI if cholecystectomy is not performed. There is no biliary duct dilatation. 2. Mild ascites/perihepatic fluid. 3. Diffuse fat infiltration of the liver.  Discussed with Emerald White MD in the morning on 12/5/2018.  This report was finalized on 12/5/2018 9:49 AM by Dr. Braydon Simental M.D.      Xr Chest 1 View    Result Date: 12/7/2018  XR CHEST 1 VW-  HISTORY: Male who is 48 years-old,  possible pneumonia  TECHNIQUE: Frontal view of the chest  COMPARISON: 12/5/2018  FINDINGS: Heart is enlarged. Pulmonary vasculature is congested. Persistent infiltrate/atelectasis in the mid to lower lungs, similar to prior exam. Left pleural effusion, appears increased; right pleural effusion appears decreased. No pneumothorax. No acute osseous process.       Persistent mid to lower lung infiltrate/atelectasis, with increased pleural effusion apparent on the left, continued follow-up recommended. Cardiomegaly, pulmonary vascular congestion.  This report was finalized on 12/7/2018 5:58 PM by Dr. Rojas Dubon M.D.      Xr Chest 1 View    Result Date: 12/5/2018  XR CHEST 1 VW-  HISTORY: Male who is 48 years-old,  cough  TECHNIQUE:     COMPARISON: 10/27/2018  FINDINGS: Heart is enlarged. Mild pulmonary vascular congestion. Patchy densities in the mid to lower lungs appear similar to prior exam, could reflect residual or recurrent infiltrate and/or chronic changes of the lungs, follow-up recommended to characterize resolution/stability, CT could be considered for further evaluation if indicated. Mild left pleural effusion appears increased, minimal right pleural effusion is apparent. No pneumothorax. No acute osseous process.       Persistent patchy densities in the mid to lower lungs. Increased left more than right pleural effusions. Cardiomegaly with persistent mild pulmonary vascular congestion. Continued follow-up/further evaluation recommended as indicated.     This report was finalized on 12/5/2018 1:49 PM by Dr. Rojas Dubon M.D.      Mri Abdomen Wo Contrast Mrcp    Result Date: 12/12/2018  MR ABDOMEN WITHOUT CONTRAST, MRCP  HISTORY: Abnormal gallbladder seen on recent ultrasound. Jaundice.  TECHNIQUE: MRI of the abdomen was performed utilizing a MRCP protocol. Axial 2-D FIESTA, in and out of phase FSPGR, axial fat-saturated T2 imaging was acquired. Thick slab coronal MRCP imaging was acquired in multiple obliquities through the biliary tree. Coronal thin section fat saturated T2 and 3-D MRCP imaging was acquired..  COMPARISON: Ultrasound of the liver from 12/03/2018.  FINDINGS: Bilateral small to moderate size layering pleural effusions with bibasilar atelectasis is seen.  There is small abdominal ascites identified. The liver demonstrates prominent  periportal hypointense signal on both T1 and T2-weighted images. The signal is most pronounced on the out of phase T1-weighted images. No intrahepatic biliary dilatation. The spleen is enlarged measuring approximately 17.7 cm in craniocaudal dimension. The pancreas is normal without ductal dilatation. The gallbladder demonstrates cholelithiasis. The visualized kidneys are normal. Diffuse anasarca is present.      This exam is limited by absence of contrast. 1. The liver demonstrates periportal low signal intensity on both T1 and T2-weighted images that is pronounced on the out of phase images suggestive presence of fat in the periportal region. These findings may be suggestive of a chronic liver disease. Primary biliary cirrhosis and alcoholic hepatitis are on the differential. 2. Splenomegaly and small ascites. Bilateral small to moderate size layering pleural effusions with bibasilar atelectasis. 2. Limited evaluation of the gallbladder secondary to absence of contrast, however cholelithiasis and gallstones are identified on this current study.  This report was finalized on 12/12/2018 1:33 PM by Dr. Stephanie Schulz M.D.      Us Renal Bilateral    Result Date: 12/6/2018  BILATERAL RENAL SONOGRAM  HISTORY: Acute renal insufficiency.  TECHNIQUE: Bilateral renal sonogram was performed in standard fashion. This is correlated with noncontrast chest CT performed yesterday and an abdomen and pelvis CT 06/21/2018.  FINDINGS: The right kidney measures 12.3 x 4.6 x 6.7 cm and the left kidney measures 12.5 x 5.7 x 5.3 cm. There is no hydronephrosis. No renal parenchymal lesion is identified. Urinary bladder is decompressed. There is some ascites in the pelvis and in the right upper quadrant.      Ascites. Otherwise negative renal sonogram. There is no evidence of obstruction.  This report was finalized on 12/6/2018 8:05 PM by Dr. Brannon Cruz M.D.      Xr Chest Pa & Lateral    Result Date: 12/10/2018  PA AND LATERAL CHEST   CLINICAL HISTORY: Follow-up pneumonia. Dyspnea.  Compared to the previous chest x-ray dated 12/7/2018.  Again seen are patchy areas of atelectasis and infiltrate in both lower lung zones appear largely unchanged. The lungs are slightly better inflated than on the previous chest x-ray. Small bilateral pleural effusions are also unchanged. There is moderate cardiomegaly. The pulmonary vasculature is within normal limits.  IMPRESSIONS: Patchy atelectasis and infiltrate in both lower lung zones and bilateral pleural effusions showing no significant change since 12/7/2018.  This report was finalized on 12/10/2018 8:38 AM by Dr. Jens Song M.D.      Results for orders placed during the hospital encounter of 12/05/18   Adult Transthoracic Echo Limited W/ Cont if Necessary Per Protocol    Narrative · Left ventricular systolic function is normal. Estimated EF = 70%.  · Right ventricular cavity is moderately dilated.  · Right atrial cavity size is moderately dilated.  · Moderate tricuspid valve regurgitation is present.  · Estimated right ventricular systolic pressure from tricuspid   regurgitation is mildly elevated (35-45 mmHg).  · There is a small (<1cm) pericardial effusion.              Active Hospital Problems    Diagnosis Date Noted   • **Symptomatic anemia [D64.9] 12/05/2018   • Pneumonia [J18.9] 12/08/2018   • Rhinovirus infection [B34.8] 12/06/2018   • Pericardial effusion [I31.3] 12/06/2018   • LÓPEZ (acute kidney injury) (CMS/HCC) [N17.9] 12/05/2018   • Anasarca [R60.1] 12/05/2018   • Cough [R05] 12/05/2018   • Epistaxis [R04.0] 12/05/2018   • Hemoptysis [R04.2] 12/05/2018   • Hyponatremia [E87.1] 10/25/2018   • Esophageal varices (CMS/HCC) [I85.00] 10/24/2018   • Elevated liver function tests [R94.5] 06/22/2018   • Alcoholic hepatitis without ascites [K70.10] 06/22/2018      Resolved Hospital Problems   No resolved problems to display.         Assessment/Plan     1. Pneumonia has completed antibiotic  course  2. Hemoptysis secondary to pneumonia and coagulopathy and thrombocytopenia.  Aside from blood product transfusions if hemoptysis gets more severe or is little that we can offer.  With his volume overload I would not want to give him blood products at this point he is not bleeding severely and they would only last a short period of time and could worsen his volume overload situation  3. Coagulopathy and very to liver disease  4. Acute alcoholic hepatitis with possible Cirrhosis and hyperbilirubinemia  5. Bilateral pleural effusions question hepatic hydrothorax diagnoses with anasarca and liver disease  6. Obesity  7. Rhinovirus infection  8. Acute kidney injury  9. Anemia acute blood loss  10. Thrombocytopenia,  11.  esophageal varices  12. Hyponatremia needs to be monitored.    Plan for disposition:    Margarito García MD  12/15/18  5:55 PM    Time:

## 2018-12-16 LAB
ABO GROUP BLD: NORMAL
ALBUMIN SERPL-MCNC: 4.3 G/DL (ref 3.5–5.2)
ALBUMIN/GLOB SERPL: 1.4 G/DL
ALP SERPL-CCNC: 71 U/L (ref 39–117)
ALT SERPL W P-5'-P-CCNC: 22 U/L (ref 1–41)
ANION GAP SERPL CALCULATED.3IONS-SCNC: 19.2 MMOL/L
AST SERPL-CCNC: 31 U/L (ref 1–40)
BASOPHILS # BLD AUTO: 0.09 10*3/MM3 (ref 0–0.2)
BASOPHILS NFR BLD AUTO: 1.5 % (ref 0–1.5)
BILIRUB SERPL-MCNC: 8.5 MG/DL (ref 0.1–1.2)
BLD GP AB SCN SERPL QL: NEGATIVE
BUN BLD-MCNC: 41 MG/DL (ref 6–20)
BUN/CREAT SERPL: 17.2 (ref 7–25)
CALCIUM SPEC-SCNC: 9.7 MG/DL (ref 8.6–10.5)
CHLORIDE SERPL-SCNC: 92 MMOL/L (ref 98–107)
CO2 SERPL-SCNC: 22.8 MMOL/L (ref 22–29)
CREAT BLD-MCNC: 2.39 MG/DL (ref 0.76–1.27)
DEPRECATED RDW RBC AUTO: 76.8 FL (ref 37–54)
EOSINOPHIL # BLD AUTO: 0.15 10*3/MM3 (ref 0–0.7)
EOSINOPHIL NFR BLD AUTO: 2.5 % (ref 0.3–6.2)
ERYTHROCYTE [DISTWIDTH] IN BLOOD BY AUTOMATED COUNT: 20.2 % (ref 11.5–14.5)
GFR SERPL CREATININE-BSD FRML MDRD: 29 ML/MIN/1.73
GLOBULIN UR ELPH-MCNC: 3 GM/DL
GLUCOSE BLD-MCNC: 111 MG/DL (ref 65–99)
HCT VFR BLD AUTO: 22.1 % (ref 40.4–52.2)
HGB BLD-MCNC: 7.1 G/DL (ref 13.7–17.6)
IMM GRANULOCYTES # BLD: 0 10*3/MM3 (ref 0–0.03)
IMM GRANULOCYTES NFR BLD: 0 % (ref 0–0.5)
INR PPP: 2.27 (ref 0.9–1.1)
LYMPHOCYTES # BLD AUTO: 0.85 10*3/MM3 (ref 0.9–4.8)
LYMPHOCYTES NFR BLD AUTO: 14 % (ref 19.6–45.3)
MCH RBC QN AUTO: 33.8 PG (ref 27–32.7)
MCHC RBC AUTO-ENTMCNC: 32.1 G/DL (ref 32.6–36.4)
MCV RBC AUTO: 105.2 FL (ref 79.8–96.2)
MONOCYTES # BLD AUTO: 1.09 10*3/MM3 (ref 0.2–1.2)
MONOCYTES NFR BLD AUTO: 18 % (ref 5–12)
NEUTROPHILS # BLD AUTO: 3.87 10*3/MM3 (ref 1.9–8.1)
NEUTROPHILS NFR BLD AUTO: 64 % (ref 42.7–76)
NRBC BLD MANUAL-RTO: 0 /100 WBC (ref 0–0)
PLATELET # BLD AUTO: 67 10*3/MM3 (ref 140–500)
PMV BLD AUTO: 9.4 FL (ref 6–12)
POTASSIUM BLD-SCNC: 3.6 MMOL/L (ref 3.5–5.2)
PROT SERPL-MCNC: 7.3 G/DL (ref 6–8.5)
PROTHROMBIN TIME: 24.6 SECONDS (ref 11.7–14.2)
RBC # BLD AUTO: 2.1 10*6/MM3 (ref 4.6–6)
RH BLD: POSITIVE
SODIUM BLD-SCNC: 134 MMOL/L (ref 136–145)
T&S EXPIRATION DATE: NORMAL
WBC NRBC COR # BLD: 6.05 10*3/MM3 (ref 4.5–10.7)

## 2018-12-16 PROCEDURE — 99232 SBSQ HOSP IP/OBS MODERATE 35: CPT | Performed by: INTERNAL MEDICINE

## 2018-12-16 PROCEDURE — 25010000002 OCTREOTIDE PER 25 MCG: Performed by: INTERNAL MEDICINE

## 2018-12-16 PROCEDURE — 86900 BLOOD TYPING SEROLOGIC ABO: CPT | Performed by: INTERNAL MEDICINE

## 2018-12-16 PROCEDURE — 85610 PROTHROMBIN TIME: CPT | Performed by: INTERNAL MEDICINE

## 2018-12-16 PROCEDURE — P9047 ALBUMIN (HUMAN), 25%, 50ML: HCPCS | Performed by: INTERNAL MEDICINE

## 2018-12-16 PROCEDURE — 80053 COMPREHEN METABOLIC PANEL: CPT | Performed by: INTERNAL MEDICINE

## 2018-12-16 PROCEDURE — 86901 BLOOD TYPING SEROLOGIC RH(D): CPT | Performed by: INTERNAL MEDICINE

## 2018-12-16 PROCEDURE — 25010000002 ALBUMIN HUMAN 25% PER 50 ML: Performed by: INTERNAL MEDICINE

## 2018-12-16 PROCEDURE — 36415 COLL VENOUS BLD VENIPUNCTURE: CPT | Performed by: INTERNAL MEDICINE

## 2018-12-16 PROCEDURE — 86850 RBC ANTIBODY SCREEN: CPT | Performed by: INTERNAL MEDICINE

## 2018-12-16 PROCEDURE — 86923 COMPATIBILITY TEST ELECTRIC: CPT

## 2018-12-16 PROCEDURE — 86900 BLOOD TYPING SEROLOGIC ABO: CPT

## 2018-12-16 PROCEDURE — 85025 COMPLETE CBC W/AUTO DIFF WBC: CPT | Performed by: INTERNAL MEDICINE

## 2018-12-16 PROCEDURE — 36430 TRANSFUSION BLD/BLD COMPNT: CPT

## 2018-12-16 PROCEDURE — P9016 RBC LEUKOCYTES REDUCED: HCPCS

## 2018-12-16 RX ORDER — OCTREOTIDE ACETATE 500 UG/ML
200 INJECTION, SOLUTION INTRAVENOUS; SUBCUTANEOUS 3 TIMES DAILY
Status: DISCONTINUED | OUTPATIENT
Start: 2018-12-16 | End: 2018-12-16

## 2018-12-16 RX ORDER — OXYCODONE HYDROCHLORIDE 5 MG/1
10 TABLET ORAL EVERY 4 HOURS PRN
Status: DISCONTINUED | OUTPATIENT
Start: 2018-12-16 | End: 2018-12-18 | Stop reason: HOSPADM

## 2018-12-16 RX ADMIN — OXYCODONE HYDROCHLORIDE 10 MG: 5 TABLET ORAL at 22:18

## 2018-12-16 RX ADMIN — LACTULOSE 20 G: 10 SOLUTION ORAL at 21:04

## 2018-12-16 RX ADMIN — ALBUMIN (HUMAN) 50 G: 12.5 SOLUTION INTRAVENOUS at 17:38

## 2018-12-16 RX ADMIN — OCTREOTIDE ACETATE 200 MCG: 500 INJECTION, SOLUTION INTRAVENOUS; SUBCUTANEOUS at 19:10

## 2018-12-16 RX ADMIN — ALBUMIN (HUMAN) 50 G: 12.5 SOLUTION INTRAVENOUS at 01:20

## 2018-12-16 RX ADMIN — SODIUM CHLORIDE, PRESERVATIVE FREE 3 ML: 5 INJECTION INTRAVENOUS at 22:00

## 2018-12-16 RX ADMIN — OXYCODONE HYDROCHLORIDE 10 MG: 5 TABLET ORAL at 12:28

## 2018-12-16 RX ADMIN — MIDODRINE HYDROCHLORIDE 10 MG: 5 TABLET ORAL at 08:34

## 2018-12-16 RX ADMIN — MIDODRINE HYDROCHLORIDE 10 MG: 5 TABLET ORAL at 17:36

## 2018-12-16 RX ADMIN — MIDODRINE HYDROCHLORIDE 10 MG: 5 TABLET ORAL at 11:47

## 2018-12-16 RX ADMIN — OXYCODONE HYDROCHLORIDE 10 MG: 5 TABLET ORAL at 17:36

## 2018-12-16 RX ADMIN — OXYCODONE HYDROCHLORIDE 5 MG: 5 TABLET ORAL at 02:16

## 2018-12-16 RX ADMIN — OXYCODONE HYDROCHLORIDE 5 MG: 5 TABLET ORAL at 08:40

## 2018-12-16 RX ADMIN — SODIUM CHLORIDE, PRESERVATIVE FREE 3 ML: 5 INJECTION INTRAVENOUS at 11:47

## 2018-12-16 RX ADMIN — GUAIFENESIN 600 MG: 600 TABLET, EXTENDED RELEASE ORAL at 21:04

## 2018-12-16 RX ADMIN — PANTOPRAZOLE SODIUM 40 MG: 40 TABLET, DELAYED RELEASE ORAL at 06:33

## 2018-12-16 RX ADMIN — LACTULOSE 20 G: 10 SOLUTION ORAL at 08:35

## 2018-12-16 RX ADMIN — HYDROXYZINE HYDROCHLORIDE 50 MG: 50 TABLET, FILM COATED ORAL at 21:04

## 2018-12-16 RX ADMIN — ALBUMIN (HUMAN) 50 G: 12.5 SOLUTION INTRAVENOUS at 08:34

## 2018-12-16 RX ADMIN — CITALOPRAM 10 MG: 10 TABLET, FILM COATED ORAL at 08:34

## 2018-12-16 RX ADMIN — BUMETANIDE 0.5 MG/HR: 0.25 INJECTION INTRAMUSCULAR; INTRAVENOUS at 06:33

## 2018-12-16 RX ADMIN — GUAIFENESIN 600 MG: 600 TABLET, EXTENDED RELEASE ORAL at 08:34

## 2018-12-16 RX ADMIN — OCTREOTIDE ACETATE 200 MCG: 500 INJECTION, SOLUTION INTRAVENOUS; SUBCUTANEOUS at 22:21

## 2018-12-16 NOTE — PROGRESS NOTES
LOS: 11 days     Name: Enrique Ramirez  Age/Sex: 48 y.o. male  :  1970        PCP: Cydney Chase MD    Subjective   Feels ok today, better after a shower but still with lots of edema,   General: No Fever or Chills, Cardiac: No Chest Pain or Palpitations, Resp: No Cough or SOA, GI: No Nausea, Vomiting, or Diarrhea and Other: No bleeding      albumin human 50 g Intravenous Q8H   citalopram 10 mg Oral Daily   guaiFENesin 600 mg Oral Q12H   hydrOXYzine 50 mg Oral Nightly   lactulose 20 g Oral BID   midodrine 10 mg Oral TID AC   pantoprazole 40 mg Oral QAM AC   sodium chloride 3 mL Intravenous Q12H          Objective   Vital Signs  Temp:  [97.5 °F (36.4 °C)-98.5 °F (36.9 °C)] 97.5 °F (36.4 °C)  Heart Rate:  [75-81] 81  Resp:  [18] 18  BP: ()/(46-48) 93/48  Body mass index is 35.75 kg/m².    Intake/Output Summary (Last 24 hours) at 2018 0933  Last data filed at 2018 0757  Gross per 24 hour   Intake 1637 ml   Output 200 ml   Net 1437 ml       Physical Exam   HENT:   Head: Normocephalic and atraumatic.   Eyes: EOM are normal. Scleral icterus is present.   Neck: Normal range of motion. Neck supple.   Cardiovascular: Normal rate and regular rhythm.   Pulmonary/Chest: Effort normal and breath sounds normal. No respiratory distress.   Abdominal: Soft. Bowel sounds are normal. He exhibits distension.   Musculoskeletal: He exhibits edema.   Skin: Skin is warm and dry. Capillary refill takes less than 2 seconds.   jaundice   Psychiatric: He has a normal mood and affect. His behavior is normal.         Results Review:       I reviewed the patient's new clinical results.  Results from last 7 days   Lab Units  18   0453  12/15/18   0424  18   0349  18   0804  18   2251  18   0754  18   2037   18   0605   12/10/18   0638   WBC 10*3/mm3  6.05  6.93  6.54  7.41   --   7.91   --    --   9.49   --   9.33   HEMOGLOBIN g/dL  7.1*  7.9*  7.4*  8.0*  7.8*  7.3*  8.3*   < >   6.7*   < >  7.0*   PLATELETS 10*3/mm3  67*  73*  66*  65*   --   71*   --    --   90*   --   88*    < > = values in this interval not displayed.     Results from last 7 days   Lab Units  12/16/18   0453  12/15/18   0424  12/14/18   0349  12/13/18   0803  12/12/18   0754  12/11/18   0548  12/10/18   0644   SODIUM mmol/L  134*  130*  131*  132*  131*  131*  128*   POTASSIUM mmol/L  3.6  3.6  3.6  4.0  3.6  3.5  3.5   CHLORIDE mmol/L  92*  91*  92*  91*  91*  92*  91*   CO2 mmol/L  22.8  22.4  22.9  24.1  24.6  24.0  21.7*   BUN mg/dL  41*  36*  34*  34*  31*  33*  37*   CREATININE mg/dL  2.39*  1.92*  1.61*  1.56*  1.50*  1.45*  1.82*   CALCIUM mg/dL  9.7  10.1  9.3  9.5  9.0  9.1  9.3   MAGNESIUM mg/dL   --    --    --   1.5*   --    --   1.9   PHOSPHORUS mg/dL   --    --    --   3.3   --    --    --    Estimated Creatinine Clearance: 51.7 mL/min (A) (by C-G formula based on SCr of 2.39 mg/dL (H)).  Results from last 7 days   Lab Units  12/16/18   0453  12/15/18   0424  12/14/18   0349   ALK PHOS U/L  71  77  70   BILIRUBIN mg/dL  8.5*  9.6*  9.4*   ALT (SGPT) U/L  22  23  24   AST (SGOT) U/L  31  36  38     Results from last 7 days   Lab Units  12/16/18   0453  12/15/18   0424  12/14/18   0349   INR   2.27*  2.03*  2.20*     Lab Results   Component Value Date    HGBA1C <4.30 (L) 11/14/2018   No results found for: POCGLU    Assessment/Plan     Symptomatic anemia    Elevated liver function tests    Alcoholic hepatitis without ascites    Esophageal varices (CMS/HCC)    Hyponatremia    LÓPEZ (acute kidney injury) (CMS/HCC)    Anasarca    Cough    Epistaxis    Hemoptysis    Rhinovirus infection    Pericardial effusion    Pneumonia      PLAN  Pneumonia  - He has now completed antibiotics at this point.  Pulmonology following.     Symptomatic Anemia  - S/p 2 units PRBCs on 12/5 and 12/11  - monitor Hgb and transfuse as needed  - hgb down to 7.1 this AM, FU in AM     Hemoptysis  - Improved overall  - 2/2 liver disease  coagulopathy, TCP and PNA  - No need for bronch currently, per pulm     LÓPEZ  - Placed on a bumex drip now dc'd, CR up today with only 1.4 L out  - Still with lots of edema     Pericardial Effusion  - Apparent on CT chest, not present on echocardiogram in October  - Echocardiogram with tiny effusion not able to be tapped, no tamponade  - Repeat ECHO 12/10/18 showed subcentimeter effusion and cardiology has signed off     EtOH Hepatitis  - possible secondary cirrhosis  -nadolol, lasix, and spironolactone held  - RUQ u/s revealing GB mass.  MRCP without contrast showed nothing acute.  - Has completed a course of steroids with no significant change in TB     DVT Prophylaxis  - SCDs  - BLE doppler negative    Disposition  Home in a few days when ok with GI and nephrology    --> attempted to call patients wife but went to voicemail and mailbox was full and not accepting messages.  Will tray again tomorrow    Joe Hernández MD  [facility]  12/16/18  9:33 AM

## 2018-12-16 NOTE — PLAN OF CARE
Problem: Patient Care Overview  Goal: Plan of Care Review  Outcome: Ongoing (interventions implemented as appropriate)   12/16/18 0043 12/16/18 0345   Coping/Psychosocial   Plan of Care Reviewed With patient --    Plan of Care Review   Progress --  no change   OTHER   Outcome Summary --  Bumex gtt continues. Pain treated with PRN medication as ordered. VSS. Encouraged ambulation. Monitoring strickt I&O as well as 1200ml fluid restriction. Will continue to monitor.     Goal: Individualization and Mutuality  Outcome: Ongoing (interventions implemented as appropriate)    Goal: Discharge Needs Assessment  Outcome: Ongoing (interventions implemented as appropriate)      Problem: Anemia (Adult)  Goal: Symptom Improvement  Outcome: Ongoing (interventions implemented as appropriate)      Problem: Fall Risk (Adult)  Goal: Absence of Fall  Outcome: Ongoing (interventions implemented as appropriate)      Problem: Renal Failure/Kidney Injury, Acute (Adult)  Goal: Signs and Symptoms of Listed Potential Problems Will be Absent, Minimized or Managed (Renal Failure/Kidney Injury, Acute)  Outcome: Ongoing (interventions implemented as appropriate)      Problem: Fluid Volume Excess (Adult)  Goal: Optimal Fluid Balance  Outcome: Ongoing (interventions implemented as appropriate)

## 2018-12-16 NOTE — PROGRESS NOTES
[facility]  Inpatient Progress Note    Reason for Follow Up:  Alcohol-induced liver disease    Subjective     Interval History:   He continues on diuretics, being managed by nephrology service.  Palliative care is on board.  Lengthy discussion today about 6 months, transplant evaluation.  That can happen about 4 months from now    Current Facility-Administered Medications:   •  albumin human 25 % IV SOLN 50 g, 50 g, Intravenous, Q8H, Erin Bellamy MD, 50 g at 12/16/18 0834  •  benzonatate (TESSALON) capsule 100 mg, 100 mg, Oral, TID PRN, Enrique Obrien MD  •  bisacodyl (DULCOLAX) EC tablet 5 mg, 5 mg, Oral, Daily PRN, Valenitno Valdez MD  •  calcium carbonate (TUMS) chewable tablet 500 mg (200 mg elemental), 2 tablet, Oral, BID PRN, Valentino Valdez MD  •  citalopram (CeleXA) tablet 10 mg, 10 mg, Oral, Daily, Michele Dixon MD, 10 mg at 12/16/18 0834  •  guaiFENesin (MUCINEX) 12 hr tablet 600 mg, 600 mg, Oral, Q12H, Valentino Valdez MD, 600 mg at 12/16/18 0834  •  hydrOXYzine (ATARAX) tablet 50 mg, 50 mg, Oral, Nightly, Emerald White MD, 50 mg at 12/15/18 2011  •  ipratropium-albuterol (DUO-NEB) nebulizer solution 3 mL, 3 mL, Nebulization, Q4H PRN, Michele Dixon MD  •  lactulose (CHRONULAC) 10 GM/15ML solution 20 g, 20 g, Oral, BID, Valentino Valdez MD, 20 g at 12/16/18 0835  •  midodrine (PROAMATINE) tablet 10 mg, 10 mg, Oral, TID AC, Emerald White MD, 10 mg at 12/16/18 1147  •  nitroglycerin (NITROSTAT) SL tablet 0.4 mg, 0.4 mg, Sublingual, Q5 Min PRN, Valentino Valdez MD  •  ondansetron (ZOFRAN) tablet 4 mg, 4 mg, Oral, Q6H PRN **OR** ondansetron ODT (ZOFRAN-ODT) disintegrating tablet 4 mg, 4 mg, Oral, Q6H PRN **OR** ondansetron (ZOFRAN) injection 4 mg, 4 mg, Intravenous, Q6H PRN, Valentino Valdez MD  •  oxyCODONE (ROXICODONE) immediate release tablet 10 mg, 10 mg, Oral, Q4H PRN, Joe Hernández MD, 10 mg at 12/16/18 1228  •   pantoprazole (PROTONIX) EC tablet 40 mg, 40 mg, Oral, QAM AC, Valentino Valdez MD, 40 mg at 12/16/18 0633  •  sodium chloride (OCEAN) nasal spray 2 spray, 2 spray, Each Nare, PRN, Ozzie Painter MD  •  [COMPLETED] Insert peripheral IV, , , Once **AND** sodium chloride 0.9 % flush 10 mL, 10 mL, Intravenous, PRN, Gurinder Burnett MD  •  sodium chloride 0.9 % flush 3 mL, 3 mL, Intravenous, Q12H, Valentino Valdez MD, 3 mL at 12/16/18 1147  •  sodium chloride 0.9 % flush 3-10 mL, 3-10 mL, Intravenous, PRN, Valentino Valdez MD  Review of Systems:    His weakness fatigue all other systems reviewed and negative    Objective     Vital Signs  Temp:  [97.5 °F (36.4 °C)-98.5 °F (36.9 °C)] 97.5 °F (36.4 °C)  Heart Rate:  [75-81] 77  Resp:  [18] 18  BP: ()/(46-55) 109/55  Body mass index is 35.75 kg/m².    Intake/Output Summary (Last 24 hours) at 12/16/2018 1329  Last data filed at 12/16/2018 1203  Gross per 24 hour   Intake 1847 ml   Output 225 ml   Net 1622 ml     I/O this shift:  In: 570 [P.O.:570]  Out: 25 [Urine:25]     Physical Exam:   General: patient awake, alert and cooperative   Eyes: Normal lids and lashes, no scleral icterus   Neck: supple, normal ROM   Skin: warm and dry, not jaundiced   Cardiovascular: regular rhythm and rate, no murmurs auscultated   Pulm: clear to auscultation bilaterally, regular and unlabored   Abdomen: soft, nontender, nondistended; normal bowel sounds   Rectal: deferred   Extremities: no rash or edema   Psychiatric: Normal mood and behavior; memory intact     Results Review:     I reviewed the patient's new clinical results.    Results from last 7 days   Lab Units  12/16/18   0453  12/15/18   0424  12/14/18   0349   WBC 10*3/mm3  6.05  6.93  6.54   HEMOGLOBIN g/dL  7.1*  7.9*  7.4*   HEMATOCRIT %  22.1*  23.1*  21.1*   PLATELETS 10*3/mm3  67*  73*  66*     Results from last 7 days   Lab Units  12/16/18   0453  12/15/18   0424  12/14/18   0349   SODIUM mmol/L  134*  130*  131*    POTASSIUM mmol/L  3.6  3.6  3.6   CHLORIDE mmol/L  92*  91*  92*   CO2 mmol/L  22.8  22.4  22.9   BUN mg/dL  41*  36*  34*   CREATININE mg/dL  2.39*  1.92*  1.61*   CALCIUM mg/dL  9.7  10.1  9.3   BILIRUBIN mg/dL  8.5*  9.6*  9.4*   ALK PHOS U/L  71  77  70   ALT (SGPT) U/L  22  23  24   AST (SGOT) U/L  31  36  38   GLUCOSE mg/dL  111*  81  89     Results from last 7 days   Lab Units  12/16/18   0453  12/15/18   0424  12/14/18   0349   INR   2.27*  2.03*  2.20*     Lab Results   Lab Value Date/Time    LIPASE 51 12/05/2018 1306    LIPASE 53 11/28/2018 1101    LIPASE 38 10/24/2018 1439    LIPASE 45 06/21/2018 1906       Radiology:  MRI abdomen wo contrast mrcp   Final Result   This exam is limited by absence of contrast.   1. The liver demonstrates periportal low signal intensity on both T1 and   T2-weighted images that is pronounced on the out of phase images   suggestive presence of fat in the periportal region. These findings may   be suggestive of a chronic liver disease. Primary biliary cirrhosis and   alcoholic hepatitis are on the differential.   2. Splenomegaly and small ascites. Bilateral small to moderate size   layering pleural effusions with bibasilar atelectasis.   2. Limited evaluation of the gallbladder secondary to absence of   contrast, however cholelithiasis and gallstones are identified on this   current study.       This report was finalized on 12/12/2018 1:33 PM by Dr. Stephanie Schulz M.D.          XR Chest PA & Lateral   Final Result      XR Chest 1 View   Final Result   Persistent mid to lower lung infiltrate/atelectasis, with   increased pleural effusion apparent on the left, continued follow-up   recommended. Cardiomegaly, pulmonary vascular congestion.       This report was finalized on 12/7/2018 5:58 PM by Dr. Rojas Dubon M.D.          CT Chest Without Contrast   Final Result   1. Basilar predominant opacities favored to reflect atelectasis although   pneumonia not excluded.    2. Right greater than left pleural effusions, moderate pericardial   effusion.   3. Mild mediastinal adenopathy.   4. Homogeneous splenic enlargement.                           .        This report was finalized on 12/7/2018 5:53 AM by Valentino Nguyen M.D.          US Renal Bilateral   Final Result   Ascites. Otherwise negative renal sonogram. There is no   evidence of obstruction.       This report was finalized on 12/6/2018 8:05 PM by Dr. Brannon Cruz M.D.          XR Chest 1 View   Final Result   Persistent patchy densities in the mid to lower lungs. Increased left   more than right pleural effusions. Cardiomegaly with persistent mild   pulmonary vascular congestion. Continued follow-up/further evaluation   recommended as indicated.                   This report was finalized on 12/5/2018 1:49 PM by Dr. Rojas Dubon M.D.              Assessment/Plan     Patient Active Problem List   Diagnosis   • Acute upper GI bleed   • Gout   • Pre-diabetes   • Cholelithiasis   • Elevated liver function tests   • Alcohol abuse   • Alcoholic hepatitis without ascites   • Thrombocytopenia (CMS/HCC)   • Esophagitis   • Folate deficiency   • Acute anemia   • Esophageal varices (CMS/HCC)   • Hepatitis, alcoholic   • Ascites   • Portal hypertension (CMS/HCC)   • Hyponatremia   • Symptomatic anemia   • LÓPEZ (acute kidney injury) (CMS/HCC)   • Anasarca   • Cough   • Epistaxis   • Hemoptysis   • Rhinovirus infection   • Pericardial effusion   • Pneumonia       Assessment/Recommendations:  1) Alcoholic liver disease. He claims no ETOH in 2 mos.  He has completed full course of oral steroids with relatively minimal change in his TB.  Continue lactulose.  Transplant evaluation would be okay in 4 months.  2) 1+ esophageal varices seen on 6/18 EGD  3) Hemoptysis - Pulmonary is following.   4) Abnormal gallbladder on recent US. MRCP with stones/sludge, no concerning findings  5) Anemia - likely secondary to #3.        I discussed  the patients findings and my recommendations with patient and family.    Manjeet Wilson MD

## 2018-12-16 NOTE — PLAN OF CARE
Problem: Patient Care Overview  Goal: Plan of Care Review  Outcome: Ongoing (interventions implemented as appropriate)   12/16/18 5054   Coping/Psychosocial   Plan of Care Reviewed With patient   Plan of Care Review   Progress no change   OTHER   Outcome Summary Bumex gtt d/c'd, Albumin cont'd. Midodrine as ordered and will now have Octreotide for better BP control. SBP has been slightly improved today with mainly readings over 100 systolic. Ambulating w/o distress, pt showered today. To have 2 u PRBCs this evening. Denies hemoptysis today. Trying to stay compliant with fluid restriction. Refused blake catheter per nephrology, but bladder scan was only 84cc. Pt requesting to visit chap and be seen by hospital spiritual counselor. will closely monitor.       Problem: Anemia (Adult)  Goal: Symptom Improvement  Outcome: Ongoing (interventions implemented as appropriate)      Problem: Fall Risk (Adult)  Goal: Absence of Fall  Outcome: Ongoing (interventions implemented as appropriate)      Problem: Renal Failure/Kidney Injury, Acute (Adult)  Goal: Signs and Symptoms of Listed Potential Problems Will be Absent, Minimized or Managed (Renal Failure/Kidney Injury, Acute)  Outcome: Ongoing (interventions implemented as appropriate)      Problem: Fluid Volume Excess (Adult)  Goal: Optimal Fluid Balance  Outcome: Ongoing (interventions implemented as appropriate)

## 2018-12-16 NOTE — PROGRESS NOTES
NEPHROLOGY PROGRESS NOTE    PATIENT IDENTIFICATION:   Name:  Enrique Ramirez      MRN:  [id number]     48 y.o.  male             Reason for visit: LÓPEZ    SUBJECTIVE:     Seen and examined.  Laying in bed. Very swollen.. Wants to have more quality of life. Swelling is about the same or better slightly.     OBJECTIVE:  Vitals:    12/16/18 0600 12/16/18 0829 12/16/18 1145 12/16/18 1416   BP:  93/48 109/55 101/52   BP Location:  Right arm Left arm Left arm   Patient Position:  Sitting Sitting Lying   Pulse:  81 77 71   Resp:  18  18   Temp:  97.5 °F (36.4 °C)  97.1 °F (36.2 °C)   TempSrc:  Oral  Oral   SpO2:  91%  90%   Weight: 120 kg (263 lb 9.6 oz)      Height:               Body mass index is 35.75 kg/m².    Intake/Output Summary (Last 24 hours) at 12/16/2018 1633  Last data filed at 12/16/2018 1203  Gross per 24 hour   Intake 1487 ml   Output 225 ml   Net 1262 ml     Wt Readings from Last 1 Encounters:   12/16/18 0600 120 kg (263 lb 9.6 oz)   12/15/18 0600 118 kg (261 lb)   12/14/18 0636 119 kg (262 lb)   12/13/18 0525 118 kg (260 lb 8 oz)   12/12/18 0616 119 kg (263 lb)   12/11/18 0612 120 kg (264 lb)   12/10/18 0730 127 kg (279 lb)   12/08/18 0618 127 kg (279 lb)   12/07/18 0530 125 kg (276 lb)   12/06/18 1457 121 kg (267 lb)   12/06/18 0600 121 kg (267 lb 8 oz)   12/05/18 1814 124 kg (273 lb)   12/05/18 1238 119 kg (262 lb 6.4 oz)     Wt Readings from Last 3 Encounters:   12/16/18 120 kg (263 lb 9.6 oz)   11/13/18 118 kg (260 lb)   11/09/18 118 kg (260 lb 12.8 oz)         Exam:  NAD; pleasant; oriented; looks older than stated age  Overweight; jaundiced  HEENT MMM; AT/NC  No eye d/c; scleral icterus  Lungs Coarse bilat; Decreased bs bases  Heart RRR, no rub, no s3  ABd  + bs, soft, distended, nontender. Body wall edema.   Ext 3 +edema lower ext to hips  No clubbing  No asterixis  Moves all extremities   Speech fluent  Mood depressed; flat affect      Scheduled meds:      albumin human 50 g Intravenous Q8H    citalopram 10 mg Oral Daily   guaiFENesin 600 mg Oral Q12H   hydrOXYzine 50 mg Oral Nightly   lactulose 20 g Oral BID   midodrine 10 mg Oral TID AC   octreotide 200 mcg Intravenous TID   pantoprazole 40 mg Oral QAM AC   sodium chloride 3 mL Intravenous Q12H     IV meds:                             Data Review:    Results from last 7 days   Lab Units  12/16/18   0453  12/15/18   0424  12/14/18   0349   SODIUM mmol/L  134*  130*  131*   POTASSIUM mmol/L  3.6  3.6  3.6   CHLORIDE mmol/L  92*  91*  92*   CO2 mmol/L  22.8  22.4  22.9   BUN mg/dL  41*  36*  34*   CREATININE mg/dL  2.39*  1.92*  1.61*   CALCIUM mg/dL  9.7  10.1  9.3   BILIRUBIN mg/dL  8.5*  9.6*  9.4*   ALK PHOS U/L  71  77  70   ALT (SGPT) U/L  22  23  24   AST (SGOT) U/L  31  36  38   GLUCOSE mg/dL  111*  81  89     Estimated Creatinine Clearance: 51.7 mL/min (A) (by C-G formula based on SCr of 2.39 mg/dL (H)).      Results from last 7 days   Lab Units  12/13/18   0803  12/10/18   0644   MAGNESIUM mg/dL  1.5*  1.9   PHOSPHORUS mg/dL  3.3   --        Results from last 7 days   Lab Units  12/16/18   0453  12/15/18   0424  12/14/18   0349  12/13/18   0804  12/12/18   2251  12/12/18   0754   WBC 10*3/mm3  6.05  6.93  6.54  7.41   --   7.91   HEMOGLOBIN g/dL  7.1*  7.9*  7.4*  8.0*  7.8*  7.3*   PLATELETS 10*3/mm3  67*  73*  66*  65*   --   71*       Results from last 7 days   Lab Units  12/16/18   0453  12/15/18   0424  12/14/18   0349  12/13/18   0803  12/12/18   0754   INR   2.27*  2.03*  2.20*  2.09*  2.16*             ASSESSMENT:     Symptomatic anemia    Elevated liver function tests    Alcoholic hepatitis without ascites    Esophageal varices (CMS/HCC)    Hyponatremia    LÓPEZ (acute kidney injury) (CMS/HCC)    Anasarca    Cough    Epistaxis    Hemoptysis    Rhinovirus infection    Pericardial effusion    Pneumonia    1.  LÓPEZ, non-oliguric, improving.  prerenal from ABLA, hypotension, and profound liver injury. Improving Hyponatremia with hypervolemia;    2.  ABLA  3.  Alcoholic cirrhosis  4.  Hypotension  5.  Hemoptysis,  Pulmonary on baord  6.  TCP: PLT is down 77        PLAN:    Unfortunately, his SCR is worsening .    etiology might be related to hepatorenal syndrome  Versus intavascular volume depletion and less likely ATN.   old on diuretic. Continue with iv albumin.  The continue Midrin and start octreotide   obtain urineeletrlytes  Palliative is on board !!  Replete potassium as needed  Transfuse 2 units  Had lengthy discussion with him and his family. For now, they want to continue to diurese  Prognosis is quarded  D/W nurse  Surveillance labs      Erin Bellamy MD  12/16/2018    4:33 PM

## 2018-12-17 LAB
ABO + RH BLD: NORMAL
ABO + RH BLD: NORMAL
ALBUMIN SERPL-MCNC: 5.1 G/DL (ref 3.5–5.2)
ALBUMIN/GLOB SERPL: 1.6 G/DL
ALP SERPL-CCNC: 74 U/L (ref 39–117)
ALT SERPL W P-5'-P-CCNC: 19 U/L (ref 1–41)
ANION GAP SERPL CALCULATED.3IONS-SCNC: 19.2 MMOL/L
AST SERPL-CCNC: 34 U/L (ref 1–40)
BASOPHILS # BLD AUTO: 0.1 10*3/MM3 (ref 0–0.2)
BASOPHILS NFR BLD AUTO: 1.6 % (ref 0–1.5)
BH BB BLOOD EXPIRATION DATE: NORMAL
BH BB BLOOD EXPIRATION DATE: NORMAL
BH BB BLOOD TYPE BARCODE: 6200
BH BB BLOOD TYPE BARCODE: 6200
BH BB DISPENSE STATUS: NORMAL
BH BB DISPENSE STATUS: NORMAL
BH BB PRODUCT CODE: NORMAL
BH BB PRODUCT CODE: NORMAL
BH BB UNIT NUMBER: NORMAL
BH BB UNIT NUMBER: NORMAL
BILIRUB SERPL-MCNC: 10.1 MG/DL (ref 0.1–1.2)
BUN BLD-MCNC: 44 MG/DL (ref 6–20)
BUN/CREAT SERPL: 14.3 (ref 7–25)
CALCIUM SPEC-SCNC: 10.6 MG/DL (ref 8.6–10.5)
CHLORIDE SERPL-SCNC: 90 MMOL/L (ref 98–107)
CO2 SERPL-SCNC: 23.8 MMOL/L (ref 22–29)
CREAT BLD-MCNC: 3.08 MG/DL (ref 0.76–1.27)
DEPRECATED RDW RBC AUTO: 72.4 FL (ref 37–54)
EOSINOPHIL # BLD AUTO: 0.2 10*3/MM3 (ref 0–0.7)
EOSINOPHIL NFR BLD AUTO: 3.3 % (ref 0.3–6.2)
ERYTHROCYTE [DISTWIDTH] IN BLOOD BY AUTOMATED COUNT: 20.7 % (ref 11.5–14.5)
GFR SERPL CREATININE-BSD FRML MDRD: 22 ML/MIN/1.73
GLOBULIN UR ELPH-MCNC: 3.2 GM/DL
GLUCOSE BLD-MCNC: 115 MG/DL (ref 65–99)
HCT VFR BLD AUTO: 26.8 % (ref 40.4–52.2)
HGB BLD-MCNC: 9 G/DL (ref 13.7–17.6)
IMM GRANULOCYTES # BLD: 0.01 10*3/MM3 (ref 0–0.03)
IMM GRANULOCYTES NFR BLD: 0.2 % (ref 0–0.5)
INR PPP: 2.15 (ref 0.9–1.1)
LYMPHOCYTES # BLD AUTO: 0.85 10*3/MM3 (ref 0.9–4.8)
LYMPHOCYTES NFR BLD AUTO: 13.9 % (ref 19.6–45.3)
MCH RBC QN AUTO: 33.1 PG (ref 27–32.7)
MCHC RBC AUTO-ENTMCNC: 33.6 G/DL (ref 32.6–36.4)
MCV RBC AUTO: 98.5 FL (ref 79.8–96.2)
MONOCYTES # BLD AUTO: 0.92 10*3/MM3 (ref 0.2–1.2)
MONOCYTES NFR BLD AUTO: 15 % (ref 5–12)
NEUTROPHILS # BLD AUTO: 4.05 10*3/MM3 (ref 1.9–8.1)
NEUTROPHILS NFR BLD AUTO: 66.2 % (ref 42.7–76)
NRBC BLD MANUAL-RTO: 0 /100 WBC (ref 0–0)
PLATELET # BLD AUTO: 68 10*3/MM3 (ref 140–500)
PMV BLD AUTO: 9.7 FL (ref 6–12)
POTASSIUM BLD-SCNC: 3.7 MMOL/L (ref 3.5–5.2)
PROT SERPL-MCNC: 8.3 G/DL (ref 6–8.5)
PROTHROMBIN TIME: 23.6 SECONDS (ref 11.7–14.2)
RBC # BLD AUTO: 2.72 10*6/MM3 (ref 4.6–6)
SODIUM BLD-SCNC: 133 MMOL/L (ref 136–145)
UNIT  ABO: NORMAL
UNIT  ABO: NORMAL
UNIT  RH: NORMAL
UNIT  RH: NORMAL
WBC NRBC COR # BLD: 6.12 10*3/MM3 (ref 4.5–10.7)

## 2018-12-17 PROCEDURE — 25010000002 ALBUMIN HUMAN 25% PER 50 ML: Performed by: INTERNAL MEDICINE

## 2018-12-17 PROCEDURE — 99232 SBSQ HOSP IP/OBS MODERATE 35: CPT | Performed by: INTERNAL MEDICINE

## 2018-12-17 PROCEDURE — 80053 COMPREHEN METABOLIC PANEL: CPT | Performed by: INTERNAL MEDICINE

## 2018-12-17 PROCEDURE — 85025 COMPLETE CBC W/AUTO DIFF WBC: CPT | Performed by: INTERNAL MEDICINE

## 2018-12-17 PROCEDURE — P9016 RBC LEUKOCYTES REDUCED: HCPCS

## 2018-12-17 PROCEDURE — 25010000002 ONDANSETRON PER 1 MG: Performed by: INTERNAL MEDICINE

## 2018-12-17 PROCEDURE — P9047 ALBUMIN (HUMAN), 25%, 50ML: HCPCS | Performed by: INTERNAL MEDICINE

## 2018-12-17 PROCEDURE — 36415 COLL VENOUS BLD VENIPUNCTURE: CPT | Performed by: INTERNAL MEDICINE

## 2018-12-17 PROCEDURE — 36430 TRANSFUSION BLD/BLD COMPNT: CPT

## 2018-12-17 PROCEDURE — 85610 PROTHROMBIN TIME: CPT | Performed by: INTERNAL MEDICINE

## 2018-12-17 PROCEDURE — 86900 BLOOD TYPING SEROLOGIC ABO: CPT

## 2018-12-17 RX ADMIN — PANTOPRAZOLE SODIUM 40 MG: 40 TABLET, DELAYED RELEASE ORAL at 08:22

## 2018-12-17 RX ADMIN — LACTULOSE 20 G: 10 SOLUTION ORAL at 20:01

## 2018-12-17 RX ADMIN — MIDODRINE HYDROCHLORIDE 10 MG: 5 TABLET ORAL at 18:09

## 2018-12-17 RX ADMIN — OXYCODONE HYDROCHLORIDE 10 MG: 5 TABLET ORAL at 20:01

## 2018-12-17 RX ADMIN — ONDANSETRON 4 MG: 2 INJECTION INTRAMUSCULAR; INTRAVENOUS at 16:08

## 2018-12-17 RX ADMIN — HYDROXYZINE HYDROCHLORIDE 50 MG: 50 TABLET, FILM COATED ORAL at 21:58

## 2018-12-17 RX ADMIN — CITALOPRAM 10 MG: 10 TABLET, FILM COATED ORAL at 08:22

## 2018-12-17 RX ADMIN — GUAIFENESIN 600 MG: 600 TABLET, EXTENDED RELEASE ORAL at 08:22

## 2018-12-17 RX ADMIN — ALBUMIN (HUMAN) 50 G: 12.5 SOLUTION INTRAVENOUS at 00:32

## 2018-12-17 RX ADMIN — ALBUMIN (HUMAN) 50 G: 12.5 SOLUTION INTRAVENOUS at 12:49

## 2018-12-17 RX ADMIN — MIDODRINE HYDROCHLORIDE 10 MG: 5 TABLET ORAL at 08:22

## 2018-12-17 RX ADMIN — MIDODRINE HYDROCHLORIDE 10 MG: 5 TABLET ORAL at 12:49

## 2018-12-17 RX ADMIN — SODIUM CHLORIDE, PRESERVATIVE FREE 3 ML: 5 INJECTION INTRAVENOUS at 21:58

## 2018-12-17 RX ADMIN — LACTULOSE 20 G: 10 SOLUTION ORAL at 08:22

## 2018-12-17 RX ADMIN — GUAIFENESIN 600 MG: 600 TABLET, EXTENDED RELEASE ORAL at 20:01

## 2018-12-17 RX ADMIN — OXYCODONE HYDROCHLORIDE 10 MG: 5 TABLET ORAL at 08:22

## 2018-12-17 RX ADMIN — OXYCODONE HYDROCHLORIDE 10 MG: 5 TABLET ORAL at 16:08

## 2018-12-17 RX ADMIN — ALBUMIN (HUMAN) 50 G: 12.5 SOLUTION INTRAVENOUS at 20:01

## 2018-12-17 RX ADMIN — SODIUM CHLORIDE, PRESERVATIVE FREE 3 ML: 5 INJECTION INTRAVENOUS at 08:30

## 2018-12-17 RX ADMIN — ONDANSETRON 4 MG: 2 INJECTION INTRAMUSCULAR; INTRAVENOUS at 21:58

## 2018-12-17 NOTE — PROGRESS NOTES
LOS: 11 days   Patient Care Team:  Cydney Chase MD as PCP - General (Family Medicine)    Subjective   Patient's a little more talkative today he has had no cough no hemoptysis today as yesterday it was just when he woke up he spit up some blood that was in his mouth he can even cough it up.  Not aware of any nosebleeds.    Review of Systems:          Objective     Vital Signs  Vital Sign Min/Max for last 24 hours  Temp  Min: 97.1 °F (36.2 °C)  Max: 98.5 °F (36.9 °C)   BP  Min: 93/48  Max: 117/60   Pulse  Min: 71  Max: 81   Resp  Min: 18  Max: 18   SpO2  Min: 90 %  Max: 92 %   No Data Recorded   Weight  Min: 120 kg (263 lb 9.6 oz)  Max: 120 kg (263 lb 9.6 oz)        Ventilator/Non-Invasive Ventilation Settings (From admission, onward)    None                       Body mass index is 35.75 kg/m².  I/O last 3 completed shifts:  In: 2564 [P.O.:2564]  Out: 325 [Urine:325]  No intake/output data recorded.        Physical Exam:  General Appearance: Developed white male resting in bed he looks much older than his stated age  Eyes: Icteric sclera pupils are equal and reactive  ENT: Mucous membranes are little dry no erythema or exudates  Neck: No adenopathy no thyromegaly no jugular venous distention  Lungs: Clear no wheezes or rales he does have some decreased breath sounds little dullness in the left base  Cardiac: Regular rate and rhythm I don't appreciate a murmur  Abdomen: Little obese/distended liver edge is at least 5 cm below the costal margin right midclavicular line and tender no palpable splenomegaly  : Not examined  Musculoskeletal: Grossly normal  Skin: Jaundiced he has a few spider-type angiomata across December chest  Neuro: He is alert oriented cooperative following commands  Extremities/P Vascular: No clubbing or cyanosis he has 2+ pitting pretibial edema bilaterally he also has edema in his presacral and lumbar regions  MSE: Seems to be quite depressed today       Labs:  Results from last 7  days   Lab Units  12/16/18   0453  12/15/18   0424  12/14/18   0349  12/13/18   0803  12/12/18   0754  12/11/18   0548  12/10/18   0644   GLUCOSE mg/dL  111*  81  89  131*  115*  109*  118*   SODIUM mmol/L  134*  130*  131*  132*  131*  131*  128*   POTASSIUM mmol/L  3.6  3.6  3.6  4.0  3.6  3.5  3.5   MAGNESIUM mg/dL   --    --    --   1.5*   --    --   1.9   CO2 mmol/L  22.8  22.4  22.9  24.1  24.6  24.0  21.7*   CHLORIDE mmol/L  92*  91*  92*  91*  91*  92*  91*   ANION GAP mmol/L  19.2  16.6  16.1  16.9  15.4  15.0  15.3   CREATININE mg/dL  2.39*  1.92*  1.61*  1.56*  1.50*  1.45*  1.82*   BUN mg/dL  41*  36*  34*  34*  31*  33*  37*   BUN / CREAT RATIO   17.2  18.8  21.1  21.8  20.7  22.8  20.3   CALCIUM mg/dL  9.7  10.1  9.3  9.5  9.0  9.1  9.3   EGFR IF NONAFRICN AM mL/min/1.73  29*  38*  46*  48*  50*  52*  40*   ALK PHOS U/L  71  77  70  77  76  99  89   TOTAL PROTEIN g/dL  7.3  7.5  6.6  7.3  6.8  7.0  6.9   ALT (SGPT) U/L  22  23  24  29  30  31  33   AST (SGOT) U/L  31  36  38  42*  43*  45*  43*   BILIRUBIN mg/dL  8.5*  9.6*  9.4*  11.0*  10.2*  9.6*  10.1*   ALBUMIN g/dL  4.30  4.10  3.40*  3.50  3.20*  3.90  3.80   GLOBULIN gm/dL  3.0  3.4  3.2  3.8  3.6  3.1  3.1     Estimated Creatinine Clearance: 51.7 mL/min (A) (by C-G formula based on SCr of 2.39 mg/dL (H)).      Results from last 7 days   Lab Units  12/16/18   0453  12/15/18   0424  12/14/18   0349  12/13/18   0804  12/12/18   2251  12/12/18   0754  12/11/18   2037   12/11/18   0605   12/10/18   0638   WBC 10*3/mm3  6.05  6.93  6.54  7.41   --   7.91   --    --   9.49   --   9.33   RBC 10*6/mm3  2.10*  2.32*  2.14*  2.35*   --   2.08*   --    --   1.92*   --   2.01*   HEMOGLOBIN g/dL  7.1*  7.9*  7.4*  8.0*  7.8*  7.3*  8.3*   < >  6.7*   < >  7.0*   HEMATOCRIT %  22.1*  23.1*  21.1*  24.6*  23.0*  21.9*  25.6*   < >  19.8*   < >  20.9*   MCV fL  105.2*  99.6*  98.6*  104.7*   --   105.3*   --    --   103.1*   --   104.0*   MCH pg  33.8*  34.1*   34.6*  34.0*   --   35.1*   --    --   34.9*   --   34.8*   MCHC g/dL  32.1*  34.2  35.1  32.5*   --   33.3   --    --   33.8   --   33.5   RDW %  20.2*  20.4*  20.9*  21.6*   --   22.0*   --    --   18.9*   --   19.6*   RDW-SD fl  76.8*  73.3*  73.8*  80.6*   --   81.6*   --    --   70.6*   --   73.9*   MPV fL  9.4  9.7  9.5  8.9   --   8.8   --    --   8.9   --   9.5   PLATELETS 10*3/mm3  67*  73*  66*  65*   --   71*   --    --   90*   --   88*   NEUTROPHIL % %  64.0  66.9  68.0  73.3   --   69.2   --    --   71.7   --   70.9   LYMPHOCYTE % %  14.0*  15.4*  17.0*  13.5*   --   15.7*   --    --   9.8*   --   10.3*   MONOCYTES % %  18.0*  14.4*  11.9  11.5   --   13.4*   --    --   16.9*   --   17.3*   EOSINOPHIL % %  2.5  2.3  2.0  1.2   --   1.1   --    --   0.9   --   0.9   BASOPHIL % %  1.5  1.0  1.1  0.5   --   0.6   --    --   0.7   --   0.6   IMM GRAN % %  0.0  0.3  0.3  0.0   --   0.0   --    --   0.3   --   0.3   NEUTROS ABS 10*3/mm3  3.87  4.63  4.45  5.43   --   5.47   --    --   6.80   --   6.62   LYMPHS ABS 10*3/mm3  0.85*  1.07  1.11  1.00   --   1.24   --    --   0.93   --   0.96   MONOS ABS 10*3/mm3  1.09  1.00  0.78  0.85   --   1.06   --    --   1.60*   --   1.61*   EOS ABS 10*3/mm3  0.15  0.16  0.13  0.09   --   0.09   --    --   0.09   --   0.08   BASOS ABS 10*3/mm3  0.09  0.07  0.07  0.04   --   0.05   --    --   0.07   --   0.06   IMMATURE GRANS (ABS) 10*3/mm3  0.00  0.02  0.02  0.00   --   0.00   --    --   0.03   --   0.03   NRBC /100 WBC  0.0  0.0   --    --    --    --    --    --   0.0   --    --     < > = values in this interval not displayed.                 Results from last 7 days   Lab Units  12/10/18   0644   TSH mIU/mL  2.230   FREE T4 ng/dL  1.51         Results from last 7 days   Lab Units  12/16/18   0453  12/15/18   0424  12/14/18   0349  12/13/18   0803  12/12/18   0754  12/11/18   0548  12/10/18   0638   INR   2.27*  2.03*  2.20*  2.09*  2.16*  2.12*  2.16*     Microbiology  Results (last 10 days)     Procedure Component Value - Date/Time    Respiratory Culture - Sputum, Cough [[id number]] Collected:  12/07/18 2011    Lab Status:  Final result Specimen:  Sputum from Cough Updated:  12/10/18 1117     Respiratory Culture Moderate growth (3+) Normal Respiratory Catrina     Gram Stain Mixed bacterial morphotypes seen on Gram Stain      No WBCs seen      No epithelial cells seen    MRSA Screen Culture - Swab, Nares [[id number]]  (Normal) Collected:  12/07/18 1540    Lab Status:  Final result Specimen:  Swab from Nares Updated:  12/09/18 1015     MRSA SCREEN CX No Methicillin Resistant Staphylococcus aureus isolated                albumin human 50 g Intravenous Q8H   citalopram 10 mg Oral Daily   guaiFENesin 600 mg Oral Q12H   hydrOXYzine 50 mg Oral Nightly   lactulose 20 g Oral BID   midodrine 10 mg Oral TID AC   octreotide 200 mcg Intravenous TID   pantoprazole 40 mg Oral QAM AC   sodium chloride 3 mL Intravenous Q12H          Diagnostics:  Ct Chest Without Contrast    Result Date: 12/7/2018  THORACIC CT SCAN WITHOUT CONTRAST  HISTORY: Shortness of breath  COMPARISON: None.  TECHNIQUE:  Radiation dose reduction techniques were utilized, including automated exposure control and exposure modulation based on body size. Axial images of the thorax obtained without IV contrast, per request.  FINDINGS: Respiratory motion degrades image quality. Linear and somewhat irregular areas of pulmonary parenchymal consolidation are present bilaterally, most abundant in the lung bases. These are favored to reflect areas of atelectasis although superimposed pneumonia cannot be entirely excluded. There is a small to moderate-sized right pleural effusion. There is a smaller left effusion. Neither appears loculated. Heart is enlarged with a moderate pericardial effusion. There is mild precarinal adenopathy, 1.8 cm on image 33. Right paratracheal adenopathy on image 32 measures 19 mm. Upper abdomen shows splenomegaly  measuring at least 19.5 cm. Mild abdominal ascites is noted along with generalized anasarca of the regional soft tissues.  Unenhanced images of the included upper abdomen are otherwise unremarkable.        1. Basilar predominant opacities favored to reflect atelectasis although pneumonia not excluded. 2. Right greater than left pleural effusions, moderate pericardial effusion. 3. Mild mediastinal adenopathy. 4. Homogeneous splenic enlargement.       .  This report was finalized on 12/7/2018 5:53 AM by Valentino Nguyen M.D.      Us Liver    Result Date: 12/5/2018  RIGHT UPPER QUADRANT ULTRASOUND  HISTORY:  Alcoholic hepatitis.  COMPARISON: CT abdomen and pelvis with IV contrast 06/21/2018, ultrasound gallbladder 06/21/2018.  FINDINGS: The liver is hyperechoic consistent with fat infiltration. Within the region of the gallbladder fundus there is soft tissue echogenicity which does not shadow. This appears rounded and occupies the majority of the lumen of the mid to proximal gallbladder and has the appearance of a soft tissue mass but could represent tumefactive sludge. This measures 4.1 x 2.5 x 3.3 cm. There appears to be blood flow within this. There is also sludge within the gallbladder layering dependently. Common bile duct measures 3 mm in diameter. The visualized portions of the pancreas are within normal limits.  The right kidney measures 12.9 cm in length and there is no hydronephrosis. There is minimal ascites with perihepatic fluid.      1. Within the gallbladder, there is a rounded area of soft tissue echogenicity that appears to contain flow and is suspicious for a gallbladder mass. Tumefactive sludge is also a possibility and there is sludge within the gallbladder. This finding represents a change compared to the previous ultrasound of the gallbladder 06/21/2018. Attempt at further evaluation could be performed with a CT or MRI if cholecystectomy is not performed. There is no biliary duct dilatation. 2. Mild  ascites/perihepatic fluid. 3. Diffuse fat infiltration of the liver.  Discussed with Emerald White MD in the morning on 12/5/2018.  This report was finalized on 12/5/2018 9:49 AM by Dr. Braydon Simental M.D.      Xr Chest 1 View    Result Date: 12/7/2018  XR CHEST 1 VW-  HISTORY: Male who is 48 years-old,  possible pneumonia  TECHNIQUE: Frontal view of the chest  COMPARISON: 12/5/2018  FINDINGS: Heart is enlarged. Pulmonary vasculature is congested. Persistent infiltrate/atelectasis in the mid to lower lungs, similar to prior exam. Left pleural effusion, appears increased; right pleural effusion appears decreased. No pneumothorax. No acute osseous process.      Persistent mid to lower lung infiltrate/atelectasis, with increased pleural effusion apparent on the left, continued follow-up recommended. Cardiomegaly, pulmonary vascular congestion.  This report was finalized on 12/7/2018 5:58 PM by Dr. Rojas Dubon M.D.      Xr Chest 1 View    Result Date: 12/5/2018  XR CHEST 1 VW-  HISTORY: Male who is 48 years-old,  cough  TECHNIQUE:     COMPARISON: 10/27/2018  FINDINGS: Heart is enlarged. Mild pulmonary vascular congestion. Patchy densities in the mid to lower lungs appear similar to prior exam, could reflect residual or recurrent infiltrate and/or chronic changes of the lungs, follow-up recommended to characterize resolution/stability, CT could be considered for further evaluation if indicated. Mild left pleural effusion appears increased, minimal right pleural effusion is apparent. No pneumothorax. No acute osseous process.       Persistent patchy densities in the mid to lower lungs. Increased left more than right pleural effusions. Cardiomegaly with persistent mild pulmonary vascular congestion. Continued follow-up/further evaluation recommended as indicated.     This report was finalized on 12/5/2018 1:49 PM by Dr. Rojas Dubon M.D.      Mri Abdomen Wo Contrast Mrcp    Result Date: 12/12/2018  MR ABDOMEN  WITHOUT CONTRAST, MRCP  HISTORY: Abnormal gallbladder seen on recent ultrasound. Jaundice.  TECHNIQUE: MRI of the abdomen was performed utilizing a MRCP protocol. Axial 2-D FIESTA, in and out of phase FSPGR, axial fat-saturated T2 imaging was acquired. Thick slab coronal MRCP imaging was acquired in multiple obliquities through the biliary tree. Coronal thin section fat saturated T2 and 3-D MRCP imaging was acquired..  COMPARISON: Ultrasound of the liver from 12/03/2018.  FINDINGS: Bilateral small to moderate size layering pleural effusions with bibasilar atelectasis is seen.  There is small abdominal ascites identified. The liver demonstrates prominent periportal hypointense signal on both T1 and T2-weighted images. The signal is most pronounced on the out of phase T1-weighted images. No intrahepatic biliary dilatation. The spleen is enlarged measuring approximately 17.7 cm in craniocaudal dimension. The pancreas is normal without ductal dilatation. The gallbladder demonstrates cholelithiasis. The visualized kidneys are normal. Diffuse anasarca is present.      This exam is limited by absence of contrast. 1. The liver demonstrates periportal low signal intensity on both T1 and T2-weighted images that is pronounced on the out of phase images suggestive presence of fat in the periportal region. These findings may be suggestive of a chronic liver disease. Primary biliary cirrhosis and alcoholic hepatitis are on the differential. 2. Splenomegaly and small ascites. Bilateral small to moderate size layering pleural effusions with bibasilar atelectasis. 2. Limited evaluation of the gallbladder secondary to absence of contrast, however cholelithiasis and gallstones are identified on this current study.  This report was finalized on 12/12/2018 1:33 PM by Dr. Stephanie Schulz M.D.      Us Renal Bilateral    Result Date: 12/6/2018  BILATERAL RENAL SONOGRAM  HISTORY: Acute renal insufficiency.  TECHNIQUE: Bilateral renal  sonogram was performed in standard fashion. This is correlated with noncontrast chest CT performed yesterday and an abdomen and pelvis CT 06/21/2018.  FINDINGS: The right kidney measures 12.3 x 4.6 x 6.7 cm and the left kidney measures 12.5 x 5.7 x 5.3 cm. There is no hydronephrosis. No renal parenchymal lesion is identified. Urinary bladder is decompressed. There is some ascites in the pelvis and in the right upper quadrant.      Ascites. Otherwise negative renal sonogram. There is no evidence of obstruction.  This report was finalized on 12/6/2018 8:05 PM by Dr. Brannon Cruz M.D.      Xr Chest Pa & Lateral    Result Date: 12/10/2018  PA AND LATERAL CHEST  CLINICAL HISTORY: Follow-up pneumonia. Dyspnea.  Compared to the previous chest x-ray dated 12/7/2018.  Again seen are patchy areas of atelectasis and infiltrate in both lower lung zones appear largely unchanged. The lungs are slightly better inflated than on the previous chest x-ray. Small bilateral pleural effusions are also unchanged. There is moderate cardiomegaly. The pulmonary vasculature is within normal limits.  IMPRESSIONS: Patchy atelectasis and infiltrate in both lower lung zones and bilateral pleural effusions showing no significant change since 12/7/2018.  This report was finalized on 12/10/2018 8:38 AM by Dr. Jens Song M.D.      Results for orders placed during the hospital encounter of 12/05/18   Adult Transthoracic Echo Limited W/ Cont if Necessary Per Protocol    Narrative · Left ventricular systolic function is normal. Estimated EF = 70%.  · Right ventricular cavity is moderately dilated.  · Right atrial cavity size is moderately dilated.  · Moderate tricuspid valve regurgitation is present.  · Estimated right ventricular systolic pressure from tricuspid   regurgitation is mildly elevated (35-45 mmHg).  · There is a small (<1cm) pericardial effusion.              Active Hospital Problems    Diagnosis Date Noted   • **Symptomatic anemia  [D64.9] 12/05/2018   • Pneumonia [J18.9] 12/08/2018   • Rhinovirus infection [B34.8] 12/06/2018   • Pericardial effusion [I31.3] 12/06/2018   • LÓPEZ (acute kidney injury) (CMS/HCC) [N17.9] 12/05/2018   • Anasarca [R60.1] 12/05/2018   • Cough [R05] 12/05/2018   • Epistaxis [R04.0] 12/05/2018   • Hemoptysis [R04.2] 12/05/2018   • Hyponatremia [E87.1] 10/25/2018   • Esophageal varices (CMS/HCC) [I85.00] 10/24/2018   • Elevated liver function tests [R94.5] 06/22/2018   • Alcoholic hepatitis without ascites [K70.10] 06/22/2018      Resolved Hospital Problems   No resolved problems to display.         Assessment/Plan     1. Pneumonia has completed antibiotic course  2. Hemoptysis secondary to pneumonia and coagulopathy and thrombocytopenia.  Aside from blood product transfusions if hemoptysis gets more severe there is little that we can offer.  With his volume overload I would not want to give him blood products at this point he is not bleeding severely and they would only last a short period of time and could worsen his volume overload situation.  Really sounds like his bleeding is under control we will see on a when necessary basis  3. Coagulopathy secondary to liver disease  4. Acute alcoholic hepatitis with possible Cirrhosis and hyperbilirubinemia  5. Bilateral pleural effusions question hepatic hydrothorax diagnoses with anasarca and liver disease  6. Obesity  7. Rhinovirus infection  8. Acute kidney injury nephrology following concerned about hepatorenal syndrome  9. Anemia acute blood loss  10. Thrombocytopenia,  11.  esophageal varices  12. Hyponatremia needs to be monitored.    I will see when necessary please don't hesitate to call if he has further problems thank you  Plan for disposition:    Margarito García MD  12/16/18  7:07 PM    Time:

## 2018-12-17 NOTE — PLAN OF CARE
Problem: Patient Care Overview  Goal: Plan of Care Review  Outcome: Ongoing (interventions implemented as appropriate)   12/17/18 3879   Coping/Psychosocial   Plan of Care Reviewed With patient   Plan of Care Review   Progress no change   OTHER   Outcome Summary pt vss, complains of bilateral lower leg pain. roxicodone given. some intermittent nausea with meals, zofran given. pt resting wel.. iv albumin given q8 hours. will continue to monitor.      Goal: Individualization and Mutuality  Outcome: Ongoing (interventions implemented as appropriate)    Goal: Discharge Needs Assessment  Outcome: Ongoing (interventions implemented as appropriate)      Problem: Anemia (Adult)  Goal: Symptom Improvement  Outcome: Ongoing (interventions implemented as appropriate)      Problem: Fall Risk (Adult)  Goal: Absence of Fall  Outcome: Ongoing (interventions implemented as appropriate)      Problem: Renal Failure/Kidney Injury, Acute (Adult)  Goal: Signs and Symptoms of Listed Potential Problems Will be Absent, Minimized or Managed (Renal Failure/Kidney Injury, Acute)  Outcome: Ongoing (interventions implemented as appropriate)      Problem: Fluid Volume Excess (Adult)  Goal: Optimal Fluid Balance  Outcome: Ongoing (interventions implemented as appropriate)

## 2018-12-17 NOTE — PROGRESS NOTES
LOS: 12 days     Name: Enrique Ramirez  Age/Sex: 48 y.o. male  :  1970        PCP: Cydney Chase MD    Subjective   Feels ok today, better after a shower but still with lots of edema,  complaining of a busted blood vessel in his left eye.  General: No Fever or Chills, Cardiac: No Chest Pain or Palpitations, Resp: No Cough or SOA, GI: No Nausea, Vomiting, or Diarrhea and Other: No bleeding      albumin human 50 g Intravenous Q8H   citalopram 10 mg Oral Daily   guaiFENesin 600 mg Oral Q12H   hydrOXYzine 50 mg Oral Nightly   lactulose 20 g Oral BID   midodrine 10 mg Oral TID AC   pantoprazole 40 mg Oral QAM AC   sodium chloride 3 mL Intravenous Q12H          Objective   Vital Signs  Temp:  [97.1 °F (36.2 °C)-98 °F (36.7 °C)] 97.2 °F (36.2 °C)  Heart Rate:  [66-82] 73  Resp:  [16-18] 18  BP: ()/(48-85) 103/85  Body mass index is 35.59 kg/m².    Intake/Output Summary (Last 24 hours) at 2018 0828  Last data filed at 2018 0547  Gross per 24 hour   Intake 1460 ml   Output 105 ml   Net 1355 ml       Physical Exam   Constitutional: He appears well-developed and well-nourished. No distress.   HENT:   Head: Normocephalic and atraumatic.   Eyes: EOM are normal. Scleral icterus is present.   Neck: Normal range of motion. Neck supple.   Cardiovascular: Normal rate and regular rhythm.   Pulmonary/Chest: Effort normal and breath sounds normal. No respiratory distress.   Abdominal: Soft. Bowel sounds are normal. He exhibits distension.   Musculoskeletal: He exhibits edema.   Skin: Skin is warm and dry. Capillary refill takes less than 2 seconds. Rash noted.   jaundice   Psychiatric: He has a normal mood and affect. His behavior is normal.         Results Review:       I reviewed the patient's new clinical results.  Results from last 7 days   Lab Units  18   0739  18   0453  12/15/18   0424  18   0349  18   0804  18   2251  18   0754   18   0605   WBC 10*3/mm3   6.12  6.05  6.93  6.54  7.41   --   7.91   --   9.49   HEMOGLOBIN g/dL  9.0*  7.1*  7.9*  7.4*  8.0*  7.8*  7.3*   < >  6.7*   PLATELETS 10*3/mm3  68*  67*  73*  66*  65*   --   71*   --   90*    < > = values in this interval not displayed.     Results from last 7 days   Lab Units  12/16/18   0453  12/15/18   0424  12/14/18   0349  12/13/18   0803  12/12/18   0754  12/11/18   0548   SODIUM mmol/L  134*  130*  131*  132*  131*  131*   POTASSIUM mmol/L  3.6  3.6  3.6  4.0  3.6  3.5   CHLORIDE mmol/L  92*  91*  92*  91*  91*  92*   CO2 mmol/L  22.8  22.4  22.9  24.1  24.6  24.0   BUN mg/dL  41*  36*  34*  34*  31*  33*   CREATININE mg/dL  2.39*  1.92*  1.61*  1.56*  1.50*  1.45*   CALCIUM mg/dL  9.7  10.1  9.3  9.5  9.0  9.1   MAGNESIUM mg/dL   --    --    --   1.5*   --    --    PHOSPHORUS mg/dL   --    --    --   3.3   --    --    Estimated Creatinine Clearance: 51.5 mL/min (A) (by C-G formula based on SCr of 2.39 mg/dL (H)).  Results from last 7 days   Lab Units  12/16/18   0453  12/15/18   0424  12/14/18   0349   ALK PHOS U/L  71  77  70   BILIRUBIN mg/dL  8.5*  9.6*  9.4*   ALT (SGPT) U/L  22  23  24   AST (SGOT) U/L  31  36  38     Results from last 7 days   Lab Units  12/17/18   0739  12/16/18   0453  12/15/18   0424   INR   2.15*  2.27*  2.03*     Lab Results   Component Value Date    HGBA1C <4.30 (L) 11/14/2018   No results found for: POCGLU    Assessment/Plan     Symptomatic anemia    Elevated liver function tests    Alcoholic hepatitis without ascites    Esophageal varices (CMS/HCC)    Hyponatremia    LÓPEZ (acute kidney injury) (CMS/HCC)    Anasarca    Cough    Epistaxis    Hemoptysis    Rhinovirus infection    Pericardial effusion    Pneumonia      PLAN  Pneumonia  - He has now completed antibiotics at this point.  Pulmonology has signed off.     Symptomatic Anemia  - S/p 2 units PRBCs on 12/5 and 12/11and 12/16  - monitor Hgb and transfuse as needed  - hgb up to 9.0 this AM, FU in  AM     Hemoptysis  - Improved overall  - 2/2 liver disease coagulopathy, TCP and PNA  - No need for bronch currently, per pulm     LÓPEZ  - Placed on a bumex drip now dc'd, CR up again today despite holding diuretics and giving blood  - Still with lots of edema  - Unable to tolerate the octreotide and DC'd  - MAP is acceptable  - Nephrology following     Pericardial Effusion/Pleural Effusion  - Apparent on CT chest, not present on echocardiogram in October  - Echocardiogram with tiny effusion not able to be tapped, no tamponade  - Repeat ECHO 12/10/18 showed subcentimeter effusion and cardiology has signed off  - Pleural effusion is stable monitor for now ? Hepatic hydrothorax     EtOH Hepatitis  - possible secondary cirrhosis  - nadolol, lasix, and spironolactone held  - RUQ u/s revealing GB mass.  MRCP without contrast showed nothing acute.  - Has completed a course of steroids with no significant change in TB     DVT Prophylaxis  - SCDs  - BLE doppler negative      >30mins spent with more than 50% in conseling and coordination regarding diagnosis and prognosis  He is in a very tough spot presently.  His creatinine continues to rise despite our best attempts at supportive care.  He was unable to tolerate the octreotide his blood pressures are currently acceptable.  If his kidneys continued to worsen his prognosis acutely worsens as well.  Spent a good deal of time discussing with the patient today how sick he was in with the expected course would be.  He is reiterated multiple times that he just wants to go home if he is going to die he rather die there.  I explained to him today that if he were to leave the hospital right now to be unclear what would happen at home.  At this point he is agreeable to continuing medical therapy for another day or 2 before deciding on further steps of care.    Disposition  To be determined    Joe Hernández MD  [facility]  12/17/18  8:28 AM

## 2018-12-17 NOTE — PLAN OF CARE
Problem: Patient Care Overview  Goal: Plan of Care Review  Outcome: Ongoing (interventions implemented as appropriate)   12/17/18 0154   Coping/Psychosocial   Plan of Care Reviewed With patient   Plan of Care Review   Progress no change   OTHER   Outcome Summary Pt received 2 units PRBC's - tolorated well. Received albumin-tolorated well. Received Octreotide - did not tolorate - caused vomiting. Contacted MD - D/C'd octreotide - will address futher in the am. Pt rested will throughout the night. VSS - will continue to monitor. 12/17/18        Problem: Anemia (Adult)  Goal: Symptom Improvement  Outcome: Ongoing (interventions implemented as appropriate)      Problem: Fall Risk (Adult)  Goal: Absence of Fall  Outcome: Ongoing (interventions implemented as appropriate)      Problem: Renal Failure/Kidney Injury, Acute (Adult)  Goal: Signs and Symptoms of Listed Potential Problems Will be Absent, Minimized or Managed (Renal Failure/Kidney Injury, Acute)  Outcome: Ongoing (interventions implemented as appropriate)      Problem: Fluid Volume Excess (Adult)  Goal: Optimal Fluid Balance  Outcome: Ongoing (interventions implemented as appropriate)

## 2018-12-17 NOTE — PROGRESS NOTES
[facility]  Inpatient Progress Note    Reason for Follow Up:  Alcohol-induced liver disease          Subjective     Interval History:   Octreotide d/c'd due to vomiting.  He is eating.  No further hemoptysis.  Moving bowels.  Abdomen and legs painful due to edema    Current Facility-Administered Medications:   •  albumin human 25 % IV SOLN 50 g, 50 g, Intravenous, Q8H, Erin Bellamy MD, 50 g at 12/17/18 0032  •  benzonatate (TESSALON) capsule 100 mg, 100 mg, Oral, TID PRN, Enrique Obrien MD  •  bisacodyl (DULCOLAX) EC tablet 5 mg, 5 mg, Oral, Daily PRN, Valentino Valdez MD  •  calcium carbonate (TUMS) chewable tablet 500 mg (200 mg elemental), 2 tablet, Oral, BID PRN, Valentino Valdez MD  •  citalopram (CeleXA) tablet 10 mg, 10 mg, Oral, Daily, Michele Dixon MD, 10 mg at 12/17/18 0822  •  guaiFENesin (MUCINEX) 12 hr tablet 600 mg, 600 mg, Oral, Q12H, Valentino Valdez MD, 600 mg at 12/17/18 0822  •  hydrOXYzine (ATARAX) tablet 50 mg, 50 mg, Oral, Nightly, Emerald White MD, 50 mg at 12/16/18 2104  •  ipratropium-albuterol (DUO-NEB) nebulizer solution 3 mL, 3 mL, Nebulization, Q4H PRN, Michele Dixon MD  •  lactulose (CHRONULAC) 10 GM/15ML solution 20 g, 20 g, Oral, BID, Valentino Valdez MD, 20 g at 12/17/18 0822  •  midodrine (PROAMATINE) tablet 10 mg, 10 mg, Oral, TID AC, Emerald White MD, 10 mg at 12/17/18 0822  •  nitroglycerin (NITROSTAT) SL tablet 0.4 mg, 0.4 mg, Sublingual, Q5 Min PRN, Valentino Valdez MD  •  ondansetron (ZOFRAN) tablet 4 mg, 4 mg, Oral, Q6H PRN **OR** ondansetron ODT (ZOFRAN-ODT) disintegrating tablet 4 mg, 4 mg, Oral, Q6H PRN **OR** ondansetron (ZOFRAN) injection 4 mg, 4 mg, Intravenous, Q6H PRN, Valentino Valdez MD  •  oxyCODONE (ROXICODONE) immediate release tablet 10 mg, 10 mg, Oral, Q4H PRN, Joe Hernández MD, 10 mg at 12/17/18 0822  •  pantoprazole (PROTONIX) EC tablet 40 mg, 40 mg, Oral, QAM DEISI, Courtney,  Valentino RAMON MD, 40 mg at 12/17/18 0822  •  sodium chloride (OCEAN) nasal spray 2 spray, 2 spray, Each Nare, PRN, Ozzie Painter MD  •  [COMPLETED] Insert peripheral IV, , , Once **AND** sodium chloride 0.9 % flush 10 mL, 10 mL, Intravenous, PRN, Gurinder Burnett MD  •  sodium chloride 0.9 % flush 3 mL, 3 mL, Intravenous, Q12H, Valentino Valdez MD, 3 mL at 12/17/18 0830  •  sodium chloride 0.9 % flush 3-10 mL, 3-10 mL, Intravenous, PRN, Valentino Valdez MD  Review of Systems:    All systems were reviewed and negative except for:  Cardiovascular: positive for  lower extremity edema  Gastrointestinal: positive for  pain    Objective     Vital Signs  Temp:  [97.1 °F (36.2 °C)-98 °F (36.7 °C)] 97.2 °F (36.2 °C)  Heart Rate:  [66-82] 73  Resp:  [16-18] 18  BP: (101-117)/(52-85) 103/85  Body mass index is 35.59 kg/m².    Intake/Output Summary (Last 24 hours) at 12/17/2018 0902  Last data filed at 12/17/2018 0547  Gross per 24 hour   Intake 1460 ml   Output 105 ml   Net 1355 ml     No intake/output data recorded.     Physical Exam:   General: patient awake, alert and cooperative   Eyes: Normal lids and lashes, +scleral icterus   Neck: supple, normal ROM   Skin: warm and dry, jaundiced   Abdomen: soft, mild diffuse tenderness,  nondistended    Rectal: deferred   Extremities: 2-3+ edema bilat   Psychiatric: Normal mood and behavior; memory intact     Results Review:     I reviewed the patient's new clinical results.    Results from last 7 days   Lab Units  12/17/18   0739  12/16/18   0453  12/15/18   0424   WBC 10*3/mm3  6.12  6.05  6.93   HEMOGLOBIN g/dL  9.0*  7.1*  7.9*   HEMATOCRIT %  26.8*  22.1*  23.1*   PLATELETS 10*3/mm3  68*  67*  73*     Results from last 7 days   Lab Units  12/17/18   0739  12/16/18   0453  12/15/18   0424   SODIUM mmol/L  133*  134*  130*   POTASSIUM mmol/L  3.7  3.6  3.6   CHLORIDE mmol/L  90*  92*  91*   CO2 mmol/L  23.8  22.8  22.4   BUN mg/dL  44*  41*  36*   CREATININE mg/dL  3.08*   2.39*  1.92*   CALCIUM mg/dL  10.6*  9.7  10.1   BILIRUBIN mg/dL  10.1*  8.5*  9.6*   ALK PHOS U/L  74  71  77   ALT (SGPT) U/L  19  22  23   AST (SGOT) U/L  34  31  36   GLUCOSE mg/dL  115*  111*  81     Results from last 7 days   Lab Units  12/17/18   0739  12/16/18   0453  12/15/18   0424   INR   2.15*  2.27*  2.03*     Lab Results   Lab Value Date/Time    LIPASE 51 12/05/2018 1306    LIPASE 53 11/28/2018 1101    LIPASE 38 10/24/2018 1439    LIPASE 45 06/21/2018 1906       Radiology:  MRI abdomen wo contrast mrcp   Final Result   This exam is limited by absence of contrast.   1. The liver demonstrates periportal low signal intensity on both T1 and   T2-weighted images that is pronounced on the out of phase images   suggestive presence of fat in the periportal region. These findings may   be suggestive of a chronic liver disease. Primary biliary cirrhosis and   alcoholic hepatitis are on the differential.   2. Splenomegaly and small ascites. Bilateral small to moderate size   layering pleural effusions with bibasilar atelectasis.   2. Limited evaluation of the gallbladder secondary to absence of   contrast, however cholelithiasis and gallstones are identified on this   current study.       This report was finalized on 12/12/2018 1:33 PM by Dr. Stephanie Schulz M.D.          XR Chest PA & Lateral   Final Result      XR Chest 1 View   Final Result   Persistent mid to lower lung infiltrate/atelectasis, with   increased pleural effusion apparent on the left, continued follow-up   recommended. Cardiomegaly, pulmonary vascular congestion.       This report was finalized on 12/7/2018 5:58 PM by Dr. Rojas Dubon M.D.          CT Chest Without Contrast   Final Result   1. Basilar predominant opacities favored to reflect atelectasis although   pneumonia not excluded.   2. Right greater than left pleural effusions, moderate pericardial   effusion.   3. Mild mediastinal adenopathy.   4. Homogeneous splenic  enlargement.                           .        This report was finalized on 12/7/2018 5:53 AM by Valentino Nguyen M.D.          US Renal Bilateral   Final Result   Ascites. Otherwise negative renal sonogram. There is no   evidence of obstruction.       This report was finalized on 12/6/2018 8:05 PM by Dr. Brannon Cruz M.D.          XR Chest 1 View   Final Result   Persistent patchy densities in the mid to lower lungs. Increased left   more than right pleural effusions. Cardiomegaly with persistent mild   pulmonary vascular congestion. Continued follow-up/further evaluation   recommended as indicated.                   This report was finalized on 12/5/2018 1:49 PM by Dr. Rojas Dubon M.D.              Assessment/Plan     Patient Active Problem List   Diagnosis   • Acute upper GI bleed   • Gout   • Pre-diabetes   • Cholelithiasis   • Elevated liver function tests   • Alcohol abuse   • Alcoholic hepatitis without ascites   • Thrombocytopenia (CMS/HCC)   • Esophagitis   • Folate deficiency   • Acute anemia   • Esophageal varices (CMS/HCC)   • Hepatitis, alcoholic   • Ascites   • Portal hypertension (CMS/HCC)   • Hyponatremia   • Symptomatic anemia   • LÓPEZ (acute kidney injury) (CMS/HCC)   • Anasarca   • Cough   • Epistaxis   • Hemoptysis   • Rhinovirus infection   • Pericardial effusion   • Pneumonia     Assessment/Recommendations:  1) Alcoholic liver disease. He claims no ETOH in 2 mos.  He has completed full course of oral steroids with relatively minimal change in his TB.  Continue lactulose.  Transplant evaluation would be okay in 4 months.  2) 1+ esophageal varices seen on 6/18 EGD  3) Hemoptysis - Pulmonary is following.   4) Abnormal gallbladder on recent US. MRCP with stones/sludge, no concerning findings  5) Anemia - likely secondary to #3.   6) LÓPEZ - worsening      Plan:  - worsening kidney function is concerning - could not tolerate octreotide  - hemoglobin improved after transfusion  - continue  to follow liver function      I discussed the patients findings and my recommendations with patient and family.    Tracie Cleveland MD

## 2018-12-17 NOTE — PAYOR COMM NOTE
"Enrique Ramirez (48 y.o. Male)                   ATTENTION;   CONTINUED STAY CLINICALS CASE REF [id number], REPLY TO [place] DEPT, [place] [facility]  OR UR  [phone]           Date of Birth Social Security Number Address Home Phone MRN    1970  [id number] [id number] [phone] [id number]    [facility] Mar[facility] Status          [place]        Admission Date Admission Type Admitting Provider Attending Provider Department, Room/Bed    12/5/18 Emergency Valentino Valdez MD Richards, Stephen J, MD [address], [address]/1    Discharge Date Discharge Disposition Discharge Destination                       Attending Provider:  Joe Hernández MD    Allergies:  Zinc    Isolation:  Droplet   Infection:  Rhinovirus  (12/05/18)   Code Status:  CPR    Ht:  182.9 cm (72\")   Wt:  119 kg (262 lb 6.4 oz)    Admission Cmt:  None   Principal Problem:  Symptomatic anemia [D64.9]                 Active Insurance as of 12/5/2018     Primary Coverage     Payor Plan Insurance Group Employer/Plan Group    [address] [employer] PPO [id number]     Payor Plan Address Payor Plan Phone Number Payor Plan Fax Number Effective Dates    [address] [phone]  4/16/2017 - None Entered    [place] [address]       Subscriber Name Subscriber Birth Date Member ID       RUSSELL WALKER 12/17/1977 [id number]                 Emergency Contacts      (Rel.) Home Phone Work Phone Mobile Phone    Russell Ramirez (Spouse) [phone] -- --            Lines, Drains & Airways    Active LDAs     Name:   Placement date:   Placement time:   Site:   Days:    Peripheral IV 12/05/18 1643 Right Wrist   12/05/18    1643    Wrist   11    Peripheral IV 12/14/18 2353 Anterior;Distal;Left Forearm   12/14/18    2353    Forearm   2              Michele Dixon MD   Physician   Medicine   Progress Notes   Signed   Date of " "Service:  12/15/2018 12:03 PM               Signed        Expand All Collapse All          Show:Clear all  [x]Manual[x]Template[x]Copied    Added by:  [x]Michele Dixon MD      []Leanne for details       LOS: 10 days      Name: Enrique Ramirez  Age: 48 y.o.  Sex: male  :  1970  MRN: [id number]         Primary Care Physician: Cydney Chase MD        Subjective      Subjective  Coughed up a small amount of blood-tinged sputum this morning but resolved quickly.  Feels as if his legs are not quite as tight today.          Objective      Vital Signs  Temp:  [97.5 °F (36.4 °C)-98.4 °F (36.9 °C)] 98 °F (36.7 °C)  Heart Rate:  [74-89] 82  Resp:  [18-20] 18  BP: ()/(51-55) 108/54  Body mass index is 35.4 kg/m².     Objective:  General Appearance:  Comfortable, in no acute distress and ill-appearing.    Vital signs: (most recent): Blood pressure 108/54, pulse 82, temperature 98 °F (36.7 °C), temperature source Oral, resp. rate 18, height 182.9 cm (72\"), weight 118 kg (261 lb), SpO2 93 %.    HEENT: (Scleral icterus)    Lungs:  Normal effort and normal respiratory rate.    Heart: Normal rate.  Regular rhythm.    Abdomen: Abdomen is soft.  Bowel sounds are normal.   There is no abdominal tenderness.     Extremities: There is dependent edema.  There is no local swelling.  (3+ pitting edema of the bilateral lower extremities as well as the dependent portions of the torso)  Neurological: Patient is alert and oriented to person, place and time.    Skin:  Warm and dry.  (Diffusely jaundiced)                 Results Review:       I reviewed the patient's new clinical results.                     Results from last 7 days   Lab Units  12/15/18   0424  18   0349  18   0804  18   2251  18   0754  18   2037  18   0824  18   0605    12/10/18   0638    18   0627   WBC 10*3/mm3  6.93  6.54  7.41   --   7.91   --    --   9.49   --   9.33   --   11.31*   HEMOGLOBIN g/dL  " 7.9*  7.4*  8.0*  7.8*  7.3*  8.3*  6.8*  6.7*   < >  7.0*   < >  7.5*   PLATELETS 10*3/mm3  73*  66*  65*   --   71*   --    --   90*   --   88*   --   101*    < > = values in this interval not displayed.                 Results from last 7 days   Lab Units  12/15/18   0424  12/14/18   0349  12/13/18   0803  12/12/18   0754  12/11/18   0548  12/10/18   0644  12/09/18   0627   SODIUM mmol/L  130*  131*  132*  131*  131*  128*  129*   POTASSIUM mmol/L  3.6  3.6  4.0  3.6  3.5  3.5  4.2   CHLORIDE mmol/L  91*  92*  91*  91*  92*  91*  91*   CO2 mmol/L  22.4  22.9  24.1  24.6  24.0  21.7*  20.3*   BUN mg/dL  36*  34*  34*  31*  33*  37*  42*   CREATININE mg/dL  1.92*  1.61*  1.56*  1.50*  1.45*  1.82*  2.19*   CALCIUM mg/dL  10.1  9.3  9.5  9.0  9.1  9.3  9.7   GLUCOSE mg/dL  81  89  131*  115*  109*  118*  119*                 Results from last 7 days   Lab Units  12/15/18   0424  12/14/18   0349  12/13/18   0803  12/12/18   0754  12/11/18   0548  12/10/18   0638  12/09/18   0627   INR    2.03*  2.20*  2.09*  2.16*  2.12*  2.16*  2.02*         Scheduled Meds:      albumin human 25 g Intravenous Q8H   bumetanide 1 mg Intravenous Once   citalopram 10 mg Oral Daily   guaiFENesin 600 mg Oral Q12H   hydrOXYzine 50 mg Oral Nightly   lactulose 20 g Oral BID   midodrine 10 mg Oral TID AC   pantoprazole 40 mg Oral QAM AC   sodium chloride 3 mL Intravenous Q12H      PRN Meds:   benzonatate  •  bisacodyl  •  calcium carbonate  •  ipratropium-albuterol  •  nitroglycerin  •  ondansetron **OR** ondansetron ODT **OR** ondansetron  •  oxyCODONE  •  sodium chloride  •  [COMPLETED] Insert peripheral IV **AND** sodium chloride  •  sodium chloride  •  zolpidem  Continuous Infusions:     bumetanide 0.5 mg/hr Last Rate: 0.5 mg/hr (12/15/18 0704)              Assessment/Plan           Active Hospital Problems     Diagnosis Date Noted   • **Symptomatic anemia [D64.9] 12/05/2018   • Pneumonia [J18.9] 12/08/2018   • Rhinovirus infection  [B34.8] 12/06/2018   • Pericardial effusion [I31.3] 12/06/2018   • LÓPEZ (acute kidney injury) (CMS/HCC) [N17.9] 12/05/2018   • Anasarca [R60.1] 12/05/2018   • Cough [R05] 12/05/2018   • Epistaxis [R04.0] 12/05/2018   • Hemoptysis [R04.2] 12/05/2018   • Hyponatremia [E87.1] 10/25/2018   • Esophageal varices (CMS/HCC) [I85.00] 10/24/2018   • Elevated liver function tests [R94.5] 06/22/2018   • Alcoholic hepatitis without ascites [K70.10] 06/22/2018       Resolved Hospital Problems   No resolved problems to display.         Assessment & Plan     Pneumonia  - He has now completed antibiotics at this point.  Pulmonology following.     Symptomatic Anemia  - S/p 2 units PRBCs on 12/5 and 12/11  - monitor Hgb and transfuse as needed     Hemoptysis  - Improved overall  - 2/2 liver disease coagulopathy, TCP and PNA  - No need for bronch currently, per pulm     LÓPEZ  - Placed on a bumex drip yesterday  - Swelling may be slightly better today but overall still extreme     Pericardial Effusion  - Apparent on CT chest, not present on echocardiogram in October  - Echocardiogram with tiny effusion not able to be tapped, no tamponade  - Repeat ECHO 12/10/18 showed subcentimeter effusion and cardiology has signed off     EtOH Hepatitis  - possible secondary cirrhosis  -nadolol, lasix, and spironolactone held  - RUQ u/s revealing GB mass.  MRCP without contrast showed nothing acute.  - Has completed a course of steroids with no significant change in TB     DVT Prophylaxis  - SCDs  - BLE doppler negative     He met with palliative care on 12/13 and is considering his options.  For now he is agreeable to remaining in the hospital to continue to work on diuresis and stabilization of renal function as well as monitoring of his anemia.        Michele Dixon MD  [facility]  12/15/18  12:03 PM                          Erin Bellamy MD   Physician   Nephrology   Progress Notes   Signed   Date of Service:   12/15/2018  4:27 PM               Signed        Expand All Collapse All          Show:Clear all  [x]Manual[x]Template[x]Copied    Added by:  [x]Erin Bellamy MD      []Leanne for details                                                      NEPHROLOGY PROGRESS NOTE     PATIENT IDENTIFICATION:              Name:  Enrique Ramirez                                                                       MRN:  [id number]                           48 y.o.  male                                                                      Reason for visit: LÓPEZ     SUBJECTIVE:                 Seen and examined.  Laying in bed. Very swollen.. Wants to have more quality of life. Swelling is about the same or better slightly.      OBJECTIVE:  Vitals          Vitals:     12/15/18 0600 12/15/18 0703 12/15/18 0842 12/15/18 1411   BP:   102/55 108/54 103/46   BP Location:   Right arm Left arm Left arm   Patient Position:   Sitting Lying Lying   Pulse:   89 82 75   Resp:     18 18   Temp:     98 °F (36.7 °C) 98.1 °F (36.7 °C)   TempSrc:     Oral Oral   SpO2:           Weight: 118 kg (261 lb)         Height:                                                       Body mass index is 35.4 kg/m².     Intake/Output Summary (Last 24 hours) at 12/15/2018 1627  Last data filed at 12/15/2018 0753      Gross per 24 hour   Intake 810 ml   Output 1350 ml   Net -540 ml           Wt Readings from Last 1 Encounters:   12/15/18 0600 118 kg (261 lb)   12/14/18 0636 119 kg (262 lb)   12/13/18 0525 118 kg (260 lb 8 oz)   12/12/18 0616 119 kg (263 lb)   12/11/18 0612 120 kg (264 lb)   12/10/18 0730 127 kg (279 lb)   12/08/18 0618 127 kg (279 lb)   12/07/18 0530 125 kg (276 lb)   12/06/18 1457 121 kg (267 lb)   12/06/18 0600 121 kg (267 lb 8 oz)   12/05/18 1814 124 kg (273 lb)   12/05/18 1238 119 kg (262 lb 6.4 oz)          Wt Readings from Last 3 Encounters:   12/15/18 118 kg (261 lb)   11/13/18 118 kg (260 lb)   11/09/18 118 kg (260 lb 12.8 oz)             Exam:  NAD; pleasant; oriented; looks older than stated age  Overweight; jaundiced  HEENT MMM; AT/NC  No eye d/c; scleral icterus  Lungs Coarse bilat; Decreased bs bases  Heart RRR, no rub, no s3  ABd  + bs, soft, distended, nontender. Body wall edema.   Ext 3 +edema lower ext to hips  No clubbing  No asterixis  Moves all extremities   Speech fluent  Mood depressed; flat affect        Scheduled meds:            albumin human 50 g Intravenous Q8H   bumetanide 1 mg Intravenous Once   citalopram 10 mg Oral Daily   guaiFENesin 600 mg Oral Q12H   hydrOXYzine 50 mg Oral Nightly   lactulose 20 g Oral BID   midodrine 10 mg Oral TID AC   pantoprazole 40 mg Oral QAM AC   sodium chloride 3 mL Intravenous Q12H      IV meds:                           bumetanide 0.5 mg/hr Last Rate: 0.5 mg/hr (12/15/18 0704)         Data Review:            Results from last 7 days   Lab Units  12/15/18   0424  12/14/18   0349  12/13/18   0803   SODIUM mmol/L  130*  131*  132*   POTASSIUM mmol/L  3.6  3.6  4.0   CHLORIDE mmol/L  91*  92*  91*   CO2 mmol/L  22.4  22.9  24.1   BUN mg/dL  36*  34*  34*   CREATININE mg/dL  1.92*  1.61*  1.56*   CALCIUM mg/dL  10.1  9.3  9.5   BILIRUBIN mg/dL  9.6*  9.4*  11.0*   ALK PHOS U/L  77  70  77   ALT (SGPT) U/L  23  24  29   AST (SGOT) U/L  36  38  42*   GLUCOSE mg/dL  81  89  131*      Estimated Creatinine Clearance: 63.8 mL/min (A) (by C-G formula based on SCr of 1.92 mg/dL (H)).             Results from last 7 days   Lab Units  12/13/18   0803  12/10/18   0644  12/09/18   0627   MAGNESIUM mg/dL  1.5*  1.9  1.9   PHOSPHORUS mg/dL  3.3   --   4.0                    Results from last 7 days   Lab Units  12/15/18   0424  12/14/18   0349  12/13/18   0804  12/12/18   2251  12/12/18   0754    12/11/18   0605   WBC 10*3/mm3  6.93  6.54  7.41   --   7.91   --   9.49   HEMOGLOBIN g/dL  7.9*  7.4*  8.0*  7.8*  7.3*   < >  6.7*   PLATELETS 10*3/mm3  73*  66*  65*   --   71*   --   90*    < > = values in  this interval not displayed.                  Results from last 7 days   Lab Units  12/15/18   0424  18   0349  18   0803  18   0754  18   0548   INR    2.03*  2.20*  2.09*  2.16*  2.12*                                      ASSESSMENT:              Symptomatic anemia    Elevated liver function tests    Alcoholic hepatitis without ascites    Esophageal varices (CMS/HCC)    Hyponatremia    LÓPEZ (acute kidney injury) (CMS/HCC)    Anasarca    Cough    Epistaxis    Hemoptysis    Rhinovirus infection    Pericardial effusion    Pneumonia     1.  LÓPEZ, non-oliguric, improving.  prerenal from ABLA, hypotension, and profound liver injury. Improving Hyponatremia with hypervolemia;   2.  ABLA  3.  Alcoholic cirrhosis  4.  Hypotension  5.  Hemoptysis,  Pulmonary on baord  6.  TCP: PLT is down 77           PLAN:     Continue  Bumex drip and continue Albumin  Palliative is on board !!  Replete potassium as needed  Had lengthy discussion with him and his family. For now, they want to continue to diurese  Prognosis is quarded  D/W nurse  Surveillance labs        Erin Bellamy MD  12/15/2018     4:27 PM                                 Joe Hernández MD   Physician   Medicine   Progress Notes   Addendum   Date of Service:  2018  9:33 AM               Expand All Collapse All          Show:Clear all  [x]Manual[x]Template[x]Copied    Added by:  [x]oJe Hernández MD      [][facility] for details            LOS: 11 days      Name: Enrique Ramirez  Age/Sex: 48 y.o. male  :  1970        PCP: Cydney Chase MD        Subjective      Feels ok today, better after a shower but still with lots of edema,   General: No Fever or Chills, Cardiac: No Chest Pain or Palpitations, Resp: No Cough or SOA, GI: No Nausea, Vomiting, or Diarrhea and Other: No bleeding        albumin human 50 g Intravenous Q8H   citalopram 10 mg Oral Daily   guaiFENesin 600 mg Oral Q12H   hydrOXYzine 50 mg Oral Nightly   lactulose 20  g Oral BID   midodrine 10 mg Oral TID AC   pantoprazole 40 mg Oral QAM AC   sodium chloride 3 mL Intravenous Q12H              Objective      Vital Signs  Temp:  [97.5 °F (36.4 °C)-98.5 °F (36.9 °C)] 97.5 °F (36.4 °C)  Heart Rate:  [75-81] 81  Resp:  [18] 18  BP: ()/(46-48) 93/48  Body mass index is 35.75 kg/m².     Intake/Output Summary (Last 24 hours) at 12/16/2018 0933  Last data filed at 12/16/2018 0757      Gross per 24 hour   Intake 1637 ml   Output 200 ml   Net 1437 ml         Physical Exam   HENT:   Head: Normocephalic and atraumatic.   Eyes: EOM are normal. Scleral icterus is present.   Neck: Normal range of motion. Neck supple.   Cardiovascular: Normal rate and regular rhythm.   Pulmonary/Chest: Effort normal and breath sounds normal. No respiratory distress.   Abdominal: Soft. Bowel sounds are normal. He exhibits distension.   Musculoskeletal: He exhibits edema.   Skin: Skin is warm and dry. Capillary refill takes less than 2 seconds.   jaundice   Psychiatric: He has a normal mood and affect. His behavior is normal.            Results Review:       I reviewed the patient's new clinical results.                 Results from last 7 days   Lab Units  12/16/18   0453  12/15/18   0424  12/14/18   0349  12/13/18   0804  12/12/18   2251  12/12/18   0754  12/11/18   2037    12/11/18   0605    12/10/18   0638   WBC 10*3/mm3  6.05  6.93  6.54  7.41   --   7.91   --    --   9.49   --   9.33   HEMOGLOBIN g/dL  7.1*  7.9*  7.4*  8.0*  7.8*  7.3*  8.3*   < >  6.7*   < >  7.0*   PLATELETS 10*3/mm3  67*  73*  66*  65*   --   71*   --    --   90*   --   88*    < > = values in this interval not displayed.                 Results from last 7 days   Lab Units  12/16/18   0453  12/15/18   0424 12/14/18 0349 12/13/18   0803  12/12/18   0754  12/11/18   0548  12/10/18   0644   SODIUM mmol/L  134*  130*  131*  132*  131*  131*  128*   POTASSIUM mmol/L  3.6  3.6  3.6  4.0  3.6  3.5  3.5   CHLORIDE mmol/L  92*  91*  92*   91*  91*  92*  91*   CO2 mmol/L  22.8  22.4  22.9  24.1  24.6  24.0  21.7*   BUN mg/dL  41*  36*  34*  34*  31*  33*  37*   CREATININE mg/dL  2.39*  1.92*  1.61*  1.56*  1.50*  1.45*  1.82*   CALCIUM mg/dL  9.7  10.1  9.3  9.5  9.0  9.1  9.3   MAGNESIUM mg/dL   --    --    --   1.5*   --    --   1.9   PHOSPHORUS mg/dL   --    --    --   3.3   --    --    --    Estimated Creatinine Clearance: 51.7 mL/min (A) (by C-G formula based on SCr of 2.39 mg/dL (H)).         Results from last 7 days   Lab Units  12/16/18   0453  12/15/18   0424  12/14/18   0349   ALK PHOS U/L  71  77  70   BILIRUBIN mg/dL  8.5*  9.6*  9.4*   ALT (SGPT) U/L  22  23  24   AST (SGOT) U/L  31  36  38             Results from last 7 days   Lab Units  12/16/18 0453  12/15/18   0424  12/14/18   0349   INR    2.27*  2.03*  2.20*            Lab Results   Component Value Date     HGBA1C <4.30 (L) 11/14/2018   No results found for: POCGLU          Assessment/Plan        Symptomatic anemia    Elevated liver function tests    Alcoholic hepatitis without ascites    Esophageal varices (CMS/HCC)    Hyponatremia    LÓPEZ (acute kidney injury) (CMS/HCC)    Anasarca    Cough    Epistaxis    Hemoptysis    Rhinovirus infection    Pericardial effusion    Pneumonia        PLAN  Pneumonia  - He has now completed antibiotics at this point.  Pulmonology following.     Symptomatic Anemia  - S/p 2 units PRBCs on 12/5 and 12/11  - monitor Hgb and transfuse as needed  - hgb down to 7.1 this AM, FU in AM     Hemoptysis  - Improved overall  - 2/2 liver disease coagulopathy, TCP and PNA  - No need for bronch currently, per pulm     LÓPEZ  - Placed on a bumex drip now dc'd, CR up today with only 1.4 L out  - Still with lots of edema     Pericardial Effusion  - Apparent on CT chest, not present on echocardiogram in October  - Echocardiogram with tiny effusion not able to be tapped, no tamponade  - Repeat ECHO 12/10/18 showed subcentimeter effusion and cardiology has signed  off     EtOH Hepatitis  - possible secondary cirrhosis  -nadolol, lasix, and spironolactone held  - RUQ u/s revealing GB mass.  MRCP without contrast showed nothing acute.  - Has completed a course of steroids with no significant change in TB     DVT Prophylaxis  - SCDs  - BLE doppler negative     Disposition  Home in a few days when ok with GI and nephrology     --> attempted to call patients wife but went to voicemail and mailbox was full and not accepting messages.  Will tray again tomorrow     Joe Hernández MD  [facility]  12/16/18  9:33 AM                               Erin Bellamy MD   Physician   Nephrology   Progress Notes   Signed   Date of Service:  12/16/2018  4:32 PM               Signed        Expand All Collapse All          Show:Clear all  [x]Manual[x]Template[x]Copied    Added by:  [x]Erin Bellamy MD      [][facility] for details                                                      NEPHROLOGY PROGRESS NOTE     PATIENT IDENTIFICATION:              Name:  Enrique Ramirez                                                                       MRN:  [id number]                           48 y.o.  male                                                                      Reason for visit: LÓPEZ     SUBJECTIVE:                 Seen and examined.  Laying in bed. Very swollen.. Wants to have more quality of life. Swelling is about the same or better slightly.      OBJECTIVE:  Vitals          Vitals:     12/16/18 0600 12/16/18 0829 12/16/18 1145 12/16/18 1416   BP:   93/48 109/55 101/52   BP Location:   Right arm Left arm Left arm   Patient Position:   Sitting Sitting Lying   Pulse:   81 77 71   Resp:   18   18   Temp:   97.5 °F (36.4 °C)   97.1 °F (36.2 °C)   TempSrc:   Oral   Oral   SpO2:   91%   90%   Weight: 120 kg (263 lb 9.6 oz)         Height:                                                       Body mass index is 35.75 kg/m².     Intake/Output Summary (Last 24 hours) at  12/16/2018 1633  Last data filed at 12/16/2018 1203      Gross per 24 hour   Intake 1487 ml   Output 225 ml   Net 1262 ml           Wt Readings from Last 1 Encounters:   12/16/18 0600 120 kg (263 lb 9.6 oz)   12/15/18 0600 118 kg (261 lb)   12/14/18 0636 119 kg (262 lb)   12/13/18 0525 118 kg (260 lb 8 oz)   12/12/18 0616 119 kg (263 lb)   12/11/18 0612 120 kg (264 lb)   12/10/18 0730 127 kg (279 lb)   12/08/18 0618 127 kg (279 lb)   12/07/18 0530 125 kg (276 lb)   12/06/18 1457 121 kg (267 lb)   12/06/18 0600 121 kg (267 lb 8 oz)   12/05/18 1814 124 kg (273 lb)   12/05/18 1238 119 kg (262 lb 6.4 oz)          Wt Readings from Last 3 Encounters:   12/16/18 120 kg (263 lb 9.6 oz)   11/13/18 118 kg (260 lb)   11/09/18 118 kg (260 lb 12.8 oz)            Exam:  NAD; pleasant; oriented; looks older than stated age  Overweight; jaundiced  HEENT MMM; AT/NC  No eye d/c; scleral icterus  Lungs Coarse bilat; Decreased bs bases  Heart RRR, no rub, no s3  ABd  + bs, soft, distended, nontender. Body wall edema.   Ext 3 +edema lower ext to hips  No clubbing  No asterixis  Moves all extremities   Speech fluent  Mood depressed; flat affect        Scheduled meds:            albumin human 50 g Intravenous Q8H   citalopram 10 mg Oral Daily   guaiFENesin 600 mg Oral Q12H   hydrOXYzine 50 mg Oral Nightly   lactulose 20 g Oral BID   midodrine 10 mg Oral TID AC   octreotide 200 mcg Intravenous TID   pantoprazole 40 mg Oral QAM AC   sodium chloride 3 mL Intravenous Q12H      IV meds:                           Data Review:            Results from last 7 days   Lab Units  12/16/18   0453  12/15/18   0424  12/14/18   0349   SODIUM mmol/L  134*  130*  131*   POTASSIUM mmol/L  3.6  3.6  3.6   CHLORIDE mmol/L  92*  91*  92*   CO2 mmol/L  22.8  22.4  22.9   BUN mg/dL  41*  36*  34*   CREATININE mg/dL  2.39*  1.92*  1.61*   CALCIUM mg/dL  9.7  10.1  9.3   BILIRUBIN mg/dL  8.5*  9.6*  9.4*   ALK PHOS U/L  71  77  70   ALT (SGPT) U/L  22  23  24    AST (SGOT) U/L  31  36  38   GLUCOSE mg/dL  111*  81  89      Estimated Creatinine Clearance: 51.7 mL/min (A) (by C-G formula based on SCr of 2.39 mg/dL (H)).      Results from last 7 days   Lab Units  12/13/18   0803  12/10/18   0644   MAGNESIUM mg/dL  1.5*  1.9   PHOSPHORUS mg/dL  3.3   --                    Results from last 7 days   Lab Units  12/16/18   0453  12/15/18   0424  12/14/18   0349  12/13/18   0804  12/12/18   2251  12/12/18   0754   WBC 10*3/mm3  6.05  6.93  6.54  7.41   --   7.91   HEMOGLOBIN g/dL  7.1*  7.9*  7.4*  8.0*  7.8*  7.3*   PLATELETS 10*3/mm3  67*  73*  66*  65*   --   71*                  Results from last 7 days   Lab Units  12/16/18   0453  12/15/18   0424  12/14/18   0349  12/13/18   0803  12/12/18   0754   INR    2.27*  2.03*  2.20*  2.09*  2.16*                                      ASSESSMENT:              Symptomatic anemia    Elevated liver function tests    Alcoholic hepatitis without ascites    Esophageal varices (CMS/HCC)    Hyponatremia    LÓPEZ (acute kidney injury) (CMS/HCC)    Anasarca    Cough    Epistaxis    Hemoptysis    Rhinovirus infection    Pericardial effusion    Pneumonia     1.  LÓPEZ, non-oliguric, improving.  prerenal from ABLA, hypotension, and profound liver injury. Improving Hyponatremia with hypervolemia;   2.  ABLA  3.  Alcoholic cirrhosis  4.  Hypotension  5.  Hemoptysis,  Pulmonary on baord  6.  TCP: PLT is down 77           PLAN:     Unfortunately, his SCR is worsening .    etiology might be related to hepatorenal syndrome  Versus intavascular volume depletion and less likely ATN.   old on diuretic. Continue with iv albumin.  The continue Midrin and start octreotide   obtain urineeletrlytes  Palliative is on board !!  Replete potassium as needed  Transfuse 2 units  Had lengthy discussion with him and his family. For now, they want to continue to diurese  Prognosis is quarded  D/W nurse  Surveillance labs        Erin Bellamy MD  12/16/2018     4:33 PM                                  Dea Kaur, RN   Registered Nurse      Plan of Care   Signed   Date of Service:  2018  6:50 PM               Signed           Problem: Patient Care Overview  Goal: Plan of Care Review  Outcome: Ongoing (interventions implemented as appropriate)    18 1846   Coping/Psychosocial   Plan of Care Reviewed With patient   Plan of Care Review   Progress no change   OTHER   Outcome Summary Bumex gtt d/c'd, Albumin cont'd. Midodrine as ordered and will now have Octreotide for better BP control. SBP has been slightly improved today with mainly readings over 100 systolic. Ambulating w/o distress, pt showered today. To have 2 u PRBCs this evening. Denies hemoptysis today. Trying to stay compliant with fluid restriction. Refused blake catheter per nephrology, but bladder scan was only 84cc. Pt requesting to visit chapel and be seen by hospital spiritual counselor. will closely monitor.         Problem: Anemia (Adult)  Goal: Symptom Improvement  Outcome: Ongoing (interventions implemented as appropriate)        Problem: Fall Risk (Adult)  Goal: Absence of Fall  Outcome: Ongoing (interventions implemented as appropriate)        Problem: Renal Failure/Kidney Injury, Acute (Adult)  Goal: Signs and Symptoms of Listed Potential Problems Will be Absent, Minimized or Managed (Renal Failure/Kidney Injury, Acute)  Outcome: Ongoing (interventions implemented as appropriate)        Problem: Fluid Volume Excess (Adult)  Goal: Optimal Fluid Balance  Outcome: Ongoing (interventions implemented as appropriate)                           Joe Hernández MD   Physician   Medicine   Progress Notes   Signed   Date of Service:  2018  8:28 AM               Signed        Expand All Collapse All          Show:Clear all  [x]Manual[x]Template[x]Copied    Added by:  [x]Joe Hernández MD      []Leanne for details            LOS: 12 days      Name: Enrique Ramirez  Age/Sex: 48 y.o. male  :   1970        PCP: Cydney Chase MD        Subjective      Feels ok today, better after a shower but still with lots of edema,  complaining of a busted blood vessel in his left eye.  General: No Fever or Chills, Cardiac: No Chest Pain or Palpitations, Resp: No Cough or SOA, GI: No Nausea, Vomiting, or Diarrhea and Other: No bleeding        albumin human 50 g Intravenous Q8H   citalopram 10 mg Oral Daily   guaiFENesin 600 mg Oral Q12H   hydrOXYzine 50 mg Oral Nightly   lactulose 20 g Oral BID   midodrine 10 mg Oral TID AC   pantoprazole 40 mg Oral QAM AC   sodium chloride 3 mL Intravenous Q12H              Objective      Vital Signs  Temp:  [97.1 °F (36.2 °C)-98 °F (36.7 °C)] 97.2 °F (36.2 °C)  Heart Rate:  [66-82] 73  Resp:  [16-18] 18  BP: ()/(48-85) 103/85  Body mass index is 35.59 kg/m².     Intake/Output Summary (Last 24 hours) at 12/17/2018 0828  Last data filed at 12/17/2018 0547      Gross per 24 hour   Intake 1460 ml   Output 105 ml   Net 1355 ml         Physical Exam   Constitutional: He appears well-developed and well-nourished. No distress.   HENT:   Head: Normocephalic and atraumatic.   Eyes: EOM are normal. Scleral icterus is present.   Neck: Normal range of motion. Neck supple.   Cardiovascular: Normal rate and regular rhythm.   Pulmonary/Chest: Effort normal and breath sounds normal. No respiratory distress.   Abdominal: Soft. Bowel sounds are normal. He exhibits distension.   Musculoskeletal: He exhibits edema.   Skin: Skin is warm and dry. Capillary refill takes less than 2 seconds. Rash noted.   jaundice   Psychiatric: He has a normal mood and affect. His behavior is normal.            Results Review:       I reviewed the patient's new clinical results.               Results from last 7 days   Lab Units  12/17/18   0739  12/16/18   0453  12/15/18   0424  12/14/18   0349  12/13/18   0804  12/12/18   2251  12/12/18   0754    12/11/18   0605   WBC 10*3/mm3  6.12  6.05  6.93  6.54  7.41   --    7.91   --   9.49   HEMOGLOBIN g/dL  9.0*  7.1*  7.9*  7.4*  8.0*  7.8*  7.3*   < >  6.7*   PLATELETS 10*3/mm3  68*  67*  73*  66*  65*   --   71*   --   90*    < > = values in this interval not displayed.      Results from last 7 days   Lab Units  12/16/18   0453  12/15/18   0424  12/14/18 0349  12/13/18   0803  12/12/18   0754  12/11/18   0548   SODIUM mmol/L  134*  130*  131*  132*  131*  131*   POTASSIUM mmol/L  3.6  3.6  3.6  4.0  3.6  3.5   CHLORIDE mmol/L  92*  91*  92*  91*  91*  92*   CO2 mmol/L  22.8  22.4  22.9  24.1  24.6  24.0   BUN mg/dL  41*  36*  34*  34*  31*  33*   CREATININE mg/dL  2.39*  1.92*  1.61*  1.56*  1.50*  1.45*   CALCIUM mg/dL  9.7  10.1  9.3  9.5  9.0  9.1   MAGNESIUM mg/dL   --    --    --   1.5*   --    --    PHOSPHORUS mg/dL   --    --    --   3.3   --    --    Estimated Creatinine Clearance: 51.5 mL/min (A) (by C-G formula based on SCr of 2.39 mg/dL (H)).         Results from last 7 days   Lab Units  12/16/18   0453  12/15/18   0424  12/14/18   0349   ALK PHOS U/L  71  77  70   BILIRUBIN mg/dL  8.5*  9.6*  9.4*   ALT (SGPT) U/L  22  23  24   AST (SGOT) U/L  31  36  38             Results from last 7 days   Lab Units  12/17/18   0739  12/16/18   0453  12/15/18   0424   INR    2.15*  2.27*  2.03*            Lab Results   Component Value Date     HGBA1C <4.30 (L) 11/14/2018   No results found for: POCGLU          Assessment/Plan        Symptomatic anemia    Elevated liver function tests    Alcoholic hepatitis without ascites    Esophageal varices (CMS/HCC)    Hyponatremia    LÓPEZ (acute kidney injury) (CMS/HCC)    Anasarca    Cough    Epistaxis    Hemoptysis    Rhinovirus infection    Pericardial effusion    Pneumonia        PLAN  Pneumonia  - He has now completed antibiotics at this point.  Pulmonology has signed off.     Symptomatic Anemia  - S/p 2 units PRBCs on 12/5 and 12/11and 12/16  - monitor Hgb and transfuse as needed  - hgb up to 9.0 this AM, FU in  AM     Hemoptysis  - Improved overall  - 2/2 liver disease coagulopathy, TCP and PNA  - No need for bronch currently, per pulm     LÓPEZ  - Placed on a bumex drip now dc'd, CR up again today despite holding diuretics and giving blood  - Still with lots of edema  - Unable to tolerate the octreotide and DC'd  - MAP is acceptable  - Nephrology following     Pericardial Effusion/Pleural Effusion  - Apparent on CT chest, not present on echocardiogram in October  - Echocardiogram with tiny effusion not able to be tapped, no tamponade  - Repeat ECHO 12/10/18 showed subcentimeter effusion and cardiology has signed off  - Pleural effusion is stable monitor for now ? Hepatic hydrothorax     EtOH Hepatitis  - possible secondary cirrhosis  - nadolol, lasix, and spironolactone held  - RUQ u/s revealing GB mass.  MRCP without contrast showed nothing acute.  - Has completed a course of steroids with no significant change in TB     DVT Prophylaxis  - SCDs  - BLE doppler negative        >30mins spent with more than 50% in conseling and coordination regarding diagnosis and prognosis  He is in a very tough spot presently.  His creatinine continues to rise despite our best attempts at supportive care.  He was unable to tolerate the octreotide his blood pressures are currently acceptable.  If his kidneys continued to worsen his prognosis acutely worsens as well.  Spent a good deal of time discussing with the patient today how sick he was in with the expected course would be.  He is reiterated multiple times that he just wants to go home if he is going to die he rather die there.  I explained to him today that if he were to leave the hospital right now to be unclear what would happen at home.  At this point he is agreeable to continuing medical therapy for another day or 2 before deciding on further steps of care.     Disposition  To be determined     Joe Hernández MD  [facility]  12/17/18  8:28  AM

## 2018-12-17 NOTE — PROGRESS NOTES
NEPHROLOGY PROGRESS NOTE    PATIENT IDENTIFICATION:   Name:  Enrique Ramirez      MRN:  [id number]     48 y.o.  male             Reason for visit: LÓPEZ    SUBJECTIVE:   Appetite mediocre, with occas N earlier today; breathing is fine at rest; swelling same; legs ache; marginal UOP today    OBJECTIVE:  Vitals:    12/17/18 0822 12/17/18 1249 12/17/18 1339 12/17/18 1809   BP: 103/85 104/63  115/64   BP Location: Left arm Left arm     Patient Position: Sitting Lying     Pulse: 73 71 61 79   Resp: 18  18    Temp:   96.8 °F (36 °C)    TempSrc:   Oral    SpO2: 97%  93%    Weight:       Height:               Body mass index is 35.59 kg/m².    Intake/Output Summary (Last 24 hours) at 12/17/2018 1814  Last data filed at 12/17/2018 1528  Gross per 24 hour   Intake 1700 ml   Output 80 ml   Net 1620 ml     Wt Readings from Last 1 Encounters:   12/17/18 0529 119 kg (262 lb 6.4 oz)   12/16/18 0600 120 kg (263 lb 9.6 oz)   12/15/18 0600 118 kg (261 lb)   12/14/18 0636 119 kg (262 lb)   12/13/18 0525 118 kg (260 lb 8 oz)   12/12/18 0616 119 kg (263 lb)   12/11/18 0612 120 kg (264 lb)   12/10/18 0730 127 kg (279 lb)   12/08/18 0618 127 kg (279 lb)   12/07/18 0530 125 kg (276 lb)   12/06/18 1457 121 kg (267 lb)   12/06/18 0600 121 kg (267 lb 8 oz)   12/05/18 1814 124 kg (273 lb)   12/05/18 1238 119 kg (262 lb 6.4 oz)     Wt Readings from Last 3 Encounters:   12/17/18 119 kg (262 lb 6.4 oz)   11/13/18 118 kg (260 lb)   11/09/18 118 kg (260 lb 12.8 oz)         Exam:  NAD; pleasant; oriented; looks older than stated age  Overweight; jaundiced  HEENT MMM; AT/NC  No eye d/c; scleral icterus  Lungs Coarse bilat; Decreased bs bases  Heart RRR, no rub, no s3  ABd  + bs, soft, distended, nontender. Body wall edema.   Ext 3 +edema lower ext to hips  No clubbing  No asterixis  Moves all extremities   Speech fluent  Mood depressed; flat affect      Scheduled meds:      albumin human 50 g Intravenous Q8H   citalopram 10 mg Oral Daily   guaiFENesin  600 mg Oral Q12H   hydrOXYzine 50 mg Oral Nightly   lactulose 20 g Oral BID   midodrine 10 mg Oral TID AC   pantoprazole 40 mg Oral QAM AC   sodium chloride 3 mL Intravenous Q12H     IV meds:                             Data Review:    Results from last 7 days   Lab Units  12/17/18   0739  12/16/18   0453  12/15/18   0424   SODIUM mmol/L  133*  134*  130*   POTASSIUM mmol/L  3.7  3.6  3.6   CHLORIDE mmol/L  90*  92*  91*   CO2 mmol/L  23.8  22.8  22.4   BUN mg/dL  44*  41*  36*   CREATININE mg/dL  3.08*  2.39*  1.92*   CALCIUM mg/dL  10.6*  9.7  10.1   BILIRUBIN mg/dL  10.1*  8.5*  9.6*   ALK PHOS U/L  74  71  77   ALT (SGPT) U/L  19  22  23   AST (SGOT) U/L  34  31  36   GLUCOSE mg/dL  115*  111*  81     Estimated Creatinine Clearance: 39.9 mL/min (A) (by C-G formula based on SCr of 3.08 mg/dL (H)).      Results from last 7 days   Lab Units  12/13/18   0803   MAGNESIUM mg/dL  1.5*   PHOSPHORUS mg/dL  3.3       Results from last 7 days   Lab Units  12/17/18   0739  12/16/18   0453  12/15/18   0424  12/14/18   0349  12/13/18   0804   WBC 10*3/mm3  6.12  6.05  6.93  6.54  7.41   HEMOGLOBIN g/dL  9.0*  7.1*  7.9*  7.4*  8.0*   PLATELETS 10*3/mm3  68*  67*  73*  66*  65*       Results from last 7 days   Lab Units  12/17/18   0739  12/16/18   0453  12/15/18   0424  12/14/18   0349  12/13/18   0803   INR   2.15*  2.27*  2.03*  2.20*  2.09*             ASSESSMENT:     Symptomatic anemia    Elevated liver function tests    Alcoholic hepatitis without ascites    Esophageal varices (CMS/HCC)    Hyponatremia    LÓPEZ (acute kidney injury) (CMS/HCC)    Anasarca    Cough    Epistaxis    Hemoptysis    Rhinovirus infection    Pericardial effusion    Pneumonia    1.  LÓPEZ, non-oliguric, worsening:  prerenal from ABLA, hypotension, and profound liver injury, tho increasing concern for HRS.  Huge vol excess with hypoNa; stable K. HyperCa, possibly from immobility   2.  ABLA, with another 2 units given yesterday  3.  Alcoholic  cirrhosis  4.  Hypotension  5.  Hemoptysis, Pulmonary on baord  6.  TCP: PLT stable        PLAN:  1.  Will hold off on diuretics in face of poor PO and nausea; wt already down vs last week  2.  D/w pt and family very real possibility that his severe liver dz is driving his kidney dz  3.  Goals of care being discussed with family      Rush Alvarez MD  12/17/2018    6:14 PM

## 2018-12-18 VITALS
HEIGHT: 72 IN | TEMPERATURE: 97.6 F | SYSTOLIC BLOOD PRESSURE: 116 MMHG | HEART RATE: 75 BPM | BODY MASS INDEX: 36.57 KG/M2 | DIASTOLIC BLOOD PRESSURE: 59 MMHG | WEIGHT: 270 LBS | RESPIRATION RATE: 16 BRPM | OXYGEN SATURATION: 91 %

## 2018-12-18 PROBLEM — K76.7 HEPATORENAL SYNDROME (HCC): Status: ACTIVE | Noted: 2018-12-18

## 2018-12-18 LAB
ALBUMIN SERPL-MCNC: 5.3 G/DL (ref 3.5–5.2)
ALBUMIN/GLOB SERPL: 1.9 G/DL
ALP SERPL-CCNC: 62 U/L (ref 39–117)
ALT SERPL W P-5'-P-CCNC: 15 U/L (ref 1–41)
ANION GAP SERPL CALCULATED.3IONS-SCNC: 21.8 MMOL/L
AST SERPL-CCNC: 29 U/L (ref 1–40)
BASOPHILS # BLD AUTO: 0.07 10*3/MM3 (ref 0–0.2)
BASOPHILS NFR BLD AUTO: 1.1 % (ref 0–1.5)
BILIRUB SERPL-MCNC: 10 MG/DL (ref 0.1–1.2)
BUN BLD-MCNC: 47 MG/DL (ref 6–20)
BUN/CREAT SERPL: 12.8 (ref 7–25)
CALCIUM SPEC-SCNC: 10.4 MG/DL (ref 8.6–10.5)
CHLORIDE SERPL-SCNC: 89 MMOL/L (ref 98–107)
CO2 SERPL-SCNC: 22.2 MMOL/L (ref 22–29)
CREAT BLD-MCNC: 3.67 MG/DL (ref 0.76–1.27)
DEPRECATED RDW RBC AUTO: 74.4 FL (ref 37–54)
EOSINOPHIL # BLD AUTO: 0.24 10*3/MM3 (ref 0–0.7)
EOSINOPHIL NFR BLD AUTO: 3.7 % (ref 0.3–6.2)
ERYTHROCYTE [DISTWIDTH] IN BLOOD BY AUTOMATED COUNT: 20.3 % (ref 11.5–14.5)
GFR SERPL CREATININE-BSD FRML MDRD: 18 ML/MIN/1.73
GLOBULIN UR ELPH-MCNC: 2.8 GM/DL
GLUCOSE BLD-MCNC: 101 MG/DL (ref 65–99)
HCT VFR BLD AUTO: 25.5 % (ref 40.4–52.2)
HGB BLD-MCNC: 8.4 G/DL (ref 13.7–17.6)
IMM GRANULOCYTES # BLD: 0 10*3/MM3 (ref 0–0.03)
IMM GRANULOCYTES NFR BLD: 0 % (ref 0–0.5)
INR PPP: 2.27 (ref 0.9–1.1)
LYMPHOCYTES # BLD AUTO: 0.76 10*3/MM3 (ref 0.9–4.8)
LYMPHOCYTES NFR BLD AUTO: 11.6 % (ref 19.6–45.3)
MAGNESIUM SERPL-MCNC: 1.9 MG/DL (ref 1.6–2.6)
MCH RBC QN AUTO: 33.7 PG (ref 27–32.7)
MCHC RBC AUTO-ENTMCNC: 32.9 G/DL (ref 32.6–36.4)
MCV RBC AUTO: 102.4 FL (ref 79.8–96.2)
MONOCYTES # BLD AUTO: 1.07 10*3/MM3 (ref 0.2–1.2)
MONOCYTES NFR BLD AUTO: 16.3 % (ref 5–12)
NEUTROPHILS # BLD AUTO: 4.43 10*3/MM3 (ref 1.9–8.1)
NEUTROPHILS NFR BLD AUTO: 67.3 % (ref 42.7–76)
PHOSPHATE SERPL-MCNC: 5.4 MG/DL (ref 2.5–4.5)
PLATELET # BLD AUTO: 68 10*3/MM3 (ref 140–500)
PMV BLD AUTO: 9.5 FL (ref 6–12)
POTASSIUM BLD-SCNC: 3.7 MMOL/L (ref 3.5–5.2)
PROT SERPL-MCNC: 8.1 G/DL (ref 6–8.5)
PROTHROMBIN TIME: 24.7 SECONDS (ref 11.7–14.2)
RBC # BLD AUTO: 2.49 10*6/MM3 (ref 4.6–6)
SODIUM BLD-SCNC: 133 MMOL/L (ref 136–145)
WBC NRBC COR # BLD: 6.57 10*3/MM3 (ref 4.5–10.7)

## 2018-12-18 PROCEDURE — P9047 ALBUMIN (HUMAN), 25%, 50ML: HCPCS | Performed by: INTERNAL MEDICINE

## 2018-12-18 PROCEDURE — 84100 ASSAY OF PHOSPHORUS: CPT | Performed by: INTERNAL MEDICINE

## 2018-12-18 PROCEDURE — 85025 COMPLETE CBC W/AUTO DIFF WBC: CPT | Performed by: INTERNAL MEDICINE

## 2018-12-18 PROCEDURE — 25010000002 ONDANSETRON PER 1 MG: Performed by: INTERNAL MEDICINE

## 2018-12-18 PROCEDURE — 83735 ASSAY OF MAGNESIUM: CPT | Performed by: INTERNAL MEDICINE

## 2018-12-18 PROCEDURE — 99232 SBSQ HOSP IP/OBS MODERATE 35: CPT | Performed by: INTERNAL MEDICINE

## 2018-12-18 PROCEDURE — 80053 COMPREHEN METABOLIC PANEL: CPT | Performed by: INTERNAL MEDICINE

## 2018-12-18 PROCEDURE — 85610 PROTHROMBIN TIME: CPT | Performed by: INTERNAL MEDICINE

## 2018-12-18 PROCEDURE — 25010000002 ALBUMIN HUMAN 25% PER 50 ML: Performed by: INTERNAL MEDICINE

## 2018-12-18 RX ORDER — GUAIFENESIN 600 MG/1
600 TABLET, EXTENDED RELEASE ORAL EVERY 12 HOURS SCHEDULED
Start: 2018-12-18 | End: 2021-01-18

## 2018-12-18 RX ORDER — MIDODRINE HYDROCHLORIDE 10 MG/1
10 TABLET ORAL
Start: 2018-12-18 | End: 2019-05-17

## 2018-12-18 RX ORDER — OXYCODONE HYDROCHLORIDE 10 MG/1
10 TABLET ORAL EVERY 4 HOURS PRN
Start: 2018-12-18 | End: 2018-12-25

## 2018-12-18 RX ORDER — CITALOPRAM 10 MG/1
10 TABLET ORAL DAILY
Start: 2018-12-19 | End: 2021-07-22

## 2018-12-18 RX ORDER — ECHINACEA PURPUREA EXTRACT 125 MG
2 TABLET ORAL AS NEEDED
Refills: 12
Start: 2018-12-18

## 2018-12-18 RX ORDER — IPRATROPIUM BROMIDE AND ALBUTEROL SULFATE 2.5; .5 MG/3ML; MG/3ML
3 SOLUTION RESPIRATORY (INHALATION) EVERY 4 HOURS PRN
Qty: 360 ML
Start: 2018-12-18

## 2018-12-18 RX ADMIN — ONDANSETRON 4 MG: 2 INJECTION INTRAMUSCULAR; INTRAVENOUS at 08:42

## 2018-12-18 RX ADMIN — ONDANSETRON 4 MG: 2 INJECTION INTRAMUSCULAR; INTRAVENOUS at 17:15

## 2018-12-18 RX ADMIN — MIDODRINE HYDROCHLORIDE 10 MG: 5 TABLET ORAL at 07:11

## 2018-12-18 RX ADMIN — PANTOPRAZOLE SODIUM 40 MG: 40 TABLET, DELAYED RELEASE ORAL at 07:11

## 2018-12-18 RX ADMIN — MIDODRINE HYDROCHLORIDE 10 MG: 5 TABLET ORAL at 11:48

## 2018-12-18 RX ADMIN — MIDODRINE HYDROCHLORIDE 10 MG: 5 TABLET ORAL at 17:15

## 2018-12-18 RX ADMIN — CITALOPRAM 10 MG: 10 TABLET, FILM COATED ORAL at 08:23

## 2018-12-18 RX ADMIN — LACTULOSE 20 G: 10 SOLUTION ORAL at 08:42

## 2018-12-18 RX ADMIN — GUAIFENESIN 600 MG: 600 TABLET, EXTENDED RELEASE ORAL at 08:23

## 2018-12-18 RX ADMIN — OXYCODONE HYDROCHLORIDE 10 MG: 5 TABLET ORAL at 14:36

## 2018-12-18 RX ADMIN — OXYCODONE HYDROCHLORIDE 10 MG: 5 TABLET ORAL at 08:23

## 2018-12-18 RX ADMIN — SODIUM CHLORIDE, PRESERVATIVE FREE 3 ML: 5 INJECTION INTRAVENOUS at 08:23

## 2018-12-18 RX ADMIN — ALBUMIN (HUMAN) 50 G: 12.5 SOLUTION INTRAVENOUS at 02:04

## 2018-12-18 NOTE — DISCHARGE SUMMARY
Date of Admission: 12/5/2018  Date of Discharge:  12/18/2018    PCP: Cydney Chase MD      DISCHARGE DIAGNOSIS    Symptomatic anemia    Elevated liver function tests    Alcoholic hepatitis without ascites    Esophageal varices (CMS/HCC)    Hyponatremia    LÓPEZ (acute kidney injury) (CMS/HCC)    Anasarca    Cough    Epistaxis    Hemoptysis    Rhinovirus infection    Pericardial effusion    Pneumonia    Hepatorenal syndrome (CMS/HCC)      SECONDARY DIAGNOSES  Past Medical History:   Diagnosis Date   • Esophageal varices (CMS/HCC)    • Gallstones    • Gout    • Hepatitis, alcoholic    • History of transfusion    • Pre-diabetes        CONSULTS   Consults     Date and Time Order Name Status Description    12/7/2018 1351 Inpatient Pulmonology Consult Completed     12/6/2018 1133 Inpatient Cardiology Consult Completed     12/6/2018 0943 Inpatient Nephrology Consult      12/5/2018 1346 Gastroenterology (on-call MD unless specified) Completed     12/5/2018 1346 [facility] (on-call MD unless specified) Completed           PROCEDURES PERFORMED  MRI abdomen wo contrast mrcp   Final Result   This exam is limited by absence of contrast.   1. The liver demonstrates periportal low signal intensity on both T1 and   T2-weighted images that is pronounced on the out of phase images   suggestive presence of fat in the periportal region. These findings may   be suggestive of a chronic liver disease. Primary biliary cirrhosis and   alcoholic hepatitis are on the differential.   2. Splenomegaly and small ascites. Bilateral small to moderate size   layering pleural effusions with bibasilar atelectasis.   2. Limited evaluation of the gallbladder secondary to absence of   contrast, however cholelithiasis and gallstones are identified on this   current study.       This report was finalized on 12/12/2018 1:33 PM by Dr. Stephanie Schulz M.D.          XR Chest PA & Lateral   Final Result      XR Chest 1 View   Final Result   Persistent mid to  lower lung infiltrate/atelectasis, with   increased pleural effusion apparent on the left, continued follow-up   recommended. Cardiomegaly, pulmonary vascular congestion.       This report was finalized on 12/7/2018 5:58 PM by Dr. Rojas Dubon M.D.          CT Chest Without Contrast   Final Result   1. Basilar predominant opacities favored to reflect atelectasis although   pneumonia not excluded.   2. Right greater than left pleural effusions, moderate pericardial   effusion.   3. Mild mediastinal adenopathy.   4. Homogeneous splenic enlargement.                           .        This report was finalized on 12/7/2018 5:53 AM by Valentino Nguyen M.D.          US Renal Bilateral   Final Result   Ascites. Otherwise negative renal sonogram. There is no   evidence of obstruction.       This report was finalized on 12/6/2018 8:05 PM by Dr. Brannon Cruz M.D.          XR Chest 1 View   Final Result   Persistent patchy densities in the mid to lower lungs. Increased left   more than right pleural effusions. Cardiomegaly with persistent mild   pulmonary vascular congestion. Continued follow-up/further evaluation   recommended as indicated.                   This report was finalized on 12/5/2018 1:49 PM by Dr. Rojas Dubon M.D.            HOSPITAL COURSE  Patient is a 48 y.o. male presented to [facility] complaining of increased swelling progressive fatigue and coughing up blood.  Please see the admitting history and physical for further details.  He has a history of alcohol cirrhosis with esophageal varices in the past.  He presented on this admission with decompensation.  He had increased swelling in his legs increased weight gain and was coughing up small amounts of blood.  His hemoglobin was 6.7 on admission.  Given his fatigue and dyspnea on exertion he was transfused for symptomatically anemia.  He was empirically covered for possible GI source and was started on a Protonix drip and GI  was consulted.  He received a total of 6 units of packed red blood cells over the course of hospitalization with 2 units given on 3 separate occasions.  Hemoglobin at the time of discharge is 8.6.  It was thought however on admission that this likely represented more of an epistaxis versus hemoptysis with his recent upper respiratory infection with rhinovirus.  X-ray on admission showed bilateral patchy lower lobe densities and bilateral pleural effusions.  Pulmonary was consulted and assisted with management over the course of the hospitalization.  He completed a course of treatment for community-acquired pneumonia while here in the hospital.  He also had acute renal failure on admission.  His baseline creatinine is around 1-1.2 and creatinine on admission was around 1.89.  He was hydrated with crystalloid and nephrology was consulted.  His creatinine never really improved and stayed around 1.5-1.6 until a few days ago and continued to rise.  At the time of discharge his creatinine is risen to 3.67.  The concern at this point is that his significant liver issues or driving his renal failure.  His arterial pressures have been acceptable on oral medications.  He's been tried on octreotide but did not tolerate this medication secondary to increased nausea and vomiting.  Attempts at diuresis early in the hospitalization resulted in increasing creatinines.  Active hospital issues summarized below  Pneumonia  - He has now completed antibiotics at this point.  Pulmonology has signed off.     Symptomatic Anemia  - S/p 2 units PRBCs on 12/5 and 12/11and 12/16  - monitor Hgb and transfuse as needed  - hgb stable at 8.6     Hemoptysis  - Improved overall  - 2/2 liver disease coagulopathy, TCP and PNA  - No need for bronch currently, per pulm     LÓPEZ  - Placed on a bumex drip now dc'd, CR up again today despite holding diuretics and giving blood  - Still with lots of edema  - Unable to tolerate the octreotide and DC'd  - MAP  "is acceptable  - Nephrology following     Pericardial Effusion/Pleural Effusion  - Apparent on CT chest, not present on echocardiogram in October  - Echocardiogram with tiny effusion not able to be tapped, no tamponade  - Repeat ECHO 12/10/18 showed subcentimeter effusion and cardiology has signed off  - Pleural effusion is stable monitor for now ? Hepatic hydrothorax     EtOH Hepatitis  - possible secondary cirrhosis  - nadolol, lasix, and spironolactone held  - RUQ u/s revealing GB mass.  MRCP without contrast showed nothing acute.  - Has completed a course of steroids with no significant change in TB    At this point his case was discussed with the liver transplant team at [facility] and they've accepted the patient to their facility for further care and evaluation.  The plan was discussed with the family at the bedside and all questions were addressed and answered.  They're agreeable with transfer this afternoon.      CONDITION ON DISCHARGE  Stable.      VITAL SIGNS  /59 (BP Location: Right arm, Patient Position: Sitting)   Pulse 75   Temp 97.6 °F (36.4 °C) (Oral)   Resp 16   Ht 182.9 cm (72\")   Wt 122 kg (270 lb)   SpO2 91%   BMI 36.62 kg/m²   Objective:  General Appearance:  Comfortable and ill-appearing (Jaundiced ill-appearing).    Vital signs: (most recent): Blood pressure 116/59, pulse 75, temperature 97.6 °F (36.4 °C), temperature source Oral, resp. rate 16, height 182.9 cm (72\"), weight 122 kg (270 lb), SpO2 91 %.  Vital signs are normal.  No fever.    HEENT: (He has a busted blood vessel in his left eye  Scleral icterus is present)    Lungs:  Normal effort and normal respiratory rate.  (Decreased at the bases bilaterally)  Heart: Normal rate.  Regular rhythm.  S1 normal and S2 normal.    Abdomen: Abdomen is soft.  Bowel sounds are normal.   There is no abdominal tenderness.     Extremities: Normal range of motion.  There is dependent edema.    Neurological: Patient is alert and " oriented to person, place and time.    Pupils:  Pupils are equal, round, and reactive to light.    Skin:  Warm and dry.                DISCHARGE DISPOSITION   Another Health Care Institution Not Defined      DISCHARGE MEDICATIONS     Discharge Medications      New Medications      Instructions Start Date   citalopram 10 MG tablet  Commonly known as:  CeleXA   10 mg, Oral, Daily      ipratropium-albuterol 0.5-2.5 mg/3 ml nebulizer  Commonly known as:  DUO-NEB   3 mL, Nebulization, Every 4 Hours PRN      midodrine 10 MG tablet  Commonly known as:  PROAMATINE   10 mg, Oral, 3 Times Daily Before Meals      oxyCODONE 10 MG tablet  Commonly known as:  ROXICODONE   10 mg, Oral, Every 4 Hours PRN      sodium chloride 0.65 % nasal spray  Commonly known as:  OCEAN   2 sprays, Nasal, As Needed         Changes to Medications      Instructions Start Date   guaiFENesin 600 MG 12 hr tablet  Commonly known as:  MUCINEX  What changed:    · medication strength  · how much to take  · when to take this   600 mg, Oral, Every 12 Hours Scheduled         Continue These Medications      Instructions Start Date   lactulose 10 GM/15ML solution  Commonly known as:  CHRONULAC   20 g, Oral, 2 Times Daily      pantoprazole 40 MG EC tablet  Commonly known as:  PROTONIX   40 mg, Oral, Every Morning Before Breakfast      Vitamin A 35567 units tablet   5,000 Units, Oral, Daily         Stop These Medications    allopurinol 300 MG tablet  Commonly known as:  ZYLOPRIM     furosemide 40 MG tablet  Commonly known as:  LASIX     multivitamin with minerals tablet tablet     nadolol 20 MG tablet  Commonly known as:  CORGARD     nitrocellulose external liquid     spironolactone 100 MG tablet  Commonly known as:  ALDACTONE           No future appointments.  Follow-up Information     Cydney Chase MD .    Specialties:  Family Medicine, Urgent Care, Sleep Medicine  Contact information:  [address] [address] [address] [phone]                    TEST  RESULTS PENDING AT DISCHARGE         Joe Hernández MD  [facility]  12/18/18  2:27 PM      Time: greater than 30 minutes.

## 2018-12-18 NOTE — PROGRESS NOTES
[facility]    Physicians Statement of Medical Necessity for Ambulance Transportation    It is medically necessary for:    Patient Name: Enrique Ramirez    Insurance Information:      To be transported by ambulance:    From (if nursing facility, specify level of care: skilled, long-term, etc):[facility]     To (specify level of care if nursing facility): [facility] [place] One room [address]    Date of Service: 12/18/2018      For dialysis patients state date dialysis began:     Diagnosis: liver failure    Past Medical/Surgical History:  Past Medical History:   Diagnosis Date   • Esophageal varices (CMS/HCC)    • Gallstones    • Gout    • Hepatitis, alcoholic    • History of transfusion    • Pre-diabetes       Past Surgical History:   Procedure Laterality Date   • COLONOSCOPY     • FOOT SURGERY          Current Objective Medical Evidence(including physical exam finding to support reason for limitations):        Other: transfer to same level of care, from telemetry unit    Physician Signature:           (RN,NP,PA,CAN, Discharge Planner)Gosia Roberts RN   Date/Time: 12/18/2018 4:17 PM       Printed Name:    __________________________________    [facility]   Phone: [phone] Phone: [phone]   Fax: [phone] Fax: [phone]

## 2018-12-18 NOTE — PROGRESS NOTES
Continued Stay Note  [facility]     Patient Name: Enrique Ramirez  MRN: [id number]  Today's Date: 12/18/2018    Admit Date: 12/5/2018    Discharge Plan     Row Name 12/18/18 1511       Plan    Plan  Preparing for Transfer to [place] [address] for liver transplant evaluation    Patient/Family in Agreement with Plan  yes    Plan Comments  Per Dr Hernández the accepting physician at [facility] is Dr Constantni Bhagat.  Spoke with Sandra at [facility], # [phone].  Faxed requested information to Sandra at [phone] for evaluation for transfer.  Informed Sandra when fax was completed.............................        Discharge Codes    No documentation.       Expected Discharge Date and Time     Expected Discharge Date Expected Discharge Time    Dec 18, 2018             Gosia Roberts RN

## 2018-12-18 NOTE — PLAN OF CARE
Problem: Patient Care Overview  Goal: Plan of Care Review  Outcome: Ongoing (interventions implemented as appropriate)   12/18/18 8134   Coping/Psychosocial   Plan of Care Reviewed With patient;significant other   Plan of Care Review   Progress no change   OTHER   Outcome Summary VSS. Pt remains grossly fluid overloaded with minimal urine production. Intermittent nausea, zofran given. IV albumin continued. Will continue to monitor patient closely       Problem: Anemia (Adult)  Goal: Symptom Improvement  Outcome: Ongoing (interventions implemented as appropriate)      Problem: Fall Risk (Adult)  Goal: Absence of Fall  Outcome: Ongoing (interventions implemented as appropriate)      Problem: Renal Failure/Kidney Injury, Acute (Adult)  Goal: Signs and Symptoms of Listed Potential Problems Will be Absent, Minimized or Managed (Renal Failure/Kidney Injury, Acute)  Outcome: Ongoing (interventions implemented as appropriate)      Problem: Fluid Volume Excess (Adult)  Goal: Optimal Fluid Balance  Outcome: Ongoing (interventions implemented as appropriate)

## 2018-12-18 NOTE — PROGRESS NOTES
NEPHROLOGY PROGRESS NOTE    PATIENT IDENTIFICATION:   Name:  Enrique Ramirez      MRN:  [id number]     48 y.o.  male             Reason for visit: LÓPEZ    SUBJECTIVE:   Stomach more swollen.  Sore all over. No bm.  Passing flatus. Appetite very poor.  Making less urine.  Itching.       OBJECTIVE:  Vitals:    12/17/18 1949 12/17/18 2329 12/18/18 0541 12/18/18 0746   BP: 113/65 110/62  107/56   BP Location: Left arm Left arm  Right arm   Patient Position: Lying Lying  Lying   Pulse: 72 71  86   Resp: 18 18 18   Temp: 97.8 °F (36.6 °C) 97.1 °F (36.2 °C)  98.2 °F (36.8 °C)   TempSrc: Oral Oral  Oral   SpO2: 90% 91%     Weight:   122 kg (270 lb)    Height:               Body mass index is 36.62 kg/m².    Intake/Output Summary (Last 24 hours) at 12/18/2018 1039  Last data filed at 12/18/2018 0700  Gross per 24 hour   Intake 1020 ml   Output --   Net 1020 ml     Wt Readings from Last 1 Encounters:   12/18/18 0541 122 kg (270 lb)   12/17/18 0529 119 kg (262 lb 6.4 oz)   12/16/18 0600 120 kg (263 lb 9.6 oz)   12/15/18 0600 118 kg (261 lb)   12/14/18 0636 119 kg (262 lb)   12/13/18 0525 118 kg (260 lb 8 oz)   12/12/18 0616 119 kg (263 lb)   12/11/18 0612 120 kg (264 lb)   12/10/18 0730 127 kg (279 lb)   12/08/18 0618 127 kg (279 lb)   12/07/18 0530 125 kg (276 lb)   12/06/18 1457 121 kg (267 lb)   12/06/18 0600 121 kg (267 lb 8 oz)   12/05/18 1814 124 kg (273 lb)   12/05/18 1238 119 kg (262 lb 6.4 oz)     Wt Readings from Last 3 Encounters:   12/18/18 122 kg (270 lb)   11/13/18 118 kg (260 lb)   11/09/18 118 kg (260 lb 12.8 oz)         Exam:  NAD; pleasant; oriented; looks older than stated age  Overweight; jaundiced  HEENT oral mucosa dry; AT/NC  No eye d/c; scleral icterus  Lungs Coarse BS bilat; Decreased bs bases  Heart RRR, no rub, no s3  Abd  + bs, soft, distended, nontender. Body wall edema.   Ext 3 +edema lower ext to hips, woody, indurated, chronic  Moves all extremities   Speech fluent  Mood depressed; flat  affect      Scheduled meds:      citalopram 10 mg Oral Daily   guaiFENesin 600 mg Oral Q12H   hydrOXYzine 50 mg Oral Nightly   lactulose 20 g Oral BID   midodrine 10 mg Oral TID AC   pantoprazole 40 mg Oral QAM AC   sodium chloride 3 mL Intravenous Q12H     IV meds:                             Data Review:    Results from last 7 days   Lab Units  12/18/18   0328  12/17/18   0739  12/16/18   0453   SODIUM mmol/L  133*  133*  134*   POTASSIUM mmol/L  3.7  3.7  3.6   CHLORIDE mmol/L  89*  90*  92*   CO2 mmol/L  22.2  23.8  22.8   BUN mg/dL  47*  44*  41*   CREATININE mg/dL  3.67*  3.08*  2.39*   CALCIUM mg/dL  10.4  10.6*  9.7   BILIRUBIN mg/dL  10.0*  10.1*  8.5*   ALK PHOS U/L  62  74  71   ALT (SGPT) U/L  15  19  22   AST (SGOT) U/L  29  34  31   GLUCOSE mg/dL  101*  115*  111*     Estimated Creatinine Clearance: 33.9 mL/min (A) (by C-G formula based on SCr of 3.67 mg/dL (H)).      Results from last 7 days   Lab Units  12/18/18   0328  12/13/18   0803   MAGNESIUM mg/dL  1.9  1.5*   PHOSPHORUS mg/dL  5.4*  3.3       Results from last 7 days   Lab Units  12/18/18   0328  12/17/18   0739  12/16/18   0453  12/15/18   0424  12/14/18   0349   WBC 10*3/mm3  6.57  6.12  6.05  6.93  6.54   HEMOGLOBIN g/dL  8.4*  9.0*  7.1*  7.9*  7.4*   PLATELETS 10*3/mm3  68*  68*  67*  73*  66*       Results from last 7 days   Lab Units  12/18/18   0328  12/17/18   0739  12/16/18   0453  12/15/18   0424  12/14/18   0349   INR   2.27*  2.15*  2.27*  2.03*  2.20*             ASSESSMENT:     Symptomatic anemia    Elevated liver function tests    Alcoholic hepatitis without ascites    Esophageal varices (CMS/HCC)    Hyponatremia    LÓPEZ (acute kidney injury) (CMS/HCC)    Anasarca    Cough    Epistaxis    Hemoptysis    Rhinovirus infection    Pericardial effusion    Pneumonia    1.  LÓPEZ, non-oliguric, worsening:Urine output not recorded, but patient says less.   Prerenal from ABLA, hypotension, and profound liver disease.  Concerned about HRS.   Last urine sodium <20 12/6.  Significant extravasc volume excess.  He is starting to have uremic symptoms: itching, poor appetite.   2.  ABLA. Hg starting to fall again.   3.  Alcoholic cirrhosis. INR 2.2,  Albumin now over 5 after 8 doses of IV albumin.  Stopped ETOH use 10/4/18  4. Chronic Hypotension on midodrine.   5.  Hemoptysis, Pulmonary on baord  6.  TCP: PLT stable  7. Rhinovirus infection.   8. Hypercalcemia. Has not been formally evaluated.  If going to pursue more therapy, will do eval.      I had a leigh discussion with patient and his wife.  Discussed liver transplant eval at [facility] ( I have not called them yet, have call in to Dr. Cleveland).  Talked about the surgery, risks, immunosuppression.  Told them that I did not know if [facility] would even evaluate him.  I also told them that I did not recommend dialysis if not going to be eval for liver transplant as this would not change his prognosis.  Discussed time frame of renal failure, death, symptom management with Palliative care option, as well.  They want to discuss it and let me know later today.  I informed his RN to call me with decision.  They understand that even if referred, may not be candidate even for eval.     PLAN:  1.  FLORES Hernández  2. FLORES Cleveland  3. RN informed to call me with patient decision.       Michelle Gomez MD  12/18/2018    10:39 AM

## 2018-12-18 NOTE — PROGRESS NOTES
[facility], [place], operated by [facility]  Inpatient Progress Note    Reason for Follow Up:  Alcohol-induced liver disease          Subjective     Interval History:   Complains of nausea, poor appetite and itching.  Decreased urine output.    Current Facility-Administered Medications:   •  benzonatate (TESSALON) capsule 100 mg, 100 mg, Oral, TID PRN, Enrique Obrien MD  •  bisacodyl (DULCOLAX) EC tablet 5 mg, 5 mg, Oral, Daily PRN, Valentino Valdez MD  •  calcium carbonate (TUMS) chewable tablet 500 mg (200 mg elemental), 2 tablet, Oral, BID PRN, Valentino Valdez MD  •  citalopram (CeleXA) tablet 10 mg, 10 mg, Oral, Daily, Michele Dixon MD, 10 mg at 12/18/18 0823  •  guaiFENesin (MUCINEX) 12 hr tablet 600 mg, 600 mg, Oral, Q12H, Valentino Valdez MD, 600 mg at 12/18/18 0823  •  hydrOXYzine (ATARAX) tablet 50 mg, 50 mg, Oral, Nightly, Emerald White MD, 50 mg at 12/17/18 2158  •  ipratropium-albuterol (DUO-NEB) nebulizer solution 3 mL, 3 mL, Nebulization, Q4H PRN, Michele Dixon MD  •  lactulose (CHRONULAC) 10 GM/15ML solution 20 g, 20 g, Oral, BID, Valentino Valdez MD, 20 g at 12/17/18 2001  •  midodrine (PROAMATINE) tablet 10 mg, 10 mg, Oral, TID AC, Emerald White MD, 10 mg at 12/18/18 0711  •  nitroglycerin (NITROSTAT) SL tablet 0.4 mg, 0.4 mg, Sublingual, Q5 Min PRN, Valentino Valdez MD  •  ondansetron (ZOFRAN) tablet 4 mg, 4 mg, Oral, Q6H PRN **OR** ondansetron ODT (ZOFRAN-ODT) disintegrating tablet 4 mg, 4 mg, Oral, Q6H PRN **OR** ondansetron (ZOFRAN) injection 4 mg, 4 mg, Intravenous, Q6H PRN, Valentino Valdez MD, 4 mg at 12/17/18 2158  •  oxyCODONE (ROXICODONE) immediate release tablet 10 mg, 10 mg, Oral, Q4H PRN, Joe Hernández MD, 10 mg at 12/18/18 0823  •  pantoprazole (PROTONIX) EC tablet 40 mg, 40 mg, Oral, QAM AC, Valentino Valdez MD, 40 mg at 12/18/18 0711  •  sodium chloride (OCEAN) nasal spray 2 spray, 2 spray, Each Nare, PRN, Ozzie Painter MD  •   [COMPLETED] Insert peripheral IV, , , Once **AND** sodium chloride 0.9 % flush 10 mL, 10 mL, Intravenous, PRN, Gurinder Burnett MD  •  sodium chloride 0.9 % flush 3 mL, 3 mL, Intravenous, Q12H, Valentino Valdez MD, 3 mL at 12/18/18 0823  •  sodium chloride 0.9 % flush 3-10 mL, 3-10 mL, Intravenous, PRN, Valentino Valdez MD  Review of Systems:    All systems were reviewed and negative except for:  Constitution:  positive for anorexia  Gastrointestinal: positive for  nausea  Genitourinary: postivie for  Decreased urine output    Objective     Vital Signs  Temp:  [96.8 °F (36 °C)-98.2 °F (36.8 °C)] 98.2 °F (36.8 °C)  Heart Rate:  [61-86] 86  Resp:  [18] 18  BP: (104-115)/(56-65) 107/56  Body mass index is 36.62 kg/m².    Intake/Output Summary (Last 24 hours) at 12/18/2018 0833  Last data filed at 12/18/2018 0700  Gross per 24 hour   Intake 1230 ml   Output --   Net 1230 ml     No intake/output data recorded.     Physical Exam:   General: patient awake, alert and cooperative   Eyes: Normal lids and lashes, + scleral icterus   Neck: supple, normal ROM   Skin: warm and dry, not jaundiced   Abdomen: soft, nontender, nondistended; normal bowel sounds   Rectal: deferred   Extremities: 1-2+ lower extremity edema   Psychiatric: Normal mood and behavior; memory intact     Results Review:     I reviewed the patient's new clinical results.    Results from last 7 days   Lab Units  12/18/18   0328  12/17/18   0739  12/16/18   0453   WBC 10*3/mm3  6.57  6.12  6.05   HEMOGLOBIN g/dL  8.4*  9.0*  7.1*   HEMATOCRIT %  25.5*  26.8*  22.1*   PLATELETS 10*3/mm3  68*  68*  67*     Results from last 7 days   Lab Units  12/18/18   0328  12/17/18   0739  12/16/18   0453   SODIUM mmol/L  133*  133*  134*   POTASSIUM mmol/L  3.7  3.7  3.6   CHLORIDE mmol/L  89*  90*  92*   CO2 mmol/L  22.2  23.8  22.8   BUN mg/dL  47*  44*  41*   CREATININE mg/dL  3.67*  3.08*  2.39*   CALCIUM mg/dL  10.4  10.6*  9.7   BILIRUBIN mg/dL  10.0*  10.1*  8.5*    ALK PHOS U/L  62  74  71   ALT (SGPT) U/L  15  19  22   AST (SGOT) U/L  29  34  31   GLUCOSE mg/dL  101*  115*  111*     Results from last 7 days   Lab Units  12/18/18   0328  12/17/18   0739  12/16/18   0453   INR   2.27*  2.15*  2.27*     Lab Results   Lab Value Date/Time    LIPASE 51 12/05/2018 1306    LIPASE 53 11/28/2018 1101    LIPASE 38 10/24/2018 1439    LIPASE 45 06/21/2018 1906       Radiology:  MRI abdomen wo contrast mrcp   Final Result   This exam is limited by absence of contrast.   1. The liver demonstrates periportal low signal intensity on both T1 and   T2-weighted images that is pronounced on the out of phase images   suggestive presence of fat in the periportal region. These findings may   be suggestive of a chronic liver disease. Primary biliary cirrhosis and   alcoholic hepatitis are on the differential.   2. Splenomegaly and small ascites. Bilateral small to moderate size   layering pleural effusions with bibasilar atelectasis.   2. Limited evaluation of the gallbladder secondary to absence of   contrast, however cholelithiasis and gallstones are identified on this   current study.       This report was finalized on 12/12/2018 1:33 PM by Dr. Stephanie Schulz M.D.          XR Chest PA & Lateral   Final Result      XR Chest 1 View   Final Result   Persistent mid to lower lung infiltrate/atelectasis, with   increased pleural effusion apparent on the left, continued follow-up   recommended. Cardiomegaly, pulmonary vascular congestion.       This report was finalized on 12/7/2018 5:58 PM by Dr. Rojas Dubon M.D.          CT Chest Without Contrast   Final Result   1. Basilar predominant opacities favored to reflect atelectasis although   pneumonia not excluded.   2. Right greater than left pleural effusions, moderate pericardial   effusion.   3. Mild mediastinal adenopathy.   4. Homogeneous splenic enlargement.                           .        This report was finalized on 12/7/2018 5:53  AM by Valentino Nguyen M.D.          US Renal Bilateral   Final Result   Ascites. Otherwise negative renal sonogram. There is no   evidence of obstruction.       This report was finalized on 12/6/2018 8:05 PM by Dr. Brannon Cruz M.D.          XR Chest 1 View   Final Result   Persistent patchy densities in the mid to lower lungs. Increased left   more than right pleural effusions. Cardiomegaly with persistent mild   pulmonary vascular congestion. Continued follow-up/further evaluation   recommended as indicated.                   This report was finalized on 12/5/2018 1:49 PM by Dr. Rojas Dubon M.D.              Assessment/Plan     Patient Active Problem List   Diagnosis   • Acute upper GI bleed   • Gout   • Pre-diabetes   • Cholelithiasis   • Elevated liver function tests   • Alcohol abuse   • Alcoholic hepatitis without ascites   • Thrombocytopenia (CMS/HCC)   • Esophagitis   • Folate deficiency   • Acute anemia   • Esophageal varices (CMS/HCC)   • Hepatitis, alcoholic   • Ascites   • Portal hypertension (CMS/HCC)   • Hyponatremia   • Symptomatic anemia   • LÓPEZ (acute kidney injury) (CMS/HCC)   • Anasarca   • Cough   • Epistaxis   • Hemoptysis   • Rhinovirus infection   • Pericardial effusion   • Pneumonia     Assessment/Recommendations:  1) Alcoholic liver disease. He claims no ETOH in 2 mos.  He has completed full course of oral steroids with relatively minimal change in his TB.  Continue lactulose.  Transplant evaluation would be okay in 4 months.  2) 1+ esophageal varices seen on 6/18 EGD  3) Hemoptysis - Pulmonary is following.   4) Abnormal gallbladder on recent US. MRCP with stones/sludge, no concerning findings  5) Anemia - likely secondary to #3.   6) LÓPEZ - worsening        Plan:  - kidney function continues to worsen - could not tolerate octreotide - multifactorial per renal with concern for HRS  - labs otherwise stable  - continue to follow liver function  -Discussed with renal-worsening  kidney function is certainly concerning.  Patient and family are interested in transplant if he is deemed a candidate.  He has been sober for over 2 months.  We'll place a call to [facility] transplant center to see if he would be a candidate for inpatient evaluation as it may impact whether or not dialysis is offered           I discussed the patients findings and my recommendations with patient, family and Dr. Gomez.    Tracie Cleveland MD

## 2018-12-18 NOTE — PLAN OF CARE
Problem: Patient Care Overview  Goal: Plan of Care Review  Outcome: Outcome(s) achieved Date Met: 12/18/18 12/18/18 3820   Coping/Psychosocial   Plan of Care Reviewed With patient   Plan of Care Review   Progress improving   OTHER   Outcome Summary pt vss, complains of BLE pain due to edema. roxicodone given Q4. pt resting well. up in [place] walking today. pt transfer to [facility] for liver transplant evaluation. Ambulance to be booked tonight.      Goal: Individualization and Mutuality  Outcome: Outcome(s) achieved Date Met: 12/18/18    Goal: Discharge Needs Assessment  Outcome: Outcome(s) achieved Date Met: 12/18/18      Problem: Anemia (Adult)  Goal: Symptom Improvement  Outcome: Outcome(s) achieved Date Met: 12/18/18      Problem: Fall Risk (Adult)  Goal: Absence of Fall  Outcome: Outcome(s) achieved Date Met: 12/18/18      Problem: Renal Failure/Kidney Injury, Acute (Adult)  Goal: Signs and Symptoms of Listed Potential Problems Will be Absent, Minimized or Managed (Renal Failure/Kidney Injury, Acute)  Outcome: Outcome(s) achieved Date Met: 12/18/18      Problem: Fluid Volume Excess (Adult)  Goal: Optimal Fluid Balance  Outcome: Outcome(s) achieved Date Met: 12/18/18

## 2018-12-18 NOTE — PROGRESS NOTES
Continued Stay Note  [facility]     Patient Name: Enrique Ramirez  MRN: [id number]  Today's Date: 12/18/2018    Admit Date: 12/5/2018    Discharge Plan     Row Name 12/18/18 1633       Plan    Plan  [facility] room 326    Patient/Family in Agreement with Plan  yes    Plan Comments  [facility] booked for  at 1815.  Patient and spouse agree for plans for transfer to [facility].  Accepting Dr Clinton.  Room 326.  Packet with medical records and film disk and report and fax given to Ana WEBER..............................Gosia Roberts RN    Row Name 12/18/18 7211       Plan    Plan  Preparing for Transfer to [place] [address] for liver transplant evaluation    Patient/Family in Agreement with Plan  yes    Plan Comments  Per Dr Hernández the accepting physician at [facility] is Dr Constantin Bhagat.  Spoke with Sandra at [facility], # [phone].  Faxed requested information to Sandra at [phone] for evaluation for transfer.  Informed Sandra when fax was completed.............................        Discharge Codes    No documentation.       Expected Discharge Date and Time     Expected Discharge Date Expected Discharge Time    Dec 18, 2018             Gosia Roberts RN

## 2018-12-19 NOTE — PAYOR COMM NOTE
"Enrique Ramirez (48 y.o. Male)                     ATTENTION; DC SUMMARY , CASE REF [id number], FOR YOUR REVIEW,                     [facility] DEPT 502  [phone] OR UR  [phone]       Date of Birth Social Security Number Address Home Phone MRN    [id number]  [id number] [facility] [phone] [id number]    Shinto Marital Status          Moravian        Admission Date Admission Type Admitting Provider Attending Provider Department, Room/Bed    12/5/18 Emergency Valentino Valdez MD  [address], S408/1    Discharge Date Discharge Disposition Discharge Destination        12/18/2018 Another Health Care Institution Not Defined              Attending Provider:  (none)   Allergies:  Zinc    Isolation:  Droplet   Infection:  Rhinovirus  (12/05/18)   Code Status:  Prior    Ht:  182.9 cm (72\")   Wt:  122 kg (270 lb)    Admission Cmt:  None   Principal Problem:  Symptomatic anemia [D64.9]                 Active Insurance as of 12/5/2018     Primary Coverage     Payor Plan Insurance Group Employer/Plan Group    [employer] BLUE CROSS AN[employer]M BLUE CROSS BLUE SHIELD PPO [id number]     Payor Plan Address Payor Plan Phone Number Payor Plan Fax Number Effective Dates    [address] [phone]  4/16/2017 - None Entered    [facility] [id number]       Subscriber Name Subscriber Birth Date Member ID       RUSSELL WALKER [birth year]/17[birth year]1977 [id number]                 Emergency Contacts      (Rel.) Home Phone Work Phone Mobile Phone    Martha Ramireznifer (Spouse) [phone] -- --               Discharge Summary      Joe Hernández MD at 12/18/2018  2:27 PM                 Date of Admission: 12/5/2018  Date of Discharge:  12/18/2018    PCP: Cydney Chase MD      DISCHARGE DIAGNOSIS    Symptomatic anemia    Elevated liver function tests    Alcoholic hepatitis without ascites    Esophageal varices (CMS/HCC)    Hyponatremia    LÓPEZ (acute kidney " injury) (CMS/HCC)    Anasarca    Cough    Epistaxis    Hemoptysis    Rhinovirus infection    Pericardial effusion    Pneumonia    Hepatorenal syndrome (CMS/HCC)      SECONDARY DIAGNOSES  Past Medical History:   Diagnosis Date   • Esophageal varices (CMS/HCC)    • Gallstones    • Gout    • Hepatitis, alcoholic    • History of transfusion    • Pre-diabetes        CONSULTS   Consults     Date and Time Order Name Status Description    12/7/2018 1351 Inpatient Pulmonology Consult Completed     12/6/2018 1133 Inpatient Cardiology Consult Completed     12/6/2018 0943 Inpatient Nephrology Consult      12/5/2018 1346 Gastroenterology (on-call MD unless specified) Completed     12/5/2018 1346 LHA (on-call MD unless specified) Completed           PROCEDURES PERFORMED  MRI abdomen wo contrast mrcp   Final Result   This exam is limited by absence of contrast.   1. The liver demonstrates periportal low signal intensity on both T1 and   T2-weighted images that is pronounced on the out of phase images   suggestive presence of fat in the periportal region. These findings may   be suggestive of a chronic liver disease. Primary biliary cirrhosis and   alcoholic hepatitis are on the differential.   2. Splenomegaly and small ascites. Bilateral small to moderate size   layering pleural effusions with bibasilar atelectasis.   2. Limited evaluation of the gallbladder secondary to absence of   contrast, however cholelithiasis and gallstones are identified on this   current study.       This report was finalized on 12/12/2018 1:33 PM by Dr. Stephanie Schulz M.D.          XR Chest PA & Lateral   Final Result      XR Chest 1 View   Final Result   Persistent mid to lower lung infiltrate/atelectasis, with   increased pleural effusion apparent on the left, continued follow-up   recommended. Cardiomegaly, pulmonary vascular congestion.       This report was finalized on 12/7/2018 5:58 PM by Dr. Rojas Dubon M.D.          CT Chest Without  Contrast   Final Result   1. Basilar predominant opacities favored to reflect atelectasis although   pneumonia not excluded.   2. Right greater than left pleural effusions, moderate pericardial   effusion.   3. Mild mediastinal adenopathy.   4. Homogeneous splenic enlargement.                           .        This report was finalized on 12/7/2018 5:53 AM by Valentino Nguyen M.D.          US Renal Bilateral   Final Result   Ascites. Otherwise negative renal sonogram. There is no   evidence of obstruction.       This report was finalized on 12/6/2018 8:05 PM by Dr. Brannon Cruz M.D.          XR Chest 1 View   Final Result   Persistent patchy densities in the mid to lower lungs. Increased left   more than right pleural effusions. Cardiomegaly with persistent mild   pulmonary vascular congestion. Continued follow-up/further evaluation   recommended as indicated.                   This report was finalized on 12/5/2018 1:49 PM by Dr. Rojas Dubon M.D.            HOSPITAL COURSE  Patient is a 48 y.o. male presented to [facility] complaining of increased swelling progressive fatigue and coughing up blood.  Please see the admitting history and physical for further details.  He has a history of alcohol cirrhosis with esophageal varices in the past.  He presented on this admission with decompensation.  He had increased swelling in his legs increased weight gain and was coughing up small amounts of blood.  His hemoglobin was 6.7 on admission.  Given his fatigue and dyspnea on exertion he was transfused for symptomatically anemia.  He was empirically covered for possible GI source and was started on a Protonix drip and GI was consulted.  He received a total of 6 units of packed red blood cells over the course of hospitalization with 2 units given on 3 separate occasions.  Hemoglobin at the time of discharge is 8.6.  It was thought however on admission that this likely represented more of an  epistaxis versus hemoptysis with his recent upper respiratory infection with rhinovirus.  X-ray on admission showed bilateral patchy lower lobe densities and bilateral pleural effusions.  Pulmonary was consulted and assisted with management over the course of the hospitalization.  He completed a course of treatment for community-acquired pneumonia while here in the hospital.  He also had acute renal failure on admission.  His baseline creatinine is around 1-1.2 and creatinine on admission was around 1.89.  He was hydrated with crystalloid and nephrology was consulted.  His creatinine never really improved and stayed around 1.5-1.6 until a few days ago and continued to rise.  At the time of discharge his creatinine is risen to 3.67.  The concern at this point is that his significant liver issues or driving his renal failure.  His arterial pressures have been acceptable on oral medications.  He's been tried on octreotide but did not tolerate this medication secondary to increased nausea and vomiting.  Attempts at diuresis early in the hospitalization resulted in increasing creatinines.  Active hospital issues summarized below  Pneumonia  - He has now completed antibiotics at this point.  Pulmonology has signed off.     Symptomatic Anemia  - S/p 2 units PRBCs on 12/5 and 12/11and 12/16  - monitor Hgb and transfuse as needed  - hgb stable at 8.6     Hemoptysis  - Improved overall  - 2/2 liver disease coagulopathy, TCP and PNA  - No need for bronch currently, per pulm     LÓPEZ  - Placed on a bumex drip now dc'd, CR up again today despite holding diuretics and giving blood  - Still with lots of edema  - Unable to tolerate the octreotide and DC'd  - MAP is acceptable  - Nephrology following     Pericardial Effusion/Pleural Effusion  - Apparent on CT chest, not present on echocardiogram in October  - Echocardiogram with tiny effusion not able to be tapped, no tamponade  - Repeat ECHO 12/10/18 showed subcentimeter  "effusion and cardiology has signed off  - Pleural effusion is stable monitor for now ? Hepatic hydrothorax     EtOH Hepatitis  - possible secondary cirrhosis  - nadolol, lasix, and spironolactone held  - RUQ u/s revealing GB mass.  MRCP without contrast showed nothing acute.  - Has completed a course of steroids with no significant change in TB    At this point his case was discussed with the liver transplant team at [facility] and they've accepted the patient to their facility for further care and evaluation.  The plan was discussed with the family at the bedside and all questions were addressed and answered.  They're agreeable with transfer this afternoon.      CONDITION ON DISCHARGE  Stable.      VITAL SIGNS  /59 (BP Location: Right arm, Patient Position: Sitting)   Pulse 75   Temp 97.6 °F (36.4 °C) (Oral)   Resp 16   Ht 182.9 cm (72\")   Wt 122 kg (270 lb)   SpO2 91%   BMI 36.62 kg/m²    Objective:  General Appearance:  Comfortable and ill-appearing (Jaundiced ill-appearing).    Vital signs: (most recent): Blood pressure 116/59, pulse 75, temperature 97.6 °F (36.4 °C), temperature source Oral, resp. rate 16, height 182.9 cm (72\"), weight 122 kg (270 lb), SpO2 91 %.  Vital signs are normal.  No fever.    HEENT: (He has a busted blood vessel in his left eye  Scleral icterus is present)    Lungs:  Normal effort and normal respiratory rate.  (Decreased at the bases bilaterally)  Heart: Normal rate.  Regular rhythm.  S1 normal and S2 normal.    Abdomen: Abdomen is soft.  Bowel sounds are normal.   There is no abdominal tenderness.     Extremities: Normal range of motion.  There is dependent edema.    Neurological: Patient is alert and oriented to person, place and time.    Pupils:  Pupils are equal, round, and reactive to light.    Skin:  Warm and dry.                DISCHARGE DISPOSITION   Another Health Care Institution Not Defined      DISCHARGE MEDICATIONS     Discharge Medications      New " Medications      Instructions Start Date   citalopram 10 MG tablet  Commonly known as:  CeleXA   10 mg, Oral, Daily      ipratropium-albuterol 0.5-2.5 mg/3 ml nebulizer  Commonly known as:  DUO-NEB   3 mL, Nebulization, Every 4 Hours PRN      midodrine 10 MG tablet  Commonly known as:  PROAMATINE   10 mg, Oral, 3 Times Daily Before Meals      oxyCODONE 10 MG tablet  Commonly known as:  ROXICODONE   10 mg, Oral, Every 4 Hours PRN      sodium chloride 0.65 % nasal spray  Commonly known as:  OCEAN   2 sprays, Nasal, As Needed         Changes to Medications      Instructions Start Date   guaiFENesin 600 MG 12 hr tablet  Commonly known as:  MUCINEX  What changed:    · medication strength  · how much to take  · when to take this   600 mg, Oral, Every 12 Hours Scheduled         Continue These Medications      Instructions Start Date   lactulose 10 GM/15ML solution  Commonly known as:  CHRONULAC   20 g, Oral, 2 Times Daily      pantoprazole 40 MG EC tablet  Commonly known as:  PROTONIX   40 mg, Oral, Every Morning Before Breakfast      Vitamin A 26572 units tablet   5,000 Units, Oral, Daily         Stop These Medications    allopurinol 300 MG tablet  Commonly known as:  ZYLOPRIM     furosemide 40 MG tablet  Commonly known as:  LASIX     multivitamin with minerals tablet tablet     nadolol 20 MG tablet  Commonly known as:  CORGARD     nitrocellulose external liquid     spironolactone 100 MG tablet  Commonly known as:  ALDACTONE           No future appointments.  Follow-up Information     Cydney Chase MD .    Specialties:  Family Medicine, Urgent Care, Sleep Medicine  Contact information:  [id number] [address] [address] [phone]                   TEST  RESULTS PENDING AT DISCHARGE         Joe Hernández MD  [facility]  12/18/18  2:27 PM      Time: greater than 30 minutes.          Electronically signed by Joe Hernández MD at 12/18/2018  2:45 PM

## 2019-02-21 ENCOUNTER — TELEPHONE (OUTPATIENT)
Dept: GASTROENTEROLOGY | Facility: CLINIC | Age: 49
End: 2019-02-21

## 2019-02-21 NOTE — TELEPHONE ENCOUNTER
Also received fax requests from [facility] for refills on lasix 40mg po daily #90 and nadolol 20mg take one tab po daily #90.  Message sent to Dr White

## 2019-02-21 NOTE — TELEPHONE ENCOUNTER
Faxed request received from [facility] for Sprionalactone 100 mg 1 tab po daily, #90.     See note of 11/29/18.    3 f/u appts cancelled (2 by this office, 1 by pt).  Message to Dr White.

## 2019-02-21 NOTE — TELEPHONE ENCOUNTER
He was sent for transplant evaluation and I believe [facility] is taking care of him now.  Can we please call to clarify that and see if they are in charge of his prescriptions at this time.  Otherwise we can refill them.  thx

## 2019-02-22 NOTE — TELEPHONE ENCOUNTER
VM to pt with request to contact office.    Call to [facility] (see [facility] auth).  States that pt received liver transplant 2/3 - currently in rehab.  Transplant team managing meds.      Call to [facility] @ [phone] and spoke with Pharmacist.  Advise that Dr White not currently managing meds - decline refill on pantoprazole, lasix, nadolol, and spironalactone.      Update to DR White.

## 2019-03-11 ENCOUNTER — TELEPHONE (OUTPATIENT)
Dept: GASTROENTEROLOGY | Facility: CLINIC | Age: 49
End: 2019-03-11

## 2019-03-11 NOTE — TELEPHONE ENCOUNTER
Wily request received from [employer] for lactulose - 90 day supply.  See note of 2/21 - Dr White no longer managing meds.    Denial faxed to [phone] - confirmation received.

## 2019-03-13 ENCOUNTER — TELEPHONE (OUTPATIENT)
Dept: GASTROENTEROLOGY | Facility: CLINIC | Age: 49
End: 2019-03-13

## 2019-03-13 NOTE — TELEPHONE ENCOUNTER
Faxed request received from [employer] for nadolol and spironalactone.   See note 2/21/19 - Dr White not managing med regime.    Denials faxed to [phone] - confirmation received.

## 2019-05-02 ENCOUNTER — TRANSCRIBE ORDERS (OUTPATIENT)
Dept: ADMINISTRATIVE | Facility: HOSPITAL | Age: 49
End: 2019-05-02

## 2019-05-02 DIAGNOSIS — E83.42 HYPOMAGNESEMIA: Primary | ICD-10-CM

## 2019-05-02 RX ORDER — MAGNESIUM SULFATE 1 G/100ML
1 INJECTION INTRAVENOUS ONCE
Status: CANCELLED | OUTPATIENT
Start: 2019-05-06

## 2019-05-02 RX ORDER — MAGNESIUM SULFATE 1 G/100ML
1 INJECTION INTRAVENOUS ONCE
Status: CANCELLED
Start: 2019-05-06 | End: 2019-05-06

## 2019-05-02 RX ORDER — MAGNESIUM SULFATE 1 G/100ML
2 INJECTION INTRAVENOUS ONCE
Status: CANCELLED | OUTPATIENT
Start: 2019-05-06

## 2019-05-06 ENCOUNTER — HOSPITAL ENCOUNTER (OUTPATIENT)
Dept: INFUSION THERAPY | Facility: HOSPITAL | Age: 49
Discharge: HOME | End: 2019-05-06
Admitting: INTERNAL MEDICINE

## 2019-05-06 VITALS
OXYGEN SATURATION: 100 % | SYSTOLIC BLOOD PRESSURE: 115 MMHG | TEMPERATURE: 98.4 F | DIASTOLIC BLOOD PRESSURE: 78 MMHG | HEART RATE: 93 BPM | RESPIRATION RATE: 20 BRPM

## 2019-05-06 DIAGNOSIS — E83.42 HYPOMAGNESEMIA: Primary | ICD-10-CM

## 2019-05-06 LAB — MAGNESIUM SERPL-MCNC: 1.2 MG/DL (ref 1.6–2.6)

## 2019-05-06 PROCEDURE — 96365 THER/PROPH/DIAG IV INF INIT: CPT

## 2019-05-06 PROCEDURE — 36415 COLL VENOUS BLD VENIPUNCTURE: CPT

## 2019-05-06 PROCEDURE — 96366 THER/PROPH/DIAG IV INF ADDON: CPT

## 2019-05-06 PROCEDURE — 83735 ASSAY OF MAGNESIUM: CPT | Performed by: INTERNAL MEDICINE

## 2019-05-06 PROCEDURE — 25010000002 MAGNESIUM SULFATE IN D5W 1G/100ML (PREMIX) 1-5 GM/100ML-% SOLUTION: Performed by: INTERNAL MEDICINE

## 2019-05-06 RX ORDER — MAGNESIUM SULFATE 1 G/100ML
1 INJECTION INTRAVENOUS ONCE
Status: CANCELLED | OUTPATIENT
Start: 2019-05-06

## 2019-05-06 RX ORDER — MAGNESIUM SULFATE 1 G/100ML
1 INJECTION INTRAVENOUS ONCE
Status: CANCELLED
Start: 2019-05-06 | End: 2019-05-06

## 2019-05-06 RX ORDER — MAGNESIUM SULFATE 1 G/100ML
1 INJECTION INTRAVENOUS ONCE
Status: COMPLETED | OUTPATIENT
Start: 2019-05-06 | End: 2019-05-06

## 2019-05-06 RX ADMIN — MAGNESIUM SULFATE HEPTAHYDRATE 1 G: 1 INJECTION, SOLUTION INTRAVENOUS at 12:52

## 2019-05-06 RX ADMIN — MAGNESIUM SULFATE HEPTAHYDRATE 1 G: 1 INJECTION, SOLUTION INTRAVENOUS at 13:46

## 2019-05-06 NOTE — PATIENT INSTRUCTIONS
Magnesium Sulfate injection  What is this medicine?  MAGNESIUM SULFATE (mag NEE zee um SUL fate) is an electrolyte injection commonly used to treat low magnesium levels in your blood. It is also used to prevent or control seizures in women with preeclampsia or eclampsia.  This medicine may be used for other purposes; ask your health care provider or pharmacist if you have questions.  What should I tell my health care provider before I take this medicine?  They need to know if you have any of these conditions:  -heart disease  -history of irregular heart beat  -kidney disease  -an unusual or allergic reaction to magnesium sulfate, medicines, foods, dyes, or preservatives  -pregnant or trying to get pregnant  -breast-feeding  How should I use this medicine?  This medicine is for infusion into a vein. It is given by a health care professional in a hospital or clinic setting.  Talk to your pediatrician regarding the use of this medicine in children. While this drug may be prescribed for selected conditions, precautions do apply.  Overdosage: If you think you have taken too much of this medicine contact a poison control center or emergency room at once.  NOTE: This medicine is only for you. Do not share this medicine with others.  What if I miss a dose?  This does not apply.  What may interact with this medicine?  This medicine may interact with the following medications:  -certain medicines for anxiety or sleep  -certain medicines for seizures like phenobarbital  -digoxin  -medicines that relax muscles for surgery  -narcotic medicines for pain  This list may not describe all possible interactions. Give your health care provider a list of all the medicines, herbs, non-prescription drugs, or dietary supplements you use. Also tell them if you smoke, drink alcohol, or use illegal drugs. Some items may interact with your medicine.  What should I watch for while using this medicine?  Your condition will be monitored carefully  while you are receiving this medicine. You may need blood work done while you are receiving this medicine.  What side effects may I notice from receiving this medicine?  Side effects that you should report to your doctor or health care professional as soon as possible:  -allergic reactions like skin rash, itching or hives, swelling of the face, lips, or tongue  -facial flushing  -muscle weakness  -signs and symptoms of low blood pressure like dizziness; feeling faint or lightheaded, falls; unusually weak or tired  -signs and symptoms of a dangerous change in heartbeat or heart rhythm like chest pain; dizziness; fast or irregular heartbeat; palpitations; breathing problems  -sweating  This list may not describe all possible side effects. Call your doctor for medical advice about side effects. You may report side effects to FDA at 6-882-FDA-9052.  Where should I keep my medicine?  This drug is given in a hospital or clinic and will not be stored at home.  NOTE: This sheet is a summary. It may not cover all possible information. If you have questions about this medicine, talk to your doctor, pharmacist, or health care provider.  © 2019 Elsevier/Gold Standard (2017-07-05 12:31:42)

## 2019-05-10 ENCOUNTER — HOSPITAL ENCOUNTER (OUTPATIENT)
Dept: INFUSION THERAPY | Facility: HOSPITAL | Age: 49
Discharge: HOME | End: 2019-05-10
Admitting: INTERNAL MEDICINE

## 2019-05-10 VITALS
DIASTOLIC BLOOD PRESSURE: 69 MMHG | TEMPERATURE: 97.4 F | OXYGEN SATURATION: 100 % | SYSTOLIC BLOOD PRESSURE: 110 MMHG | HEART RATE: 88 BPM | RESPIRATION RATE: 16 BRPM

## 2019-05-10 DIAGNOSIS — E83.42 HYPOMAGNESEMIA: Primary | ICD-10-CM

## 2019-05-10 LAB — MAGNESIUM SERPL-MCNC: 1.5 MG/DL (ref 1.6–2.6)

## 2019-05-10 PROCEDURE — 96366 THER/PROPH/DIAG IV INF ADDON: CPT

## 2019-05-10 PROCEDURE — 83735 ASSAY OF MAGNESIUM: CPT | Performed by: INTERNAL MEDICINE

## 2019-05-10 PROCEDURE — 96365 THER/PROPH/DIAG IV INF INIT: CPT

## 2019-05-10 PROCEDURE — 36415 COLL VENOUS BLD VENIPUNCTURE: CPT

## 2019-05-10 PROCEDURE — 25010000002 MAGNESIUM SULFATE IN D5W 1G/100ML (PREMIX) 1-5 GM/100ML-% SOLUTION: Performed by: INTERNAL MEDICINE

## 2019-05-10 RX ORDER — MAGNESIUM SULFATE 1 G/100ML
1 INJECTION INTRAVENOUS ONCE
Status: COMPLETED | OUTPATIENT
Start: 2019-05-10 | End: 2019-05-10

## 2019-05-10 RX ORDER — MAGNESIUM SULFATE 1 G/100ML
1 INJECTION INTRAVENOUS ONCE
Status: CANCELLED
Start: 2019-05-10 | End: 2019-05-10

## 2019-05-10 RX ORDER — MAGNESIUM SULFATE 1 G/100ML
1 INJECTION INTRAVENOUS ONCE
Status: CANCELLED | OUTPATIENT
Start: 2019-05-10

## 2019-05-10 RX ADMIN — MAGNESIUM SULFATE HEPTAHYDRATE 1 G: 1 INJECTION, SOLUTION INTRAVENOUS at 11:59

## 2019-05-10 RX ADMIN — MAGNESIUM SULFATE HEPTAHYDRATE 1 G: 1 INJECTION, SOLUTION INTRAVENOUS at 11:02

## 2019-05-14 ENCOUNTER — HOSPITAL ENCOUNTER (OUTPATIENT)
Dept: INFUSION THERAPY | Facility: HOSPITAL | Age: 49
Discharge: HOME | End: 2019-05-14
Admitting: INTERNAL MEDICINE

## 2019-05-14 VITALS
TEMPERATURE: 97.9 F | OXYGEN SATURATION: 100 % | SYSTOLIC BLOOD PRESSURE: 105 MMHG | HEART RATE: 75 BPM | RESPIRATION RATE: 16 BRPM | DIASTOLIC BLOOD PRESSURE: 69 MMHG

## 2019-05-14 DIAGNOSIS — E83.42 HYPOMAGNESEMIA: Primary | ICD-10-CM

## 2019-05-14 LAB — MAGNESIUM SERPL-MCNC: 1.4 MG/DL (ref 1.6–2.6)

## 2019-05-14 PROCEDURE — 96365 THER/PROPH/DIAG IV INF INIT: CPT

## 2019-05-14 PROCEDURE — 96366 THER/PROPH/DIAG IV INF ADDON: CPT

## 2019-05-14 PROCEDURE — 25010000002 MAGNESIUM SULFATE IN D5W 1G/100ML (PREMIX) 1-5 GM/100ML-% SOLUTION: Performed by: INTERNAL MEDICINE

## 2019-05-14 PROCEDURE — 83735 ASSAY OF MAGNESIUM: CPT | Performed by: INTERNAL MEDICINE

## 2019-05-14 PROCEDURE — 36415 COLL VENOUS BLD VENIPUNCTURE: CPT

## 2019-05-14 RX ORDER — MAGNESIUM SULFATE 1 G/100ML
1 INJECTION INTRAVENOUS ONCE
Status: COMPLETED | OUTPATIENT
Start: 2019-05-14 | End: 2019-05-14

## 2019-05-14 RX ORDER — MAGNESIUM SULFATE 1 G/100ML
1 INJECTION INTRAVENOUS ONCE
Status: CANCELLED | OUTPATIENT
Start: 2019-05-14

## 2019-05-14 RX ORDER — MAGNESIUM SULFATE 1 G/100ML
1 INJECTION INTRAVENOUS ONCE
Status: CANCELLED
Start: 2019-05-14 | End: 2019-05-14

## 2019-05-14 RX ADMIN — MAGNESIUM SULFATE HEPTAHYDRATE 1 G: 1 INJECTION, SOLUTION INTRAVENOUS at 11:18

## 2019-05-14 RX ADMIN — MAGNESIUM SULFATE HEPTAHYDRATE 1 G: 1 INJECTION, SOLUTION INTRAVENOUS at 12:12

## 2019-05-17 ENCOUNTER — HOSPITAL ENCOUNTER (OUTPATIENT)
Dept: INFUSION THERAPY | Facility: HOSPITAL | Age: 49
Discharge: HOME | End: 2019-05-17
Admitting: INTERNAL MEDICINE

## 2019-05-17 VITALS
DIASTOLIC BLOOD PRESSURE: 78 MMHG | RESPIRATION RATE: 20 BRPM | HEART RATE: 78 BPM | OXYGEN SATURATION: 100 % | SYSTOLIC BLOOD PRESSURE: 119 MMHG | TEMPERATURE: 97.8 F

## 2019-05-17 DIAGNOSIS — E83.42 HYPOMAGNESEMIA: Primary | ICD-10-CM

## 2019-05-17 LAB — MAGNESIUM SERPL-MCNC: 1.5 MG/DL (ref 1.6–2.6)

## 2019-05-17 PROCEDURE — 96365 THER/PROPH/DIAG IV INF INIT: CPT

## 2019-05-17 PROCEDURE — 96366 THER/PROPH/DIAG IV INF ADDON: CPT

## 2019-05-17 PROCEDURE — 83735 ASSAY OF MAGNESIUM: CPT | Performed by: INTERNAL MEDICINE

## 2019-05-17 PROCEDURE — 25010000002 MAGNESIUM SULFATE IN D5W 1G/100ML (PREMIX) 1-5 GM/100ML-% SOLUTION: Performed by: INTERNAL MEDICINE

## 2019-05-17 RX ORDER — MAGNESIUM SULFATE 1 G/100ML
1 INJECTION INTRAVENOUS ONCE
Status: COMPLETED | OUTPATIENT
Start: 2019-05-17 | End: 2019-05-17

## 2019-05-17 RX ORDER — TACROLIMUS 1 MG/1
3 CAPSULE ORAL 2 TIMES DAILY
COMMUNITY

## 2019-05-17 RX ORDER — TRAMADOL HYDROCHLORIDE 50 MG/1
50 TABLET ORAL EVERY 6 HOURS PRN
COMMUNITY
End: 2021-01-18 | Stop reason: ALTCHOICE

## 2019-05-17 RX ORDER — PANTOPRAZOLE SODIUM 20 MG/1
20 TABLET, DELAYED RELEASE ORAL 2 TIMES DAILY
COMMUNITY
End: 2021-01-18

## 2019-05-17 RX ORDER — FUROSEMIDE 40 MG/1
40 TABLET ORAL 2 TIMES DAILY
COMMUNITY
End: 2021-07-22

## 2019-05-17 RX ORDER — MAGNESIUM SULFATE 1 G/100ML
1 INJECTION INTRAVENOUS ONCE
Status: CANCELLED | OUTPATIENT
Start: 2019-05-17

## 2019-05-17 RX ORDER — MAGNESIUM SULFATE 1 G/100ML
1 INJECTION INTRAVENOUS ONCE
Status: CANCELLED
Start: 2019-05-17 | End: 2019-05-17

## 2019-05-17 RX ADMIN — MAGNESIUM SULFATE HEPTAHYDRATE 1 G: 1 INJECTION, SOLUTION INTRAVENOUS at 10:53

## 2019-05-17 RX ADMIN — MAGNESIUM SULFATE HEPTAHYDRATE 1 G: 1 INJECTION, SOLUTION INTRAVENOUS at 11:52

## 2019-05-21 ENCOUNTER — HOSPITAL ENCOUNTER (OUTPATIENT)
Dept: INFUSION THERAPY | Facility: HOSPITAL | Age: 49
End: 2019-05-21

## 2019-05-24 ENCOUNTER — APPOINTMENT (OUTPATIENT)
Dept: INFUSION THERAPY | Facility: HOSPITAL | Age: 49
End: 2019-05-24

## 2019-05-28 ENCOUNTER — HOSPITAL ENCOUNTER (OUTPATIENT)
Dept: INFUSION THERAPY | Facility: HOSPITAL | Age: 49
Discharge: HOME | End: 2019-05-28
Admitting: INTERNAL MEDICINE

## 2019-05-28 VITALS
RESPIRATION RATE: 16 BRPM | OXYGEN SATURATION: 99 % | DIASTOLIC BLOOD PRESSURE: 84 MMHG | TEMPERATURE: 98 F | HEART RATE: 80 BPM | SYSTOLIC BLOOD PRESSURE: 136 MMHG

## 2019-05-28 DIAGNOSIS — E83.42 HYPOMAGNESEMIA: Primary | ICD-10-CM

## 2019-05-28 LAB — MAGNESIUM SERPL-MCNC: 1.6 MG/DL (ref 1.6–2.6)

## 2019-05-28 PROCEDURE — 25010000002 MAGNESIUM SULFATE IN D5W 1G/100ML (PREMIX) 1-5 GM/100ML-% SOLUTION: Performed by: INTERNAL MEDICINE

## 2019-05-28 PROCEDURE — 83735 ASSAY OF MAGNESIUM: CPT | Performed by: INTERNAL MEDICINE

## 2019-05-28 PROCEDURE — 96366 THER/PROPH/DIAG IV INF ADDON: CPT

## 2019-05-28 PROCEDURE — 36415 COLL VENOUS BLD VENIPUNCTURE: CPT

## 2019-05-28 PROCEDURE — 96365 THER/PROPH/DIAG IV INF INIT: CPT

## 2019-05-28 RX ORDER — MAGNESIUM SULFATE 1 G/100ML
1 INJECTION INTRAVENOUS ONCE
Status: CANCELLED | OUTPATIENT
Start: 2019-05-28

## 2019-05-28 RX ORDER — MAGNESIUM SULFATE 1 G/100ML
1 INJECTION INTRAVENOUS ONCE
Status: COMPLETED | OUTPATIENT
Start: 2019-05-28 | End: 2019-05-28

## 2019-05-28 RX ORDER — MAGNESIUM SULFATE 1 G/100ML
1 INJECTION INTRAVENOUS ONCE
Status: CANCELLED
Start: 2019-05-28 | End: 2019-05-28

## 2019-05-28 RX ADMIN — MAGNESIUM SULFATE HEPTAHYDRATE 1 G: 1 INJECTION, SOLUTION INTRAVENOUS at 11:57

## 2019-05-28 RX ADMIN — MAGNESIUM SULFATE HEPTAHYDRATE 1 G: 1 INJECTION, SOLUTION INTRAVENOUS at 10:56

## 2019-05-31 ENCOUNTER — HOSPITAL ENCOUNTER (OUTPATIENT)
Dept: INFUSION THERAPY | Facility: HOSPITAL | Age: 49
Discharge: HOME | End: 2019-05-31
Admitting: INTERNAL MEDICINE

## 2019-05-31 VITALS
WEIGHT: 192 LBS | RESPIRATION RATE: 16 BRPM | SYSTOLIC BLOOD PRESSURE: 134 MMHG | BODY MASS INDEX: 26.01 KG/M2 | OXYGEN SATURATION: 100 % | DIASTOLIC BLOOD PRESSURE: 89 MMHG | TEMPERATURE: 97.8 F | HEART RATE: 76 BPM | HEIGHT: 72 IN

## 2019-05-31 DIAGNOSIS — E83.42 HYPOMAGNESEMIA: Primary | ICD-10-CM

## 2019-05-31 LAB — MAGNESIUM SERPL-MCNC: 1.6 MG/DL (ref 1.6–2.6)

## 2019-05-31 PROCEDURE — 83735 ASSAY OF MAGNESIUM: CPT | Performed by: INTERNAL MEDICINE

## 2019-05-31 PROCEDURE — 96365 THER/PROPH/DIAG IV INF INIT: CPT

## 2019-05-31 PROCEDURE — 25010000002 MAGNESIUM SULFATE IN D5W 1G/100ML (PREMIX) 1-5 GM/100ML-% SOLUTION: Performed by: INTERNAL MEDICINE

## 2019-05-31 PROCEDURE — 96366 THER/PROPH/DIAG IV INF ADDON: CPT

## 2019-05-31 PROCEDURE — 36415 COLL VENOUS BLD VENIPUNCTURE: CPT

## 2019-05-31 RX ORDER — MAGNESIUM SULFATE 1 G/100ML
1 INJECTION INTRAVENOUS ONCE
Status: COMPLETED | OUTPATIENT
Start: 2019-05-31 | End: 2019-05-31

## 2019-05-31 RX ORDER — MAGNESIUM SULFATE 1 G/100ML
1 INJECTION INTRAVENOUS ONCE
Start: 2019-05-31 | End: 2019-05-31

## 2019-05-31 RX ORDER — MAGNESIUM SULFATE 1 G/100ML
1 INJECTION INTRAVENOUS ONCE
OUTPATIENT
Start: 2019-05-31

## 2019-05-31 RX ADMIN — MAGNESIUM SULFATE HEPTAHYDRATE 1 G: 1 INJECTION, SOLUTION INTRAVENOUS at 13:21

## 2019-05-31 RX ADMIN — MAGNESIUM SULFATE HEPTAHYDRATE 1 G: 1 INJECTION, SOLUTION INTRAVENOUS at 12:22

## 2019-06-04 ENCOUNTER — HOSPITAL ENCOUNTER (OUTPATIENT)
Dept: INFUSION THERAPY | Facility: HOSPITAL | Age: 49
Discharge: HOME | End: 2019-06-04

## 2019-06-11 ENCOUNTER — APPOINTMENT (OUTPATIENT)
Dept: INFUSION THERAPY | Facility: HOSPITAL | Age: 49
End: 2019-06-11

## 2019-06-14 ENCOUNTER — APPOINTMENT (OUTPATIENT)
Dept: INFUSION THERAPY | Facility: HOSPITAL | Age: 49
End: 2019-06-14

## 2019-10-08 NOTE — PLAN OF CARE
Problem: Patient Care Overview  Goal: Plan of Care Review  Outcome: Ongoing (interventions implemented as appropriate)   06/23/18 4649   Coping/Psychosocial   Plan of Care Reviewed With patient   Plan of Care Review   Progress no change   OTHER   Outcome Summary patient alert follows commands. CIWA score 1-2 no acute c/o of withdrawal issues. all meds schuled given orally. plans to monitor labs in am. dc'd iv protonix and iv fluids. no acute distress noted. no signs of acute bleeding. will continue to monitor     Goal: Individualization and Mutuality  Outcome: Ongoing (interventions implemented as appropriate)    Goal: Discharge Needs Assessment  Outcome: Ongoing (interventions implemented as appropriate)    Goal: Interprofessional Rounds/Family Conf  Outcome: Ongoing (interventions implemented as appropriate)      Problem: Gastrointestinal Bleeding (Adult)  Goal: Signs and Symptoms of Listed Potential Problems Will be Absent, Minimized or Managed (Gastrointestinal Bleeding)  Outcome: Ongoing (interventions implemented as appropriate)      Problem: Fall Risk (Adult)  Goal: Identify Related Risk Factors and Signs and Symptoms  Outcome: Ongoing (interventions implemented as appropriate)    Goal: Absence of Fall  Outcome: Ongoing (interventions implemented as appropriate)         70 yo M for left total knee replacement  c/o pain left knee > 1 year  inject x 1 w minimal effect

## 2020-07-20 ENCOUNTER — TELEMEDICINE (OUTPATIENT)
Dept: FAMILY MEDICINE CLINIC | Facility: CLINIC | Age: 50
End: 2020-07-20

## 2020-07-20 ENCOUNTER — TELEPHONE (OUTPATIENT)
Dept: FAMILY MEDICINE CLINIC | Facility: CLINIC | Age: 50
End: 2020-07-20

## 2020-07-20 DIAGNOSIS — D84.9 ACQUIRED IMMUNOCOMPROMISED STATE (HCC): ICD-10-CM

## 2020-07-20 DIAGNOSIS — B02.9 HERPES ZOSTER WITHOUT COMPLICATION: Primary | ICD-10-CM

## 2020-07-20 PROCEDURE — 99213 OFFICE O/P EST LOW 20 MIN: CPT | Performed by: FAMILY MEDICINE

## 2020-07-20 RX ORDER — VALACYCLOVIR HYDROCHLORIDE 1 G/1
1000 TABLET, FILM COATED ORAL 3 TIMES DAILY
Qty: 21 TABLET | Refills: 0 | Status: SHIPPED | OUTPATIENT
Start: 2020-07-20 | End: 2020-07-27

## 2020-07-20 NOTE — PROGRESS NOTES
Subjective   Enrique Ramirez is a 50 y.o. male.     History of Present Illness     Enrique Ramirez 50 y.o. male presents via video visit for evaluation of a rash involving the trunk. Rash has been present for: a few hours. Lesions are red, and are described as grouped and vesicular. Rash has not changed over time. Rash is painful. Associated symptoms  .area is spreading.  Patient denies:abdominal pain, arthralgia, congestion, cough and crankiness..  Treatment to date includes: none without improvement. Symptoms are gradually worsening. Patient has not had contacts with similar rash. Patient has not had new exposures (soaps, lotions, laundry detergents, foods, medications, plants, insects or animals).            The following portions of the patient's history were reviewed and updated as appropriate: allergies, current medications, past family history, past medical history, past social history, past surgical history and problem list.    Review of Systems   Constitutional: Negative for chills and fever.   Skin: Positive for rash and skin lesions. Negative for pallor and bruise.   Neurological: Negative for weakness and numbness.       Objective   Physical Exam   Constitutional: He is oriented to person, place, and time. He appears well-developed and well-nourished.   HENT:   Head: Normocephalic and atraumatic.   Right Ear: External ear normal.   Left Ear: External ear normal.   Eyes: EOM are normal.   Pulmonary/Chest: Effort normal. No respiratory distress.   Neurological: He is alert and oriented to person, place, and time.   Skin: Rash (along dermatome L abdomen to back) noted. Rash is vesicular.   Psychiatric: He has a normal mood and affect. His behavior is normal.               Assessment/Plan     Diagnoses and all orders for this visit:    Herpes zoster without complication    Acquired immunocompromised state (CMS/[facility])      Would like to treat patient with valacyclovir 1000 mg 3 times daily for 7 days for shingles.   Patient has not been seen here in over a year.  He is a transplant recipient and is on several immunosuppressants.  Wife and patient are not sure if medication list that we have is up-to-date.    They will [facility]  They will discuss considering treatment for shingles.  Patient will have transplant team's office call our office to see if they approve valacyclovir and/or they can prescribe themselves as well.    If transplant team approves will prescribe valacyclovir 1000 mg 3 times daily for 7 days.    Time spent during visit: 10 minutes.

## 2020-07-20 NOTE — TELEPHONE ENCOUNTER
[facility] called to let you know that pt's transplant team is ok with your treatment for shingles.  Please send Rx to [facility].

## 2020-07-22 ENCOUNTER — TELEPHONE (OUTPATIENT)
Dept: FAMILY MEDICINE CLINIC | Facility: CLINIC | Age: 50
End: 2020-07-22

## 2020-07-22 NOTE — TELEPHONE ENCOUNTER
Spoke to pt's wife - wife states that pt cannot take NSAIDS due to transplant.  Pt has contacted pain mgt and they are going to prescribe something for pain.

## 2020-07-22 NOTE — TELEPHONE ENCOUNTER
"Pt's wife called stating that shingles have \"spread\" - states pt is in a lot of pain and is asking if you can give him something topical for pain.  "

## 2020-07-22 NOTE — TELEPHONE ENCOUNTER
Please let patient and his wife know with active rash we cannot do topical for pain.  We will have to do something by mouth.  Has he tried NSAIDs with any relief?  And is he on any pain medications regularly?  If not I can consider a short course of something to help with pain.

## 2021-01-18 ENCOUNTER — OFFICE VISIT (OUTPATIENT)
Dept: FAMILY MEDICINE CLINIC | Facility: CLINIC | Age: 51
End: 2021-01-18

## 2021-01-18 VITALS
BODY MASS INDEX: 33.32 KG/M2 | DIASTOLIC BLOOD PRESSURE: 83 MMHG | RESPIRATION RATE: 16 BRPM | HEART RATE: 84 BPM | HEIGHT: 72 IN | OXYGEN SATURATION: 98 % | WEIGHT: 246 LBS | TEMPERATURE: 97.1 F | SYSTOLIC BLOOD PRESSURE: 132 MMHG

## 2021-01-18 DIAGNOSIS — L08.9 SKIN INFECTION: Primary | ICD-10-CM

## 2021-01-18 PROCEDURE — 99213 OFFICE O/P EST LOW 20 MIN: CPT | Performed by: FAMILY MEDICINE

## 2021-01-18 RX ORDER — CEPHALEXIN 500 MG/1
500 CAPSULE ORAL 2 TIMES DAILY
Qty: 14 CAPSULE | Refills: 0 | Status: SHIPPED | OUTPATIENT
Start: 2021-01-18 | End: 2021-01-25

## 2021-01-18 RX ORDER — PREDNISONE 10 MG/1
10 TABLET ORAL
COMMUNITY
Start: 2020-04-24 | End: 2021-04-19

## 2021-01-18 RX ORDER — OXYCODONE HYDROCHLORIDE 10 MG/1
10 TABLET ORAL EVERY 6 HOURS PRN
COMMUNITY
Start: 2020-12-06

## 2021-01-18 RX ORDER — GABAPENTIN 300 MG/1
300 CAPSULE ORAL
COMMUNITY
Start: 2020-04-21

## 2021-01-18 NOTE — PROGRESS NOTES
Subjective   Enrique Ramirez is a 51 y.o. male.     History of Present Illness     51-year-old male with past medical history complicated with alcohol otitis, esophageal varices, portal hypertension, hepatorenal syndrome, prediabetes, folate deficiency, pericardial effusion, thrombocytopenia, esophagitis, pancreatic, anemia presents today for the first time in the office since 2018 for follow-up on vaccines and concern for infection around left eye.  A few weeks now.  Redness and mild swelling under left lower lash line.  No tenderness to palpation no drainage no discharge no pain.  No fevers chills night sweats no headache no vision changes.  Televisit in July 2020 for shingles episode.    2 years post liver transplant. Stage 4 CKD; listed for a kidney transplant.     When kidney function dips needs Zofran PRN. Sees Nephro with [facility] this week. Last needed Zofran last week and needs it for about a day a half. Will have Nephro put this in note this week and send to me so we know cleared with Nephro to take Zofran as needed.     Labs with transplant team every 2 weeks.     Had a colonoscopy through Dr Weber; due for one now. Will call them to set this up.           The following portions of the patient's history were reviewed and updated as appropriate: allergies, current medications, past family history, past medical history, past social history, past surgical history and problem list.    Review of Systems   Constitutional: Negative for chills and fever.   Respiratory: Negative for cough and shortness of breath.    Cardiovascular: Negative for chest pain, palpitations and leg swelling.   Gastrointestinal: Negative for abdominal pain, nausea and vomiting.       Objective   Physical Exam  Constitutional:       Appearance: He is well-developed.   HENT:      Head: Normocephalic and atraumatic.   Eyes:      Pupils: Pupils are equal, round, and reactive to light.   Neck:      Musculoskeletal: Normal range of motion  and neck supple.   Cardiovascular:      Rate and Rhythm: Normal rate and regular rhythm.      Heart sounds: Normal heart sounds. No murmur.   Pulmonary:      Effort: Pulmonary effort is normal. No respiratory distress.      Breath sounds: Normal breath sounds. No stridor. No wheezing or rales.   Skin:     General: Skin is warm.          Neurological:      Mental Status: He is alert and oriented to person, place, and time.   Psychiatric:         Behavior: Behavior normal.                 Assessment/Plan     Diagnoses and all orders for this visit:    1. Skin infection (Primary)  -     cephalexin (Keflex) 500 MG capsule; Take 1 capsule by mouth 2 (Two) Times a Day for 7 days.  Dispense: 14 capsule; Refill: 0      Patient will follow up with nephrologist about Zofran as needed or when kidney function drops.  If nephrologist is okay with this I can prescribe for him.  Patient will get records sent to me.  Continue care per transplant team and other specialists.  Get on waiting list for Shingrix vaccine.  Return to clinic in 6 months for follow-up or sooner if needed.

## 2021-04-02 ENCOUNTER — BULK ORDERING (OUTPATIENT)
Dept: CASE MANAGEMENT | Facility: OTHER | Age: 51
End: 2021-04-02

## 2021-04-02 DIAGNOSIS — Z23 IMMUNIZATION DUE: ICD-10-CM

## 2021-04-22 ENCOUNTER — CLINICAL SUPPORT (OUTPATIENT)
Dept: FAMILY MEDICINE CLINIC | Facility: CLINIC | Age: 51
End: 2021-04-22

## 2021-04-22 DIAGNOSIS — Z23 IMMUNIZATION DUE: Primary | ICD-10-CM

## 2021-04-22 PROCEDURE — 90750 HZV VACC RECOMBINANT IM: CPT | Performed by: FAMILY MEDICINE

## 2021-04-22 PROCEDURE — 90471 IMMUNIZATION ADMIN: CPT | Performed by: FAMILY MEDICINE

## 2021-04-22 NOTE — PROGRESS NOTES
Immunization  Immunization performed in LD by King Young MA. Patient tolerated the procedure well without complications.  04/22/21   King Young MA

## 2021-07-22 ENCOUNTER — OFFICE VISIT (OUTPATIENT)
Dept: FAMILY MEDICINE CLINIC | Facility: CLINIC | Age: 51
End: 2021-07-22

## 2021-07-22 VITALS
DIASTOLIC BLOOD PRESSURE: 90 MMHG | BODY MASS INDEX: 34.27 KG/M2 | HEART RATE: 71 BPM | HEIGHT: 72 IN | RESPIRATION RATE: 16 BRPM | TEMPERATURE: 97.3 F | WEIGHT: 253 LBS | SYSTOLIC BLOOD PRESSURE: 142 MMHG | OXYGEN SATURATION: 98 %

## 2021-07-22 DIAGNOSIS — F41.8 ANXIETY WITH DEPRESSION: ICD-10-CM

## 2021-07-22 DIAGNOSIS — N18.4 CHRONIC KIDNEY DISEASE (CKD), ACTIVE MEDICAL MANAGEMENT WITHOUT DIALYSIS, STAGE 4 (SEVERE) (HCC): ICD-10-CM

## 2021-07-22 DIAGNOSIS — Z23 IMMUNIZATION DUE: ICD-10-CM

## 2021-07-22 DIAGNOSIS — Z94.4 LIVER TRANSPLANT RECIPIENT (HCC): Primary | ICD-10-CM

## 2021-07-22 DIAGNOSIS — Z76.82 AWAITING TRANSPLANTATION OF KIDNEY: ICD-10-CM

## 2021-07-22 DIAGNOSIS — E66.09 OBESITY DUE TO EXCESS CALORIES WITH SERIOUS COMORBIDITY, UNSPECIFIED CLASSIFICATION: ICD-10-CM

## 2021-07-22 PROCEDURE — 90750 HZV VACC RECOMBINANT IM: CPT | Performed by: FAMILY MEDICINE

## 2021-07-22 PROCEDURE — 90471 IMMUNIZATION ADMIN: CPT | Performed by: FAMILY MEDICINE

## 2021-07-22 PROCEDURE — 99214 OFFICE O/P EST MOD 30 MIN: CPT | Performed by: FAMILY MEDICINE

## 2021-07-22 RX ORDER — ONDANSETRON 4 MG/1
4 TABLET, ORALLY DISINTEGRATING ORAL EVERY 8 HOURS PRN
Qty: 30 TABLET | Refills: 3 | Status: SHIPPED | OUTPATIENT
Start: 2021-07-22

## 2021-07-22 NOTE — PROGRESS NOTES
Injection  Injection performed in Left deltoid by Kelly Quiros. Patient tolerated the procedure well without complications.  07/22/21   Kelly Quiros

## 2021-07-22 NOTE — PROGRESS NOTES
Subjective   Enrique Ramirez is a 51 y.o. male.     History of Present Illness     51-year-old male presents today for 6-month follow-up on hypertension anemia elevated LFTs.  Complicated past medical history with alcoholic hepatitis, esophageal varices, portal hypertension, hepatorenal syndrome, prediabetes, folate deficiency, pericardial effusion, thrombocytopenia, and anemia.    2 years post liver transplant.  Stage IV CKD listed for kidney transplant.  Sees nephrology with [facility].  Needs Zofran as needed when kidney function dips down.  Last nephrology visit May 2021.  Per records no new medical events.  CKD due to hepatorenal syndrome in the presence of end-stage liver disease.  Nephrologist also follows anemia; patient now receiving EPO with dialysis.  No recent gout flares.  PPI okay to continue per nephrology.    Has labs with transplant team every month.    Wean himself off of Celexa while ago.  Never really helped him.  Interested in medication for anxiety with depression.  Feels like he has midcycle up and down.  Interested in seeing a therapist.  Given information.  Denies suicidal homicidal ideation or self injury.    Body mass index is 34.31 kg/m².      The following portions of the patient's history were reviewed and updated as appropriate: allergies, current medications, past family history, past medical history, past social history, past surgical history and problem list.    Review of Systems   Constitutional: Negative for chills and fever.   Respiratory: Negative for cough and shortness of breath.    Cardiovascular: Negative for chest pain, palpitations and leg swelling.   Gastrointestinal: Negative for abdominal pain, nausea and vomiting.   Psychiatric/Behavioral: Positive for dysphoric mood. Negative for self-injury and suicidal ideas. The patient is nervous/anxious.        Objective   Physical Exam  Constitutional:       Appearance: He is well-developed.   HENT:      Head: Normocephalic  and atraumatic.   Eyes:      Pupils: Pupils are equal, round, and reactive to light.   Cardiovascular:      Rate and Rhythm: Normal rate and regular rhythm.      Heart sounds: Normal heart sounds. No murmur heard.     Pulmonary:      Effort: Pulmonary effort is normal. No respiratory distress.      Breath sounds: Normal breath sounds. No stridor. No wheezing or rales.   Musculoskeletal:      Cervical back: Normal range of motion and neck supple.   Skin:     General: Skin is warm.   Neurological:      Mental Status: He is alert and oriented to person, place, and time.   Psychiatric:         Behavior: Behavior normal.                 Assessment/Plan     Diagnoses and all orders for this visit:    1. Liver transplant recipient (CMS/[facility]) (Primary)  -     Comprehensive Metabolic Panel  -     ondansetron ODT (Zofran ODT) 4 MG disintegrating tablet; Place 1 tablet on the tongue Every 8 (Eight) Hours As Needed for Nausea or Vomiting.  Dispense: 30 tablet; Refill: 3    2. Awaiting transplantation of kidney  -     Comprehensive Metabolic Panel  -     ondansetron ODT (Zofran ODT) 4 MG disintegrating tablet; Place 1 tablet on the tongue Every 8 (Eight) Hours As Needed for Nausea or Vomiting.  Dispense: 30 tablet; Refill: 3    3. Chronic kidney disease (CKD), active medical management without dialysis, stage 4 (severe) (CMS/[facility])  -     Comprehensive Metabolic Panel  -     ondansetron ODT (Zofran ODT) 4 MG disintegrating tablet; Place 1 tablet on the tongue Every 8 (Eight) Hours As Needed for Nausea or Vomiting.  Dispense: 30 tablet; Refill: 3    4. Anxiety with depression  -     sertraline (Zoloft) 50 MG tablet; Take 1 tablet by mouth Daily.  Dispense: 30 tablet; Refill: 1    5. Obesity due to excess calories with serious comorbidity, unspecified classification  -     Lipid Panel    6. Immunization due  -     Shingrix Vaccine      Return to clinic in 1 month for follow-up on anxiety with depression or sooner if needed.

## 2021-08-23 ENCOUNTER — OFFICE VISIT (OUTPATIENT)
Dept: FAMILY MEDICINE CLINIC | Facility: CLINIC | Age: 51
End: 2021-08-23

## 2021-08-23 VITALS
OXYGEN SATURATION: 100 % | SYSTOLIC BLOOD PRESSURE: 140 MMHG | HEIGHT: 72 IN | RESPIRATION RATE: 16 BRPM | BODY MASS INDEX: 34.27 KG/M2 | WEIGHT: 253 LBS | DIASTOLIC BLOOD PRESSURE: 94 MMHG | HEART RATE: 60 BPM | TEMPERATURE: 97.1 F

## 2021-08-23 DIAGNOSIS — F41.8 ANXIETY WITH DEPRESSION: Primary | ICD-10-CM

## 2021-08-23 PROBLEM — M54.9 CHRONIC BACK PAIN: Status: ACTIVE | Noted: 2020-07-11

## 2021-08-23 PROBLEM — S72.009A FRACTURE OF NECK OF FEMUR (HCC): Status: ACTIVE | Noted: 2019-10-15

## 2021-08-23 PROBLEM — N52.9 ERECTILE DYSFUNCTION: Status: ACTIVE | Noted: 2019-11-14

## 2021-08-23 PROBLEM — K75.9 INFLAMMATORY LIVER DISEASE: Status: ACTIVE | Noted: 2021-08-23

## 2021-08-23 PROBLEM — D84.9 IMMUNOSUPPRESSED STATUS (HCC): Status: ACTIVE | Noted: 2020-04-29

## 2021-08-23 PROBLEM — G61.0 GUILLAIN-BARRE DISEASE (HCC): Status: ACTIVE | Noted: 2020-04-29

## 2021-08-23 PROBLEM — K70.10 ALCOHOLIC HEPATITIS: Status: ACTIVE | Noted: 2018-06-22

## 2021-08-23 PROBLEM — D84.9 IMMUNOSUPPRESSION (HCC): Status: ACTIVE | Noted: 2019-11-14

## 2021-08-23 PROBLEM — R03.0 ELEVATED BLOOD PRESSURE READING: Status: ACTIVE | Noted: 2020-04-29

## 2021-08-23 PROBLEM — I85.01 BLEEDING ESOPHAGEAL VARICES (HCC): Status: ACTIVE | Noted: 2018-06-01

## 2021-08-23 PROBLEM — Z86.69 PERSONAL HISTORY OF OTHER DISEASES OF THE NERVOUS SYSTEM AND SENSE ORGANS: Status: ACTIVE | Noted: 2019-07-22

## 2021-08-23 PROBLEM — D64.9 ANEMIA: Status: ACTIVE | Noted: 2021-08-23

## 2021-08-23 PROBLEM — D61.818 PANCYTOPENIA (HCC): Status: ACTIVE | Noted: 2020-04-29

## 2021-08-23 PROBLEM — A49.1 INFECTION DUE TO VANCOMYCIN RESISTANT ENTEROCOCCUS (VRE): Status: ACTIVE | Noted: 2019-01-21

## 2021-08-23 PROBLEM — Z20.822 SUSPECTED COVID-19 VIRUS INFECTION: Status: ACTIVE | Noted: 2020-04-29

## 2021-08-23 PROBLEM — E83.01 WILSON'S DISEASE: Status: ACTIVE | Noted: 2020-04-29

## 2021-08-23 PROBLEM — G89.29 CHRONIC BACK PAIN: Status: ACTIVE | Noted: 2020-07-11

## 2021-08-23 PROBLEM — F41.9 ANXIETY: Status: ACTIVE | Noted: 2021-03-18

## 2021-08-23 PROBLEM — N18.9 CHRONIC KIDNEY DISEASE: Status: ACTIVE | Noted: 2019-11-14

## 2021-08-23 PROBLEM — Z91.89 AT RISK OF DISEASE: Status: ACTIVE | Noted: 2021-08-23

## 2021-08-23 PROBLEM — Z16.21 INFECTION DUE TO VANCOMYCIN RESISTANT ENTEROCOCCUS (VRE): Status: ACTIVE | Noted: 2019-01-21

## 2021-08-23 PROBLEM — R50.9 ACUTE FEBRILE ILLNESS: Status: ACTIVE | Noted: 2020-04-29

## 2021-08-23 PROBLEM — N28.9 KIDNEY DISORDER: Status: ACTIVE | Noted: 2021-08-23

## 2021-08-23 PROBLEM — M62.9 DISORDER OF MUSCLE: Status: ACTIVE | Noted: 2021-08-23

## 2021-08-23 PROBLEM — R50.81 NEUTROPENIC FEVER (HCC): Status: ACTIVE | Noted: 2020-04-29

## 2021-08-23 PROBLEM — K80.20 CHOLELITHIASIS: Status: ACTIVE | Noted: 2018-12-05

## 2021-08-23 PROBLEM — R79.89 ELEVATED LIVER FUNCTION TESTS: Status: ACTIVE | Noted: 2018-06-22

## 2021-08-23 PROBLEM — Z94.4 HISTORY OF LIVER TRANSPLANT (HCC): Status: ACTIVE | Noted: 2019-02-03

## 2021-08-23 PROBLEM — D70.9 NEUTROPENIC FEVER (HCC): Status: ACTIVE | Noted: 2020-04-29

## 2021-08-23 PROCEDURE — 99213 OFFICE O/P EST LOW 20 MIN: CPT | Performed by: FAMILY MEDICINE

## 2021-08-23 RX ORDER — GABAPENTIN 800 MG/1
800 TABLET ORAL
COMMUNITY
Start: 2021-07-04

## 2021-08-23 RX ORDER — SERTRALINE HYDROCHLORIDE 100 MG/1
100 TABLET, FILM COATED ORAL DAILY
Qty: 30 TABLET | Refills: 1 | Status: SHIPPED | OUTPATIENT
Start: 2021-08-23

## 2021-08-23 NOTE — PROGRESS NOTES
"Subjective   Enrique Ramirez is a 51 y.o. male.     History of Present Illness     Enrique Ramirez male 51 y.o., /94   Pulse 60   Temp 97.1 °F (36.2 °C) (Tympanic)   Resp 16   Ht 182.9 cm (72\")   Wt 115 kg (253 lb)   SpO2 100%   BMI 34.31 kg/m²   who presents today for follow up of Depression and Anxiety.  He reports medication has helped and needs to increase dose. Onset of symptoms was approximately several years ago.  He denies current suicidal and homicidal ideation. Risk factors are chronic illness, prior diagnosis of anxiety and prior diagnosis of depression.  Previous treatment includes celexa.  He complains of the following medication side effects: none.            The following portions of the patient's history were reviewed and updated as appropriate: allergies, current medications, past family history, past medical history, past social history, past surgical history and problem list.    Review of Systems   Constitutional: Negative for chills and fever.   Respiratory: Negative for cough and shortness of breath.    Cardiovascular: Negative for chest pain, palpitations and leg swelling.   Gastrointestinal: Negative for abdominal pain, nausea and vomiting.       Objective   Physical Exam  Constitutional:       Appearance: He is well-developed.   HENT:      Head: Normocephalic and atraumatic.   Eyes:      Pupils: Pupils are equal, round, and reactive to light.   Cardiovascular:      Rate and Rhythm: Normal rate and regular rhythm.      Heart sounds: Normal heart sounds. No murmur heard.     Pulmonary:      Effort: Pulmonary effort is normal. No respiratory distress.      Breath sounds: Normal breath sounds. No stridor. No wheezing or rales.   Musculoskeletal:      Cervical back: Normal range of motion and neck supple.   Skin:     General: Skin is warm.   Neurological:      Mental Status: He is alert and oriented to person, place, and time.   Psychiatric:         Behavior: Behavior normal. "                 Assessment/Plan     Diagnoses and all orders for this visit:    1. Anxiety with depression (Primary)  -     sertraline (Zoloft) 100 MG tablet; Take 1 tablet by mouth Daily.  Dispense: 30 tablet; Refill: 1      Increase Zoloft 100 mg a day.  Return to clinic in 1 month for follow-up or sooner if needed.

## 2021-09-14 ENCOUNTER — OFFICE VISIT (OUTPATIENT)
Dept: FAMILY MEDICINE CLINIC | Facility: CLINIC | Age: 51
End: 2021-09-14

## 2021-09-14 ENCOUNTER — TELEPHONE (OUTPATIENT)
Dept: FAMILY MEDICINE CLINIC | Facility: CLINIC | Age: 51
End: 2021-09-14

## 2021-09-14 DIAGNOSIS — F51.04 PSYCHOPHYSIOLOGICAL INSOMNIA: ICD-10-CM

## 2021-09-14 DIAGNOSIS — F41.8 ANXIETY WITH DEPRESSION: Primary | ICD-10-CM

## 2021-09-14 PROCEDURE — 99442 PR PHYS/QHP TELEPHONE EVALUATION 11-20 MIN: CPT | Performed by: FAMILY MEDICINE

## 2021-09-14 RX ORDER — ZOLPIDEM TARTRATE 5 MG/1
2.5 TABLET ORAL NIGHTLY PRN
Qty: 15 TABLET | Refills: 0 | Status: SHIPPED | OUTPATIENT
Start: 2021-09-14

## 2021-09-14 NOTE — TELEPHONE ENCOUNTER
Caller: Enrique Ramirez    Relationship to patient: Self    Best call back number: [phone]     Chief complaint: PATIENT GOT A CALL REQUESTING TO CHANGE APPOINTMENT TO TELEHEALTH     Type of visit: SWITCH APPOINTMENT     Requested date:  9/14/2021     If rescheduling, when is the original appointment: 9/14/2021      Additional notes: HUB ATTEMPTED TO WARM TRANSFER NOT SUCCESSFUL .

## 2021-09-14 NOTE — PROGRESS NOTES
Enrique Ramirez    1970  [id number]  Time spent reviewing E-Visit Questionnaire-5-10 minutes.  I have reviewed the e-Visit questionnaire and patient's answers, my assessment and plan documented below.    HPI    This visit was completed via telephone due to the restriction of the COVID-19 pandemic.  All issues as below were discussed and addressed but no physical exam was performed.  It was felt that the patient should be evaluated in clinic and they were directed there.  The patient verbally consented to visit.    Enrique Ramirez male 51 y.o., There were no vitals taken for this visit.  who presents today via telephone visit for follow up of Depression and Anxiety.  He reports increased dosage worsened insomnia went back to 50 mg a day; does not want to make any changes in medication today as he is setting up with a new primary care doctor as I am leaving the office.  Would like to continue Zoloft 50 mg a day with some help with insomnia with small prescription of Ambien which she would use sparingly.  I discussed that Ambien is not meant for chronic use and patient expressed understanding. Onset of symptoms was approximately several years ago.  He denies current suicidal and homicidal ideation. Risk factors are chronic illness, prior diagnosis of anxiety and prior diagnosis of depression.  Previous treatment includes Celexa. He complains of the following medication side effects: none.       Diagnoses and all orders for this visit:    1. Anxiety with depression (Primary)  -     zolpidem (Ambien) 5 MG tablet; Take 0.5 tablets by mouth At Night As Needed for Sleep.  Dispense: 15 tablet; Refill: 0    2. Psychophysiological insomnia  -     zolpidem (Ambien) 5 MG tablet; Take 0.5 tablets by mouth At Night As Needed for Sleep.  Dispense: 15 tablet; Refill: 0       Time spent during visit: 15 minutes.    Any medications prescribed have been sent electronically to   [id number] [place] [facility] [address] AT [facility] [phone] [facility] [phone] FX  [address]  [place] [address]  Phone: [phone] Fax: [phone]        Cydney Chase MD  09/14/21  11:21 EDT

## 2022-01-05 NOTE — PROGRESS NOTES
[facility], [place], operated by [facility]  Inpatient Progress Note    Reason for Follow Up:  Alcohol hepatitis    Subjective     Interval History:   Nothing significant occurred overnight    Current Facility-Administered Medications:   •  albumin human 25 % IV SOLN 25 g, 25 g, Intravenous, Q8H, Erin Bellamy MD, 25 g at 12/15/18 0604  •  benzonatate (TESSALON) capsule 100 mg, 100 mg, Oral, TID PRN, Enrique Obrien MD  •  bisacodyl (DULCOLAX) EC tablet 5 mg, 5 mg, Oral, Daily PRN, Valentino Valdez MD  •  bumetanide (BUMEX) 10 mg in sodium chloride 0.9 % 100 mL (0.1 mg/mL) infusion, 0.5 mg/hr, Intravenous, Continuous, Erin Bellamy MD, Last Rate: 5 mL/hr at 12/15/18 0704, 0.5 mg/hr at 12/15/18 0704  •  bumetanide (BUMEX) injection 1 mg, 1 mg, Intravenous, Once, Erin Bellamy MD  •  calcium carbonate (TUMS) chewable tablet 500 mg (200 mg elemental), 2 tablet, Oral, BID PRN, Valentino Valdez MD  •  citalopram (CeleXA) tablet 10 mg, 10 mg, Oral, Daily, Michele Dixon MD, 10 mg at 12/15/18 1146  •  guaiFENesin (MUCINEX) 12 hr tablet 600 mg, 600 mg, Oral, Q12H, Valentino Valdez MD, 600 mg at 12/15/18 0754  •  hydrOXYzine (ATARAX) tablet 50 mg, 50 mg, Oral, Nightly, Emerald White MD, 50 mg at 12/14/18 2004  •  ipratropium-albuterol (DUO-NEB) nebulizer solution 3 mL, 3 mL, Nebulization, Q4H PRN, Michele Dixon MD  •  lactulose (CHRONULAC) 10 GM/15ML solution 20 g, 20 g, Oral, BID, Valentino Valdez MD, 20 g at 12/15/18 0755  •  midodrine (PROAMATINE) tablet 10 mg, 10 mg, Oral, TID AC, Emerald White MD, 10 mg at 12/15/18 1146  •  nitroglycerin (NITROSTAT) SL tablet 0.4 mg, 0.4 mg, Sublingual, Q5 Min PRN, Valentino Valdez MD  •  ondansetron (ZOFRAN) tablet 4 mg, 4 mg, Oral, Q6H PRN **OR** ondansetron ODT (ZOFRAN-ODT) disintegrating tablet 4 mg, 4 mg, Oral, Q6H PRN **OR** ondansetron (ZOFRAN) injection 4 mg, 4 mg, Intravenous, Q6H PRN, Valentino Valdez MD  •  oxyCODONE  Patient seen for length of stay. Chart reviewed. PO intake good at meals. Yann being given bid to help aid in wound healing. Labs and medications reviewed. Will continue to monitor per policy/consult.    (ROXICODONE) immediate release tablet 5 mg, 5 mg, Oral, Q4H PRN, Valentino Valdez MD, 5 mg at 12/15/18 1146  •  pantoprazole (PROTONIX) EC tablet 40 mg, 40 mg, Oral, QAM AC, Valention Valdez MD, 40 mg at 12/15/18 0704  •  sodium chloride (OCEAN) nasal spray 2 spray, 2 spray, Each Nare, PRN, Ozzie Painter MD  •  [COMPLETED] Insert peripheral IV, , , Once **AND** sodium chloride 0.9 % flush 10 mL, 10 mL, Intravenous, PRN, Gurinder Burnett MD  •  sodium chloride 0.9 % flush 3 mL, 3 mL, Intravenous, Q12H, Valentino Valdez MD, 3 mL at 12/15/18 0755  •  sodium chloride 0.9 % flush 3-10 mL, 3-10 mL, Intravenous, PRN, Valentino Valdez MD  •  zolpidem (AMBIEN) tablet 5 mg, 5 mg, Oral, Nightly PRN, Valentino Valdez MD, 5 mg at 12/05/18 2103  Review of Systems:    He has weakness and fatigue all other systems reviewed and negative    Objective     Vital Signs  Temp:  [97.5 °F (36.4 °C)-98.4 °F (36.9 °C)] 98 °F (36.7 °C)  Heart Rate:  [74-89] 82  Resp:  [18-20] 18  BP: ()/(51-55) 108/54  Body mass index is 35.4 kg/m².    Intake/Output Summary (Last 24 hours) at 12/15/2018 1250  Last data filed at 12/15/2018 0753  Gross per 24 hour   Intake 910 ml   Output 1350 ml   Net -440 ml     I/O this shift:  In: 240 [P.O.:240]  Out: 100 [Urine:100]     Physical Exam:   General: patient awake, alert and cooperative   Eyes: Normal lids and lashes, scleral icterus   Neck: supple, normal ROM   Skin: warm and dry, not jaundiced   Cardiovascular: regular rhythm and rate, no murmurs auscultated   Pulm: clear to auscultation bilaterally, regular and unlabored   Abdomen: soft, nontender, nondistended; normal bowel sounds   Rectal: deferred   Extremities: no rash or edema   Psychiatric: Normal mood and behavior; memory intact     Results Review:     I reviewed the patient's new clinical results.    Results from last 7 days   Lab Units  12/15/18   0424  12/14/18   0349  12/13/18   0804   WBC 10*3/mm3  6.93  6.54  7.41    HEMOGLOBIN g/dL  7.9*  7.4*  8.0*   HEMATOCRIT %  23.1*  21.1*  24.6*   PLATELETS 10*3/mm3  73*  66*  65*     Results from last 7 days   Lab Units  12/15/18   0424  12/14/18   0349  12/13/18   0803   SODIUM mmol/L  130*  131*  132*   POTASSIUM mmol/L  3.6  3.6  4.0   CHLORIDE mmol/L  91*  92*  91*   CO2 mmol/L  22.4  22.9  24.1   BUN mg/dL  36*  34*  34*   CREATININE mg/dL  1.92*  1.61*  1.56*   CALCIUM mg/dL  10.1  9.3  9.5   BILIRUBIN mg/dL  9.6*  9.4*  11.0*   ALK PHOS U/L  77  70  77   ALT (SGPT) U/L  23  24  29   AST (SGOT) U/L  36  38  42*   GLUCOSE mg/dL  81  89  131*     Results from last 7 days   Lab Units  12/15/18   0424  12/14/18   0349  12/13/18   0803   INR   2.03*  2.20*  2.09*     Lab Results   Lab Value Date/Time    LIPASE 51 12/05/2018 1306    LIPASE 53 11/28/2018 1101    LIPASE 38 10/24/2018 1439    LIPASE 45 06/21/2018 1906       Radiology:  MRI abdomen wo contrast mrcp   Final Result   This exam is limited by absence of contrast.   1. The liver demonstrates periportal low signal intensity on both T1 and   T2-weighted images that is pronounced on the out of phase images   suggestive presence of fat in the periportal region. These findings may   be suggestive of a chronic liver disease. Primary biliary cirrhosis and   alcoholic hepatitis are on the differential.   2. Splenomegaly and small ascites. Bilateral small to moderate size   layering pleural effusions with bibasilar atelectasis.   2. Limited evaluation of the gallbladder secondary to absence of   contrast, however cholelithiasis and gallstones are identified on this   current study.       This report was finalized on 12/12/2018 1:33 PM by Dr. Stephanie Schulz M.D.          XR Chest PA & Lateral   Final Result      XR Chest 1 View   Final Result   Persistent mid to lower lung infiltrate/atelectasis, with   increased pleural effusion apparent on the left, continued follow-up   recommended. Cardiomegaly, pulmonary vascular congestion.        This report was finalized on 12/7/2018 5:58 PM by Dr. Rojas Dubon M.D.          CT Chest Without Contrast   Final Result   1. Basilar predominant opacities favored to reflect atelectasis although   pneumonia not excluded.   2. Right greater than left pleural effusions, moderate pericardial   effusion.   3. Mild mediastinal adenopathy.   4. Homogeneous splenic enlargement.                           .        This report was finalized on 12/7/2018 5:53 AM by Valentino Nguyen M.D.          US Renal Bilateral   Final Result   Ascites. Otherwise negative renal sonogram. There is no   evidence of obstruction.       This report was finalized on 12/6/2018 8:05 PM by Dr. Brannon Cruz M.D.          XR Chest 1 View   Final Result   Persistent patchy densities in the mid to lower lungs. Increased left   more than right pleural effusions. Cardiomegaly with persistent mild   pulmonary vascular congestion. Continued follow-up/further evaluation   recommended as indicated.                   This report was finalized on 12/5/2018 1:49 PM by Dr. Rojas Dubon M.D.              Assessment/Plan     Patient Active Problem List   Diagnosis   • Acute upper GI bleed   • Gout   • Pre-diabetes   • Cholelithiasis   • Elevated liver function tests   • Alcohol abuse   • Alcoholic hepatitis without ascites   • Thrombocytopenia (CMS/HCC)   • Esophagitis   • Folate deficiency   • Acute anemia   • Esophageal varices (CMS/HCC)   • Hepatitis, alcoholic   • Ascites   • Portal hypertension (CMS/HCC)   • Hyponatremia   • Symptomatic anemia   • LÓPEZ (acute kidney injury) (CMS/HCC)   • Anasarca   • Cough   • Epistaxis   • Hemoptysis   • Rhinovirus infection   • Pericardial effusion   • Pneumonia     Assessment/Recommendations:  1) Alcoholic liver disease. He claims no ETOH in 2 mos.  He has completed full course of oral steroids with relatively minimal change in his TB.  Continue lactulose  2) 1+ esophageal varices seen on 6/18 EGD  3)  Hemoptysis - Pulmonary is following.   4) Abnormal gallbladder on recent US. MRCP with stones/sludge, no concerning findings  5) Anemia - likely secondary to #3.            I discussed the patients findings and my recommendations with patient and nursing staff.    Manjeet Wilson MD

## 2024-11-29 NOTE — PROGRESS NOTES
Medication:    Disp Refills Start End    tirzepatide (Mounjaro) 15 MG/0.5ML Solution Pen-injector 2 mL 3 7/12/2024 --    Sig - Route: Inject 15 mg into the skin every 7 days. Indications: Type 2 Diabetes - Subcutaneous    Sent to pharmacy as: Mounjaro 15 MG/0.5ML Subcutaneous Solution Pen-injector (tirzepatide)    Class: Eprescribe    E-Prescribing Status: Receipt confirmed by pharmacy (7/12/2024  4:41 PM CDT)     passed protocol.   Last office visit date: 10/25/24  Next appointment scheduled?: No;  patient seen annually and as needed    Number of refills given: 3        Controlled Substance: No        Date of any Lab Results pertaining to medication:   Lab Results   Component Value Date    SODIUM 140 10/25/2024    POTASSIUM 4.4 10/25/2024    CHLORIDE 109 10/25/2024    CO2 29 10/25/2024    BUN 23 (H) 10/25/2024    CREATININE 1.45 (H) 10/25/2024    GLUCOSE 109 (H) 10/25/2024     Lab Results   Component Value Date    CHOLESTEROL 171 04/02/2024    HDL 49 (L) 04/02/2024    CALCLDL 54 04/02/2024    TRIGLYCERIDE 338 (H) 04/02/2024           Patient discharged ambulatory with AVS.

## (undated) DEVICE — Device: Brand: DEFENDO AIR/WATER/SUCTION AND BIOPSY VALVE

## (undated) DEVICE — FRCP BX RADJAW4 NDL 2.8 240CM LG OG BX40

## (undated) DEVICE — TUBING, SUCTION, 1/4" X 10', STRAIGHT: Brand: MEDLINE

## (undated) DEVICE — TBG 02 CRUSH RESIST LF CLR 7FT

## (undated) DEVICE — CANN NASL CO2 TRULINK W/O2 A/

## (undated) DEVICE — BITEBLOCK OMNI BLOC